# Patient Record
Sex: MALE | Race: WHITE | Employment: OTHER | URBAN - METROPOLITAN AREA
[De-identification: names, ages, dates, MRNs, and addresses within clinical notes are randomized per-mention and may not be internally consistent; named-entity substitution may affect disease eponyms.]

---

## 2017-01-03 ENCOUNTER — HOSPITAL ENCOUNTER (OUTPATIENT)
Dept: PHYSICAL THERAPY | Age: 82
Discharge: HOME | End: 2016-12-31
Admitting: ORTHOPAEDIC SURGERY

## 2017-01-03 ENCOUNTER — OFFICE VISIT (OUTPATIENT)
Dept: INTERNAL MEDICINE CLINIC | Age: 82
End: 2017-01-03

## 2017-01-03 VITALS
SYSTOLIC BLOOD PRESSURE: 118 MMHG | HEART RATE: 83 BPM | TEMPERATURE: 98 F | WEIGHT: 203 LBS | DIASTOLIC BLOOD PRESSURE: 72 MMHG | BODY MASS INDEX: 29.06 KG/M2 | OXYGEN SATURATION: 93 %

## 2017-01-03 DIAGNOSIS — J18.9 PNEUMONIA OF RIGHT LOWER LOBE DUE TO INFECTIOUS ORGANISM: ICD-10-CM

## 2017-01-03 PROCEDURE — 99214 OFFICE O/P EST MOD 30 MIN: CPT | Performed by: FAMILY MEDICINE

## 2017-01-03 RX ORDER — LEVOFLOXACIN 750 MG/1
750 TABLET ORAL DAILY
Qty: 7 TABLET | Refills: 0 | Status: SHIPPED | OUTPATIENT
Start: 2017-01-03 | End: 2017-01-10

## 2017-01-03 RX ORDER — GUAIFENESIN 600 MG/1
600 TABLET, EXTENDED RELEASE ORAL 2 TIMES DAILY
Qty: 14 TABLET | Refills: 0 | Status: SHIPPED | OUTPATIENT
Start: 2017-01-03 | End: 2017-01-10

## 2017-01-03 ASSESSMENT — ENCOUNTER SYMPTOMS
NAUSEA: 0
SINUS PRESSURE: 0
DIARRHEA: 0
COUGH: 1
RHINORRHEA: 0
CHEST TIGHTNESS: 0
SORE THROAT: 0
VOMITING: 0
WHEEZING: 0

## 2017-01-03 ASSESSMENT — PATIENT HEALTH QUESTIONNAIRE - PHQ9
SUM OF ALL RESPONSES TO PHQ QUESTIONS 1-9: 0
2. FEELING DOWN, DEPRESSED OR HOPELESS: 0
1. LITTLE INTEREST OR PLEASURE IN DOING THINGS: 0
SUM OF ALL RESPONSES TO PHQ9 QUESTIONS 1 & 2: 0

## 2017-01-04 ENCOUNTER — TELEPHONE (OUTPATIENT)
Dept: INTERNAL MEDICINE CLINIC | Age: 82
End: 2017-01-04

## 2017-01-10 ENCOUNTER — OFFICE VISIT (OUTPATIENT)
Dept: INTERNAL MEDICINE CLINIC | Age: 82
End: 2017-01-10

## 2017-01-10 VITALS
OXYGEN SATURATION: 96 % | WEIGHT: 203 LBS | HEART RATE: 100 BPM | SYSTOLIC BLOOD PRESSURE: 130 MMHG | DIASTOLIC BLOOD PRESSURE: 70 MMHG | HEIGHT: 70 IN | BODY MASS INDEX: 29.06 KG/M2

## 2017-01-10 DIAGNOSIS — I10 ESSENTIAL HYPERTENSION: Chronic | ICD-10-CM

## 2017-01-10 DIAGNOSIS — E11.65 UNCONTROLLED TYPE 2 DIABETES MELLITUS WITH HYPERGLYCEMIA, WITH LONG-TERM CURRENT USE OF INSULIN (HCC): Primary | ICD-10-CM

## 2017-01-10 DIAGNOSIS — Z79.4 UNCONTROLLED TYPE 2 DIABETES MELLITUS WITH HYPERGLYCEMIA, WITH LONG-TERM CURRENT USE OF INSULIN (HCC): Primary | ICD-10-CM

## 2017-01-10 LAB
CREATININE URINE POCT: NORMAL
HBA1C MFR BLD: 8.9 %
MICROALBUMIN/CREAT 24H UR: NORMAL MG/G{CREAT}

## 2017-01-10 PROCEDURE — 82044 UR ALBUMIN SEMIQUANTITATIVE: CPT | Performed by: NURSE PRACTITIONER

## 2017-01-10 PROCEDURE — 83036 HEMOGLOBIN GLYCOSYLATED A1C: CPT | Performed by: NURSE PRACTITIONER

## 2017-01-10 PROCEDURE — 99214 OFFICE O/P EST MOD 30 MIN: CPT | Performed by: NURSE PRACTITIONER

## 2017-01-10 ASSESSMENT — ENCOUNTER SYMPTOMS
CONSTIPATION: 0
EYES NEGATIVE: 1
BLOOD IN STOOL: 0
COLOR CHANGE: 0
DIARRHEA: 0
COUGH: 0
BACK PAIN: 0
SHORTNESS OF BREATH: 0

## 2017-01-13 ENCOUNTER — HOSPITAL ENCOUNTER (OUTPATIENT)
Dept: PHYSICAL THERAPY | Age: 82
Discharge: HOME | End: 2017-01-13
Admitting: ORTHOPAEDIC SURGERY

## 2017-01-19 ENCOUNTER — HOSPITAL ENCOUNTER (OUTPATIENT)
Dept: PHYSICAL THERAPY | Age: 82
Discharge: HOME | End: 2017-01-19
Admitting: ORTHOPAEDIC SURGERY

## 2017-01-24 ENCOUNTER — TELEPHONE (OUTPATIENT)
Dept: INTERNAL MEDICINE CLINIC | Age: 82
End: 2017-01-24

## 2017-01-26 ENCOUNTER — HOSPITAL ENCOUNTER (OUTPATIENT)
Dept: OTHER | Age: 82
Discharge: HOME | End: 2017-01-26
Attending: FAMILY MEDICINE | Admitting: FAMILY MEDICINE

## 2017-01-26 ENCOUNTER — HOSPITAL ENCOUNTER (OUTPATIENT)
Dept: PHYSICAL THERAPY | Age: 82
Discharge: HOME | End: 2017-01-26
Admitting: ORTHOPAEDIC SURGERY

## 2017-01-26 ENCOUNTER — OFFICE VISIT (OUTPATIENT)
Dept: INTERNAL MEDICINE CLINIC | Age: 82
End: 2017-01-26

## 2017-01-26 VITALS
WEIGHT: 208 LBS | DIASTOLIC BLOOD PRESSURE: 82 MMHG | BODY MASS INDEX: 29.78 KG/M2 | HEART RATE: 82 BPM | SYSTOLIC BLOOD PRESSURE: 130 MMHG | HEIGHT: 70 IN

## 2017-01-26 DIAGNOSIS — R07.81 RIB PAIN: Primary | ICD-10-CM

## 2017-01-26 DIAGNOSIS — R07.81 ANTERIOR PLEURITIC PAIN: ICD-10-CM

## 2017-01-26 DIAGNOSIS — R05.9 COUGH: ICD-10-CM

## 2017-01-26 PROCEDURE — 1123F ACP DISCUSS/DSCN MKR DOCD: CPT | Performed by: FAMILY MEDICINE

## 2017-01-26 PROCEDURE — 99214 OFFICE O/P EST MOD 30 MIN: CPT | Performed by: FAMILY MEDICINE

## 2017-01-26 PROCEDURE — G8598 ASA/ANTIPLAT THER USED: HCPCS | Performed by: FAMILY MEDICINE

## 2017-01-26 PROCEDURE — G8420 CALC BMI NORM PARAMETERS: HCPCS | Performed by: FAMILY MEDICINE

## 2017-01-26 PROCEDURE — G8484 FLU IMMUNIZE NO ADMIN: HCPCS | Performed by: FAMILY MEDICINE

## 2017-01-26 PROCEDURE — 1036F TOBACCO NON-USER: CPT | Performed by: FAMILY MEDICINE

## 2017-01-26 PROCEDURE — 4040F PNEUMOC VAC/ADMIN/RCVD: CPT | Performed by: FAMILY MEDICINE

## 2017-01-26 PROCEDURE — G8428 CUR MEDS NOT DOCUMENT: HCPCS | Performed by: FAMILY MEDICINE

## 2017-01-26 ASSESSMENT — PATIENT HEALTH QUESTIONNAIRE - PHQ9
2. FEELING DOWN, DEPRESSED OR HOPELESS: 0
SUM OF ALL RESPONSES TO PHQ9 QUESTIONS 1 & 2: 0
SUM OF ALL RESPONSES TO PHQ QUESTIONS 1-9: 0
1. LITTLE INTEREST OR PLEASURE IN DOING THINGS: 0

## 2017-01-29 ASSESSMENT — ENCOUNTER SYMPTOMS
SHORTNESS OF BREATH: 0
CHEST TIGHTNESS: 0
BLOOD IN STOOL: 0
COLOR CHANGE: 0
BACK PAIN: 0
EYES NEGATIVE: 1
CONSTIPATION: 0
COUGH: 1
DIARRHEA: 0

## 2017-02-02 ENCOUNTER — HOSPITAL ENCOUNTER (OUTPATIENT)
Dept: PHYSICAL THERAPY | Age: 82
Discharge: HOME | End: 2017-02-02
Admitting: ORTHOPAEDIC SURGERY

## 2017-02-07 ENCOUNTER — OFFICE VISIT (OUTPATIENT)
Dept: INTERNAL MEDICINE CLINIC | Age: 82
End: 2017-02-07

## 2017-02-07 VITALS
HEIGHT: 70 IN | BODY MASS INDEX: 29.2 KG/M2 | WEIGHT: 204 LBS | DIASTOLIC BLOOD PRESSURE: 76 MMHG | SYSTOLIC BLOOD PRESSURE: 124 MMHG

## 2017-02-07 DIAGNOSIS — E11.65 UNCONTROLLED TYPE 2 DIABETES MELLITUS WITH HYPERGLYCEMIA, WITH LONG-TERM CURRENT USE OF INSULIN (HCC): Primary | ICD-10-CM

## 2017-02-07 DIAGNOSIS — Z79.4 UNCONTROLLED TYPE 2 DIABETES MELLITUS WITH HYPERGLYCEMIA, WITH LONG-TERM CURRENT USE OF INSULIN (HCC): Primary | ICD-10-CM

## 2017-02-07 DIAGNOSIS — I10 ESSENTIAL HYPERTENSION: ICD-10-CM

## 2017-02-07 PROCEDURE — 99213 OFFICE O/P EST LOW 20 MIN: CPT | Performed by: NURSE PRACTITIONER

## 2017-02-07 PROCEDURE — 1123F ACP DISCUSS/DSCN MKR DOCD: CPT | Performed by: NURSE PRACTITIONER

## 2017-02-07 PROCEDURE — G8484 FLU IMMUNIZE NO ADMIN: HCPCS | Performed by: NURSE PRACTITIONER

## 2017-02-07 PROCEDURE — G8598 ASA/ANTIPLAT THER USED: HCPCS | Performed by: NURSE PRACTITIONER

## 2017-02-07 PROCEDURE — 4040F PNEUMOC VAC/ADMIN/RCVD: CPT | Performed by: NURSE PRACTITIONER

## 2017-02-07 PROCEDURE — 1036F TOBACCO NON-USER: CPT | Performed by: NURSE PRACTITIONER

## 2017-02-07 PROCEDURE — G8428 CUR MEDS NOT DOCUMENT: HCPCS | Performed by: NURSE PRACTITIONER

## 2017-02-07 PROCEDURE — G8420 CALC BMI NORM PARAMETERS: HCPCS | Performed by: NURSE PRACTITIONER

## 2017-02-07 ASSESSMENT — ENCOUNTER SYMPTOMS
COUGH: 0
CONSTIPATION: 0
SHORTNESS OF BREATH: 0
EYES NEGATIVE: 1
COLOR CHANGE: 0
DIARRHEA: 0
BACK PAIN: 0
BLOOD IN STOOL: 0

## 2017-02-08 ENCOUNTER — HOSPITAL ENCOUNTER (OUTPATIENT)
Dept: PHYSICAL THERAPY | Age: 82
Discharge: HOME | End: 2017-02-08
Admitting: ORTHOPAEDIC SURGERY

## 2017-02-13 RX ORDER — PEN NEEDLE, DIABETIC 31 GX5/16"
NEEDLE, DISPOSABLE MISCELLANEOUS
Qty: 100 EACH | Refills: 2 | Status: SHIPPED | OUTPATIENT
Start: 2017-02-13 | End: 2017-12-09 | Stop reason: SDUPTHER

## 2017-02-23 ENCOUNTER — HOSPITAL ENCOUNTER (OUTPATIENT)
Dept: GENERAL RADIOLOGY | Age: 82
Discharge: HOME | End: 2017-02-23
Attending: INTERNAL MEDICINE | Admitting: INTERNAL MEDICINE

## 2017-02-23 DIAGNOSIS — R13.13 DYSPHAGIA, CRICOPHARYNGEAL: ICD-10-CM

## 2017-03-07 ENCOUNTER — OFFICE VISIT (OUTPATIENT)
Dept: INTERNAL MEDICINE CLINIC | Age: 82
End: 2017-03-07

## 2017-03-07 VITALS
WEIGHT: 200 LBS | SYSTOLIC BLOOD PRESSURE: 120 MMHG | HEIGHT: 70 IN | DIASTOLIC BLOOD PRESSURE: 68 MMHG | BODY MASS INDEX: 28.63 KG/M2

## 2017-03-07 DIAGNOSIS — E11.65 TYPE 2 DIABETES MELLITUS WITH HYPERGLYCEMIA, WITH LONG-TERM CURRENT USE OF INSULIN (HCC): Primary | ICD-10-CM

## 2017-03-07 DIAGNOSIS — Z79.4 TYPE 2 DIABETES MELLITUS WITH HYPERGLYCEMIA, WITH LONG-TERM CURRENT USE OF INSULIN (HCC): Primary | ICD-10-CM

## 2017-03-07 DIAGNOSIS — I10 ESSENTIAL HYPERTENSION: Chronic | ICD-10-CM

## 2017-03-07 LAB — HBA1C MFR BLD: 8.9 %

## 2017-03-07 PROCEDURE — 1036F TOBACCO NON-USER: CPT | Performed by: NURSE PRACTITIONER

## 2017-03-07 PROCEDURE — G8427 DOCREV CUR MEDS BY ELIG CLIN: HCPCS | Performed by: NURSE PRACTITIONER

## 2017-03-07 PROCEDURE — 4040F PNEUMOC VAC/ADMIN/RCVD: CPT | Performed by: NURSE PRACTITIONER

## 2017-03-07 PROCEDURE — 99214 OFFICE O/P EST MOD 30 MIN: CPT | Performed by: NURSE PRACTITIONER

## 2017-03-07 PROCEDURE — 1123F ACP DISCUSS/DSCN MKR DOCD: CPT | Performed by: NURSE PRACTITIONER

## 2017-03-07 PROCEDURE — G8598 ASA/ANTIPLAT THER USED: HCPCS | Performed by: NURSE PRACTITIONER

## 2017-03-07 PROCEDURE — G8484 FLU IMMUNIZE NO ADMIN: HCPCS | Performed by: NURSE PRACTITIONER

## 2017-03-07 PROCEDURE — G8420 CALC BMI NORM PARAMETERS: HCPCS | Performed by: NURSE PRACTITIONER

## 2017-03-07 PROCEDURE — 83036 HEMOGLOBIN GLYCOSYLATED A1C: CPT | Performed by: NURSE PRACTITIONER

## 2017-03-07 ASSESSMENT — ENCOUNTER SYMPTOMS
COLOR CHANGE: 0
SHORTNESS OF BREATH: 0
DIARRHEA: 0
EYES NEGATIVE: 1
BLOOD IN STOOL: 0
COUGH: 0
BACK PAIN: 0
CONSTIPATION: 0

## 2017-03-16 ENCOUNTER — OFFICE VISIT (OUTPATIENT)
Dept: ORTHOPEDIC SURGERY | Age: 82
End: 2017-03-16

## 2017-03-16 VITALS
SYSTOLIC BLOOD PRESSURE: 130 MMHG | HEIGHT: 70 IN | BODY MASS INDEX: 28.63 KG/M2 | HEART RATE: 78 BPM | WEIGHT: 199.96 LBS | DIASTOLIC BLOOD PRESSURE: 70 MMHG

## 2017-03-16 DIAGNOSIS — S72.001D CLOSED FRACTURE OF NECK OF RIGHT FEMUR WITH ROUTINE HEALING, SUBSEQUENT ENCOUNTER: Primary | ICD-10-CM

## 2017-03-16 PROCEDURE — G8484 FLU IMMUNIZE NO ADMIN: HCPCS | Performed by: ORTHOPAEDIC SURGERY

## 2017-03-16 PROCEDURE — G8427 DOCREV CUR MEDS BY ELIG CLIN: HCPCS | Performed by: ORTHOPAEDIC SURGERY

## 2017-03-16 PROCEDURE — 1123F ACP DISCUSS/DSCN MKR DOCD: CPT | Performed by: ORTHOPAEDIC SURGERY

## 2017-03-16 PROCEDURE — G8598 ASA/ANTIPLAT THER USED: HCPCS | Performed by: ORTHOPAEDIC SURGERY

## 2017-03-16 PROCEDURE — 99213 OFFICE O/P EST LOW 20 MIN: CPT | Performed by: ORTHOPAEDIC SURGERY

## 2017-03-16 PROCEDURE — 4040F PNEUMOC VAC/ADMIN/RCVD: CPT | Performed by: ORTHOPAEDIC SURGERY

## 2017-03-16 PROCEDURE — G8420 CALC BMI NORM PARAMETERS: HCPCS | Performed by: ORTHOPAEDIC SURGERY

## 2017-03-16 PROCEDURE — 1036F TOBACCO NON-USER: CPT | Performed by: ORTHOPAEDIC SURGERY

## 2017-03-23 ENCOUNTER — TELEPHONE (OUTPATIENT)
Dept: ORTHOPEDIC SURGERY | Age: 82
End: 2017-03-23

## 2017-03-29 ENCOUNTER — TELEPHONE (OUTPATIENT)
Dept: INTERNAL MEDICINE CLINIC | Age: 82
End: 2017-03-29

## 2017-04-14 ENCOUNTER — TELEPHONE (OUTPATIENT)
Dept: INTERNAL MEDICINE CLINIC | Age: 82
End: 2017-04-14

## 2017-04-19 ENCOUNTER — OFFICE VISIT (OUTPATIENT)
Dept: INTERNAL MEDICINE CLINIC | Age: 82
End: 2017-04-19

## 2017-04-19 VITALS
WEIGHT: 207 LBS | BODY MASS INDEX: 29.63 KG/M2 | OXYGEN SATURATION: 95 % | DIASTOLIC BLOOD PRESSURE: 58 MMHG | HEART RATE: 78 BPM | TEMPERATURE: 98.9 F | SYSTOLIC BLOOD PRESSURE: 110 MMHG | HEIGHT: 70 IN

## 2017-04-19 DIAGNOSIS — E11.65 UNCONTROLLED TYPE 2 DIABETES MELLITUS WITH HYPERGLYCEMIA, WITH LONG-TERM CURRENT USE OF INSULIN (HCC): ICD-10-CM

## 2017-04-19 DIAGNOSIS — J30.2 SEASONAL ALLERGIC RHINITIS, UNSPECIFIED ALLERGIC RHINITIS TRIGGER: Primary | ICD-10-CM

## 2017-04-19 DIAGNOSIS — R06.02 SOB (SHORTNESS OF BREATH): ICD-10-CM

## 2017-04-19 DIAGNOSIS — I10 ESSENTIAL HYPERTENSION: Chronic | ICD-10-CM

## 2017-04-19 DIAGNOSIS — Z79.4 UNCONTROLLED TYPE 2 DIABETES MELLITUS WITH HYPERGLYCEMIA, WITH LONG-TERM CURRENT USE OF INSULIN (HCC): ICD-10-CM

## 2017-04-19 PROCEDURE — 4040F PNEUMOC VAC/ADMIN/RCVD: CPT | Performed by: NURSE PRACTITIONER

## 2017-04-19 PROCEDURE — G8420 CALC BMI NORM PARAMETERS: HCPCS | Performed by: NURSE PRACTITIONER

## 2017-04-19 PROCEDURE — 1123F ACP DISCUSS/DSCN MKR DOCD: CPT | Performed by: NURSE PRACTITIONER

## 2017-04-19 PROCEDURE — 1036F TOBACCO NON-USER: CPT | Performed by: NURSE PRACTITIONER

## 2017-04-19 PROCEDURE — G8598 ASA/ANTIPLAT THER USED: HCPCS | Performed by: NURSE PRACTITIONER

## 2017-04-19 PROCEDURE — G8427 DOCREV CUR MEDS BY ELIG CLIN: HCPCS | Performed by: NURSE PRACTITIONER

## 2017-04-19 PROCEDURE — 99214 OFFICE O/P EST MOD 30 MIN: CPT | Performed by: NURSE PRACTITIONER

## 2017-04-19 ASSESSMENT — ENCOUNTER SYMPTOMS
COLOR CHANGE: 0
BLOOD IN STOOL: 0
COUGH: 0
DIARRHEA: 0
SHORTNESS OF BREATH: 1
EYES NEGATIVE: 1
BACK PAIN: 0
CONSTIPATION: 0

## 2017-04-28 ENCOUNTER — OFFICE VISIT (OUTPATIENT)
Dept: INTERNAL MEDICINE CLINIC | Age: 82
End: 2017-04-28

## 2017-04-28 VITALS
HEIGHT: 70 IN | BODY MASS INDEX: 29.49 KG/M2 | WEIGHT: 206 LBS | SYSTOLIC BLOOD PRESSURE: 120 MMHG | OXYGEN SATURATION: 92 % | TEMPERATURE: 98.3 F | DIASTOLIC BLOOD PRESSURE: 60 MMHG | HEART RATE: 75 BPM

## 2017-04-28 DIAGNOSIS — I10 ESSENTIAL HYPERTENSION: Chronic | ICD-10-CM

## 2017-04-28 DIAGNOSIS — Z79.4 UNCONTROLLED TYPE 2 DIABETES MELLITUS WITH HYPERGLYCEMIA, WITH LONG-TERM CURRENT USE OF INSULIN (HCC): Primary | ICD-10-CM

## 2017-04-28 DIAGNOSIS — E11.65 UNCONTROLLED TYPE 2 DIABETES MELLITUS WITH HYPERGLYCEMIA, WITH LONG-TERM CURRENT USE OF INSULIN (HCC): Primary | ICD-10-CM

## 2017-04-28 PROCEDURE — 1036F TOBACCO NON-USER: CPT | Performed by: NURSE PRACTITIONER

## 2017-04-28 PROCEDURE — 4040F PNEUMOC VAC/ADMIN/RCVD: CPT | Performed by: NURSE PRACTITIONER

## 2017-04-28 PROCEDURE — G8598 ASA/ANTIPLAT THER USED: HCPCS | Performed by: NURSE PRACTITIONER

## 2017-04-28 PROCEDURE — G8420 CALC BMI NORM PARAMETERS: HCPCS | Performed by: NURSE PRACTITIONER

## 2017-04-28 PROCEDURE — 1123F ACP DISCUSS/DSCN MKR DOCD: CPT | Performed by: NURSE PRACTITIONER

## 2017-04-28 PROCEDURE — G8427 DOCREV CUR MEDS BY ELIG CLIN: HCPCS | Performed by: NURSE PRACTITIONER

## 2017-04-28 PROCEDURE — 99213 OFFICE O/P EST LOW 20 MIN: CPT | Performed by: NURSE PRACTITIONER

## 2017-04-28 ASSESSMENT — ENCOUNTER SYMPTOMS
COLOR CHANGE: 0
EYES NEGATIVE: 1
BLOOD IN STOOL: 0
CONSTIPATION: 0
SHORTNESS OF BREATH: 0
BACK PAIN: 0
DIARRHEA: 0
COUGH: 0

## 2017-05-02 ENCOUNTER — OFFICE VISIT (OUTPATIENT)
Dept: INTERNAL MEDICINE CLINIC | Age: 82
End: 2017-05-02

## 2017-05-02 VITALS
DIASTOLIC BLOOD PRESSURE: 60 MMHG | HEIGHT: 70 IN | WEIGHT: 205 LBS | SYSTOLIC BLOOD PRESSURE: 120 MMHG | HEART RATE: 84 BPM | OXYGEN SATURATION: 93 % | TEMPERATURE: 98.7 F | BODY MASS INDEX: 29.35 KG/M2

## 2017-05-02 DIAGNOSIS — R53.1 WEAKNESS: ICD-10-CM

## 2017-05-02 DIAGNOSIS — J06.9 UPPER RESPIRATORY TRACT INFECTION, UNSPECIFIED TYPE: Primary | ICD-10-CM

## 2017-05-02 DIAGNOSIS — R09.82 PND (POST-NASAL DRIP): ICD-10-CM

## 2017-05-02 DIAGNOSIS — I95.2 HYPOTENSION DUE TO DRUGS: ICD-10-CM

## 2017-05-02 PROCEDURE — 1036F TOBACCO NON-USER: CPT | Performed by: NURSE PRACTITIONER

## 2017-05-02 PROCEDURE — G8598 ASA/ANTIPLAT THER USED: HCPCS | Performed by: NURSE PRACTITIONER

## 2017-05-02 PROCEDURE — 1123F ACP DISCUSS/DSCN MKR DOCD: CPT | Performed by: NURSE PRACTITIONER

## 2017-05-02 PROCEDURE — 99214 OFFICE O/P EST MOD 30 MIN: CPT | Performed by: NURSE PRACTITIONER

## 2017-05-02 PROCEDURE — 4040F PNEUMOC VAC/ADMIN/RCVD: CPT | Performed by: NURSE PRACTITIONER

## 2017-05-02 PROCEDURE — G8428 CUR MEDS NOT DOCUMENT: HCPCS | Performed by: NURSE PRACTITIONER

## 2017-05-02 PROCEDURE — G8420 CALC BMI NORM PARAMETERS: HCPCS | Performed by: NURSE PRACTITIONER

## 2017-05-02 RX ORDER — AMOXICILLIN 500 MG/1
500 CAPSULE ORAL 3 TIMES DAILY
Qty: 30 CAPSULE | Refills: 0 | Status: SHIPPED | OUTPATIENT
Start: 2017-05-02 | End: 2017-05-12

## 2017-05-02 ASSESSMENT — ENCOUNTER SYMPTOMS
VOICE CHANGE: 1
COUGH: 0
BACK PAIN: 1
RHINORRHEA: 1
EYES NEGATIVE: 1
BLOOD IN STOOL: 0
CONSTIPATION: 0
COLOR CHANGE: 0
DIARRHEA: 0
SHORTNESS OF BREATH: 0

## 2017-05-03 RX ORDER — CALCIUM CITRATE/VITAMIN D3 200MG-6.25
TABLET ORAL
Qty: 100 STRIP | Refills: 0 | Status: SHIPPED | OUTPATIENT
Start: 2017-05-03 | End: 2017-06-06 | Stop reason: SDUPTHER

## 2017-06-09 ENCOUNTER — OFFICE VISIT (OUTPATIENT)
Dept: INTERNAL MEDICINE CLINIC | Age: 82
End: 2017-06-09

## 2017-06-09 VITALS
SYSTOLIC BLOOD PRESSURE: 134 MMHG | HEIGHT: 70 IN | DIASTOLIC BLOOD PRESSURE: 66 MMHG | WEIGHT: 209 LBS | BODY MASS INDEX: 29.92 KG/M2

## 2017-06-09 DIAGNOSIS — Z79.4 UNCONTROLLED TYPE 2 DIABETES MELLITUS WITH HYPERGLYCEMIA, WITH LONG-TERM CURRENT USE OF INSULIN (HCC): Primary | ICD-10-CM

## 2017-06-09 DIAGNOSIS — R41.3 MEMORY CHANGE: ICD-10-CM

## 2017-06-09 DIAGNOSIS — I10 ESSENTIAL HYPERTENSION: Chronic | ICD-10-CM

## 2017-06-09 DIAGNOSIS — E11.65 UNCONTROLLED TYPE 2 DIABETES MELLITUS WITH HYPERGLYCEMIA, WITH LONG-TERM CURRENT USE OF INSULIN (HCC): Primary | ICD-10-CM

## 2017-06-09 LAB — HBA1C MFR BLD: 8.3 %

## 2017-06-09 PROCEDURE — 99214 OFFICE O/P EST MOD 30 MIN: CPT | Performed by: NURSE PRACTITIONER

## 2017-06-09 PROCEDURE — 83036 HEMOGLOBIN GLYCOSYLATED A1C: CPT | Performed by: NURSE PRACTITIONER

## 2017-06-09 PROCEDURE — G8598 ASA/ANTIPLAT THER USED: HCPCS | Performed by: NURSE PRACTITIONER

## 2017-06-09 PROCEDURE — 1123F ACP DISCUSS/DSCN MKR DOCD: CPT | Performed by: NURSE PRACTITIONER

## 2017-06-09 PROCEDURE — G8427 DOCREV CUR MEDS BY ELIG CLIN: HCPCS | Performed by: NURSE PRACTITIONER

## 2017-06-09 PROCEDURE — 4040F PNEUMOC VAC/ADMIN/RCVD: CPT | Performed by: NURSE PRACTITIONER

## 2017-06-09 PROCEDURE — G8420 CALC BMI NORM PARAMETERS: HCPCS | Performed by: NURSE PRACTITIONER

## 2017-06-09 PROCEDURE — 1036F TOBACCO NON-USER: CPT | Performed by: NURSE PRACTITIONER

## 2017-06-09 ASSESSMENT — ENCOUNTER SYMPTOMS
COUGH: 0
BLOOD IN STOOL: 0
SHORTNESS OF BREATH: 0
COLOR CHANGE: 0
EYES NEGATIVE: 1
DIARRHEA: 0
BACK PAIN: 0
CONSTIPATION: 0

## 2017-06-12 ENCOUNTER — TELEPHONE (OUTPATIENT)
Dept: PULMONOLOGY | Age: 82
End: 2017-06-12

## 2017-06-19 ENCOUNTER — TELEPHONE (OUTPATIENT)
Dept: INTERNAL MEDICINE CLINIC | Age: 82
End: 2017-06-19

## 2017-07-19 ENCOUNTER — OFFICE VISIT (OUTPATIENT)
Dept: INTERNAL MEDICINE CLINIC | Age: 82
End: 2017-07-19

## 2017-07-19 VITALS
HEIGHT: 70 IN | BODY MASS INDEX: 29.63 KG/M2 | SYSTOLIC BLOOD PRESSURE: 124 MMHG | WEIGHT: 207 LBS | DIASTOLIC BLOOD PRESSURE: 60 MMHG

## 2017-07-19 DIAGNOSIS — I10 ESSENTIAL HYPERTENSION: Chronic | ICD-10-CM

## 2017-07-19 DIAGNOSIS — E11.65 UNCONTROLLED TYPE 2 DIABETES MELLITUS WITH HYPERGLYCEMIA, WITH LONG-TERM CURRENT USE OF INSULIN (HCC): Primary | ICD-10-CM

## 2017-07-19 DIAGNOSIS — Z79.4 UNCONTROLLED TYPE 2 DIABETES MELLITUS WITH HYPERGLYCEMIA, WITH LONG-TERM CURRENT USE OF INSULIN (HCC): Primary | ICD-10-CM

## 2017-07-19 PROCEDURE — 1036F TOBACCO NON-USER: CPT | Performed by: NURSE PRACTITIONER

## 2017-07-19 PROCEDURE — 4040F PNEUMOC VAC/ADMIN/RCVD: CPT | Performed by: NURSE PRACTITIONER

## 2017-07-19 PROCEDURE — G8598 ASA/ANTIPLAT THER USED: HCPCS | Performed by: NURSE PRACTITIONER

## 2017-07-19 PROCEDURE — 1123F ACP DISCUSS/DSCN MKR DOCD: CPT | Performed by: NURSE PRACTITIONER

## 2017-07-19 PROCEDURE — 99213 OFFICE O/P EST LOW 20 MIN: CPT | Performed by: NURSE PRACTITIONER

## 2017-07-19 PROCEDURE — G8419 CALC BMI OUT NRM PARAM NOF/U: HCPCS | Performed by: NURSE PRACTITIONER

## 2017-07-19 PROCEDURE — G8427 DOCREV CUR MEDS BY ELIG CLIN: HCPCS | Performed by: NURSE PRACTITIONER

## 2017-07-19 RX ORDER — FLUTICASONE FUROATE AND VILANTEROL TRIFENATATE 100; 25 UG/1; UG/1
POWDER RESPIRATORY (INHALATION)
COMMUNITY
Start: 2017-06-27 | End: 2018-10-26

## 2017-07-19 ASSESSMENT — ENCOUNTER SYMPTOMS
COUGH: 0
BACK PAIN: 0
CONSTIPATION: 0
SHORTNESS OF BREATH: 0
COLOR CHANGE: 0
DIARRHEA: 0
BLOOD IN STOOL: 0
EYES NEGATIVE: 1

## 2017-07-25 ENCOUNTER — TELEPHONE (OUTPATIENT)
Dept: INTERNAL MEDICINE CLINIC | Age: 82
End: 2017-07-25

## 2017-08-25 RX ORDER — INSULIN DETEMIR 100 [IU]/ML
INJECTION, SOLUTION SUBCUTANEOUS
Qty: 15 ML | Refills: 0 | Status: SHIPPED | OUTPATIENT
Start: 2017-08-25 | End: 2017-10-01 | Stop reason: SDUPTHER

## 2017-09-05 RX ORDER — GLYBURIDE 5 MG/1
TABLET ORAL
Qty: 180 TABLET | Refills: 1 | Status: SHIPPED | OUTPATIENT
Start: 2017-09-05 | End: 2018-03-06 | Stop reason: SDUPTHER

## 2017-10-02 RX ORDER — BLOOD SUGAR DIAGNOSTIC
STRIP MISCELLANEOUS
Qty: 300 STRIP | Refills: 0 | Status: SHIPPED | OUTPATIENT
Start: 2017-10-02 | End: 2018-10-25

## 2017-10-02 RX ORDER — LANCETS
EACH MISCELLANEOUS
Qty: 100 EACH | Refills: 0 | Status: SHIPPED | OUTPATIENT
Start: 2017-10-02 | End: 2018-10-26

## 2017-10-02 NOTE — TELEPHONE ENCOUNTER
Refill request for lancets / test strips medication.      Name of Pharmacy- [facility]    Last visit - 7-19-17     Pending visit - none pending    Last refill -8-24-16 / 7-28-17    Medication Contract signed -   Last Robin lenz-     Additional Comments

## 2017-10-03 RX ORDER — INSULIN DETEMIR 100 [IU]/ML
INJECTION, SOLUTION SUBCUTANEOUS
Qty: 15 PEN | Refills: 3 | Status: SHIPPED | OUTPATIENT
Start: 2017-10-03 | End: 2019-02-01 | Stop reason: SDUPTHER

## 2017-10-11 ENCOUNTER — HOSPITAL ENCOUNTER (OUTPATIENT)
Dept: SURGERY | Age: 82
Discharge: HOME | End: 2017-10-11
Attending: OPHTHALMOLOGY | Admitting: OPHTHALMOLOGY

## 2017-10-11 VITALS
SYSTOLIC BLOOD PRESSURE: 160 MMHG | RESPIRATION RATE: 14 BRPM | OXYGEN SATURATION: 95 % | DIASTOLIC BLOOD PRESSURE: 71 MMHG | HEART RATE: 66 BPM

## 2017-10-11 RX ORDER — PROPARACAINE HYDROCHLORIDE 5 MG/ML
1 SOLUTION/ DROPS OPHTHALMIC
Status: COMPLETED | OUTPATIENT
Start: 2017-10-11 | End: 2017-10-11

## 2017-10-11 RX ORDER — PREDNISOLONE ACETATE 10 MG/ML
1 SUSPENSION/ DROPS OPHTHALMIC
Status: COMPLETED | OUTPATIENT
Start: 2017-10-11 | End: 2017-10-11

## 2017-10-11 RX ORDER — APRACLONIDINE HYDROCHLORIDE 5 MG/ML
1 SOLUTION/ DROPS OPHTHALMIC 2 TIMES DAILY PRN
Status: DISCONTINUED | OUTPATIENT
Start: 2017-10-11 | End: 2017-10-12 | Stop reason: HOSPADM

## 2017-10-11 RX ORDER — SOFT LENS RINSE,STORE SOLUTION
1 SOLUTION, NON-ORAL MISCELLANEOUS
Status: COMPLETED | OUTPATIENT
Start: 2017-10-11 | End: 2017-10-11

## 2017-10-11 RX ORDER — PILOCARPINE HYDROCHLORIDE 20 MG/ML
1 SOLUTION/ DROPS OPHTHALMIC
Status: COMPLETED | OUTPATIENT
Start: 2017-10-11 | End: 2017-10-11

## 2017-10-11 RX ADMIN — APRACLONIDINE HYDROCHLORIDE 1 DROP: 5 SOLUTION/ DROPS OPHTHALMIC at 12:29

## 2017-10-11 RX ADMIN — PREDNISOLONE ACETATE 1 DROP: 10 SUSPENSION/ DROPS OPHTHALMIC at 12:29

## 2017-10-11 RX ADMIN — PROPARACAINE HYDROCHLORIDE 1 DROP: 5 SOLUTION/ DROPS OPHTHALMIC at 12:25

## 2017-10-11 RX ADMIN — PILOCARPINE HYDROCHLORIDE 1 DROP: 20 SOLUTION/ DROPS OPHTHALMIC at 11:58

## 2017-10-11 RX ADMIN — APRACLONIDINE HYDROCHLORIDE 1 DROP: 5 SOLUTION/ DROPS OPHTHALMIC at 11:54

## 2017-10-11 RX ADMIN — Medication 1 DROP: at 12:29

## 2017-10-11 NOTE — PROGRESS NOTES
Patient received discharge instruction and tolerated procedure well. All questions answered. ** Prescription drop instruction given. Patient left via self.

## 2017-10-16 ENCOUNTER — CARE COORDINATION (OUTPATIENT)
Dept: CARE COORDINATION | Age: 82
End: 2017-10-16

## 2017-10-18 ENCOUNTER — CARE COORDINATION (OUTPATIENT)
Dept: CARE COORDINATION | Age: 82
End: 2017-10-18

## 2017-10-18 NOTE — CARE COORDINATION
No longer with [facility] PCP.  He is being followed by Dr. Naima Rousseau with [facility].

## 2017-11-08 ENCOUNTER — HOSPITAL ENCOUNTER (OUTPATIENT)
Dept: SURGERY | Age: 82
Discharge: HOME | End: 2017-11-08
Attending: OPHTHALMOLOGY | Admitting: OPHTHALMOLOGY

## 2017-11-08 VITALS — RESPIRATION RATE: 16 BRPM | HEART RATE: 64 BPM | SYSTOLIC BLOOD PRESSURE: 148 MMHG | DIASTOLIC BLOOD PRESSURE: 66 MMHG

## 2017-11-08 RX ORDER — APRACLONIDINE HYDROCHLORIDE 5 MG/ML
1 SOLUTION/ DROPS OPHTHALMIC 2 TIMES DAILY PRN
Status: COMPLETED | OUTPATIENT
Start: 2017-11-08 | End: 2017-11-08

## 2017-11-08 RX ORDER — SOFT LENS RINSE,STORE SOLUTION
1 SOLUTION, NON-ORAL MISCELLANEOUS
Status: COMPLETED | OUTPATIENT
Start: 2017-11-08 | End: 2017-11-08

## 2017-11-08 RX ORDER — PROPARACAINE HYDROCHLORIDE 5 MG/ML
1 SOLUTION/ DROPS OPHTHALMIC
Status: COMPLETED | OUTPATIENT
Start: 2017-11-08 | End: 2017-11-08

## 2017-11-08 RX ORDER — PILOCARPINE HYDROCHLORIDE 20 MG/ML
1 SOLUTION/ DROPS OPHTHALMIC
Status: COMPLETED | OUTPATIENT
Start: 2017-11-08 | End: 2017-11-08

## 2017-11-08 RX ORDER — PREDNISOLONE ACETATE 10 MG/ML
1 SUSPENSION/ DROPS OPHTHALMIC
Status: COMPLETED | OUTPATIENT
Start: 2017-11-08 | End: 2017-11-08

## 2017-11-08 RX ADMIN — APRACLONIDINE HYDROCHLORIDE 1 DROP: 5 SOLUTION/ DROPS OPHTHALMIC at 13:00

## 2017-11-08 RX ADMIN — PILOCARPINE HYDROCHLORIDE 1 DROP: 20 SOLUTION/ DROPS OPHTHALMIC at 12:19

## 2017-11-08 RX ADMIN — PROPARACAINE HYDROCHLORIDE 1 DROP: 5 SOLUTION/ DROPS OPHTHALMIC at 12:55

## 2017-11-08 RX ADMIN — Medication 1 DROP: at 13:00

## 2017-11-08 RX ADMIN — APRACLONIDINE HYDROCHLORIDE 1 DROP: 5 SOLUTION/ DROPS OPHTHALMIC at 12:22

## 2017-11-08 RX ADMIN — PREDNISOLONE ACETATE 1 DROP: 10 SUSPENSION/ DROPS OPHTHALMIC at 13:00

## 2017-11-08 NOTE — OP NOTE
Mahesh Ibarra    Pre-operative diagnosis:  Anatomically Narrow Angle the left eye    Post-operative diagnosis:  Same    Procedure Performed:  YAG laser iridotomy the left eye                                      Anesthesia:  Topical anesthesia     Complications:  None    Procedure: The patient was taken to the laser room and following adequate miosis, was positioned in front of the YAG laser. The patient then received 34 applications of 3.0 millijoules to the temporal one-third of the iris with a nice iridotomy site performed and fluid seen to percolate from the posterior to the anterior chamber and an immediate deepening of the angle. The eye was then rinsed with sterile saline. One drop of Pred Forte 1% was then administered. One drop of Iopidine (o.5%) was also administered. The patient tolerated the procedure extremely well and will be followed closely and conservatively as an outpatient in my office for pressure measurement and was discharged on Pred Forte qid and Iopidine bid.

## 2017-12-11 RX ORDER — PEN NEEDLE, DIABETIC 31 GX5/16"
NEEDLE, DISPOSABLE MISCELLANEOUS
Qty: 100 EACH | Refills: 1 | Status: SHIPPED | OUTPATIENT
Start: 2017-12-11 | End: 2018-10-25

## 2017-12-14 ENCOUNTER — OFFICE VISIT (OUTPATIENT)
Dept: ORTHOPEDIC SURGERY | Age: 82
End: 2017-12-14

## 2017-12-14 VITALS
DIASTOLIC BLOOD PRESSURE: 70 MMHG | BODY MASS INDEX: 32.49 KG/M2 | HEART RATE: 67 BPM | WEIGHT: 207.01 LBS | SYSTOLIC BLOOD PRESSURE: 149 MMHG | HEIGHT: 67 IN

## 2017-12-14 VITALS — BODY MASS INDEX: 32.49 KG/M2 | WEIGHT: 207.01 LBS | HEIGHT: 67 IN

## 2017-12-14 DIAGNOSIS — M25.551 HIP PAIN, RIGHT: Primary | ICD-10-CM

## 2017-12-14 DIAGNOSIS — M54.50 ACUTE RIGHT-SIDED LOW BACK PAIN WITHOUT SCIATICA: ICD-10-CM

## 2017-12-14 DIAGNOSIS — M43.06 LUMBAR SPONDYLOLYSIS: Primary | ICD-10-CM

## 2017-12-14 DIAGNOSIS — S72.001D CLOSED FRACTURE OF NECK OF RIGHT FEMUR WITH ROUTINE HEALING: ICD-10-CM

## 2017-12-14 PROCEDURE — G8484 FLU IMMUNIZE NO ADMIN: HCPCS | Performed by: PHYSICAL MEDICINE & REHABILITATION

## 2017-12-14 PROCEDURE — 1123F ACP DISCUSS/DSCN MKR DOCD: CPT | Performed by: ORTHOPAEDIC SURGERY

## 2017-12-14 PROCEDURE — G8427 DOCREV CUR MEDS BY ELIG CLIN: HCPCS | Performed by: PHYSICAL MEDICINE & REHABILITATION

## 2017-12-14 PROCEDURE — G8417 CALC BMI ABV UP PARAM F/U: HCPCS | Performed by: ORTHOPAEDIC SURGERY

## 2017-12-14 PROCEDURE — G8598 ASA/ANTIPLAT THER USED: HCPCS | Performed by: ORTHOPAEDIC SURGERY

## 2017-12-14 PROCEDURE — 1123F ACP DISCUSS/DSCN MKR DOCD: CPT | Performed by: PHYSICAL MEDICINE & REHABILITATION

## 2017-12-14 PROCEDURE — 4040F PNEUMOC VAC/ADMIN/RCVD: CPT | Performed by: PHYSICAL MEDICINE & REHABILITATION

## 2017-12-14 PROCEDURE — G8484 FLU IMMUNIZE NO ADMIN: HCPCS | Performed by: ORTHOPAEDIC SURGERY

## 2017-12-14 PROCEDURE — 99203 OFFICE O/P NEW LOW 30 MIN: CPT | Performed by: PHYSICAL MEDICINE & REHABILITATION

## 2017-12-14 PROCEDURE — 1036F TOBACCO NON-USER: CPT | Performed by: PHYSICAL MEDICINE & REHABILITATION

## 2017-12-14 PROCEDURE — G8427 DOCREV CUR MEDS BY ELIG CLIN: HCPCS | Performed by: ORTHOPAEDIC SURGERY

## 2017-12-14 PROCEDURE — G8417 CALC BMI ABV UP PARAM F/U: HCPCS | Performed by: PHYSICAL MEDICINE & REHABILITATION

## 2017-12-14 PROCEDURE — 1036F TOBACCO NON-USER: CPT | Performed by: ORTHOPAEDIC SURGERY

## 2017-12-14 PROCEDURE — G8598 ASA/ANTIPLAT THER USED: HCPCS | Performed by: PHYSICAL MEDICINE & REHABILITATION

## 2017-12-14 PROCEDURE — 4040F PNEUMOC VAC/ADMIN/RCVD: CPT | Performed by: ORTHOPAEDIC SURGERY

## 2017-12-14 PROCEDURE — 73502 X-RAY EXAM HIP UNI 2-3 VIEWS: CPT | Performed by: ORTHOPAEDIC SURGERY

## 2017-12-14 PROCEDURE — 99213 OFFICE O/P EST LOW 20 MIN: CPT | Performed by: ORTHOPAEDIC SURGERY

## 2017-12-14 RX ORDER — HYDROCODONE BITARTRATE AND ACETAMINOPHEN 5; 325 MG/1; MG/1
TABLET ORAL
Qty: 28 TABLET | Refills: 0 | Status: SHIPPED | OUTPATIENT
Start: 2017-12-14 | End: 2018-01-26 | Stop reason: ALTCHOICE

## 2017-12-14 NOTE — PROGRESS NOTES
Chief Complaint:  Follow-up (Right femoral neck fracture, status post hip hemiarthroplasty sx 9/23/2016)      SUBJECTIVE:  Mahesh Ibarra is a 80 y.o. male who returns today for reevaluation of right hip, I did a hemiarthroplasty for femoral neck fracture 1 year ago, he has done quite well until 2-3 days ago he noticed posterior pain worse with bending forward, denies pain in the thigh, denies pain in the groin on the lateral aspect of his hip. He denies radiating pain down the leg or numbness or tingling throughout the leg or foot. Currently ambulating with a cane, denies falls for reinjury. He is here with his wife. Pain Assessment:  Pain Assessment  Location of Pain: Pelvis  Location Modifiers: Right  Severity of Pain: 10  Duration of Pain: A few hours  Frequency of Pain: Intermittent  Limiting Behavior: No  Relieving Factors: Rest  Result of Injury: Yes  Work-Related Injury: No  Are there other pain locations you wish to document?: No      OBJECTIVE:  Vital Signs:  Vitals:    12/14/17 1420   Weight: 207 lb 0.2 oz (93.9 kg)   Height: 5' 6.93\" (1.7 m)       Appearance: alert, well appearing, and in no distress, oriented to person, place, and time and normal appearing weight. Physical exam:   Incision is well-healed, there is no erythema, no tenderness to palpation groin or lateral aspect of the hip, no pain with gentle range of motion. Pain is all posterior located at the SI joint and low back. Pain with forward flexion. X-ray: 3 views of the right hip and AP pelvis were obtained and reviewed today show evidence of a hemiarthroplasty in appropriate position, no evidence of loosening, he does have subchondral cysts noted in the acetabulum, these are unchanged from his initial x-rays. No evidence of periprosthetic fracture. Assessment :  Low back pain    Impression:  Encounter Diagnoses   Name Primary?     Hip pain, right Yes    Closed fracture of neck of right femur with routine healing Office Procedures:  Orders Placed This Encounter   Procedures    Hip 2-3 Vw W Pelvis Right     No orders of the defined types were placed in this encounter. Treatment Plan:  His hip is appropriately positioned, he does not have any complaints at this time that are consistent with loosening or acetabular wear. His pain appears to be coming from his back, I will have him follow up with Dr. Diego Cota for further evaluation and treatment. Follow up with me as needed for right hip pain. Patient agrees with this plan, all of their questions were answered best of our ability and to their satisfaction.         Helen Brown

## 2017-12-14 NOTE — PROGRESS NOTES
[facility] [address]     Hyperlipemia     Hypertension     Ischemic colitis ([facility] [address] 10/07/14    Renal insufficiency     Shortness of breath on exertion     Sleep apnea     cpap    Syncope     Venous (peripheral) insufficiency       Past Surgical History:     Past Surgical History:   Procedure Laterality Date    APPENDECTOMY  1939    CARDIAC SURGERY  1990    cabg 4 vessel    CHOLECYSTECTOMY      COLONOSCOPY  12/12/03    ADENOMA    COLONOSCOPY  04/12/05    DIVERTICULOSIS    COLONOSCOPY  03/02/10    DIVERTICULOSIS    COLONOSCOPY  10/07/2014    Ischemic colitis   [facility] [place]    JOINT REPLACEMENT Right 1991    OTHER SURGICAL HISTORY Right 09/24/2016     right hip hemiarthroplasty      PACEMAKER PLACEMENT      PROSTATECTOMY  2007    REVISION TOTAL KNEE ARTHROPLASTY Left 6-18-14    REVISION LEFT TOTAL KNEE REPLACEMENT WITH FROZEN SECTION AND    SIGMOIDOSCOPY      UPPER GASTROINTESTINAL ENDOSCOPY  03/02/10    WNL                  Current Medications:     Current Outpatient Prescriptions:     B-D ULTRAFINE III SHORT PEN 31G X 8 MM MISC, USE DAILY WITH LEVEMIR INSULIN, Disp: 100 each, Rfl: 1    Probiotic Product (ALIGN PO), Take by mouth daily, Disp: , Rfl:     LEVEMIR FLEXTOUCH 100 UNIT/ML injection pen, INJECT 44 UNITS INTO THE SKIN DAILY, Disp: 15 Pen, Rfl: 3    [facility] Ultra Thin Lancets MISC, TEST TWICE DAILY AS DIRECTED, Disp: 100 each, Rfl: 0    ACCU-CHEK SMARTVIEW strip, TEST THREE TIMES DAILY, Disp: 300 strip, Rfl: 0    glyBURIDE (DIABETA) 5 MG tablet, TAKE ONE TABLET BY MOUTH TWICE A DAY, Disp: 180 tablet, Rfl: 1    BREO ELLIPTA 100-25 MCG/INH AEPB inhaler, , Disp: , Rfl:     metFORMIN (GLUCOPHAGE) 500 MG tablet, Take 1 tablet by mouth 2 times daily (with meals) 1/2 tablet twice a day, Disp: 30 tablet, Rfl: 3    Blood Glucose Monitoring Suppl (TRUE METRIX METER) W/DEVICE KIT, 1 KIT DOES NOT APPLY TWICE DAILY, Disp: 1 kit, Rfl: 0    spironolactone (ALDACTONE) 25 adequately groomed with no evidence of malnutrition. · Orientation: The patient is oriented to time, place and person. · Mood & Affect:The patient's mood and affect are appropriate   · Vascular: Examination reveals no swelling tenderness in upper or lower extremities. Good capillary refill  · Lymphatic: The lymphatic examination bilaterally reveals all areas to be without enlargement or induration  · Sensation: Sensation is intact without deficit  · Coordination/Balance: Good coordination       LUMBAR/SACRAL EXAMINATION:  · Inspection: Local inspection shows no step-off or bruising. Lumbar alignment is normal.  Sagittal and Coronal balance is neutral.      · Palpation:   No evidence of tenderness at the midline. No tenderness bilaterally at the paraspinal or trochanters. There is no step-off or paraspinal spasm. No tenderness in the right lower lumbar area reason,  · Range of Motion:  Any flexion or extension aggravate his right back pain  · Strength:   Strength testing is 5/5 in all muscle groups tested. · Special Tests:   Straight leg raise and crossed SLR negative. Leg length and pelvis level.  0 out of 5 Dalila's signs. · Skin: There are no rashes, ulcerations or lesions. · Reflexes: Reflexes are symmetrically absent at the patellar and ankle tendons. Clonus absent bilaterally at the feet. · Gait & station: Walks with a single point cane  · Additional Examinations:   · RIGHT LOWER EXTREMITY: Inspection/examination of the right lower extremity does not show any tenderness, deformity or injury. Range of motion is full. There is no gross instability. There are no rashes, ulcerations or lesions. Strength and tone are normal.  ·   · LEFT LOWER EXTREMITY:  Inspection/examination of the left lower extremity does not show any tenderness, deformity or injury. Range of motion is full. There is no gross instability. There are no rashes, ulcerations or lesions.   Strength and tone are normal.    Diagnostic Testing:      AP pelvis reviewed today shows right hip ORIF spondylosis and endplate spurring of the lumbar spine    Hemoglobin A1c over the summer as a 0.3    Is beginning creatinine are mildly elevated from [facility] labs which were reviewed    Impression:    Acute right back pain  Lumbar spondylosis  Coumadin therapy, renal insufficiency, incidental dependent diabetes        Plan:     Avoid prednisone and NSAIDs with his underlying medical problems  Hydrocodone 5/325 one half to one q.i.d. p.r.n.  #28she will check with his Coumadin DrLisa prior to taking this medicine  Physical therapy  2 week follow-up for pain control, if no better 2 views lumbar spine and MRI lumbar spine    JOEY Coppola

## 2017-12-28 ENCOUNTER — OFFICE VISIT (OUTPATIENT)
Dept: ORTHOPEDIC SURGERY | Age: 82
End: 2017-12-28

## 2017-12-28 VITALS
DIASTOLIC BLOOD PRESSURE: 83 MMHG | SYSTOLIC BLOOD PRESSURE: 159 MMHG | HEIGHT: 67 IN | HEART RATE: 86 BPM | WEIGHT: 207.01 LBS | BODY MASS INDEX: 32.49 KG/M2

## 2017-12-28 DIAGNOSIS — M43.06 LUMBAR SPONDYLOLYSIS: Primary | ICD-10-CM

## 2017-12-28 DIAGNOSIS — M54.50 ACUTE RIGHT-SIDED LOW BACK PAIN WITHOUT SCIATICA: ICD-10-CM

## 2017-12-28 PROCEDURE — 4040F PNEUMOC VAC/ADMIN/RCVD: CPT | Performed by: PHYSICIAN ASSISTANT

## 2017-12-28 PROCEDURE — 1036F TOBACCO NON-USER: CPT | Performed by: PHYSICIAN ASSISTANT

## 2017-12-28 PROCEDURE — G8598 ASA/ANTIPLAT THER USED: HCPCS | Performed by: PHYSICIAN ASSISTANT

## 2017-12-28 PROCEDURE — 1123F ACP DISCUSS/DSCN MKR DOCD: CPT | Performed by: PHYSICIAN ASSISTANT

## 2017-12-28 PROCEDURE — G8417 CALC BMI ABV UP PARAM F/U: HCPCS | Performed by: PHYSICIAN ASSISTANT

## 2017-12-28 PROCEDURE — G8427 DOCREV CUR MEDS BY ELIG CLIN: HCPCS | Performed by: PHYSICIAN ASSISTANT

## 2017-12-28 PROCEDURE — 99213 OFFICE O/P EST LOW 20 MIN: CPT | Performed by: PHYSICIAN ASSISTANT

## 2017-12-28 PROCEDURE — G8484 FLU IMMUNIZE NO ADMIN: HCPCS | Performed by: PHYSICIAN ASSISTANT

## 2017-12-28 RX ORDER — HYDROCODONE BITARTRATE AND ACETAMINOPHEN 5; 325 MG/1; MG/1
TABLET ORAL
Qty: 7 TABLET | Refills: 0 | Status: SHIPPED | OUTPATIENT
Start: 2017-12-28 | End: 2018-01-26 | Stop reason: ALTCHOICE

## 2017-12-28 NOTE — PROGRESS NOTES
6.93\" (1.7 m), weight 207 lb 0.2 oz (93.9 kg). GENERAL EXAM:  · General Apparence: Patient is adequately groomed with no evidence of malnutrition. · Orientation: The patient is oriented to time, place and person. · Mood & Affect:The patient's mood and affect are appropriate   · Sensation: Sensation is intact without deficit  ·   LUMBAR/SACRAL EXAMINATION:  · Inspection: Local inspection shows no step-off or bruising. Mild kyphosis   · Palpation:   No evidence of tenderness at the midline. No tenderness bilaterally at the paraspinal or trochanters. There is no step-off or paraspinal spasm. · Range of Motion:  Able to sit forward flexed w/out pain   · Strength:   Strength testing is 5/5 in all muscle groups tested. · Special Tests:   Straight leg raise and crossed SLR negative. Leg length and pelvis level.  0 out of 5 Dalila's signs. · Skin: There are no rashes, ulcerations or lesions. · Reflexes: Reflexes are symmetrically absent  at the patellar and ankle tendons. Clonus absent bilaterally at the feet. · Gait & station: Forward flexed mildly antalgic with cane  · Additional Examinations:   · RIGHT LOWER EXTREMITY: Inspection/examination of the right lower extremity does not show any tenderness, deformity or injury. Range of motion is full. There is no gross instability. There are no rashes, ulcerations or lesions. Strength and tone are normal.  · LEFT LOWER EXTREMITY:  Inspection/examination of the left lower extremity does not show any tenderness, deformity or injury. Range of motion is full. There is no gross instability. There are no rashes, ulcerations or lesions.   Strength and tone are normal.      Diagnostic Testing:   AP pelvis reviewed today shows right hip ORIF spondylosis and endplate spurring of the lumbar spine     Hemoglobin A1c over the summer as a 0.3     Is beginning creatinine are mildly elevated from [facility] labs which were reviewed      Impression:  1) Acute right mechanical back pain, improved   2) Lumbar spondylosis  3) H/o prostate cancer, renal insufficiency, DM  4) S/p right hip hemiarthroplasty, Dr. Shant Espinoza recent eval        Plan:  1) Norco 5/325 1/2 po BID PRN #7 then Tylenol PRN   2) F/u if pain returns or worsens for dedicated L xrays & L MRI           Dictated by Steph Rico PA-C, also seen & evaluated by Dr. Niya Floyd

## 2018-01-04 ENCOUNTER — OFFICE VISIT (OUTPATIENT)
Dept: ORTHOPEDIC SURGERY | Age: 83
End: 2018-01-04

## 2018-01-04 VITALS
DIASTOLIC BLOOD PRESSURE: 85 MMHG | SYSTOLIC BLOOD PRESSURE: 166 MMHG | HEART RATE: 87 BPM | BODY MASS INDEX: 32.49 KG/M2 | WEIGHT: 207.01 LBS | HEIGHT: 67 IN

## 2018-01-04 DIAGNOSIS — M51.36 DDD (DEGENERATIVE DISC DISEASE), LUMBAR: ICD-10-CM

## 2018-01-04 DIAGNOSIS — M54.5 LOW BACK PAIN, UNSPECIFIED BACK PAIN LATERALITY, UNSPECIFIED CHRONICITY, WITH SCIATICA PRESENCE UNSPECIFIED: Primary | ICD-10-CM

## 2018-01-04 DIAGNOSIS — S32.050D CLOSED COMPRESSION FRACTURE OF L5 LUMBAR VERTEBRA WITH ROUTINE HEALING, SUBSEQUENT ENCOUNTER: ICD-10-CM

## 2018-01-04 PROCEDURE — 99214 OFFICE O/P EST MOD 30 MIN: CPT | Performed by: PHYSICIAN ASSISTANT

## 2018-01-04 PROCEDURE — G8417 CALC BMI ABV UP PARAM F/U: HCPCS | Performed by: PHYSICIAN ASSISTANT

## 2018-01-04 PROCEDURE — 1123F ACP DISCUSS/DSCN MKR DOCD: CPT | Performed by: PHYSICIAN ASSISTANT

## 2018-01-04 PROCEDURE — G8484 FLU IMMUNIZE NO ADMIN: HCPCS | Performed by: PHYSICIAN ASSISTANT

## 2018-01-04 PROCEDURE — 72100 X-RAY EXAM L-S SPINE 2/3 VWS: CPT | Performed by: PHYSICIAN ASSISTANT

## 2018-01-04 PROCEDURE — 1036F TOBACCO NON-USER: CPT | Performed by: PHYSICIAN ASSISTANT

## 2018-01-04 PROCEDURE — 4040F PNEUMOC VAC/ADMIN/RCVD: CPT | Performed by: PHYSICIAN ASSISTANT

## 2018-01-04 PROCEDURE — L0642 LO SAG RI AN/POS PNL PRE OTS: HCPCS | Performed by: PHYSICIAN ASSISTANT

## 2018-01-04 PROCEDURE — G8598 ASA/ANTIPLAT THER USED: HCPCS | Performed by: PHYSICIAN ASSISTANT

## 2018-01-04 PROCEDURE — G8427 DOCREV CUR MEDS BY ELIG CLIN: HCPCS | Performed by: PHYSICIAN ASSISTANT

## 2018-01-09 DIAGNOSIS — M51.36 DDD (DEGENERATIVE DISC DISEASE), LUMBAR: Primary | ICD-10-CM

## 2018-01-17 ENCOUNTER — HOSPITAL ENCOUNTER (OUTPATIENT)
Dept: CT IMAGING | Age: 83
Discharge: HOME | End: 2018-01-17
Attending: PHYSICIAN ASSISTANT | Admitting: PHYSICIAN ASSISTANT

## 2018-01-17 DIAGNOSIS — M51.36 DDD (DEGENERATIVE DISC DISEASE), LUMBAR: ICD-10-CM

## 2018-01-17 DIAGNOSIS — M51.36 OTHER INTERVERTEBRAL DISC DEGENERATION, LUMBAR REGION: ICD-10-CM

## 2018-01-26 ENCOUNTER — HOSPITAL ENCOUNTER (OUTPATIENT)
Dept: OTHER | Age: 83
Discharge: HOME | End: 2018-01-26
Attending: PHYSICIAN ASSISTANT | Admitting: PHYSICIAN ASSISTANT

## 2018-01-26 ENCOUNTER — OFFICE VISIT (OUTPATIENT)
Dept: ORTHOPEDIC SURGERY | Age: 83
End: 2018-01-26

## 2018-01-26 VITALS
HEART RATE: 64 BPM | WEIGHT: 199.96 LBS | DIASTOLIC BLOOD PRESSURE: 82 MMHG | BODY MASS INDEX: 28.63 KG/M2 | HEIGHT: 70 IN | SYSTOLIC BLOOD PRESSURE: 159 MMHG

## 2018-01-26 DIAGNOSIS — S32.020D CLOSED COMPRESSION FRACTURE OF L2 LUMBAR VERTEBRA WITH ROUTINE HEALING, SUBSEQUENT ENCOUNTER: ICD-10-CM

## 2018-01-26 DIAGNOSIS — M51.36 DDD (DEGENERATIVE DISC DISEASE), LUMBAR: Primary | ICD-10-CM

## 2018-01-26 DIAGNOSIS — M43.06 LUMBAR SPONDYLOLYSIS: ICD-10-CM

## 2018-01-26 LAB
BASOPHILS ABSOLUTE: 0 K/UL (ref 0–0.2)
BASOPHILS RELATIVE PERCENT: 0.3 %
EOSINOPHILS ABSOLUTE: 0.1 K/UL (ref 0–0.6)
EOSINOPHILS RELATIVE PERCENT: 1.3 %
HCT VFR BLD CALC: 37.2 % (ref 40.5–52.5)
HEMOGLOBIN: 12.5 G/DL (ref 13.5–17.5)
LYMPHOCYTES ABSOLUTE: 1.6 K/UL (ref 1–5.1)
LYMPHOCYTES RELATIVE PERCENT: 21.1 %
MCH RBC QN AUTO: 30.1 PG (ref 26–34)
MCHC RBC AUTO-ENTMCNC: 33.5 G/DL (ref 31–36)
MCV RBC AUTO: 89.9 FL (ref 80–100)
MONOCYTES ABSOLUTE: 0.6 K/UL (ref 0–1.3)
MONOCYTES RELATIVE PERCENT: 8.1 %
NEUTROPHILS ABSOLUTE: 5.4 K/UL (ref 1.7–7.7)
NEUTROPHILS RELATIVE PERCENT: 69.2 %
PDW BLD-RTO: 14.1 % (ref 12.4–15.4)
PLATELET # BLD: 191 K/UL (ref 135–450)
PMV BLD AUTO: 8.4 FL (ref 5–10.5)
PROTEIN PROTEIN: 0.01 G/DL
PROTEIN PROTEIN: 6 MG/DL
RBC # BLD: 4.14 M/UL (ref 4.2–5.9)
SEDIMENTATION RATE, ERYTHROCYTE: 41 MM/HR (ref 0–20)
WBC # BLD: 7.8 K/UL (ref 4–11)

## 2018-01-26 PROCEDURE — G8598 ASA/ANTIPLAT THER USED: HCPCS | Performed by: PHYSICAL MEDICINE & REHABILITATION

## 2018-01-26 PROCEDURE — 4040F PNEUMOC VAC/ADMIN/RCVD: CPT | Performed by: PHYSICAL MEDICINE & REHABILITATION

## 2018-01-26 PROCEDURE — 99214 OFFICE O/P EST MOD 30 MIN: CPT | Performed by: PHYSICAL MEDICINE & REHABILITATION

## 2018-01-26 PROCEDURE — G8427 DOCREV CUR MEDS BY ELIG CLIN: HCPCS | Performed by: PHYSICAL MEDICINE & REHABILITATION

## 2018-01-26 PROCEDURE — G8484 FLU IMMUNIZE NO ADMIN: HCPCS | Performed by: PHYSICAL MEDICINE & REHABILITATION

## 2018-01-26 PROCEDURE — G8417 CALC BMI ABV UP PARAM F/U: HCPCS | Performed by: PHYSICAL MEDICINE & REHABILITATION

## 2018-01-26 PROCEDURE — 1036F TOBACCO NON-USER: CPT | Performed by: PHYSICAL MEDICINE & REHABILITATION

## 2018-01-26 PROCEDURE — 1123F ACP DISCUSS/DSCN MKR DOCD: CPT | Performed by: PHYSICAL MEDICINE & REHABILITATION

## 2018-01-29 LAB
ALBUMIN SERPL-MCNC: 3.3 G/DL (ref 3.1–4.9)
ALPHA-1-GLOBULIN: 0.3 G/DL (ref 0.2–0.4)
ALPHA-2-GLOBULIN: 1 G/DL (ref 0.4–1.1)
BETA GLOBULIN: 1.2 G/DL (ref 0.9–1.6)
GAMMA GLOBULIN: 0.9 G/DL (ref 0.6–1.8)
SPE/IFE INTERPRETATION: NORMAL
TOTAL PROTEIN: 6.7 G/DL (ref 6.4–8.2)
URINE ELECTROPHORESIS INTERP: NORMAL

## 2018-02-09 ENCOUNTER — TELEPHONE (OUTPATIENT)
Dept: ORTHOPEDIC SURGERY | Age: 83
End: 2018-02-09

## 2018-03-25 PROBLEM — R07.9 CHEST PAIN: Status: ACTIVE | Noted: 2018-03-25

## 2018-10-26 ENCOUNTER — HOSPITAL ENCOUNTER (OUTPATIENT)
Dept: WOUND CARE | Age: 83
Discharge: HOME | End: 2018-10-26
Payer: MEDICARE

## 2018-10-26 VITALS
TEMPERATURE: 97.7 F | BODY MASS INDEX: 29.78 KG/M2 | WEIGHT: 208 LBS | HEIGHT: 70 IN | RESPIRATION RATE: 18 BRPM | HEART RATE: 69 BPM | DIASTOLIC BLOOD PRESSURE: 67 MMHG | SYSTOLIC BLOOD PRESSURE: 150 MMHG

## 2018-10-26 DIAGNOSIS — E55.9 VITAMIN D DEFICIENCY: ICD-10-CM

## 2018-10-26 DIAGNOSIS — J47.9 BRONCHIECTASIS WITHOUT COMPLICATION (HCC): ICD-10-CM

## 2018-10-26 DIAGNOSIS — L89.152 PRESSURE ULCER OF COCCYGEAL REGION, STAGE 2 (HCC): ICD-10-CM

## 2018-10-26 DIAGNOSIS — Z96.653 PRESENCE OF TOTAL KNEE JOINT PROSTHESIS, BILATERAL: ICD-10-CM

## 2018-10-26 DIAGNOSIS — J45.30 RAD (REACTIVE AIRWAY DISEASE), MILD PERSISTENT, UNCOMPLICATED: ICD-10-CM

## 2018-10-26 DIAGNOSIS — R26.89 DECREASED MOBILITY: ICD-10-CM

## 2018-10-26 DIAGNOSIS — Z96.641 PRESENCE OF RIGHT HIP IMPLANT: ICD-10-CM

## 2018-10-26 PROCEDURE — 99203 OFFICE O/P NEW LOW 30 MIN: CPT | Performed by: INTERNAL MEDICINE

## 2018-10-26 PROCEDURE — 99204 OFFICE O/P NEW MOD 45 MIN: CPT

## 2018-10-26 RX ORDER — LIDOCAINE 40 MG/G
CREAM TOPICAL PRN
Status: DISCONTINUED | OUTPATIENT
Start: 2018-10-26 | End: 2018-10-27 | Stop reason: HOSPADM

## 2018-10-26 RX ORDER — IBUPROFEN 200 MG
1 CAPSULE ORAL DAILY
COMMUNITY

## 2018-10-26 RX ORDER — FLUTICASONE PROPIONATE 50 MCG
1 SPRAY, SUSPENSION (ML) NASAL PRN
Status: ON HOLD | COMMUNITY
End: 2019-03-08 | Stop reason: HOSPADM

## 2018-10-29 NOTE — PLAN OF CARE
Problem: Pressure Ulcer:  Goal: Signs of wound healing will improve  Signs of wound healing will improve  Outcome: Ongoing  New patient with stage 2 pressure ulcer. Patient will use mepilex border this week and when dressing comes off can use ZnO. Patient will be referred for [facility] for optimal offloading and follow up in 2 weeks in wound clinic. Discharge instructions reviewed with patient, all questions answered, copy given to patient. Dressings were applied to all wounds per M.D. Instructions at this visit.

## 2018-11-03 PROBLEM — E55.9 VITAMIN D DEFICIENCY: Status: ACTIVE | Noted: 2018-11-03

## 2018-11-03 PROBLEM — J98.4 RESTRICTIVE LUNG DISEASE: Status: ACTIVE | Noted: 2018-01-28

## 2018-11-03 PROBLEM — G47.31 CENTRAL SLEEP APNEA: Status: ACTIVE | Noted: 2017-09-07

## 2018-11-03 PROBLEM — R26.89 DECREASED MOBILITY: Status: ACTIVE | Noted: 2018-11-03

## 2018-11-03 PROBLEM — Z96.653: Status: ACTIVE | Noted: 2018-11-03

## 2018-11-03 PROBLEM — G47.30 SLEEP APNEA: Status: ACTIVE | Noted: 2017-09-07

## 2018-11-03 PROBLEM — J30.9 ALLERGIC RHINITIS: Status: ACTIVE | Noted: 2018-11-03

## 2018-11-03 PROBLEM — L89.152 PRESSURE ULCER OF COCCYGEAL REGION, STAGE 2 (HCC): Status: ACTIVE | Noted: 2018-11-03

## 2018-11-03 PROBLEM — K57.90 DIVERTICULOSIS: Status: ACTIVE | Noted: 2018-11-03

## 2018-11-03 PROBLEM — J84.9 ILD (INTERSTITIAL LUNG DISEASE) (HCC): Status: ACTIVE | Noted: 2017-06-16

## 2018-11-03 PROBLEM — R07.9 CHEST PAIN: Status: RESOLVED | Noted: 2018-03-25 | Resolved: 2018-11-03

## 2018-11-03 PROBLEM — J45.30 RAD (REACTIVE AIRWAY DISEASE), MILD PERSISTENT, UNCOMPLICATED: Status: ACTIVE | Noted: 2017-06-27

## 2018-11-03 PROBLEM — K21.9 LPRD (LARYNGOPHARYNGEAL REFLUX DISEASE): Status: ACTIVE | Noted: 2017-09-01

## 2018-11-03 PROBLEM — Z96.641 PRESENCE OF RIGHT HIP IMPLANT: Status: ACTIVE | Noted: 2018-11-03

## 2018-11-03 PROBLEM — J47.9 BRONCHIECTASIS WITHOUT COMPLICATION (HCC): Status: ACTIVE | Noted: 2017-06-27

## 2018-11-03 NOTE — H&P
cm    Wound 10/26/18 #1, coccyx, pressure stage 2 (onset 9/1/18) gradually appeared-Wound Depth (cm) : 0.2  ______________________________    Lab Results   Component Value Date    LABALBU 3.7 03/25/2018     Lab Results   Component Value Date    CREATININE 1.0 03/26/2018     Lab Results   Component Value Date    HGB 12.3 (L) 03/26/2018     Lab Results   Component Value Date    LABA1C 8.3 06/09/2017     A more recent A1c at an outside hospital was 7.5%, however. Assessment:     Patient Active Problem List   Diagnosis Code    Persistent atrial fibrillation (HCC) I48.1    Diabetes mellitus type 2, uncontrolled ([facility] [address] E11.65    Hyperlipidemia E78.5    Venous (peripheral) insufficiency I87.2    CAD (coronary artery disease) I25.10    Normocytic anemia I32.4    Diastolic dysfunction O93.4    Essential hypertension I10    Lumbar spondylolysis M43.06    Pressure ulcer of coccygeal region, stage 2 L89.152    Bronchiectasis without complication ([facility] [address] E46.6    Cardiac pacemaker in situ Z95.0    Carotid stenosis I65.29    CKD (chronic kidney disease) stage 3, GFR 30-59 ml/min ([facility] N18.3    LPRD (laryngopharyngeal reflux disease) K21.9    RAD (reactive airway disease), mild persistent, uncomplicated P66.01    RBBB (right bundle branch block) I45.10    Restrictive lung disease J98.4    Sensorineural hearing loss H90.5    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    Decreased mobility R26.89    Presence of total knee joint prosthesis, 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  Factors contributing to occurrence and/or persistence of the chronic ulcer include diabetes, chronic pressure, decreased mobility

## 2018-11-09 ENCOUNTER — HOSPITAL ENCOUNTER (OUTPATIENT)
Dept: WOUND CARE | Age: 83
Discharge: HOME | End: 2018-11-09
Payer: MEDICARE

## 2018-11-09 VITALS
RESPIRATION RATE: 16 BRPM | WEIGHT: 208 LBS | DIASTOLIC BLOOD PRESSURE: 63 MMHG | HEART RATE: 65 BPM | BODY MASS INDEX: 29.78 KG/M2 | SYSTOLIC BLOOD PRESSURE: 165 MMHG | TEMPERATURE: 97.2 F | HEIGHT: 70 IN

## 2018-11-09 PROCEDURE — 99213 OFFICE O/P EST LOW 20 MIN: CPT

## 2018-11-09 PROCEDURE — 99212 OFFICE O/P EST SF 10 MIN: CPT | Performed by: INTERNAL MEDICINE

## 2018-11-09 RX ORDER — LIDOCAINE 40 MG/G
CREAM TOPICAL ONCE
Status: DISCONTINUED | OUTPATIENT
Start: 2018-11-09 | End: 2018-11-10 | Stop reason: HOSPADM

## 2018-11-19 PROBLEM — R29.898 BILATERAL ARM WEAKNESS: Status: ACTIVE | Noted: 2018-11-19

## 2018-11-24 NOTE — PROGRESS NOTES
[facility] Progress Note    Mahesh Ibarra     : 3/13/1933    DATE OF VISIT:  2018    Subjective:     Mahesh Ibarra is a 80 y.o. male who has a pressure ulcer located on the buttocks. Current complaint of pain in this ulcer? yes. Quality of pain: aching  Timing: constant  Severity: mild  Associated Signs/Symptoms:  mild redness and drainage  Other significant symptoms or pertinent ulcer history: no fever or chills, no diarrhea, doing ok with current dressings. There was a bit of a delay getting his wheelchair and cushion approved, but things should be happening now. He and his wife are concerned with how well he might be able to self-propel in a standard wheelchair, however, an issue that we didn't discuss last time. Additional ulcer(s) noted? no.      Mr. Jose Boothe has a past medical history of Acute encephalopathy; Acute lower GI bleeding; Acute renal failure (ARF) ([address] Acute respiratory failure ([address] Atrial flutter ([address] Closed right hip fracture ([address] Diastolic heart failure ([address] Diverticulitis; HCAP (healthcare-associated pneumonia); Iron deficiency anemia; Ischemic colitis ([address] Obesity; 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 ([address] Prostate cancer ([address] Vasovagal syncope; and VT (ventricular tachycardia) ([address] He has a past surgical history that includes Cholecystectomy; Appendectomy (); Prostatectomy (); Revision total knee arthroplasty (Left, 2014); Upper gastrointestinal endoscopy (03/02/10); pacemaker placement; Colonoscopy (10/07/2014); Uvulopalatopharygoplasty; Coronary artery bypass graft (); Total knee arthroplasty (Bilateral, ); Partial hip arthroplasty (Right, 2016); and Prostate biopsy (08/10/2007). His family history includes Alcohol Abuse in his father; Cancer in his brother, sister, and sister; Mental Retardation in his brother; Other in his brother and father; Stroke in his mother.      Mr. Jose Boothe reports Component Value Date    LABA1C 8.3 06/09/2017     Assessment:     Patient Active Problem List   Diagnosis Code    Persistent atrial fibrillation ([facility]) I48.1    Diabetes mellitus type 2, uncontrolled ([facility] [address] E11.65    Hyperlipidemia E78.5    Venous (peripheral) insufficiency I87.2    CAD (coronary artery disease) I25.10    Normocytic anemia Z54.4    Diastolic dysfunction F38.2    Essential hypertension I10    Lumbar spondylolysis M43.06    Pressure ulcer of coccygeal region, stage 2 L89.152    Bronchiectasis without complication ([facility] [address] H88.0    Cardiac pacemaker in situ Z95.0    Carotid stenosis I65.29    CKD (chronic kidney disease) stage 3, GFR 30-59 ml/min ([facility]) N18.3    LPRD (laryngopharyngeal reflux disease) K21.9    RAD (reactive airway disease), mild persistent, uncomplicated A86.14    RBBB (right bundle branch block) I45.10    Restrictive lung disease J98.4    Sensorineural hearing loss H90.5    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    Decreased mobility R26.89    Presence of total knee joint prosthesis, 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 Factors contributing to occurrence and/or persistence of the chronic ulcer include chronic pressure, decreased mobility and shear force. Medical necessity of today's visit is shown by the above documentation. Sharp debridement is not indicated today, based upon the exam findings in the wound(s) above. Discharge plan:     Treatment in the wound care center today: Wound 10/26/18 #1, coccyx, pressure stage 2 (onset 9/1/18) gradually appeared-Dressing/Treatment:  (polysporin, Mepilex border).     Await delivery of manual wheelchair; I'll see if they can't

## 2018-11-30 ENCOUNTER — HOSPITAL ENCOUNTER (OUTPATIENT)
Dept: WOUND CARE | Age: 83
Discharge: HOME | End: 2018-11-30
Payer: MEDICARE

## 2018-11-30 VITALS
HEART RATE: 94 BPM | WEIGHT: 208.6 LBS | BODY MASS INDEX: 29.86 KG/M2 | HEIGHT: 70 IN | DIASTOLIC BLOOD PRESSURE: 73 MMHG | RESPIRATION RATE: 16 BRPM | TEMPERATURE: 98 F | SYSTOLIC BLOOD PRESSURE: 148 MMHG

## 2018-11-30 DIAGNOSIS — R26.81 UNSTEADY GAIT: Primary | ICD-10-CM

## 2018-11-30 DIAGNOSIS — Z91.81 HX OF FALL: ICD-10-CM

## 2018-11-30 PROCEDURE — 99213 OFFICE O/P EST LOW 20 MIN: CPT | Performed by: INTERNAL MEDICINE

## 2018-11-30 PROCEDURE — 99213 OFFICE O/P EST LOW 20 MIN: CPT

## 2018-11-30 RX ORDER — LIDOCAINE 40 MG/G
CREAM TOPICAL ONCE
Status: DISCONTINUED | OUTPATIENT
Start: 2018-11-30 | End: 2018-12-01 | Stop reason: HOSPADM

## 2018-12-10 NOTE — PROGRESS NOTES
decreased mobility and shear force. Medical necessity of today's visit is shown by the above documentation. Sharp debridement is not indicated today, based upon the exam findings in the wound(s) above. Discharge plan:     Treatment in the wound care center today: Wound 10/26/18 #1, coccyx, pressure stage 2 (onset 9/1/18) gradually appeared-Dressing/Treatment: Other (Comment) (mepilex border ). Will order a lightweight wheelchair (of the same seat size, so that his ROHO will still be useful). Will also set him up for outpatient PT. Continue to work on frequent repositioning, more time lying in bed if needed, maintain protein intake. Home treatment: good handwashing before and after any dressing changes. Cleanse ulcer with saline or soap & water before dressing change. May use Vaseline (petrolatum), Aquaphor, Aveeno, CeraVe, Cetaphil, Eucerin, Lubriderm, etc for dry skin. Dressing type for home: Other: [employer], every 1-2 days as needed; zinc oxide to protect periwound is ok also. Written discharge instructions given to patient. Follow up in 2 weeks, but call sooner with any concerns or questions. Electronically signed by Yamileth Ramirez MD on 12/10/2018 at 9:18 AM.  ___________________________    ADDENDUM --    Just by way of clarification, the bilateral arm weakness mentioned in the above problem list and documented in the exam is the reason that he has a medical necessity for a lightweight wheelchair, as I (a bit vaguely) mentioned toward the top of my note. Prescription recently sent to [facility], but I'll forward this additional documentation to them now.      Electronically signed by Yamileth Ramirez MD on 1/17/2019 at 8:16 AM

## 2018-12-14 ENCOUNTER — HOSPITAL ENCOUNTER (OUTPATIENT)
Dept: WOUND CARE | Age: 83
Discharge: HOME | End: 2018-12-14
Payer: MEDICARE

## 2018-12-14 VITALS
HEIGHT: 70 IN | DIASTOLIC BLOOD PRESSURE: 68 MMHG | HEART RATE: 69 BPM | BODY MASS INDEX: 30.06 KG/M2 | TEMPERATURE: 98.3 F | SYSTOLIC BLOOD PRESSURE: 152 MMHG | WEIGHT: 210 LBS | RESPIRATION RATE: 16 BRPM

## 2018-12-14 PROCEDURE — 99213 OFFICE O/P EST LOW 20 MIN: CPT

## 2018-12-14 PROCEDURE — 99212 OFFICE O/P EST SF 10 MIN: CPT | Performed by: INTERNAL MEDICINE

## 2018-12-14 RX ORDER — LIDOCAINE 40 MG/G
CREAM TOPICAL ONCE
Status: DISCONTINUED | OUTPATIENT
Start: 2018-12-14 | End: 2018-12-15 | Stop reason: HOSPADM

## 2018-12-26 PROBLEM — L89.152 PRESSURE ULCER OF COCCYGEAL REGION, STAGE 2 (HCC): Status: RESOLVED | Noted: 2018-11-03 | Resolved: 2018-12-26

## 2018-12-26 NOTE — PROGRESS NOTES
skin care. Await lightweight wheelchair from [facility]. Continue to use ROHO cushion while seated, as much as possible (including in the car, other chairs at home, etc). Await start of PT to work on balance, strength, more assured ambulation, with Hx of recurrent falls. Home treatment: good handwashing before and after any dressing changes. Cleanse ulcer with saline or soap & water before dressing change. May use Vaseline (petrolatum), Aquaphor, Aveeno, CeraVe, Cetaphil, Eucerin, Lubriderm, etc for dry skin. Dressing type for home: Other: She can keep a [employer] dressing in place for at least a week or so to allow skin to get more durable, and then would apply zinc oxide regularly, if there are any concerns with moisture or mild friction changes. Written discharge instructions given to patient. Follow up here PRN.     Electronically signed by Rosalina Florez MD on 12/26/2018 at 7:42 AM.

## 2018-12-27 ENCOUNTER — OFFICE VISIT (OUTPATIENT)
Dept: ENDOCRINOLOGY | Age: 83
End: 2018-12-27
Payer: MEDICARE

## 2018-12-27 VITALS
HEART RATE: 82 BPM | HEIGHT: 70 IN | WEIGHT: 207.6 LBS | SYSTOLIC BLOOD PRESSURE: 157 MMHG | OXYGEN SATURATION: 97 % | BODY MASS INDEX: 29.72 KG/M2 | DIASTOLIC BLOOD PRESSURE: 77 MMHG

## 2018-12-27 DIAGNOSIS — I10 ESSENTIAL HYPERTENSION: Chronic | ICD-10-CM

## 2018-12-27 DIAGNOSIS — E78.2 MIXED HYPERLIPIDEMIA: ICD-10-CM

## 2018-12-27 DIAGNOSIS — E55.9 VITAMIN D DEFICIENCY: ICD-10-CM

## 2018-12-27 DIAGNOSIS — E11.65 UNCONTROLLED TYPE 2 DIABETES MELLITUS WITH HYPERGLYCEMIA (HCC): Primary | ICD-10-CM

## 2018-12-27 LAB — HBA1C MFR BLD: 7.4 %

## 2018-12-27 PROCEDURE — G8598 ASA/ANTIPLAT THER USED: HCPCS | Performed by: NURSE PRACTITIONER

## 2018-12-27 PROCEDURE — 83036 HEMOGLOBIN GLYCOSYLATED A1C: CPT | Performed by: NURSE PRACTITIONER

## 2018-12-27 PROCEDURE — G8427 DOCREV CUR MEDS BY ELIG CLIN: HCPCS | Performed by: NURSE PRACTITIONER

## 2018-12-27 PROCEDURE — 95250 CONT GLUC MNTR PHYS/QHP EQP: CPT | Performed by: NURSE PRACTITIONER

## 2018-12-27 PROCEDURE — 1123F ACP DISCUSS/DSCN MKR DOCD: CPT | Performed by: NURSE PRACTITIONER

## 2018-12-27 PROCEDURE — G8417 CALC BMI ABV UP PARAM F/U: HCPCS | Performed by: NURSE PRACTITIONER

## 2018-12-27 PROCEDURE — G8484 FLU IMMUNIZE NO ADMIN: HCPCS | Performed by: NURSE PRACTITIONER

## 2018-12-27 PROCEDURE — 1036F TOBACCO NON-USER: CPT | Performed by: NURSE PRACTITIONER

## 2018-12-27 PROCEDURE — 99214 OFFICE O/P EST MOD 30 MIN: CPT | Performed by: NURSE PRACTITIONER

## 2018-12-27 PROCEDURE — 1101F PT FALLS ASSESS-DOCD LE1/YR: CPT | Performed by: NURSE PRACTITIONER

## 2018-12-27 PROCEDURE — 4040F PNEUMOC VAC/ADMIN/RCVD: CPT | Performed by: NURSE PRACTITIONER

## 2018-12-27 ASSESSMENT — ENCOUNTER SYMPTOMS
DIARRHEA: 0
NAUSEA: 1
CONSTIPATION: 0
SHORTNESS OF BREATH: 0
COLOR CHANGE: 0
EYE PAIN: 0

## 2018-12-31 LAB
ALBUMIN SERPL-MCNC: ABNORMAL G/DL
ALP BLD-CCNC: ABNORMAL U/L
ALT SERPL-CCNC: ABNORMAL U/L
AST SERPL-CCNC: ABNORMAL U/L
BILIRUB SERPL-MCNC: ABNORMAL MG/DL (ref 0.1–1.4)
BUN BLDV-MCNC: 18 MG/DL
CALCIUM SERPL-MCNC: 9.7 MG/DL
CHLORIDE BLD-SCNC: 94 MMOL/L
CO2: 29 MMOL/L
CREAT SERPL-MCNC: 1.3 MG/DL
GFR CALCULATED: 52
GLUCOSE BLD-MCNC: 362 MG/DL
INR BLD: 4
POTASSIUM SERPL-SCNC: 4.8 MMOL/L
PROTIME: 47.2 SECONDS
SODIUM BLD-SCNC: 135 MMOL/L
TOTAL PROTEIN: ABNORMAL

## 2019-01-15 ENCOUNTER — HOSPITAL ENCOUNTER (OUTPATIENT)
Dept: PHYSICAL THERAPY | Age: 84
Setting detail: THERAPIES SERIES
Discharge: HOME | End: 2019-01-15

## 2019-01-23 ENCOUNTER — HOSPITAL ENCOUNTER (OUTPATIENT)
Dept: PHYSICAL THERAPY | Age: 84
Setting detail: THERAPIES SERIES
Discharge: HOME | End: 2019-01-23
Payer: MEDICARE

## 2019-01-23 PROCEDURE — 97116 GAIT TRAINING THERAPY: CPT

## 2019-01-23 PROCEDURE — 97162 PT EVAL MOD COMPLEX 30 MIN: CPT

## 2019-01-23 PROCEDURE — 97110 THERAPEUTIC EXERCISES: CPT

## 2019-01-23 PROCEDURE — 97530 THERAPEUTIC ACTIVITIES: CPT

## 2019-01-29 ENCOUNTER — HOSPITAL ENCOUNTER (OUTPATIENT)
Dept: PHYSICAL THERAPY | Age: 84
Setting detail: THERAPIES SERIES
Discharge: HOME | End: 2019-01-29
Payer: MEDICARE

## 2019-01-29 PROCEDURE — 97530 THERAPEUTIC ACTIVITIES: CPT

## 2019-01-29 PROCEDURE — 97110 THERAPEUTIC EXERCISES: CPT

## 2019-01-31 ENCOUNTER — OFFICE VISIT (OUTPATIENT)
Dept: ENDOCRINOLOGY | Age: 84
End: 2019-01-31
Payer: MEDICARE

## 2019-01-31 VITALS
BODY MASS INDEX: 29.46 KG/M2 | WEIGHT: 205.8 LBS | HEART RATE: 69 BPM | SYSTOLIC BLOOD PRESSURE: 173 MMHG | OXYGEN SATURATION: 94 % | DIASTOLIC BLOOD PRESSURE: 70 MMHG | HEIGHT: 70 IN

## 2019-01-31 DIAGNOSIS — I10 ESSENTIAL HYPERTENSION: Chronic | ICD-10-CM

## 2019-01-31 DIAGNOSIS — E78.2 MIXED HYPERLIPIDEMIA: ICD-10-CM

## 2019-01-31 DIAGNOSIS — N18.30 CKD (CHRONIC KIDNEY DISEASE) STAGE 3, GFR 30-59 ML/MIN (HCC): ICD-10-CM

## 2019-01-31 DIAGNOSIS — R26.89 DECREASED MOBILITY: ICD-10-CM

## 2019-01-31 DIAGNOSIS — Z95.0 CARDIAC PACEMAKER IN SITU: ICD-10-CM

## 2019-01-31 DIAGNOSIS — E11.65 UNCONTROLLED TYPE 2 DIABETES MELLITUS WITH HYPERGLYCEMIA (HCC): Primary | ICD-10-CM

## 2019-01-31 PROCEDURE — 4040F PNEUMOC VAC/ADMIN/RCVD: CPT | Performed by: NURSE PRACTITIONER

## 2019-01-31 PROCEDURE — G8598 ASA/ANTIPLAT THER USED: HCPCS | Performed by: NURSE PRACTITIONER

## 2019-01-31 PROCEDURE — 95251 CONT GLUC MNTR ANALYSIS I&R: CPT | Performed by: NURSE PRACTITIONER

## 2019-01-31 PROCEDURE — 99214 OFFICE O/P EST MOD 30 MIN: CPT | Performed by: NURSE PRACTITIONER

## 2019-01-31 PROCEDURE — 1036F TOBACCO NON-USER: CPT | Performed by: NURSE PRACTITIONER

## 2019-01-31 PROCEDURE — G8417 CALC BMI ABV UP PARAM F/U: HCPCS | Performed by: NURSE PRACTITIONER

## 2019-01-31 PROCEDURE — G8484 FLU IMMUNIZE NO ADMIN: HCPCS | Performed by: NURSE PRACTITIONER

## 2019-01-31 PROCEDURE — 1101F PT FALLS ASSESS-DOCD LE1/YR: CPT | Performed by: NURSE PRACTITIONER

## 2019-01-31 PROCEDURE — 1123F ACP DISCUSS/DSCN MKR DOCD: CPT | Performed by: NURSE PRACTITIONER

## 2019-01-31 PROCEDURE — G8427 DOCREV CUR MEDS BY ELIG CLIN: HCPCS | Performed by: NURSE PRACTITIONER

## 2019-01-31 RX ORDER — FLASH GLUCOSE SCANNING READER
1 EACH MISCELLANEOUS PRN
Qty: 1 DEVICE | Refills: 0 | Status: ON HOLD | OUTPATIENT
Start: 2019-01-31 | End: 2019-05-28 | Stop reason: HOSPADM

## 2019-01-31 RX ORDER — FLASH GLUCOSE SENSOR
2 KIT MISCELLANEOUS PRN
Qty: 2 EACH | Refills: 3 | Status: ON HOLD | OUTPATIENT
Start: 2019-01-31 | End: 2019-10-21 | Stop reason: HOSPADM

## 2019-01-31 ASSESSMENT — ENCOUNTER SYMPTOMS
CONSTIPATION: 0
EYE PAIN: 0
DIARRHEA: 0
SHORTNESS OF BREATH: 0
NAUSEA: 1
COLOR CHANGE: 0

## 2019-02-05 ENCOUNTER — HOSPITAL ENCOUNTER (OUTPATIENT)
Dept: PHYSICAL THERAPY | Age: 84
Setting detail: THERAPIES SERIES
Discharge: HOME | End: 2019-02-05
Payer: MEDICARE

## 2019-02-06 ENCOUNTER — HOSPITAL ENCOUNTER (OUTPATIENT)
Dept: WOUND CARE | Age: 84
Discharge: HOME | End: 2019-02-06
Payer: MEDICARE

## 2019-02-06 VITALS
TEMPERATURE: 97.8 F | DIASTOLIC BLOOD PRESSURE: 66 MMHG | HEART RATE: 67 BPM | SYSTOLIC BLOOD PRESSURE: 146 MMHG | HEIGHT: 70 IN | WEIGHT: 204 LBS | BODY MASS INDEX: 29.2 KG/M2 | RESPIRATION RATE: 16 BRPM

## 2019-02-06 DIAGNOSIS — T14.8XXA NONHEALING NONSURGICAL WOUND LIMITED TO BREAKDOWN OF SKIN: ICD-10-CM

## 2019-02-06 DIAGNOSIS — L92.9 HYPERGRANULATION: ICD-10-CM

## 2019-02-06 PROCEDURE — 17250 CHEM CAUT OF GRANLTJ TISSUE: CPT | Performed by: INTERNAL MEDICINE

## 2019-02-06 PROCEDURE — 99214 OFFICE O/P EST MOD 30 MIN: CPT

## 2019-02-06 PROCEDURE — 17250 CHEM CAUT OF GRANLTJ TISSUE: CPT

## 2019-02-06 RX ORDER — LIDOCAINE HYDROCHLORIDE 40 MG/ML
SOLUTION TOPICAL ONCE
Status: DISCONTINUED | OUTPATIENT
Start: 2019-02-06 | End: 2019-02-07 | Stop reason: HOSPADM

## 2019-02-07 ENCOUNTER — HOSPITAL ENCOUNTER (OUTPATIENT)
Dept: PHYSICAL THERAPY | Age: 84
Setting detail: THERAPIES SERIES
Discharge: HOME | End: 2019-02-07
Payer: MEDICARE

## 2019-02-07 PROCEDURE — 97116 GAIT TRAINING THERAPY: CPT

## 2019-02-07 PROCEDURE — 97530 THERAPEUTIC ACTIVITIES: CPT

## 2019-02-07 PROCEDURE — 97110 THERAPEUTIC EXERCISES: CPT

## 2019-02-12 ENCOUNTER — HOSPITAL ENCOUNTER (OUTPATIENT)
Dept: PHYSICAL THERAPY | Age: 84
Setting detail: THERAPIES SERIES
Discharge: HOME | End: 2019-02-12
Payer: MEDICARE

## 2019-02-12 PROCEDURE — 97112 NEUROMUSCULAR REEDUCATION: CPT

## 2019-02-12 PROCEDURE — 97110 THERAPEUTIC EXERCISES: CPT

## 2019-02-12 PROCEDURE — 97116 GAIT TRAINING THERAPY: CPT

## 2019-02-14 ENCOUNTER — HOSPITAL ENCOUNTER (OUTPATIENT)
Dept: PHYSICAL THERAPY | Age: 84
Setting detail: THERAPIES SERIES
Discharge: HOME | End: 2019-02-14
Payer: MEDICARE

## 2019-02-14 PROBLEM — T14.8XXA NONHEALING NONSURGICAL WOUND LIMITED TO BREAKDOWN OF SKIN: Status: ACTIVE | Noted: 2019-02-14

## 2019-02-14 PROBLEM — L92.9 HYPERGRANULATION: Status: ACTIVE | Noted: 2019-02-14

## 2019-02-18 ENCOUNTER — APPOINTMENT (OUTPATIENT)
Dept: PHYSICAL THERAPY | Age: 84
End: 2019-02-18
Payer: MEDICARE

## 2019-02-20 ENCOUNTER — HOSPITAL ENCOUNTER (OUTPATIENT)
Dept: WOUND CARE | Age: 84
Discharge: HOME | End: 2019-02-20
Payer: MEDICARE

## 2019-02-20 VITALS
HEART RATE: 70 BPM | HEIGHT: 70 IN | BODY MASS INDEX: 29.58 KG/M2 | TEMPERATURE: 97 F | WEIGHT: 206.6 LBS | SYSTOLIC BLOOD PRESSURE: 151 MMHG | RESPIRATION RATE: 16 BRPM | DIASTOLIC BLOOD PRESSURE: 72 MMHG

## 2019-02-20 PROCEDURE — 17250 CHEM CAUT OF GRANLTJ TISSUE: CPT

## 2019-02-20 PROCEDURE — 17250 CHEM CAUT OF GRANLTJ TISSUE: CPT | Performed by: INTERNAL MEDICINE

## 2019-02-20 RX ORDER — LIDOCAINE 40 MG/G
CREAM TOPICAL ONCE
Status: DISCONTINUED | OUTPATIENT
Start: 2019-02-20 | End: 2019-02-21 | Stop reason: HOSPADM

## 2019-02-20 ASSESSMENT — PAIN SCALES - GENERAL: PAINLEVEL_OUTOF10: 3

## 2019-02-20 ASSESSMENT — PAIN DESCRIPTION - PAIN TYPE: TYPE: ACUTE PAIN

## 2019-02-20 ASSESSMENT — PAIN DESCRIPTION - LOCATION: LOCATION: COCCYX

## 2019-02-21 ENCOUNTER — HOSPITAL ENCOUNTER (OUTPATIENT)
Dept: PHYSICAL THERAPY | Age: 84
Setting detail: THERAPIES SERIES
Discharge: HOME | End: 2019-02-21
Payer: MEDICARE

## 2019-02-21 PROCEDURE — 97110 THERAPEUTIC EXERCISES: CPT

## 2019-02-21 PROCEDURE — 97112 NEUROMUSCULAR REEDUCATION: CPT

## 2019-02-25 ENCOUNTER — APPOINTMENT (OUTPATIENT)
Dept: GENERAL RADIOLOGY | Age: 84
End: 2019-02-25
Payer: MEDICARE

## 2019-02-25 ENCOUNTER — HOSPITAL ENCOUNTER (EMERGENCY)
Age: 84
Discharge: ANOTHER HOSPITAL | End: 2019-02-25
Attending: EMERGENCY MEDICINE
Payer: MEDICARE

## 2019-02-25 ENCOUNTER — HOSPITAL ENCOUNTER (OUTPATIENT)
Age: 84
Setting detail: OBSERVATION
Discharge: NURSING, REHAB OR LONG-TERM CARE | End: 2019-02-28
Attending: INTERNAL MEDICINE | Admitting: INTERNAL MEDICINE
Payer: MEDICARE

## 2019-02-25 ENCOUNTER — APPOINTMENT (OUTPATIENT)
Dept: CT IMAGING | Age: 84
End: 2019-02-25
Payer: MEDICARE

## 2019-02-25 ENCOUNTER — HOSPITAL ENCOUNTER (OUTPATIENT)
Dept: PHYSICAL THERAPY | Age: 84
Setting detail: THERAPIES SERIES
Discharge: HOME | End: 2019-02-25
Payer: MEDICARE

## 2019-02-25 VITALS
OXYGEN SATURATION: 97 % | DIASTOLIC BLOOD PRESSURE: 53 MMHG | WEIGHT: 193 LBS | HEART RATE: 64 BPM | BODY MASS INDEX: 26.14 KG/M2 | HEIGHT: 72 IN | RESPIRATION RATE: 13 BRPM | TEMPERATURE: 99.2 F | SYSTOLIC BLOOD PRESSURE: 144 MMHG

## 2019-02-25 DIAGNOSIS — I48.91 ATRIAL FIBRILLATION, UNSPECIFIED TYPE (HCC): ICD-10-CM

## 2019-02-25 DIAGNOSIS — R79.1: ICD-10-CM

## 2019-02-25 DIAGNOSIS — G93.40 ACUTE ENCEPHALOPATHY: Primary | ICD-10-CM

## 2019-02-25 PROBLEM — R41.82 MENTAL STATUS ALTERATION: Status: ACTIVE | Noted: 2019-02-25

## 2019-02-25 LAB
A/G RATIO: 1.2 (ref 1.1–2.2)
ALBUMIN SERPL-MCNC: 3.7 G/DL (ref 3.4–5)
ALP BLD-CCNC: 99 U/L (ref 40–129)
ALT SERPL-CCNC: 27 U/L (ref 10–40)
ANION GAP SERPL CALCULATED.3IONS-SCNC: 11 MMOL/L (ref 3–16)
AST SERPL-CCNC: 26 U/L (ref 15–37)
BASOPHILS ABSOLUTE: 0.1 K/UL (ref 0–0.2)
BASOPHILS RELATIVE PERCENT: 0.4 %
BILIRUB SERPL-MCNC: 0.9 MG/DL (ref 0–1)
BILIRUBIN URINE: NEGATIVE
BLOOD, URINE: NEGATIVE
BUN BLDV-MCNC: 15 MG/DL (ref 7–20)
CALCIUM SERPL-MCNC: 9.2 MG/DL (ref 8.3–10.6)
CHLORIDE BLD-SCNC: 92 MMOL/L (ref 99–110)
CHP ED QC CHECK: NORMAL
CLARITY: CLEAR
CO2: 28 MMOL/L (ref 21–32)
COLOR: YELLOW
CREAT SERPL-MCNC: 1.1 MG/DL (ref 0.8–1.3)
D DIMER: 393 NG/ML DDU (ref 0–229)
EKG ATRIAL RATE: 375 BPM
EKG DIAGNOSIS: NORMAL
EKG Q-T INTERVAL: 376 MS
EKG QRS DURATION: 120 MS
EKG QTC CALCULATION (BAZETT): 459 MS
EKG R AXIS: 54 DEGREES
EKG T AXIS: -40 DEGREES
EKG VENTRICULAR RATE: 90 BPM
EOSINOPHILS ABSOLUTE: 0 K/UL (ref 0–0.6)
EOSINOPHILS RELATIVE PERCENT: 0.1 %
GFR AFRICAN AMERICAN: >60
GFR NON-AFRICAN AMERICAN: >60
GLOBULIN: 3.2 G/DL
GLUCOSE BLD-MCNC: 115 MG/DL (ref 70–99)
GLUCOSE BLD-MCNC: 130 MG/DL
GLUCOSE BLD-MCNC: 130 MG/DL (ref 70–99)
GLUCOSE URINE: NEGATIVE MG/DL
HCT VFR BLD CALC: 35.1 % (ref 40.5–52.5)
HEMOGLOBIN: 11.7 G/DL (ref 13.5–17.5)
INR BLD: 5.98 (ref 0.86–1.14)
KETONES, URINE: NEGATIVE MG/DL
LEUKOCYTE ESTERASE, URINE: NEGATIVE
LIPASE: 40 U/L (ref 13–60)
LYMPHOCYTES ABSOLUTE: 0.4 K/UL (ref 1–5.1)
LYMPHOCYTES RELATIVE PERCENT: 3.2 %
MCH RBC QN AUTO: 30.3 PG (ref 26–34)
MCHC RBC AUTO-ENTMCNC: 33.4 G/DL (ref 31–36)
MCV RBC AUTO: 90.6 FL (ref 80–100)
MICROSCOPIC EXAMINATION: NORMAL
MONOCYTES ABSOLUTE: 0.7 K/UL (ref 0–1.3)
MONOCYTES RELATIVE PERCENT: 4.7 %
NEUTROPHILS ABSOLUTE: 12.7 K/UL (ref 1.7–7.7)
NEUTROPHILS RELATIVE PERCENT: 91.6 %
NITRITE, URINE: NEGATIVE
PDW BLD-RTO: 13.5 % (ref 12.4–15.4)
PERFORMED ON: ABNORMAL
PH UA: 6
PLATELET # BLD: 171 K/UL (ref 135–450)
PMV BLD AUTO: 7.8 FL (ref 5–10.5)
POTASSIUM SERPL-SCNC: 4.3 MMOL/L (ref 3.5–5.1)
PROTEIN UA: NEGATIVE MG/DL
PROTHROMBIN TIME: 68.2 SEC (ref 9.8–13)
RAPID INFLUENZA  B AGN: NEGATIVE
RAPID INFLUENZA A AGN: NEGATIVE
RBC # BLD: 3.88 M/UL (ref 4.2–5.9)
SODIUM BLD-SCNC: 131 MMOL/L (ref 136–145)
SPECIFIC GRAVITY UA: 1.01
TOTAL PROTEIN: 6.9 G/DL (ref 6.4–8.2)
TROPONIN: 0.02 NG/ML
URINE REFLEX TO CULTURE: NORMAL
URINE TYPE: NORMAL
UROBILINOGEN, URINE: 0.2 E.U./DL
WBC # BLD: 13.9 K/UL (ref 4–11)

## 2019-02-25 PROCEDURE — 6360000002 HC RX W HCPCS: Performed by: PHYSICIAN ASSISTANT

## 2019-02-25 PROCEDURE — G0378 HOSPITAL OBSERVATION PER HR: HCPCS

## 2019-02-25 PROCEDURE — 51701 INSERT BLADDER CATHETER: CPT

## 2019-02-25 PROCEDURE — 71275 CT ANGIOGRAPHY CHEST: CPT

## 2019-02-25 PROCEDURE — 6360000004 HC RX CONTRAST MEDICATION: Performed by: PHYSICIAN ASSISTANT

## 2019-02-25 PROCEDURE — 87804 INFLUENZA ASSAY W/OPTIC: CPT

## 2019-02-25 PROCEDURE — 71045 X-RAY EXAM CHEST 1 VIEW: CPT

## 2019-02-25 PROCEDURE — 84484 ASSAY OF TROPONIN QUANT: CPT

## 2019-02-25 PROCEDURE — 6360000002 HC RX W HCPCS: Performed by: EMERGENCY MEDICINE

## 2019-02-25 PROCEDURE — 93005 ELECTROCARDIOGRAM TRACING: CPT | Performed by: EMERGENCY MEDICINE

## 2019-02-25 PROCEDURE — 2580000003 HC RX 258: Performed by: PHYSICIAN ASSISTANT

## 2019-02-25 PROCEDURE — 70450 CT HEAD/BRAIN W/O DYE: CPT

## 2019-02-25 PROCEDURE — 85610 PROTHROMBIN TIME: CPT

## 2019-02-25 PROCEDURE — 80053 COMPREHEN METABOLIC PANEL: CPT

## 2019-02-25 PROCEDURE — 96365 THER/PROPH/DIAG IV INF INIT: CPT

## 2019-02-25 PROCEDURE — 85379 FIBRIN DEGRADATION QUANT: CPT

## 2019-02-25 PROCEDURE — 99285 EMERGENCY DEPT VISIT HI MDM: CPT

## 2019-02-25 PROCEDURE — 81003 URINALYSIS AUTO W/O SCOPE: CPT

## 2019-02-25 PROCEDURE — 96375 TX/PRO/DX INJ NEW DRUG ADDON: CPT

## 2019-02-25 PROCEDURE — 85025 COMPLETE CBC W/AUTO DIFF WBC: CPT

## 2019-02-25 PROCEDURE — 74174 CTA ABD&PLVS W/CONTRAST: CPT

## 2019-02-25 PROCEDURE — 83690 ASSAY OF LIPASE: CPT

## 2019-02-25 PROCEDURE — G0379 DIRECT REFER HOSPITAL OBSERV: HCPCS

## 2019-02-25 PROCEDURE — 93010 ELECTROCARDIOGRAM REPORT: CPT | Performed by: INTERNAL MEDICINE

## 2019-02-25 RX ORDER — FINASTERIDE 5 MG/1
5 TABLET, FILM COATED ORAL DAILY
Status: DISCONTINUED | OUTPATIENT
Start: 2019-02-26 | End: 2019-02-28 | Stop reason: HOSPADM

## 2019-02-25 RX ORDER — LISINOPRIL 10 MG/1
10 TABLET ORAL DAILY
Status: DISCONTINUED | OUTPATIENT
Start: 2019-02-26 | End: 2019-02-28 | Stop reason: HOSPADM

## 2019-02-25 RX ORDER — AMLODIPINE BESYLATE 2.5 MG/1
2.5 TABLET ORAL DAILY
Status: DISCONTINUED | OUTPATIENT
Start: 2019-02-26 | End: 2019-02-28 | Stop reason: HOSPADM

## 2019-02-25 RX ORDER — SODIUM CHLORIDE 0.9 % (FLUSH) 0.9 %
10 SYRINGE (ML) INJECTION EVERY 12 HOURS SCHEDULED
Status: DISCONTINUED | OUTPATIENT
Start: 2019-02-25 | End: 2019-02-28 | Stop reason: HOSPADM

## 2019-02-25 RX ORDER — WARFARIN SODIUM 5 MG/1
1 TABLET ORAL
Status: ON HOLD | COMMUNITY
End: 2019-02-25 | Stop reason: SDUPTHER

## 2019-02-25 RX ORDER — ONDANSETRON 2 MG/ML
4 INJECTION INTRAMUSCULAR; INTRAVENOUS EVERY 6 HOURS PRN
Status: DISCONTINUED | OUTPATIENT
Start: 2019-02-25 | End: 2019-02-28 | Stop reason: HOSPADM

## 2019-02-25 RX ORDER — SODIUM CHLORIDE 0.9 % (FLUSH) 0.9 %
10 SYRINGE (ML) INJECTION PRN
Status: DISCONTINUED | OUTPATIENT
Start: 2019-02-25 | End: 2019-02-28 | Stop reason: HOSPADM

## 2019-02-25 RX ORDER — RANOLAZINE 500 MG/1
500 TABLET, EXTENDED RELEASE ORAL DAILY
Status: DISCONTINUED | OUTPATIENT
Start: 2019-02-26 | End: 2019-02-28 | Stop reason: HOSPADM

## 2019-02-25 RX ORDER — ONDANSETRON 2 MG/ML
4 INJECTION INTRAMUSCULAR; INTRAVENOUS EVERY 30 MIN PRN
Status: DISCONTINUED | OUTPATIENT
Start: 2019-02-25 | End: 2019-02-25 | Stop reason: HOSPADM

## 2019-02-25 RX ORDER — AMLODIPINE BESYLATE AND BENAZEPRIL HYDROCHLORIDE 2.5; 1 MG/1; MG/1
1 CAPSULE ORAL DAILY
Status: DISCONTINUED | OUTPATIENT
Start: 2019-02-26 | End: 2019-02-25

## 2019-02-25 RX ORDER — ASPIRIN 81 MG/1
81 TABLET ORAL DAILY
Status: DISCONTINUED | OUTPATIENT
Start: 2019-02-26 | End: 2019-02-28 | Stop reason: HOSPADM

## 2019-02-25 RX ORDER — ACETAMINOPHEN 325 MG/1
650 TABLET ORAL EVERY 4 HOURS PRN
Status: DISCONTINUED | OUTPATIENT
Start: 2019-02-25 | End: 2019-02-28 | Stop reason: HOSPADM

## 2019-02-25 RX ADMIN — ONDANSETRON 4 MG: 2 INJECTION INTRAMUSCULAR; INTRAVENOUS at 14:53

## 2019-02-25 RX ADMIN — PHYTONADIONE 10 MG: 10 INJECTION, EMULSION INTRAMUSCULAR; INTRAVENOUS; SUBCUTANEOUS at 17:06

## 2019-02-25 RX ADMIN — IOPAMIDOL 85 ML: 755 INJECTION, SOLUTION INTRAVENOUS at 15:46

## 2019-02-25 ASSESSMENT — ENCOUNTER SYMPTOMS
BACK PAIN: 0
RHINORRHEA: 0
DIARRHEA: 0
EYE PAIN: 0
ABDOMINAL PAIN: 0
NAUSEA: 1
SHORTNESS OF BREATH: 0
COUGH: 0
FACIAL SWELLING: 0
CONSTIPATION: 0
SORE THROAT: 0
CHEST TIGHTNESS: 0
EYE REDNESS: 0

## 2019-02-25 ASSESSMENT — PAIN SCALES - GENERAL: PAINLEVEL_OUTOF10: 0

## 2019-02-26 LAB
ANION GAP SERPL CALCULATED.3IONS-SCNC: 7 MMOL/L (ref 3–16)
BASOPHILS ABSOLUTE: 0 K/UL (ref 0–0.2)
BASOPHILS RELATIVE PERCENT: 0.2 %
BUN BLDV-MCNC: 16 MG/DL (ref 7–20)
CALCIUM SERPL-MCNC: 8.6 MG/DL (ref 8.3–10.6)
CHLORIDE BLD-SCNC: 97 MMOL/L (ref 99–110)
CO2: 32 MMOL/L (ref 21–32)
CREAT SERPL-MCNC: 1 MG/DL (ref 0.8–1.3)
EOSINOPHILS ABSOLUTE: 0 K/UL (ref 0–0.6)
EOSINOPHILS RELATIVE PERCENT: 0.4 %
GFR AFRICAN AMERICAN: >60
GFR NON-AFRICAN AMERICAN: >60
GLUCOSE BLD-MCNC: 101 MG/DL (ref 70–99)
GLUCOSE BLD-MCNC: 130 MG/DL (ref 70–99)
GLUCOSE BLD-MCNC: 174 MG/DL (ref 70–99)
GLUCOSE BLD-MCNC: 42 MG/DL (ref 70–99)
GLUCOSE BLD-MCNC: 45 MG/DL (ref 70–99)
GLUCOSE BLD-MCNC: 70 MG/DL (ref 70–99)
GLUCOSE BLD-MCNC: 93 MG/DL (ref 70–99)
GLUCOSE BLD-MCNC: 94 MG/DL (ref 70–99)
HCT VFR BLD CALC: 32.2 % (ref 40.5–52.5)
HEMOGLOBIN: 10.9 G/DL (ref 13.5–17.5)
INR BLD: 1.75 (ref 0.86–1.14)
LYMPHOCYTES ABSOLUTE: 1.4 K/UL (ref 1–5.1)
LYMPHOCYTES RELATIVE PERCENT: 13.5 %
MCH RBC QN AUTO: 30.4 PG (ref 26–34)
MCHC RBC AUTO-ENTMCNC: 33.8 G/DL (ref 31–36)
MCV RBC AUTO: 90.1 FL (ref 80–100)
MONOCYTES ABSOLUTE: 1.4 K/UL (ref 0–1.3)
MONOCYTES RELATIVE PERCENT: 13.5 %
NEUTROPHILS ABSOLUTE: 7.3 K/UL (ref 1.7–7.7)
NEUTROPHILS RELATIVE PERCENT: 72.4 %
PDW BLD-RTO: 13.7 % (ref 12.4–15.4)
PERFORMED ON: ABNORMAL
PERFORMED ON: NORMAL
PERFORMED ON: NORMAL
PLATELET # BLD: 157 K/UL (ref 135–450)
PMV BLD AUTO: 7.9 FL (ref 5–10.5)
POTASSIUM REFLEX MAGNESIUM: 4.8 MMOL/L (ref 3.5–5.1)
PROTHROMBIN TIME: 19.9 SEC (ref 9.8–13)
RBC # BLD: 3.57 M/UL (ref 4.2–5.9)
SODIUM BLD-SCNC: 136 MMOL/L (ref 136–145)
WBC # BLD: 10.1 K/UL (ref 4–11)

## 2019-02-26 PROCEDURE — 97530 THERAPEUTIC ACTIVITIES: CPT

## 2019-02-26 PROCEDURE — 2700000000 HC OXYGEN THERAPY PER DAY

## 2019-02-26 PROCEDURE — 94761 N-INVAS EAR/PLS OXIMETRY MLT: CPT

## 2019-02-26 PROCEDURE — 97116 GAIT TRAINING THERAPY: CPT

## 2019-02-26 PROCEDURE — 85025 COMPLETE CBC W/AUTO DIFF WBC: CPT

## 2019-02-26 PROCEDURE — G0378 HOSPITAL OBSERVATION PER HR: HCPCS

## 2019-02-26 PROCEDURE — 85610 PROTHROMBIN TIME: CPT

## 2019-02-26 PROCEDURE — 80048 BASIC METABOLIC PNL TOTAL CA: CPT

## 2019-02-26 PROCEDURE — 36415 COLL VENOUS BLD VENIPUNCTURE: CPT

## 2019-02-26 PROCEDURE — 97166 OT EVAL MOD COMPLEX 45 MIN: CPT

## 2019-02-26 PROCEDURE — 97162 PT EVAL MOD COMPLEX 30 MIN: CPT

## 2019-02-26 PROCEDURE — 2580000003 HC RX 258: Performed by: INTERNAL MEDICINE

## 2019-02-26 PROCEDURE — 97535 SELF CARE MNGMENT TRAINING: CPT

## 2019-02-26 PROCEDURE — 6370000000 HC RX 637 (ALT 250 FOR IP): Performed by: INTERNAL MEDICINE

## 2019-02-26 RX ADMIN — ACETAMINOPHEN 650 MG: 325 TABLET ORAL at 19:39

## 2019-02-26 RX ADMIN — FINASTERIDE 5 MG: 5 TABLET, FILM COATED ORAL at 08:54

## 2019-02-26 RX ADMIN — SODIUM CHLORIDE, PRESERVATIVE FREE 10 ML: 5 INJECTION INTRAVENOUS at 08:54

## 2019-02-26 RX ADMIN — ASPIRIN 81 MG: 81 TABLET, COATED ORAL at 08:54

## 2019-02-26 RX ADMIN — LISINOPRIL 10 MG: 10 TABLET ORAL at 08:54

## 2019-02-26 RX ADMIN — AMLODIPINE BESYLATE 2.5 MG: 2.5 TABLET ORAL at 08:54

## 2019-02-26 RX ADMIN — RANOLAZINE 500 MG: 500 TABLET, FILM COATED, EXTENDED RELEASE ORAL at 08:54

## 2019-02-26 RX ADMIN — SODIUM CHLORIDE, PRESERVATIVE FREE 10 ML: 5 INJECTION INTRAVENOUS at 05:14

## 2019-02-26 RX ADMIN — SODIUM CHLORIDE, PRESERVATIVE FREE 10 ML: 5 INJECTION INTRAVENOUS at 22:38

## 2019-02-26 ASSESSMENT — PAIN DESCRIPTION - LOCATION: LOCATION: BACK

## 2019-02-26 ASSESSMENT — PAIN SCALES - GENERAL: PAINLEVEL_OUTOF10: 3

## 2019-02-26 ASSESSMENT — PAIN DESCRIPTION - PAIN TYPE: TYPE: CHRONIC PAIN

## 2019-02-27 LAB
GLUCOSE BLD-MCNC: 148 MG/DL (ref 70–99)
GLUCOSE BLD-MCNC: 154 MG/DL (ref 70–99)
GLUCOSE BLD-MCNC: 272 MG/DL (ref 70–99)
GLUCOSE BLD-MCNC: 335 MG/DL (ref 70–99)
GLUCOSE BLD-MCNC: 377 MG/DL (ref 70–99)
INR BLD: 1.29 (ref 0.86–1.14)
PERFORMED ON: ABNORMAL
PROTHROMBIN TIME: 14.7 SEC (ref 9.8–13)

## 2019-02-27 PROCEDURE — G0378 HOSPITAL OBSERVATION PER HR: HCPCS

## 2019-02-27 PROCEDURE — 2580000003 HC RX 258: Performed by: INTERNAL MEDICINE

## 2019-02-27 PROCEDURE — 6370000000 HC RX 637 (ALT 250 FOR IP): Performed by: INTERNAL MEDICINE

## 2019-02-27 PROCEDURE — 85610 PROTHROMBIN TIME: CPT

## 2019-02-27 PROCEDURE — 36415 COLL VENOUS BLD VENIPUNCTURE: CPT

## 2019-02-27 PROCEDURE — 97110 THERAPEUTIC EXERCISES: CPT

## 2019-02-27 PROCEDURE — 97530 THERAPEUTIC ACTIVITIES: CPT

## 2019-02-27 RX ADMIN — FINASTERIDE 5 MG: 5 TABLET, FILM COATED ORAL at 09:23

## 2019-02-27 RX ADMIN — SODIUM CHLORIDE, PRESERVATIVE FREE 10 ML: 5 INJECTION INTRAVENOUS at 09:24

## 2019-02-27 RX ADMIN — ASPIRIN 81 MG: 81 TABLET, COATED ORAL at 09:23

## 2019-02-27 RX ADMIN — LISINOPRIL 10 MG: 10 TABLET ORAL at 09:23

## 2019-02-27 RX ADMIN — RANOLAZINE 500 MG: 500 TABLET, FILM COATED, EXTENDED RELEASE ORAL at 09:23

## 2019-02-27 RX ADMIN — AMLODIPINE BESYLATE 2.5 MG: 2.5 TABLET ORAL at 09:23

## 2019-02-27 ASSESSMENT — PAIN SCALES - GENERAL
PAINLEVEL_OUTOF10: 0

## 2019-02-28 ENCOUNTER — APPOINTMENT (OUTPATIENT)
Dept: PHYSICAL THERAPY | Age: 84
End: 2019-02-28
Payer: MEDICARE

## 2019-02-28 ENCOUNTER — HOSPITAL ENCOUNTER (INPATIENT)
Age: 84
LOS: 9 days | Discharge: HOME HEALTH | DRG: 071 | End: 2019-03-09
Attending: PHYSICAL MEDICINE & REHABILITATION | Admitting: PHYSICAL MEDICINE & REHABILITATION
Payer: MEDICARE

## 2019-02-28 VITALS
BODY MASS INDEX: 28.35 KG/M2 | RESPIRATION RATE: 18 BRPM | DIASTOLIC BLOOD PRESSURE: 90 MMHG | HEART RATE: 68 BPM | HEIGHT: 70 IN | TEMPERATURE: 97.9 F | SYSTOLIC BLOOD PRESSURE: 135 MMHG | OXYGEN SATURATION: 97 % | WEIGHT: 198 LBS

## 2019-02-28 PROBLEM — G93.41 METABOLIC ENCEPHALOPATHY: Status: ACTIVE | Noted: 2019-02-28

## 2019-02-28 LAB
GLUCOSE BLD-MCNC: 202 MG/DL (ref 70–99)
GLUCOSE BLD-MCNC: 276 MG/DL (ref 70–99)
GLUCOSE BLD-MCNC: 280 MG/DL (ref 70–99)
GLUCOSE BLD-MCNC: 294 MG/DL (ref 70–99)
INR BLD: 1.24 (ref 0.86–1.14)
PERFORMED ON: ABNORMAL
PROTHROMBIN TIME: 14.1 SEC (ref 9.8–13)

## 2019-02-28 PROCEDURE — 85610 PROTHROMBIN TIME: CPT

## 2019-02-28 PROCEDURE — 51701 INSERT BLADDER CATHETER: CPT

## 2019-02-28 PROCEDURE — 1280000000 HC REHAB R&B

## 2019-02-28 PROCEDURE — 6370000000 HC RX 637 (ALT 250 FOR IP): Performed by: PHYSICAL MEDICINE & REHABILITATION

## 2019-02-28 PROCEDURE — 6370000000 HC RX 637 (ALT 250 FOR IP): Performed by: INTERNAL MEDICINE

## 2019-02-28 PROCEDURE — 36415 COLL VENOUS BLD VENIPUNCTURE: CPT

## 2019-02-28 PROCEDURE — 51798 US URINE CAPACITY MEASURE: CPT

## 2019-02-28 PROCEDURE — 2580000003 HC RX 258: Performed by: INTERNAL MEDICINE

## 2019-02-28 PROCEDURE — 97110 THERAPEUTIC EXERCISES: CPT

## 2019-02-28 PROCEDURE — 97530 THERAPEUTIC ACTIVITIES: CPT

## 2019-02-28 PROCEDURE — 97535 SELF CARE MNGMENT TRAINING: CPT

## 2019-02-28 PROCEDURE — G0378 HOSPITAL OBSERVATION PER HR: HCPCS

## 2019-02-28 RX ORDER — POLYETHYLENE GLYCOL 3350 17 G/17G
17 POWDER, FOR SOLUTION ORAL DAILY PRN
Status: DISCONTINUED | OUTPATIENT
Start: 2019-02-28 | End: 2019-03-09 | Stop reason: HOSPADM

## 2019-02-28 RX ORDER — DEXTROSE MONOHYDRATE 25 G/50ML
12.5 INJECTION, SOLUTION INTRAVENOUS PRN
Status: DISCONTINUED | OUTPATIENT
Start: 2019-02-28 | End: 2019-03-09 | Stop reason: HOSPADM

## 2019-02-28 RX ORDER — DOCUSATE SODIUM 100 MG/1
100 CAPSULE, LIQUID FILLED ORAL 2 TIMES DAILY
Status: CANCELLED | OUTPATIENT
Start: 2019-02-28

## 2019-02-28 RX ORDER — WARFARIN SODIUM 2.5 MG/1
2.5 TABLET ORAL DAILY
Status: CANCELLED | OUTPATIENT
Start: 2019-02-28

## 2019-02-28 RX ORDER — DEXTROSE MONOHYDRATE 25 G/50ML
12.5 INJECTION, SOLUTION INTRAVENOUS PRN
Status: CANCELLED | OUTPATIENT
Start: 2019-02-28

## 2019-02-28 RX ORDER — RANOLAZINE 500 MG/1
500 TABLET, EXTENDED RELEASE ORAL DAILY
Status: CANCELLED | OUTPATIENT
Start: 2019-03-01

## 2019-02-28 RX ORDER — ASPIRIN 81 MG/1
81 TABLET ORAL DAILY
Status: CANCELLED | OUTPATIENT
Start: 2019-03-01

## 2019-02-28 RX ORDER — AMLODIPINE BESYLATE 2.5 MG/1
2.5 TABLET ORAL DAILY
Status: DISCONTINUED | OUTPATIENT
Start: 2019-03-01 | End: 2019-03-05

## 2019-02-28 RX ORDER — BISACODYL 10 MG
10 SUPPOSITORY, RECTAL RECTAL DAILY PRN
Status: CANCELLED | OUTPATIENT
Start: 2019-02-28

## 2019-02-28 RX ORDER — DOCUSATE SODIUM 100 MG/1
100 CAPSULE, LIQUID FILLED ORAL 2 TIMES DAILY
Status: DISCONTINUED | OUTPATIENT
Start: 2019-02-28 | End: 2019-03-09 | Stop reason: HOSPADM

## 2019-02-28 RX ORDER — FINASTERIDE 5 MG/1
5 TABLET, FILM COATED ORAL DAILY
Status: DISCONTINUED | OUTPATIENT
Start: 2019-03-01 | End: 2019-03-09 | Stop reason: HOSPADM

## 2019-02-28 RX ORDER — NICOTINE POLACRILEX 4 MG
15 LOZENGE BUCCAL PRN
Status: CANCELLED | OUTPATIENT
Start: 2019-02-28

## 2019-02-28 RX ORDER — ONDANSETRON 4 MG/1
4 TABLET, FILM COATED ORAL EVERY 8 HOURS PRN
Status: DISCONTINUED | OUTPATIENT
Start: 2019-02-28 | End: 2019-02-28 | Stop reason: CLARIF

## 2019-02-28 RX ORDER — BISACODYL 10 MG
10 SUPPOSITORY, RECTAL RECTAL DAILY PRN
Status: DISCONTINUED | OUTPATIENT
Start: 2019-02-28 | End: 2019-03-09 | Stop reason: HOSPADM

## 2019-02-28 RX ORDER — ASPIRIN 81 MG/1
81 TABLET ORAL DAILY
Status: DISCONTINUED | OUTPATIENT
Start: 2019-03-01 | End: 2019-03-09 | Stop reason: HOSPADM

## 2019-02-28 RX ORDER — RANOLAZINE 500 MG/1
500 TABLET, EXTENDED RELEASE ORAL DAILY
Status: DISCONTINUED | OUTPATIENT
Start: 2019-03-01 | End: 2019-03-09 | Stop reason: HOSPADM

## 2019-02-28 RX ORDER — NICOTINE POLACRILEX 4 MG
15 LOZENGE BUCCAL PRN
Status: DISCONTINUED | OUTPATIENT
Start: 2019-02-28 | End: 2019-03-09 | Stop reason: HOSPADM

## 2019-02-28 RX ORDER — ONDANSETRON HYDROCHLORIDE 8 MG/1
4 TABLET, FILM COATED ORAL EVERY 8 HOURS PRN
Status: CANCELLED | OUTPATIENT
Start: 2019-02-28

## 2019-02-28 RX ORDER — ONDANSETRON 4 MG/1
4 TABLET, ORALLY DISINTEGRATING ORAL EVERY 8 HOURS PRN
Status: DISCONTINUED | OUTPATIENT
Start: 2019-02-28 | End: 2019-03-09 | Stop reason: HOSPADM

## 2019-02-28 RX ORDER — ACETAMINOPHEN 325 MG/1
650 TABLET ORAL EVERY 6 HOURS PRN
Status: CANCELLED | OUTPATIENT
Start: 2019-02-28

## 2019-02-28 RX ORDER — CHOLECALCIFEROL (VITAMIN D3) 125 MCG
5 CAPSULE ORAL NIGHTLY PRN
Status: CANCELLED | OUTPATIENT
Start: 2019-02-28

## 2019-02-28 RX ORDER — DEXTROSE MONOHYDRATE 50 MG/ML
100 INJECTION, SOLUTION INTRAVENOUS PRN
Status: DISCONTINUED | OUTPATIENT
Start: 2019-02-28 | End: 2019-03-09 | Stop reason: HOSPADM

## 2019-02-28 RX ORDER — POLYETHYLENE GLYCOL 3350 17 G/17G
17 POWDER, FOR SOLUTION ORAL DAILY PRN
Status: CANCELLED | OUTPATIENT
Start: 2019-02-28

## 2019-02-28 RX ORDER — FINASTERIDE 5 MG/1
5 TABLET, FILM COATED ORAL DAILY
Status: CANCELLED | OUTPATIENT
Start: 2019-03-01

## 2019-02-28 RX ORDER — SPIRONOLACTONE 25 MG/1
25 TABLET ORAL DAILY
Status: CANCELLED | OUTPATIENT
Start: 2019-03-01

## 2019-02-28 RX ORDER — SPIRONOLACTONE 25 MG/1
25 TABLET ORAL DAILY
Status: DISCONTINUED | OUTPATIENT
Start: 2019-03-01 | End: 2019-03-09 | Stop reason: HOSPADM

## 2019-02-28 RX ORDER — DEXTROSE MONOHYDRATE 50 MG/ML
100 INJECTION, SOLUTION INTRAVENOUS PRN
Status: CANCELLED | OUTPATIENT
Start: 2019-02-28

## 2019-02-28 RX ORDER — LISINOPRIL 10 MG/1
10 TABLET ORAL DAILY
Status: CANCELLED | OUTPATIENT
Start: 2019-03-01

## 2019-02-28 RX ORDER — AMLODIPINE BESYLATE 2.5 MG/1
2.5 TABLET ORAL DAILY
Status: CANCELLED | OUTPATIENT
Start: 2019-03-01

## 2019-02-28 RX ORDER — LISINOPRIL 10 MG/1
10 TABLET ORAL DAILY
Status: DISCONTINUED | OUTPATIENT
Start: 2019-03-01 | End: 2019-03-05

## 2019-02-28 RX ORDER — WARFARIN SODIUM 2.5 MG/1
5 TABLET ORAL ONCE
Status: COMPLETED | OUTPATIENT
Start: 2019-02-28 | End: 2019-02-28

## 2019-02-28 RX ORDER — CHOLECALCIFEROL (VITAMIN D3) 125 MCG
5 CAPSULE ORAL NIGHTLY PRN
Status: DISCONTINUED | OUTPATIENT
Start: 2019-02-28 | End: 2019-03-09 | Stop reason: HOSPADM

## 2019-02-28 RX ORDER — ACETAMINOPHEN 325 MG/1
650 TABLET ORAL EVERY 6 HOURS PRN
Status: DISCONTINUED | OUTPATIENT
Start: 2019-02-28 | End: 2019-03-09 | Stop reason: HOSPADM

## 2019-02-28 RX ADMIN — LISINOPRIL 10 MG: 10 TABLET ORAL at 08:07

## 2019-02-28 RX ADMIN — FINASTERIDE 5 MG: 5 TABLET, FILM COATED ORAL at 08:07

## 2019-02-28 RX ADMIN — RANOLAZINE 500 MG: 500 TABLET, FILM COATED, EXTENDED RELEASE ORAL at 08:07

## 2019-02-28 RX ADMIN — WARFARIN SODIUM 5 MG: 2.5 TABLET ORAL at 21:38

## 2019-02-28 RX ADMIN — AMLODIPINE BESYLATE 2.5 MG: 2.5 TABLET ORAL at 08:07

## 2019-02-28 RX ADMIN — SODIUM CHLORIDE, PRESERVATIVE FREE 10 ML: 5 INJECTION INTRAVENOUS at 08:06

## 2019-02-28 RX ADMIN — METFORMIN HYDROCHLORIDE 500 MG: 500 TABLET ORAL at 11:49

## 2019-02-28 RX ADMIN — DOCUSATE SODIUM 100 MG: 100 CAPSULE, LIQUID FILLED ORAL at 21:38

## 2019-02-28 RX ADMIN — ACETAMINOPHEN 650 MG: 325 TABLET ORAL at 08:12

## 2019-02-28 RX ADMIN — ASPIRIN 81 MG: 81 TABLET, COATED ORAL at 08:07

## 2019-02-28 ASSESSMENT — PAIN DESCRIPTION - LOCATION: LOCATION: BUTTOCKS

## 2019-02-28 ASSESSMENT — PAIN SCALES - GENERAL
PAINLEVEL_OUTOF10: 5
PAINLEVEL_OUTOF10: 5
PAINLEVEL_OUTOF10: 0

## 2019-03-01 LAB
ANION GAP SERPL CALCULATED.3IONS-SCNC: 9 MMOL/L (ref 3–16)
BASOPHILS ABSOLUTE: 0 K/UL (ref 0–0.2)
BASOPHILS RELATIVE PERCENT: 0.1 %
BUN BLDV-MCNC: 21 MG/DL (ref 7–20)
CALCIUM SERPL-MCNC: 8.5 MG/DL (ref 8.3–10.6)
CHLORIDE BLD-SCNC: 96 MMOL/L (ref 99–110)
CO2: 29 MMOL/L (ref 21–32)
CREAT SERPL-MCNC: 1 MG/DL (ref 0.8–1.3)
EOSINOPHILS ABSOLUTE: 0.1 K/UL (ref 0–0.6)
EOSINOPHILS RELATIVE PERCENT: 2 %
GFR AFRICAN AMERICAN: >60
GFR NON-AFRICAN AMERICAN: >60
GLUCOSE BLD-MCNC: 162 MG/DL (ref 70–99)
GLUCOSE BLD-MCNC: 164 MG/DL (ref 70–99)
GLUCOSE BLD-MCNC: 177 MG/DL (ref 70–99)
GLUCOSE BLD-MCNC: 215 MG/DL (ref 70–99)
GLUCOSE BLD-MCNC: 238 MG/DL (ref 70–99)
GLUCOSE BLD-MCNC: 250 MG/DL (ref 70–99)
HCT VFR BLD CALC: 32.4 % (ref 40.5–52.5)
HEMOGLOBIN: 10.8 G/DL (ref 13.5–17.5)
INR BLD: 1.24 (ref 0.86–1.14)
LYMPHOCYTES ABSOLUTE: 1.6 K/UL (ref 1–5.1)
LYMPHOCYTES RELATIVE PERCENT: 21.3 %
MCH RBC QN AUTO: 30 PG (ref 26–34)
MCHC RBC AUTO-ENTMCNC: 33.3 G/DL (ref 31–36)
MCV RBC AUTO: 90 FL (ref 80–100)
MONOCYTES ABSOLUTE: 0.9 K/UL (ref 0–1.3)
MONOCYTES RELATIVE PERCENT: 12.4 %
NEUTROPHILS ABSOLUTE: 4.7 K/UL (ref 1.7–7.7)
NEUTROPHILS RELATIVE PERCENT: 64.2 %
PDW BLD-RTO: 13.8 % (ref 12.4–15.4)
PERFORMED ON: ABNORMAL
PLATELET # BLD: 174 K/UL (ref 135–450)
PMV BLD AUTO: 7.9 FL (ref 5–10.5)
POTASSIUM REFLEX MAGNESIUM: 4.3 MMOL/L (ref 3.5–5.1)
PREALBUMIN: 11.6 MG/DL (ref 20–40)
PROTHROMBIN TIME: 14.1 SEC (ref 9.8–13)
RBC # BLD: 3.6 M/UL (ref 4.2–5.9)
SODIUM BLD-SCNC: 134 MMOL/L (ref 136–145)
WBC # BLD: 7.4 K/UL (ref 4–11)

## 2019-03-01 PROCEDURE — 51798 US URINE CAPACITY MEASURE: CPT

## 2019-03-01 PROCEDURE — 85610 PROTHROMBIN TIME: CPT

## 2019-03-01 PROCEDURE — 36415 COLL VENOUS BLD VENIPUNCTURE: CPT

## 2019-03-01 PROCEDURE — 80048 BASIC METABOLIC PNL TOTAL CA: CPT

## 2019-03-01 PROCEDURE — 97530 THERAPEUTIC ACTIVITIES: CPT

## 2019-03-01 PROCEDURE — 2700000000 HC OXYGEN THERAPY PER DAY

## 2019-03-01 PROCEDURE — 97535 SELF CARE MNGMENT TRAINING: CPT

## 2019-03-01 PROCEDURE — 1280000000 HC REHAB R&B

## 2019-03-01 PROCEDURE — 92523 SPEECH SOUND LANG COMPREHEN: CPT

## 2019-03-01 PROCEDURE — 97166 OT EVAL MOD COMPLEX 45 MIN: CPT

## 2019-03-01 PROCEDURE — 97116 GAIT TRAINING THERAPY: CPT

## 2019-03-01 PROCEDURE — 97162 PT EVAL MOD COMPLEX 30 MIN: CPT

## 2019-03-01 PROCEDURE — 51701 INSERT BLADDER CATHETER: CPT

## 2019-03-01 PROCEDURE — 94761 N-INVAS EAR/PLS OXIMETRY MLT: CPT

## 2019-03-01 PROCEDURE — 6370000000 HC RX 637 (ALT 250 FOR IP): Performed by: PHYSICAL MEDICINE & REHABILITATION

## 2019-03-01 PROCEDURE — 97542 WHEELCHAIR MNGMENT TRAINING: CPT

## 2019-03-01 PROCEDURE — 85025 COMPLETE CBC W/AUTO DIFF WBC: CPT

## 2019-03-01 PROCEDURE — 84134 ASSAY OF PREALBUMIN: CPT

## 2019-03-01 PROCEDURE — 92610 EVALUATE SWALLOWING FUNCTION: CPT

## 2019-03-01 RX ORDER — WARFARIN SODIUM 5 MG/1
5 TABLET ORAL ONCE
Status: COMPLETED | OUTPATIENT
Start: 2019-03-01 | End: 2019-03-01

## 2019-03-01 RX ADMIN — RANOLAZINE 500 MG: 500 TABLET, FILM COATED, EXTENDED RELEASE ORAL at 08:02

## 2019-03-01 RX ADMIN — DOCUSATE SODIUM 100 MG: 100 CAPSULE, LIQUID FILLED ORAL at 20:37

## 2019-03-01 RX ADMIN — SPIRONOLACTONE 25 MG: 25 TABLET ORAL at 08:02

## 2019-03-01 RX ADMIN — WARFARIN SODIUM 5 MG: 5 TABLET ORAL at 17:43

## 2019-03-01 RX ADMIN — AMLODIPINE BESYLATE 2.5 MG: 2.5 TABLET ORAL at 08:03

## 2019-03-01 RX ADMIN — METFORMIN HYDROCHLORIDE 500 MG: 500 TABLET ORAL at 08:03

## 2019-03-01 RX ADMIN — METFORMIN HYDROCHLORIDE 500 MG: 500 TABLET ORAL at 17:48

## 2019-03-01 RX ADMIN — DOCUSATE SODIUM 100 MG: 100 CAPSULE, LIQUID FILLED ORAL at 08:03

## 2019-03-01 RX ADMIN — LISINOPRIL 10 MG: 10 TABLET ORAL at 08:03

## 2019-03-01 RX ADMIN — ASPIRIN 81 MG: 81 TABLET, COATED ORAL at 08:03

## 2019-03-01 RX ADMIN — FINASTERIDE 5 MG: 5 TABLET, FILM COATED ORAL at 08:03

## 2019-03-01 ASSESSMENT — PAIN SCALES - GENERAL
PAINLEVEL_OUTOF10: 0

## 2019-03-02 LAB
GLUCOSE BLD-MCNC: 169 MG/DL (ref 70–99)
GLUCOSE BLD-MCNC: 196 MG/DL (ref 70–99)
GLUCOSE BLD-MCNC: 199 MG/DL (ref 70–99)
INR BLD: 1.38 (ref 0.86–1.14)
PERFORMED ON: ABNORMAL
PROTHROMBIN TIME: 15.7 SEC (ref 9.8–13)

## 2019-03-02 PROCEDURE — 92507 TX SP LANG VOICE COMM INDIV: CPT

## 2019-03-02 PROCEDURE — 97116 GAIT TRAINING THERAPY: CPT

## 2019-03-02 PROCEDURE — 92526 ORAL FUNCTION THERAPY: CPT

## 2019-03-02 PROCEDURE — 97127 HC SP THER IVNTJ W/FOCUS COG FUNCJ: CPT

## 2019-03-02 PROCEDURE — 36415 COLL VENOUS BLD VENIPUNCTURE: CPT

## 2019-03-02 PROCEDURE — 97110 THERAPEUTIC EXERCISES: CPT

## 2019-03-02 PROCEDURE — 51798 US URINE CAPACITY MEASURE: CPT

## 2019-03-02 PROCEDURE — 97530 THERAPEUTIC ACTIVITIES: CPT

## 2019-03-02 PROCEDURE — 51701 INSERT BLADDER CATHETER: CPT

## 2019-03-02 PROCEDURE — 6370000000 HC RX 637 (ALT 250 FOR IP): Performed by: PHYSICAL MEDICINE & REHABILITATION

## 2019-03-02 PROCEDURE — 1280000000 HC REHAB R&B

## 2019-03-02 PROCEDURE — 85610 PROTHROMBIN TIME: CPT

## 2019-03-02 PROCEDURE — 94761 N-INVAS EAR/PLS OXIMETRY MLT: CPT

## 2019-03-02 PROCEDURE — 2700000000 HC OXYGEN THERAPY PER DAY

## 2019-03-02 RX ORDER — WARFARIN SODIUM 5 MG/1
5 TABLET ORAL
Status: COMPLETED | OUTPATIENT
Start: 2019-03-02 | End: 2019-03-02

## 2019-03-02 RX ADMIN — WARFARIN SODIUM 5 MG: 5 TABLET ORAL at 17:33

## 2019-03-02 RX ADMIN — METFORMIN HYDROCHLORIDE 500 MG: 500 TABLET ORAL at 17:33

## 2019-03-02 RX ADMIN — DOCUSATE SODIUM 100 MG: 100 CAPSULE, LIQUID FILLED ORAL at 19:55

## 2019-03-02 RX ADMIN — ASPIRIN 81 MG: 81 TABLET, COATED ORAL at 08:50

## 2019-03-02 RX ADMIN — RANOLAZINE 500 MG: 500 TABLET, FILM COATED, EXTENDED RELEASE ORAL at 08:51

## 2019-03-02 RX ADMIN — METFORMIN HYDROCHLORIDE 500 MG: 500 TABLET ORAL at 08:49

## 2019-03-02 RX ADMIN — AMLODIPINE BESYLATE 2.5 MG: 2.5 TABLET ORAL at 08:50

## 2019-03-02 RX ADMIN — LISINOPRIL 10 MG: 10 TABLET ORAL at 08:49

## 2019-03-02 RX ADMIN — SPIRONOLACTONE 25 MG: 25 TABLET ORAL at 08:50

## 2019-03-02 RX ADMIN — DOCUSATE SODIUM 100 MG: 100 CAPSULE, LIQUID FILLED ORAL at 08:49

## 2019-03-02 RX ADMIN — FINASTERIDE 5 MG: 5 TABLET, FILM COATED ORAL at 08:50

## 2019-03-02 ASSESSMENT — PAIN SCALES - GENERAL
PAINLEVEL_OUTOF10: 0

## 2019-03-03 LAB
GLUCOSE BLD-MCNC: 153 MG/DL (ref 70–99)
GLUCOSE BLD-MCNC: 194 MG/DL (ref 70–99)
GLUCOSE BLD-MCNC: 228 MG/DL (ref 70–99)
GLUCOSE BLD-MCNC: 234 MG/DL (ref 70–99)
INR BLD: 1.69 (ref 0.86–1.14)
PERFORMED ON: ABNORMAL
PROTHROMBIN TIME: 19.3 SEC (ref 9.8–13)

## 2019-03-03 PROCEDURE — 6370000000 HC RX 637 (ALT 250 FOR IP): Performed by: PHYSICAL MEDICINE & REHABILITATION

## 2019-03-03 PROCEDURE — 2700000000 HC OXYGEN THERAPY PER DAY

## 2019-03-03 PROCEDURE — 36415 COLL VENOUS BLD VENIPUNCTURE: CPT

## 2019-03-03 PROCEDURE — 85610 PROTHROMBIN TIME: CPT

## 2019-03-03 PROCEDURE — 51798 US URINE CAPACITY MEASURE: CPT

## 2019-03-03 PROCEDURE — 1280000000 HC REHAB R&B

## 2019-03-03 PROCEDURE — 94761 N-INVAS EAR/PLS OXIMETRY MLT: CPT

## 2019-03-03 RX ORDER — WARFARIN SODIUM 5 MG/1
5 TABLET ORAL
Status: COMPLETED | OUTPATIENT
Start: 2019-03-03 | End: 2019-03-03

## 2019-03-03 RX ADMIN — DOCUSATE SODIUM 100 MG: 100 CAPSULE, LIQUID FILLED ORAL at 20:08

## 2019-03-03 RX ADMIN — AMLODIPINE BESYLATE 2.5 MG: 2.5 TABLET ORAL at 08:51

## 2019-03-03 RX ADMIN — DOCUSATE SODIUM 100 MG: 100 CAPSULE, LIQUID FILLED ORAL at 08:50

## 2019-03-03 RX ADMIN — ASPIRIN 81 MG: 81 TABLET, COATED ORAL at 08:51

## 2019-03-03 RX ADMIN — METFORMIN HYDROCHLORIDE 500 MG: 500 TABLET ORAL at 08:51

## 2019-03-03 RX ADMIN — FINASTERIDE 5 MG: 5 TABLET, FILM COATED ORAL at 08:50

## 2019-03-03 RX ADMIN — METFORMIN HYDROCHLORIDE 500 MG: 500 TABLET ORAL at 17:18

## 2019-03-03 RX ADMIN — RANOLAZINE 500 MG: 500 TABLET, FILM COATED, EXTENDED RELEASE ORAL at 08:50

## 2019-03-03 RX ADMIN — WARFARIN SODIUM 5 MG: 5 TABLET ORAL at 17:18

## 2019-03-03 RX ADMIN — Medication 5 MG: at 20:08

## 2019-03-03 RX ADMIN — LISINOPRIL 10 MG: 10 TABLET ORAL at 08:50

## 2019-03-03 RX ADMIN — SPIRONOLACTONE 25 MG: 25 TABLET ORAL at 08:50

## 2019-03-03 ASSESSMENT — PAIN SCALES - GENERAL
PAINLEVEL_OUTOF10: 0

## 2019-03-04 LAB
ANION GAP SERPL CALCULATED.3IONS-SCNC: 7 MMOL/L (ref 3–16)
BASOPHILS ABSOLUTE: 0 K/UL (ref 0–0.2)
BASOPHILS RELATIVE PERCENT: 0.4 %
BILIRUBIN URINE: NEGATIVE
BLOOD, URINE: ABNORMAL
BUN BLDV-MCNC: 17 MG/DL (ref 7–20)
CALCIUM SERPL-MCNC: 8.7 MG/DL (ref 8.3–10.6)
CHLORIDE BLD-SCNC: 97 MMOL/L (ref 99–110)
CLARITY: CLEAR
CO2: 31 MMOL/L (ref 21–32)
COLOR: YELLOW
CREAT SERPL-MCNC: 1 MG/DL (ref 0.8–1.3)
EOSINOPHILS ABSOLUTE: 0.2 K/UL (ref 0–0.6)
EOSINOPHILS RELATIVE PERCENT: 2.8 %
GFR AFRICAN AMERICAN: >60
GFR NON-AFRICAN AMERICAN: >60
GLUCOSE BLD-MCNC: 168 MG/DL (ref 70–99)
GLUCOSE BLD-MCNC: 177 MG/DL (ref 70–99)
GLUCOSE BLD-MCNC: 180 MG/DL (ref 70–99)
GLUCOSE BLD-MCNC: 196 MG/DL (ref 70–99)
GLUCOSE BLD-MCNC: 225 MG/DL (ref 70–99)
GLUCOSE URINE: 100 MG/DL
HCT VFR BLD CALC: 31.7 % (ref 40.5–52.5)
HEMOGLOBIN: 10.7 G/DL (ref 13.5–17.5)
INR BLD: 1.96 (ref 0.86–1.14)
KETONES, URINE: NEGATIVE MG/DL
LEUKOCYTE ESTERASE, URINE: NEGATIVE
LYMPHOCYTES ABSOLUTE: 1.5 K/UL (ref 1–5.1)
LYMPHOCYTES RELATIVE PERCENT: 22.9 %
MCH RBC QN AUTO: 30.4 PG (ref 26–34)
MCHC RBC AUTO-ENTMCNC: 33.9 G/DL (ref 31–36)
MCV RBC AUTO: 89.7 FL (ref 80–100)
MICROSCOPIC EXAMINATION: YES
MONOCYTES ABSOLUTE: 0.7 K/UL (ref 0–1.3)
MONOCYTES RELATIVE PERCENT: 10.5 %
NEUTROPHILS ABSOLUTE: 4.2 K/UL (ref 1.7–7.7)
NEUTROPHILS RELATIVE PERCENT: 63.4 %
NITRITE, URINE: NEGATIVE
PDW BLD-RTO: 13.8 % (ref 12.4–15.4)
PERFORMED ON: ABNORMAL
PH UA: 5.5 (ref 5–8)
PLATELET # BLD: 206 K/UL (ref 135–450)
PMV BLD AUTO: 7.6 FL (ref 5–10.5)
POTASSIUM REFLEX MAGNESIUM: 4.3 MMOL/L (ref 3.5–5.1)
PROTEIN UA: NEGATIVE MG/DL
PROTHROMBIN TIME: 22.3 SEC (ref 9.8–13)
RBC # BLD: 3.54 M/UL (ref 4.2–5.9)
RBC UA: NORMAL /HPF (ref 0–2)
SODIUM BLD-SCNC: 135 MMOL/L (ref 136–145)
SPECIFIC GRAVITY UA: <=1.005 (ref 1–1.03)
URINE REFLEX TO CULTURE: ABNORMAL
URINE TYPE: ABNORMAL
UROBILINOGEN, URINE: 0.2 E.U./DL
WBC # BLD: 6.6 K/UL (ref 4–11)
WBC UA: NORMAL /HPF (ref 0–5)

## 2019-03-04 PROCEDURE — 1280000000 HC REHAB R&B

## 2019-03-04 PROCEDURE — 97110 THERAPEUTIC EXERCISES: CPT

## 2019-03-04 PROCEDURE — 97530 THERAPEUTIC ACTIVITIES: CPT

## 2019-03-04 PROCEDURE — 2700000000 HC OXYGEN THERAPY PER DAY

## 2019-03-04 PROCEDURE — 85025 COMPLETE CBC W/AUTO DIFF WBC: CPT

## 2019-03-04 PROCEDURE — 97127 HC OT THER IVNTJ W/FOCUS COG FUNCJ: CPT

## 2019-03-04 PROCEDURE — 92507 TX SP LANG VOICE COMM INDIV: CPT

## 2019-03-04 PROCEDURE — 80048 BASIC METABOLIC PNL TOTAL CA: CPT

## 2019-03-04 PROCEDURE — 51701 INSERT BLADDER CATHETER: CPT

## 2019-03-04 PROCEDURE — 6370000000 HC RX 637 (ALT 250 FOR IP): Performed by: PHYSICAL MEDICINE & REHABILITATION

## 2019-03-04 PROCEDURE — 51798 US URINE CAPACITY MEASURE: CPT

## 2019-03-04 PROCEDURE — 36415 COLL VENOUS BLD VENIPUNCTURE: CPT

## 2019-03-04 PROCEDURE — 92526 ORAL FUNCTION THERAPY: CPT

## 2019-03-04 PROCEDURE — 94761 N-INVAS EAR/PLS OXIMETRY MLT: CPT

## 2019-03-04 PROCEDURE — 97127 HC SP THER IVNTJ W/FOCUS COG FUNCJ: CPT

## 2019-03-04 PROCEDURE — 97116 GAIT TRAINING THERAPY: CPT

## 2019-03-04 PROCEDURE — 81001 URINALYSIS AUTO W/SCOPE: CPT

## 2019-03-04 PROCEDURE — 97112 NEUROMUSCULAR REEDUCATION: CPT

## 2019-03-04 PROCEDURE — 85610 PROTHROMBIN TIME: CPT

## 2019-03-04 PROCEDURE — 97535 SELF CARE MNGMENT TRAINING: CPT

## 2019-03-04 RX ORDER — BETHANECHOL CHLORIDE 25 MG/1
25 TABLET ORAL 3 TIMES DAILY
Status: DISCONTINUED | OUTPATIENT
Start: 2019-03-04 | End: 2019-03-09 | Stop reason: HOSPADM

## 2019-03-04 RX ORDER — TAMSULOSIN HYDROCHLORIDE 0.4 MG/1
0.4 CAPSULE ORAL DAILY
Status: DISCONTINUED | OUTPATIENT
Start: 2019-03-04 | End: 2019-03-09 | Stop reason: HOSPADM

## 2019-03-04 RX ADMIN — WARFARIN SODIUM 3 MG: 2 TABLET ORAL at 18:04

## 2019-03-04 RX ADMIN — BETHANECHOL CHLORIDE 25 MG: 25 TABLET ORAL at 08:19

## 2019-03-04 RX ADMIN — LISINOPRIL 10 MG: 10 TABLET ORAL at 08:18

## 2019-03-04 RX ADMIN — AMLODIPINE BESYLATE 2.5 MG: 2.5 TABLET ORAL at 08:21

## 2019-03-04 RX ADMIN — Medication 5 MG: at 20:38

## 2019-03-04 RX ADMIN — ASPIRIN 81 MG: 81 TABLET, COATED ORAL at 08:19

## 2019-03-04 RX ADMIN — METFORMIN HYDROCHLORIDE 500 MG: 500 TABLET ORAL at 18:04

## 2019-03-04 RX ADMIN — SPIRONOLACTONE 25 MG: 25 TABLET ORAL at 08:21

## 2019-03-04 RX ADMIN — BETHANECHOL CHLORIDE 25 MG: 25 TABLET ORAL at 20:38

## 2019-03-04 RX ADMIN — DOCUSATE SODIUM 100 MG: 100 CAPSULE, LIQUID FILLED ORAL at 08:20

## 2019-03-04 RX ADMIN — RANOLAZINE 500 MG: 500 TABLET, FILM COATED, EXTENDED RELEASE ORAL at 08:21

## 2019-03-04 RX ADMIN — TAMSULOSIN HYDROCHLORIDE 0.4 MG: 0.4 CAPSULE ORAL at 08:20

## 2019-03-04 RX ADMIN — DOCUSATE SODIUM 100 MG: 100 CAPSULE, LIQUID FILLED ORAL at 20:32

## 2019-03-04 RX ADMIN — FINASTERIDE 5 MG: 5 TABLET, FILM COATED ORAL at 08:18

## 2019-03-04 RX ADMIN — METFORMIN HYDROCHLORIDE 500 MG: 500 TABLET ORAL at 08:20

## 2019-03-04 RX ADMIN — BETHANECHOL CHLORIDE 25 MG: 25 TABLET ORAL at 18:07

## 2019-03-04 ASSESSMENT — PAIN DESCRIPTION - ONSET: ONSET: ON-GOING

## 2019-03-04 ASSESSMENT — PAIN SCALES - GENERAL
PAINLEVEL_OUTOF10: 0
PAINLEVEL_OUTOF10: 3
PAINLEVEL_OUTOF10: 0
PAINLEVEL_OUTOF10: 0

## 2019-03-04 ASSESSMENT — PAIN - FUNCTIONAL ASSESSMENT: PAIN_FUNCTIONAL_ASSESSMENT: ACTIVITIES ARE NOT PREVENTED

## 2019-03-04 ASSESSMENT — PAIN DESCRIPTION - DESCRIPTORS: DESCRIPTORS: ACHING

## 2019-03-04 ASSESSMENT — PAIN DESCRIPTION - ORIENTATION: ORIENTATION: MID

## 2019-03-04 ASSESSMENT — PAIN DESCRIPTION - FREQUENCY: FREQUENCY: CONTINUOUS

## 2019-03-04 ASSESSMENT — PAIN DESCRIPTION - PAIN TYPE: TYPE: ACUTE PAIN

## 2019-03-04 ASSESSMENT — PAIN DESCRIPTION - PROGRESSION: CLINICAL_PROGRESSION: GRADUALLY WORSENING

## 2019-03-04 ASSESSMENT — PAIN DESCRIPTION - LOCATION: LOCATION: COCCYX

## 2019-03-05 LAB
GLUCOSE BLD-MCNC: 153 MG/DL (ref 70–99)
GLUCOSE BLD-MCNC: 156 MG/DL (ref 70–99)
GLUCOSE BLD-MCNC: 164 MG/DL (ref 70–99)
GLUCOSE BLD-MCNC: 213 MG/DL (ref 70–99)
INR BLD: 1.86 (ref 0.86–1.14)
PERFORMED ON: ABNORMAL
PROTHROMBIN TIME: 21.2 SEC (ref 9.8–13)

## 2019-03-05 PROCEDURE — 6370000000 HC RX 637 (ALT 250 FOR IP): Performed by: PHYSICAL MEDICINE & REHABILITATION

## 2019-03-05 PROCEDURE — 51798 US URINE CAPACITY MEASURE: CPT

## 2019-03-05 PROCEDURE — 97116 GAIT TRAINING THERAPY: CPT

## 2019-03-05 PROCEDURE — 97112 NEUROMUSCULAR REEDUCATION: CPT

## 2019-03-05 PROCEDURE — 85610 PROTHROMBIN TIME: CPT

## 2019-03-05 PROCEDURE — 97127 HC SP THER IVNTJ W/FOCUS COG FUNCJ: CPT

## 2019-03-05 PROCEDURE — 97110 THERAPEUTIC EXERCISES: CPT

## 2019-03-05 PROCEDURE — 92507 TX SP LANG VOICE COMM INDIV: CPT

## 2019-03-05 PROCEDURE — 97530 THERAPEUTIC ACTIVITIES: CPT

## 2019-03-05 PROCEDURE — 92526 ORAL FUNCTION THERAPY: CPT

## 2019-03-05 PROCEDURE — 36415 COLL VENOUS BLD VENIPUNCTURE: CPT

## 2019-03-05 PROCEDURE — 97535 SELF CARE MNGMENT TRAINING: CPT

## 2019-03-05 PROCEDURE — 1280000000 HC REHAB R&B

## 2019-03-05 RX ORDER — LISINOPRIL 10 MG/1
10 TABLET ORAL DAILY
Status: DISCONTINUED | OUTPATIENT
Start: 2019-03-06 | End: 2019-03-06

## 2019-03-05 RX ORDER — WARFARIN SODIUM 5 MG/1
5 TABLET ORAL
Status: COMPLETED | OUTPATIENT
Start: 2019-03-05 | End: 2019-03-05

## 2019-03-05 RX ORDER — ATORVASTATIN CALCIUM 80 MG/1
80 TABLET, FILM COATED ORAL NIGHTLY
Status: DISCONTINUED | OUTPATIENT
Start: 2019-03-05 | End: 2019-03-09 | Stop reason: HOSPADM

## 2019-03-05 RX ORDER — AMLODIPINE BESYLATE 5 MG/1
5 TABLET ORAL ONCE
Status: COMPLETED | OUTPATIENT
Start: 2019-03-05 | End: 2019-03-05

## 2019-03-05 RX ADMIN — BETHANECHOL CHLORIDE 25 MG: 25 TABLET ORAL at 07:53

## 2019-03-05 RX ADMIN — BETHANECHOL CHLORIDE 25 MG: 25 TABLET ORAL at 20:28

## 2019-03-05 RX ADMIN — METFORMIN HYDROCHLORIDE 850 MG: 850 TABLET ORAL at 17:21

## 2019-03-05 RX ADMIN — SPIRONOLACTONE 25 MG: 25 TABLET ORAL at 07:53

## 2019-03-05 RX ADMIN — WARFARIN SODIUM 5 MG: 5 TABLET ORAL at 17:21

## 2019-03-05 RX ADMIN — AMLODIPINE BESYLATE 2.5 MG: 2.5 TABLET ORAL at 07:53

## 2019-03-05 RX ADMIN — TAMSULOSIN HYDROCHLORIDE 0.4 MG: 0.4 CAPSULE ORAL at 07:54

## 2019-03-05 RX ADMIN — AMLODIPINE BESYLATE 5 MG: 5 TABLET ORAL at 12:32

## 2019-03-05 RX ADMIN — FINASTERIDE 5 MG: 5 TABLET, FILM COATED ORAL at 07:53

## 2019-03-05 RX ADMIN — DOCUSATE SODIUM 100 MG: 100 CAPSULE, LIQUID FILLED ORAL at 07:54

## 2019-03-05 RX ADMIN — Medication 5 MG: at 20:28

## 2019-03-05 RX ADMIN — LISINOPRIL 10 MG: 10 TABLET ORAL at 07:54

## 2019-03-05 RX ADMIN — BETHANECHOL CHLORIDE 25 MG: 25 TABLET ORAL at 12:32

## 2019-03-05 RX ADMIN — DOCUSATE SODIUM 100 MG: 100 CAPSULE, LIQUID FILLED ORAL at 20:28

## 2019-03-05 RX ADMIN — ASPIRIN 81 MG: 81 TABLET, COATED ORAL at 07:54

## 2019-03-05 RX ADMIN — METFORMIN HYDROCHLORIDE 500 MG: 500 TABLET ORAL at 07:54

## 2019-03-05 RX ADMIN — ATORVASTATIN CALCIUM 80 MG: 80 TABLET, FILM COATED ORAL at 20:28

## 2019-03-05 RX ADMIN — RANOLAZINE 500 MG: 500 TABLET, FILM COATED, EXTENDED RELEASE ORAL at 07:54

## 2019-03-05 ASSESSMENT — PAIN SCALES - GENERAL
PAINLEVEL_OUTOF10: 0

## 2019-03-06 LAB
GLUCOSE BLD-MCNC: 149 MG/DL (ref 70–99)
GLUCOSE BLD-MCNC: 186 MG/DL (ref 70–99)
GLUCOSE BLD-MCNC: 207 MG/DL (ref 70–99)
GLUCOSE BLD-MCNC: 226 MG/DL (ref 70–99)
INR BLD: 2.04 (ref 0.86–1.14)
PERFORMED ON: ABNORMAL
PROTHROMBIN TIME: 23.3 SEC (ref 9.8–13)

## 2019-03-06 PROCEDURE — 1280000000 HC REHAB R&B

## 2019-03-06 PROCEDURE — 97530 THERAPEUTIC ACTIVITIES: CPT

## 2019-03-06 PROCEDURE — 85610 PROTHROMBIN TIME: CPT

## 2019-03-06 PROCEDURE — 51798 US URINE CAPACITY MEASURE: CPT

## 2019-03-06 PROCEDURE — 97127 HC SP THER IVNTJ W/FOCUS COG FUNCJ: CPT

## 2019-03-06 PROCEDURE — 97110 THERAPEUTIC EXERCISES: CPT

## 2019-03-06 PROCEDURE — 6370000000 HC RX 637 (ALT 250 FOR IP): Performed by: PHYSICAL MEDICINE & REHABILITATION

## 2019-03-06 PROCEDURE — 36415 COLL VENOUS BLD VENIPUNCTURE: CPT

## 2019-03-06 PROCEDURE — 92507 TX SP LANG VOICE COMM INDIV: CPT

## 2019-03-06 PROCEDURE — 97535 SELF CARE MNGMENT TRAINING: CPT

## 2019-03-06 PROCEDURE — 97116 GAIT TRAINING THERAPY: CPT

## 2019-03-06 RX ORDER — AMLODIPINE BESYLATE 5 MG/1
10 TABLET ORAL DAILY
Status: DISCONTINUED | OUTPATIENT
Start: 2019-03-07 | End: 2019-03-09 | Stop reason: HOSPADM

## 2019-03-06 RX ORDER — LISINOPRIL 10 MG/1
10 TABLET ORAL DAILY
Status: DISCONTINUED | OUTPATIENT
Start: 2019-03-07 | End: 2019-03-09 | Stop reason: HOSPADM

## 2019-03-06 RX ORDER — WARFARIN SODIUM 5 MG/1
5 TABLET ORAL
Status: COMPLETED | OUTPATIENT
Start: 2019-03-06 | End: 2019-03-06

## 2019-03-06 RX ADMIN — LISINOPRIL 10 MG: 10 TABLET ORAL at 07:45

## 2019-03-06 RX ADMIN — FINASTERIDE 5 MG: 5 TABLET, FILM COATED ORAL at 07:44

## 2019-03-06 RX ADMIN — METFORMIN HYDROCHLORIDE 850 MG: 850 TABLET ORAL at 07:45

## 2019-03-06 RX ADMIN — BETHANECHOL CHLORIDE 25 MG: 25 TABLET ORAL at 13:18

## 2019-03-06 RX ADMIN — BETHANECHOL CHLORIDE 25 MG: 25 TABLET ORAL at 20:57

## 2019-03-06 RX ADMIN — DOCUSATE SODIUM 100 MG: 100 CAPSULE, LIQUID FILLED ORAL at 20:57

## 2019-03-06 RX ADMIN — ATORVASTATIN CALCIUM 80 MG: 80 TABLET, FILM COATED ORAL at 20:57

## 2019-03-06 RX ADMIN — ASPIRIN 81 MG: 81 TABLET, COATED ORAL at 07:44

## 2019-03-06 RX ADMIN — METFORMIN HYDROCHLORIDE 850 MG: 850 TABLET ORAL at 17:36

## 2019-03-06 RX ADMIN — BETHANECHOL CHLORIDE 25 MG: 25 TABLET ORAL at 07:44

## 2019-03-06 RX ADMIN — AMLODIPINE BESYLATE 7.5 MG: 5 TABLET ORAL at 07:45

## 2019-03-06 RX ADMIN — SPIRONOLACTONE 25 MG: 25 TABLET ORAL at 07:45

## 2019-03-06 RX ADMIN — WARFARIN SODIUM 5 MG: 5 TABLET ORAL at 17:37

## 2019-03-06 RX ADMIN — DOCUSATE SODIUM 100 MG: 100 CAPSULE, LIQUID FILLED ORAL at 07:44

## 2019-03-06 RX ADMIN — RANOLAZINE 500 MG: 500 TABLET, FILM COATED, EXTENDED RELEASE ORAL at 07:44

## 2019-03-06 RX ADMIN — TAMSULOSIN HYDROCHLORIDE 0.4 MG: 0.4 CAPSULE ORAL at 07:45

## 2019-03-06 ASSESSMENT — PAIN SCALES - GENERAL
PAINLEVEL_OUTOF10: 0

## 2019-03-07 LAB
ANION GAP SERPL CALCULATED.3IONS-SCNC: 10 MMOL/L (ref 3–16)
BASOPHILS ABSOLUTE: 0.1 K/UL (ref 0–0.2)
BASOPHILS RELATIVE PERCENT: 0.7 %
BUN BLDV-MCNC: 20 MG/DL (ref 7–20)
CALCIUM SERPL-MCNC: 9.5 MG/DL (ref 8.3–10.6)
CHLORIDE BLD-SCNC: 94 MMOL/L (ref 99–110)
CO2: 29 MMOL/L (ref 21–32)
CREAT SERPL-MCNC: 1 MG/DL (ref 0.8–1.3)
EOSINOPHILS ABSOLUTE: 0.1 K/UL (ref 0–0.6)
EOSINOPHILS RELATIVE PERCENT: 1.7 %
GFR AFRICAN AMERICAN: >60
GFR NON-AFRICAN AMERICAN: >60
GLUCOSE BLD-MCNC: 164 MG/DL (ref 70–99)
GLUCOSE BLD-MCNC: 165 MG/DL (ref 70–99)
GLUCOSE BLD-MCNC: 170 MG/DL (ref 70–99)
GLUCOSE BLD-MCNC: 180 MG/DL (ref 70–99)
GLUCOSE BLD-MCNC: 191 MG/DL (ref 70–99)
HCT VFR BLD CALC: 35.4 % (ref 40.5–52.5)
HEMOGLOBIN: 11.9 G/DL (ref 13.5–17.5)
INR BLD: 2.43 (ref 0.86–1.14)
LYMPHOCYTES ABSOLUTE: 1.9 K/UL (ref 1–5.1)
LYMPHOCYTES RELATIVE PERCENT: 25.4 %
MCH RBC QN AUTO: 30.5 PG (ref 26–34)
MCHC RBC AUTO-ENTMCNC: 33.6 G/DL (ref 31–36)
MCV RBC AUTO: 90.8 FL (ref 80–100)
MONOCYTES ABSOLUTE: 0.6 K/UL (ref 0–1.3)
MONOCYTES RELATIVE PERCENT: 7.3 %
NEUTROPHILS ABSOLUTE: 4.9 K/UL (ref 1.7–7.7)
NEUTROPHILS RELATIVE PERCENT: 64.9 %
PDW BLD-RTO: 14 % (ref 12.4–15.4)
PERFORMED ON: ABNORMAL
PLATELET # BLD: 264 K/UL (ref 135–450)
PMV BLD AUTO: 7.3 FL (ref 5–10.5)
POTASSIUM REFLEX MAGNESIUM: 5.6 MMOL/L (ref 3.5–5.1)
PROTHROMBIN TIME: 27.7 SEC (ref 9.8–13)
RBC # BLD: 3.9 M/UL (ref 4.2–5.9)
SODIUM BLD-SCNC: 133 MMOL/L (ref 136–145)
WBC # BLD: 7.6 K/UL (ref 4–11)

## 2019-03-07 PROCEDURE — 97110 THERAPEUTIC EXERCISES: CPT

## 2019-03-07 PROCEDURE — 1280000000 HC REHAB R&B

## 2019-03-07 PROCEDURE — 6370000000 HC RX 637 (ALT 250 FOR IP): Performed by: PHYSICAL MEDICINE & REHABILITATION

## 2019-03-07 PROCEDURE — 92507 TX SP LANG VOICE COMM INDIV: CPT

## 2019-03-07 PROCEDURE — 97116 GAIT TRAINING THERAPY: CPT

## 2019-03-07 PROCEDURE — 97127 HC SP THER IVNTJ W/FOCUS COG FUNCJ: CPT

## 2019-03-07 PROCEDURE — 36415 COLL VENOUS BLD VENIPUNCTURE: CPT

## 2019-03-07 PROCEDURE — 85610 PROTHROMBIN TIME: CPT

## 2019-03-07 PROCEDURE — 80048 BASIC METABOLIC PNL TOTAL CA: CPT

## 2019-03-07 PROCEDURE — 97535 SELF CARE MNGMENT TRAINING: CPT

## 2019-03-07 PROCEDURE — 92526 ORAL FUNCTION THERAPY: CPT

## 2019-03-07 PROCEDURE — 51798 US URINE CAPACITY MEASURE: CPT

## 2019-03-07 PROCEDURE — 85025 COMPLETE CBC W/AUTO DIFF WBC: CPT

## 2019-03-07 PROCEDURE — 97530 THERAPEUTIC ACTIVITIES: CPT

## 2019-03-07 RX ORDER — HYDROCHLOROTHIAZIDE 25 MG/1
25 TABLET ORAL DAILY
Status: DISCONTINUED | OUTPATIENT
Start: 2019-03-07 | End: 2019-03-09 | Stop reason: HOSPADM

## 2019-03-07 RX ORDER — SODIUM POLYSTYRENE SULFONATE 15 G/60ML
15 SUSPENSION ORAL; RECTAL ONCE
Status: DISCONTINUED | OUTPATIENT
Start: 2019-03-07 | End: 2019-03-07 | Stop reason: ALTCHOICE

## 2019-03-07 RX ADMIN — FINASTERIDE 5 MG: 5 TABLET, FILM COATED ORAL at 10:08

## 2019-03-07 RX ADMIN — LISINOPRIL 10 MG: 10 TABLET ORAL at 10:08

## 2019-03-07 RX ADMIN — DOCUSATE SODIUM 100 MG: 100 CAPSULE, LIQUID FILLED ORAL at 19:54

## 2019-03-07 RX ADMIN — WARFARIN SODIUM 3 MG: 2 TABLET ORAL at 17:08

## 2019-03-07 RX ADMIN — BETHANECHOL CHLORIDE 25 MG: 25 TABLET ORAL at 14:26

## 2019-03-07 RX ADMIN — TAMSULOSIN HYDROCHLORIDE 0.4 MG: 0.4 CAPSULE ORAL at 10:07

## 2019-03-07 RX ADMIN — HYDROCHLOROTHIAZIDE 25 MG: 25 TABLET ORAL at 10:07

## 2019-03-07 RX ADMIN — BETHANECHOL CHLORIDE 25 MG: 25 TABLET ORAL at 10:07

## 2019-03-07 RX ADMIN — METFORMIN HYDROCHLORIDE 850 MG: 850 TABLET ORAL at 16:45

## 2019-03-07 RX ADMIN — ASPIRIN 81 MG: 81 TABLET, COATED ORAL at 10:07

## 2019-03-07 RX ADMIN — DOCUSATE SODIUM 100 MG: 100 CAPSULE, LIQUID FILLED ORAL at 10:08

## 2019-03-07 RX ADMIN — BETHANECHOL CHLORIDE 25 MG: 25 TABLET ORAL at 19:53

## 2019-03-07 RX ADMIN — METFORMIN HYDROCHLORIDE 850 MG: 850 TABLET ORAL at 10:07

## 2019-03-07 RX ADMIN — AMLODIPINE BESYLATE 10 MG: 5 TABLET ORAL at 10:07

## 2019-03-07 RX ADMIN — ATORVASTATIN CALCIUM 80 MG: 80 TABLET, FILM COATED ORAL at 19:54

## 2019-03-07 RX ADMIN — RANOLAZINE 500 MG: 500 TABLET, FILM COATED, EXTENDED RELEASE ORAL at 10:07

## 2019-03-07 RX ADMIN — ACETAMINOPHEN 650 MG: 325 TABLET ORAL at 18:03

## 2019-03-07 RX ADMIN — SPIRONOLACTONE 25 MG: 25 TABLET ORAL at 10:07

## 2019-03-07 RX ADMIN — PATIROMER 8.4 G: 8.4 POWDER, FOR SUSPENSION ORAL at 11:50

## 2019-03-07 ASSESSMENT — PAIN SCALES - GENERAL
PAINLEVEL_OUTOF10: 0
PAINLEVEL_OUTOF10: 3

## 2019-03-08 LAB
ANION GAP SERPL CALCULATED.3IONS-SCNC: 9 MMOL/L (ref 3–16)
BUN BLDV-MCNC: 21 MG/DL (ref 7–20)
CALCIUM SERPL-MCNC: 8.9 MG/DL (ref 8.3–10.6)
CHLORIDE BLD-SCNC: 92 MMOL/L (ref 99–110)
CO2: 29 MMOL/L (ref 21–32)
CREAT SERPL-MCNC: 1 MG/DL (ref 0.8–1.3)
GFR AFRICAN AMERICAN: >60
GFR NON-AFRICAN AMERICAN: >60
GLUCOSE BLD-MCNC: 137 MG/DL (ref 70–99)
GLUCOSE BLD-MCNC: 159 MG/DL (ref 70–99)
GLUCOSE BLD-MCNC: 169 MG/DL (ref 70–99)
GLUCOSE BLD-MCNC: 205 MG/DL (ref 70–99)
GLUCOSE BLD-MCNC: 223 MG/DL (ref 70–99)
INR BLD: 2.42 (ref 0.86–1.14)
PERFORMED ON: ABNORMAL
POTASSIUM REFLEX MAGNESIUM: 5 MMOL/L (ref 3.5–5.1)
PROTHROMBIN TIME: 27.6 SEC (ref 9.8–13)
SODIUM BLD-SCNC: 130 MMOL/L (ref 136–145)

## 2019-03-08 PROCEDURE — 6370000000 HC RX 637 (ALT 250 FOR IP): Performed by: PHYSICAL MEDICINE & REHABILITATION

## 2019-03-08 PROCEDURE — 97530 THERAPEUTIC ACTIVITIES: CPT

## 2019-03-08 PROCEDURE — 36415 COLL VENOUS BLD VENIPUNCTURE: CPT

## 2019-03-08 PROCEDURE — 97116 GAIT TRAINING THERAPY: CPT

## 2019-03-08 PROCEDURE — 97535 SELF CARE MNGMENT TRAINING: CPT

## 2019-03-08 PROCEDURE — 97127 HC SP THER IVNTJ W/FOCUS COG FUNCJ: CPT

## 2019-03-08 PROCEDURE — 1280000000 HC REHAB R&B

## 2019-03-08 PROCEDURE — 80048 BASIC METABOLIC PNL TOTAL CA: CPT

## 2019-03-08 PROCEDURE — 92507 TX SP LANG VOICE COMM INDIV: CPT

## 2019-03-08 PROCEDURE — 85610 PROTHROMBIN TIME: CPT

## 2019-03-08 PROCEDURE — 97110 THERAPEUTIC EXERCISES: CPT

## 2019-03-08 PROCEDURE — 92526 ORAL FUNCTION THERAPY: CPT

## 2019-03-08 RX ORDER — TAMSULOSIN HYDROCHLORIDE 0.4 MG/1
0.4 CAPSULE ORAL DAILY
Qty: 30 CAPSULE | Refills: 3 | Status: SHIPPED | OUTPATIENT
Start: 2019-03-09 | End: 2019-04-24

## 2019-03-08 RX ORDER — WARFARIN SODIUM 5 MG/1
5 TABLET ORAL
Status: COMPLETED | OUTPATIENT
Start: 2019-03-08 | End: 2019-03-08

## 2019-03-08 RX ORDER — BETHANECHOL CHLORIDE 25 MG/1
25 TABLET ORAL 3 TIMES DAILY
Qty: 90 TABLET | Refills: 3 | Status: ON HOLD | OUTPATIENT
Start: 2019-03-08 | End: 2019-11-08 | Stop reason: HOSPADM

## 2019-03-08 RX ADMIN — FINASTERIDE 5 MG: 5 TABLET, FILM COATED ORAL at 07:46

## 2019-03-08 RX ADMIN — BETHANECHOL CHLORIDE 25 MG: 25 TABLET ORAL at 13:33

## 2019-03-08 RX ADMIN — TAMSULOSIN HYDROCHLORIDE 0.4 MG: 0.4 CAPSULE ORAL at 07:47

## 2019-03-08 RX ADMIN — BETHANECHOL CHLORIDE 25 MG: 25 TABLET ORAL at 07:46

## 2019-03-08 RX ADMIN — ATORVASTATIN CALCIUM 80 MG: 80 TABLET, FILM COATED ORAL at 21:42

## 2019-03-08 RX ADMIN — ASPIRIN 81 MG: 81 TABLET, COATED ORAL at 07:47

## 2019-03-08 RX ADMIN — METFORMIN HYDROCHLORIDE 850 MG: 850 TABLET ORAL at 16:49

## 2019-03-08 RX ADMIN — BETHANECHOL CHLORIDE 25 MG: 25 TABLET ORAL at 21:42

## 2019-03-08 RX ADMIN — DOCUSATE SODIUM 100 MG: 100 CAPSULE, LIQUID FILLED ORAL at 21:42

## 2019-03-08 RX ADMIN — WARFARIN SODIUM 5 MG: 5 TABLET ORAL at 18:15

## 2019-03-08 RX ADMIN — RANOLAZINE 500 MG: 500 TABLET, FILM COATED, EXTENDED RELEASE ORAL at 07:47

## 2019-03-08 RX ADMIN — METFORMIN HYDROCHLORIDE 850 MG: 850 TABLET ORAL at 07:46

## 2019-03-08 RX ADMIN — DOCUSATE SODIUM 100 MG: 100 CAPSULE, LIQUID FILLED ORAL at 07:46

## 2019-03-08 RX ADMIN — AMLODIPINE BESYLATE 10 MG: 5 TABLET ORAL at 07:47

## 2019-03-08 RX ADMIN — HYDROCHLOROTHIAZIDE 25 MG: 25 TABLET ORAL at 07:46

## 2019-03-08 RX ADMIN — LISINOPRIL 10 MG: 10 TABLET ORAL at 07:47

## 2019-03-08 RX ADMIN — SPIRONOLACTONE 25 MG: 25 TABLET ORAL at 07:47

## 2019-03-08 ASSESSMENT — PAIN DESCRIPTION - PAIN TYPE
TYPE: ACUTE PAIN
TYPE: ACUTE PAIN

## 2019-03-08 ASSESSMENT — PAIN SCALES - GENERAL
PAINLEVEL_OUTOF10: 2
PAINLEVEL_OUTOF10: 3

## 2019-03-08 ASSESSMENT — PAIN DESCRIPTION - LOCATION
LOCATION: BUTTOCKS
LOCATION: BUTTOCKS

## 2019-03-08 ASSESSMENT — PAIN DESCRIPTION - ORIENTATION
ORIENTATION: LEFT
ORIENTATION: LEFT

## 2019-03-09 VITALS
WEIGHT: 197.7 LBS | BODY MASS INDEX: 28.3 KG/M2 | TEMPERATURE: 98.6 F | DIASTOLIC BLOOD PRESSURE: 72 MMHG | SYSTOLIC BLOOD PRESSURE: 154 MMHG | HEIGHT: 70 IN | OXYGEN SATURATION: 94 % | HEART RATE: 64 BPM | RESPIRATION RATE: 16 BRPM

## 2019-03-09 LAB
ANION GAP SERPL CALCULATED.3IONS-SCNC: 11 MMOL/L (ref 3–16)
BUN BLDV-MCNC: 20 MG/DL (ref 7–20)
CALCIUM SERPL-MCNC: 9.1 MG/DL (ref 8.3–10.6)
CHLORIDE BLD-SCNC: 89 MMOL/L (ref 99–110)
CO2: 28 MMOL/L (ref 21–32)
CREAT SERPL-MCNC: 1.1 MG/DL (ref 0.8–1.3)
GFR AFRICAN AMERICAN: >60
GFR NON-AFRICAN AMERICAN: >60
GLUCOSE BLD-MCNC: 168 MG/DL (ref 70–99)
GLUCOSE BLD-MCNC: 169 MG/DL (ref 70–99)
GLUCOSE BLD-MCNC: 179 MG/DL (ref 70–99)
INR BLD: 2.4 (ref 0.86–1.14)
PERFORMED ON: ABNORMAL
PERFORMED ON: ABNORMAL
POTASSIUM REFLEX MAGNESIUM: 4.8 MMOL/L (ref 3.5–5.1)
PROTHROMBIN TIME: 27.4 SEC (ref 9.8–13)
SODIUM BLD-SCNC: 128 MMOL/L (ref 136–145)

## 2019-03-09 PROCEDURE — 85610 PROTHROMBIN TIME: CPT

## 2019-03-09 PROCEDURE — 80048 BASIC METABOLIC PNL TOTAL CA: CPT

## 2019-03-09 PROCEDURE — 6370000000 HC RX 637 (ALT 250 FOR IP): Performed by: PHYSICAL MEDICINE & REHABILITATION

## 2019-03-09 PROCEDURE — 36415 COLL VENOUS BLD VENIPUNCTURE: CPT

## 2019-03-09 RX ADMIN — BETHANECHOL CHLORIDE 25 MG: 25 TABLET ORAL at 07:53

## 2019-03-09 RX ADMIN — TAMSULOSIN HYDROCHLORIDE 0.4 MG: 0.4 CAPSULE ORAL at 07:53

## 2019-03-09 RX ADMIN — AMLODIPINE BESYLATE 10 MG: 5 TABLET ORAL at 07:53

## 2019-03-09 RX ADMIN — HYDROCHLOROTHIAZIDE 25 MG: 25 TABLET ORAL at 07:53

## 2019-03-09 RX ADMIN — LISINOPRIL 10 MG: 10 TABLET ORAL at 07:53

## 2019-03-09 RX ADMIN — ASPIRIN 81 MG: 81 TABLET, COATED ORAL at 07:53

## 2019-03-09 RX ADMIN — ONDANSETRON 4 MG: 4 TABLET, ORALLY DISINTEGRATING ORAL at 12:00

## 2019-03-09 RX ADMIN — METFORMIN HYDROCHLORIDE 850 MG: 850 TABLET ORAL at 07:53

## 2019-03-09 RX ADMIN — RANOLAZINE 500 MG: 500 TABLET, FILM COATED, EXTENDED RELEASE ORAL at 07:53

## 2019-03-09 RX ADMIN — DOCUSATE SODIUM 100 MG: 100 CAPSULE, LIQUID FILLED ORAL at 07:53

## 2019-03-09 RX ADMIN — FINASTERIDE 5 MG: 5 TABLET, FILM COATED ORAL at 07:53

## 2019-03-09 RX ADMIN — SPIRONOLACTONE 25 MG: 25 TABLET ORAL at 07:53

## 2019-03-09 ASSESSMENT — PAIN DESCRIPTION - ORIENTATION: ORIENTATION: LEFT

## 2019-03-09 ASSESSMENT — PAIN SCALES - GENERAL
PAINLEVEL_OUTOF10: 0
PAINLEVEL_OUTOF10: 2
PAINLEVEL_OUTOF10: 0
PAINLEVEL_OUTOF10: 0

## 2019-03-09 ASSESSMENT — PAIN DESCRIPTION - LOCATION: LOCATION: BUTTOCKS

## 2019-03-09 ASSESSMENT — PAIN DESCRIPTION - PAIN TYPE: TYPE: ACUTE PAIN

## 2019-03-11 ENCOUNTER — TELEPHONE (OUTPATIENT)
Dept: ENDOCRINOLOGY | Age: 84
End: 2019-03-11

## 2019-03-11 DIAGNOSIS — E11.65 UNCONTROLLED TYPE 2 DIABETES MELLITUS WITH HYPERGLYCEMIA (HCC): Primary | ICD-10-CM

## 2019-03-13 ENCOUNTER — HOSPITAL ENCOUNTER (OUTPATIENT)
Dept: WOUND CARE | Age: 84
Discharge: HOME | End: 2019-03-13
Payer: MEDICARE

## 2019-03-13 VITALS
SYSTOLIC BLOOD PRESSURE: 155 MMHG | WEIGHT: 199.2 LBS | HEART RATE: 65 BPM | BODY MASS INDEX: 28.52 KG/M2 | HEIGHT: 70 IN | RESPIRATION RATE: 16 BRPM | TEMPERATURE: 97.8 F | DIASTOLIC BLOOD PRESSURE: 66 MMHG

## 2019-03-13 DIAGNOSIS — L89.322 PRESSURE ULCER OF LEFT BUTTOCK, STAGE 2 (HCC): ICD-10-CM

## 2019-03-13 PROCEDURE — 99212 OFFICE O/P EST SF 10 MIN: CPT | Performed by: INTERNAL MEDICINE

## 2019-03-13 PROCEDURE — 99213 OFFICE O/P EST LOW 20 MIN: CPT

## 2019-03-13 RX ORDER — LIDOCAINE 40 MG/G
CREAM TOPICAL ONCE
Status: DISCONTINUED | OUTPATIENT
Start: 2019-03-13 | End: 2019-03-14 | Stop reason: HOSPADM

## 2019-03-13 ASSESSMENT — PAIN SCALES - GENERAL: PAINLEVEL_OUTOF10: 7

## 2019-03-13 ASSESSMENT — PAIN DESCRIPTION - FREQUENCY: FREQUENCY: CONTINUOUS

## 2019-03-13 ASSESSMENT — PAIN DESCRIPTION - LOCATION: LOCATION: BUTTOCKS

## 2019-03-13 ASSESSMENT — PAIN DESCRIPTION - PROGRESSION: CLINICAL_PROGRESSION: GRADUALLY WORSENING

## 2019-03-13 ASSESSMENT — PAIN DESCRIPTION - DESCRIPTORS: DESCRIPTORS: BURNING

## 2019-03-13 ASSESSMENT — PAIN DESCRIPTION - ORIENTATION: ORIENTATION: LEFT

## 2019-03-13 ASSESSMENT — PAIN DESCRIPTION - ONSET: ONSET: ON-GOING

## 2019-03-13 ASSESSMENT — PAIN - FUNCTIONAL ASSESSMENT: PAIN_FUNCTIONAL_ASSESSMENT: ACTIVITIES ARE NOT PREVENTED

## 2019-03-13 ASSESSMENT — PAIN DESCRIPTION - PAIN TYPE: TYPE: ACUTE PAIN

## 2019-03-15 ENCOUNTER — TELEPHONE (OUTPATIENT)
Dept: ENDOCRINOLOGY | Age: 84
End: 2019-03-15

## 2019-03-20 ENCOUNTER — HOSPITAL ENCOUNTER (OUTPATIENT)
Dept: WOUND CARE | Age: 84
Discharge: HOME | End: 2019-03-20
Payer: MEDICARE

## 2019-03-20 VITALS
HEART RATE: 64 BPM | SYSTOLIC BLOOD PRESSURE: 172 MMHG | DIASTOLIC BLOOD PRESSURE: 67 MMHG | RESPIRATION RATE: 18 BRPM | TEMPERATURE: 97.7 F

## 2019-03-20 PROCEDURE — 97597 DBRDMT OPN WND 1ST 20 CM/<: CPT

## 2019-03-20 PROCEDURE — 97597 DBRDMT OPN WND 1ST 20 CM/<: CPT | Performed by: INTERNAL MEDICINE

## 2019-03-20 RX ORDER — LIDOCAINE 40 MG/G
CREAM TOPICAL ONCE
Status: DISCONTINUED | OUTPATIENT
Start: 2019-03-20 | End: 2019-03-21 | Stop reason: HOSPADM

## 2019-03-23 ENCOUNTER — TELEPHONE (OUTPATIENT)
Dept: ENDOCRINOLOGY | Age: 84
End: 2019-03-23

## 2019-03-24 PROBLEM — L89.322 PRESSURE ULCER OF LEFT BUTTOCK, STAGE 2 (HCC): Status: ACTIVE | Noted: 2019-03-24

## 2019-03-24 PROBLEM — T14.8XXA NONHEALING NONSURGICAL WOUND LIMITED TO BREAKDOWN OF SKIN: Status: RESOLVED | Noted: 2019-02-14 | Resolved: 2019-03-24

## 2019-03-24 PROBLEM — L92.9 HYPERGRANULATION: Status: RESOLVED | Noted: 2019-02-14 | Resolved: 2019-03-24

## 2019-03-24 PROBLEM — R41.82 MENTAL STATUS ALTERATION: Status: RESOLVED | Noted: 2019-02-25 | Resolved: 2019-03-24

## 2019-03-24 PROBLEM — G93.41 METABOLIC ENCEPHALOPATHY: Status: RESOLVED | Noted: 2019-02-28 | Resolved: 2019-03-24

## 2019-03-27 ENCOUNTER — TELEPHONE (OUTPATIENT)
Dept: ENDOCRINOLOGY | Age: 84
End: 2019-03-27

## 2019-04-03 ENCOUNTER — OFFICE VISIT (OUTPATIENT)
Dept: ENDOCRINOLOGY | Age: 84
End: 2019-04-03
Payer: MEDICARE

## 2019-04-03 VITALS
SYSTOLIC BLOOD PRESSURE: 154 MMHG | BODY MASS INDEX: 28.58 KG/M2 | OXYGEN SATURATION: 99 % | DIASTOLIC BLOOD PRESSURE: 63 MMHG | HEART RATE: 68 BPM | HEIGHT: 70 IN | RESPIRATION RATE: 16 BRPM

## 2019-04-03 DIAGNOSIS — E11.65 UNCONTROLLED TYPE 2 DIABETES MELLITUS WITH HYPERGLYCEMIA (HCC): Primary | ICD-10-CM

## 2019-04-03 PROCEDURE — 1111F DSCHRG MED/CURRENT MED MERGE: CPT | Performed by: NURSE PRACTITIONER

## 2019-04-03 PROCEDURE — G8427 DOCREV CUR MEDS BY ELIG CLIN: HCPCS | Performed by: NURSE PRACTITIONER

## 2019-04-03 PROCEDURE — 4040F PNEUMOC VAC/ADMIN/RCVD: CPT | Performed by: NURSE PRACTITIONER

## 2019-04-03 PROCEDURE — G8417 CALC BMI ABV UP PARAM F/U: HCPCS | Performed by: NURSE PRACTITIONER

## 2019-04-03 PROCEDURE — 1036F TOBACCO NON-USER: CPT | Performed by: NURSE PRACTITIONER

## 2019-04-03 PROCEDURE — 99213 OFFICE O/P EST LOW 20 MIN: CPT | Performed by: NURSE PRACTITIONER

## 2019-04-03 PROCEDURE — G8598 ASA/ANTIPLAT THER USED: HCPCS | Performed by: NURSE PRACTITIONER

## 2019-04-03 PROCEDURE — 1123F ACP DISCUSS/DSCN MKR DOCD: CPT | Performed by: NURSE PRACTITIONER

## 2019-04-03 ASSESSMENT — ENCOUNTER SYMPTOMS
CONSTIPATION: 0
SHORTNESS OF BREATH: 0
DIARRHEA: 0
COLOR CHANGE: 0
NAUSEA: 1
EYE PAIN: 0

## 2019-04-03 NOTE — PROGRESS NOTES
Endocrinology  [place], [place]  Phone: [phone]   FAX: [phone]    Mahesh Ibarra is a 80 y.o. male who is following up for management of Diabetes Mellitus Type 2. Last A1C:   Lab Results   Component Value Date    LABA1C 7.4 12/27/2018    LABA1C 8.3 06/09/2017    LABA1C 8.9 03/07/2017     Last BP Readings:  BP Readings from Last 3 Encounters:   04/03/19 (!) 154/63   03/20/19 (!) 172/67   03/13/19 (!) 155/66     Last LDL:   Lab Results   Component Value Date    LDLCALC 30 03/26/2018     Aspirin Use: 81 mg    Tobacco/Alcohol History:    Smoking status: Never Smoker    Smokeless tobacco: Never Used    Alcohol use No     Diabetes:  Mahesh Ibarra  has been diabetic for 12  years and on insulin for  8 years. Patient reports that diabetes is generally not well controlled. Disease course has been varaible   Current microvascular complications include peripheral neuropathy  Current Macrovascular complications include  CAD, PVD  Patient reports compliance for about 80% of the time and adheres to medication, but usually not to diet and exercise instructions. Most recent hospital admission on 2/25/19 for encephalopathy and hypoglycemia  PMH includes CAD, PVD, Afib (s/p pace maker), RBBB, ckd, TERRY, h/o prostate cancer.     Blood glucose trends:  Patient brought log glucometer for download  Readings per day  4 times per day  Average reading low 100s  Lowest in past 2 weeks 88  Highest in past 2 weeks 228  Hypoglycemia awareness and symptoms: mental fogginess  Has not done CGM  Usual carbs per meal: does not count    Lab Results   Component Value Date    .3 09/30/2016    .6 03/24/2015    .8 10/01/2014     Current Medication regimen:   Metformin 500 mg BID  Glyburide 5mg  Levemir 44 mg    Other meds tried in past: no recent changes  GFR 52    Microvascular Complications:  · Neuropathy: denies concerns, occasional numbness  · Retinopathy: no concerns with vision, field of vision  · Nephropathy: no recent MACR    Diabetic Health Maintenance   · Last Eye Exam: > 1 year  · Last Foot exam: sees podiatrist  · Has patient seen a dietitian? Yes  · Current Exercise: No structured exercise  · On ACEI or ARB: Lotrel 2.5-10 mg  · On statin: Lipitor 80 mg    Hyperlipidemia: Currently is on Lipitor 80 mg. Current complaints include occasional myalgias but otherwise tolerates well. Lab Results   Component Value Date    CHOL 86 03/26/2018    CHOL 101 03/24/2015    CHOL 133 04/23/2014     Lab Results   Component Value Date    TRIG 87 03/26/2018    TRIG 98 03/24/2015    TRIG 155 04/23/2014     Lab Results   Component Value Date    HDL 39 03/26/2018    HDL 45 03/24/2015    HDL 40 04/23/2014    HDL 43 03/27/2012    HDL 39 08/13/2011    HDL 42 03/28/2011     Lab Results   Component Value Date    LDLCALC 30 03/26/2018    LDLCALC 36 03/24/2015    LDLCALC 62 04/23/2014     No results found for: LDLDIRECT  No results found for: CHOLHDLRATIO    Vitamin D deficiency: Currently is on no supplementation. Current complaints include fatigue on daily basis. Last vitamin D level is:  Lab Results   Component Value Date    VITD25 14.0 09/29/2016     Hypertension  Currently on Lotrel 2.5-10 mg. Controlled , denies symptoms of dizziness, light headedness. Occasional dependent edema. Tries to follow a salt restricted diet. Lab Results   Component Value Date     (L) 03/09/2019    K 4.8 03/09/2019    CL 89 (L) 03/09/2019    CO2 28 03/09/2019    BUN 20 03/09/2019    CREATININE 1.1 03/09/2019    GLUCOSE 169 (H) 03/09/2019    CALCIUM 9.1 03/09/2019    PROT 6.9 02/25/2019    LABALBU 3.7 02/25/2019    BILITOT 0.9 02/25/2019    ALKPHOS 99 02/25/2019    AST 26 02/25/2019    ALT 27 02/25/2019    LABGLOM >60 03/09/2019    GFRAA >60 03/09/2019    AGRATIO 1.2 02/25/2019    GLOB 3.2 02/25/2019     The ASCVD Risk score (Pierre Bryant et al., 2013) failed to calculate for the following reasons:     The 2013 ASCVD risk score is only valid for ages 36 to 78  Past Medical History:   Diagnosis Date    Acute encephalopathy 10/7/2014    Acute lower GI bleeding 10/6/2014    Acute renal failure (ARF) ([address] 10/7/2014    Acute respiratory failure ([address] 11/2/2014    Atrial flutter ([address] 07/11/2015 06/27/2016, AFL DCCV 200 J, successful    Closed right hip fracture ([address] 2/77/4705    Diastolic heart failure ([address] 11/2/2014    Diverticulitis     HCAP (healthcare-associated pneumonia) 11/2/2014    Iron deficiency anemia     Ischemic colitis ([address] 96/70/40    Metabolic encephalopathy 5/84/7283    Nonhealing nonsurgical wound of scalp, limited to breakdown of skin 2/14/2019    Obesity 8/29/2012    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 Negative for appetite change, diaphoresis, fever and unexpected weight change. HENT: Negative for dental problem. Eyes: Negative for pain and visual disturbance. Respiratory: Negative for shortness of breath. Cardiovascular: Negative for chest pain, palpitations and leg swelling. Gastrointestinal: Positive for nausea. Negative for constipation and diarrhea. Endocrine: Negative for cold intolerance, heat intolerance, polydipsia, polyphagia and polyuria. Genitourinary: Negative for frequency and urgency. Musculoskeletal: Negative for arthralgias, joint swelling and myalgias. Skin: Negative for color change and pallor.    Neurological: Negative for weakness, numbness Recommendations  Important to modify breakfast composition specifically to higher protein and good fats. Reduce/eliminate the oatmeal/doughnut/fruit and substitute with eggs/rogers/peanut butter with whole grain bread. Lunch / dinner to decrease refined carbs/processed carbs as much as possible  Repeat CGMS in 6 months if does not qualify for personal freestyle faye. Plan  Problem List Items Addressed This Visit     Diabetes mellitus type 2, uncontrolled ([facility] - Primary     Insulin Levemir 30 units: take it in the morning    Stop the glyburide at this time    Blood sugar numbers to range in the 100 -200 range at morning and evening         Relevant Medications    insulin detemir (LEVEMIR FLEXTOUCH) 100 UNIT/ML injection pen      Counseled patient on theareas above for > 25 minutes  Return in about 3 months (around 7/3/2019).

## 2019-04-03 NOTE — PATIENT INSTRUCTIONS
Insulin Levemir 30 units: take it in the morning    Stop the glyburide    Blood sugar numbers to range in the 100 -200 range at morning and evening

## 2019-04-04 ENCOUNTER — TELEPHONE (OUTPATIENT)
Dept: ENDOCRINOLOGY | Age: 84
End: 2019-04-04

## 2019-04-04 NOTE — TELEPHONE ENCOUNTER
Patient's wife said that he wasn't able to take his shot until 6pm last night and his sugars were 125 this morning. She wanted to know what time should he take his shot today.

## 2019-04-04 NOTE — TELEPHONE ENCOUNTER
Called the patient and spoke with wife per HIPAA,  it is ok for him to take the regular dose of levemir now and to resume morning dosing from tomorrow

## 2019-04-04 NOTE — TELEPHONE ENCOUNTER
Pt's wife wants to know if it's ok for him to take the Levemir tomorrow morning or should he take a dose sooner since his last dose was yesterday evening at 6:00? She's concerned that if he takes it too soon that his BS will drop too low. The reading he had this morning (125) was the best he's had in a long time.

## 2019-04-07 NOTE — ASSESSMENT & PLAN NOTE
Insulin Levemir 30 units: take it in the morning    Stop the glyburide at this time    Blood sugar numbers to range in the 100 -200 range at morning and evening

## 2019-04-08 ENCOUNTER — TELEPHONE (OUTPATIENT)
Dept: ENDOCRINOLOGY | Age: 84
End: 2019-04-08

## 2019-04-09 NOTE — TELEPHONE ENCOUNTER
Spoke with pt's wife per HIPAA and asked that she bring in BS log per Dr. Gwen Mishra to see how to titrate pt's insulin. Wife will drop off at the office.

## 2019-04-10 ENCOUNTER — HOSPITAL ENCOUNTER (OUTPATIENT)
Dept: WOUND CARE | Age: 84
Discharge: HOME | End: 2019-04-10
Payer: MEDICARE

## 2019-04-10 VITALS
RESPIRATION RATE: 18 BRPM | SYSTOLIC BLOOD PRESSURE: 162 MMHG | HEART RATE: 68 BPM | TEMPERATURE: 97.7 F | HEIGHT: 70 IN | BODY MASS INDEX: 28.37 KG/M2 | DIASTOLIC BLOOD PRESSURE: 85 MMHG | WEIGHT: 198.2 LBS

## 2019-04-10 PROCEDURE — 99212 OFFICE O/P EST SF 10 MIN: CPT

## 2019-04-10 PROCEDURE — 99212 OFFICE O/P EST SF 10 MIN: CPT | Performed by: INTERNAL MEDICINE

## 2019-04-10 RX ORDER — LIDOCAINE 40 MG/G
CREAM TOPICAL ONCE
Status: DISCONTINUED | OUTPATIENT
Start: 2019-04-10 | End: 2019-04-11 | Stop reason: HOSPADM

## 2019-04-15 ENCOUNTER — TELEPHONE (OUTPATIENT)
Dept: ENDOCRINOLOGY | Age: 84
End: 2019-04-15

## 2019-04-15 NOTE — TELEPHONE ENCOUNTER
Patient's wife called and said that this patient has been taking 22 units of insulin and his readings have been above 200. They don't think it is what he is eating because he is losing weight. Patient thinks he should be taking 35 units but wife did not think that was right. They would like a call back to clarify what he should be taking.

## 2019-04-15 NOTE — TELEPHONE ENCOUNTER
How many units of Levemir is the pt supposed to be taking? Notes from 4/03/19 state he's supposed to be taking 30 IU in the morning.

## 2019-04-16 NOTE — TELEPHONE ENCOUNTER
Please call patient to start taking 30 units as discussed at visit. His prior dose was 22 units and he needs to increase to 30 units.

## 2019-04-18 NOTE — TELEPHONE ENCOUNTER
Patient's wife was calling back and wants to talk to René. She asked if she could increase his medication.

## 2019-04-18 NOTE — PROGRESS NOTES
[facility] Progress Note    Mahesh Ibarra     : 3/13/1933    DATE OF VISIT:  4/10/2019    Subjective:     Mahesh Ibarra is a 80 y.o. male who has a pressure ulcer located on the buttock (left). Current complaint of pain in this ulcer? yes. Quality of pain: aching and sharp  Timing: intermittent and decreasing in frequency  Severity: mild  Associated Signs/Symptoms: drainage (light, clear)  Other significant symptoms or pertinent ulcer history: feeling ok, eating well, using his ROHO cushion, no fever. Additional ulcer(s) noted? no.      Mr. 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 He has a past surgical history that includes Cholecystectomy; Appendectomy (); Prostatectomy (); Revision total knee arthroplasty (Left, 2014); Upper gastrointestinal endoscopy (03/02/10); pacemaker placement; Colonoscopy (10/07/2014); Uvulopalatopharygoplasty; Coronary artery bypass graft (); Total knee arthroplasty (Bilateral, ); Partial hip arthroplasty (Right, 2016); and Prostate biopsy (08/10/2007). His family history includes Alcohol Abuse in his father; Cancer in his brother, sister, and sister; Mental Retardation in his brother; Other in his brother and father; Stroke in his mother. Mr. Georgiana Frederick reports that he has never smoked.  He has never used smokeless tobacco. He reports that he does not drink alcohol or use drugs. His current medication list consists of FREESTYLE KRISTY 14 DAY READER, FREESTYLE KRISTY 14 DAY SENSOR, amLODIPine-benazepril, aspirin, atorvastatin, bethanechol, calcium carbonate, dutasteride, ezetimibe, insulin detemir, metFORMIN HCl, spironolactone, tamsulosin, and warfarin. Allergies: Metoprolol    Pertinent items from the review of systems are discussed in the HPI; the remainder of the ROS was reviewed and is negative. Objective:     Vitals:    04/10/19 1319   BP: (!) 162/85   Pulse: 68   Resp: 18   Temp: 97.7 °F (36.5 °C)   TempSrc: Oral   Weight: 198 lb 3.2 oz (89.9 kg)   Height: 5' 10\" (1.778 m)       Constitutional:  well-developed, well-nourished, overweight, a bit fatigued  Psychiatric:  Stable features of cognitive decline; mood and affect appropriate for the situation   Eyes:  pupils equal, round and reactive to light; sclerae anicteric, conjunctivae not pale  Musculoskeletal:  no clubbing, cyanosis or petechiae; hips and pelvic area with no gross effusions, joint misalignment or acute arthritis  Cheryl-ulcer skin: indurated, pink, vilma and warm. Ulcer(s): appropriately moist, granular, red and (light, clear) drainage. Photos also saved in electronic chart.     Today's Wound Measurements:  Wound 03/13/19 #3, Left buttock, Pressure Ulcer, Stage 2, onset 3/6/19, Pressure-Wound Length (cm): 0.3 cm    Wound 03/13/19 #3, Left buttock, Pressure Ulcer, Stage 2, onset 3/6/19, Pressure-Wound Width (cm): 0.3 cm    Wound 03/13/19 #3, Left buttock, Pressure Ulcer, Stage 2, onset 3/6/19, Pressure-Wound Depth (cm): 0.1 cm  ______________________________    Lab Results   Component Value Date    LABALBU 3.7 02/25/2019     Lab Results   Component Value Date    CREATININE 1.1 03/09/2019     Lab Results   Component Value Date    HGB 11.9 (L) 03/07/2019     Lab Results   Component Value Date    LABA1C 7.4 12/27/2018     Assessment:     Patient Active Problem List   Diagnosis Code    Persistent atrial fibrillation ([facility]) I48.1    Diabetes mellitus type 2, uncontrolled ([facility] E11.65    Hyperlipidemia E78.5    Venous (peripheral) insufficiency I87.2    CAD (coronary artery disease) I25.10    Normocytic anemia N81.1    Diastolic dysfunction V64.4    Essential hypertension I10    Lumbar spondylolysis M43.06    Bronchiectasis without complication ([facility]) T91.6    Cardiac pacemaker in situ Z95.0    Carotid stenosis I65.29    CKD (chronic kidney disease) stage 3, GFR 30-59 ml/min ([facility]) N18.3    LPRD (laryngopharyngeal reflux disease) K21.9    RAD (reactive airway disease), mild persistent, uncomplicated T48.74    RBBB (right bundle branch block) I45.10    Restrictive lung disease J98.4    Sensorineural hearing loss H90.5    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 Factors contributing to occurrence and/or persistence of the chronic ulcer include diabetes, poor glucose control, chronic pressure, decreased mobility, shear force and obesity. Medical necessity of today's visit is shown by the above documentation. Sharp debridement is not indicated today, based upon the exam findings in the wound(s) above. Discharge plan:     Treatment in the wound care center today: Wound 03/13/19 #3, Left buttock, Pressure Ulcer, Stage 2, onset 3/6/19, Pressure-Dressing/Treatment: Barrier Film, Silicone border.     Keep working on glucose control, protein intake, getting more mobile, changing positions frequently, with good transfer technique. Use ROHO at all times while seated, check for proper inflation frequently. Home treatment: good handwashing before and after any dressing changes. Cleanse ulcer with saline or soap & water before dressing change. May use Vaseline (petrolatum), Aquaphor, Aveeno, CeraVe, Cetaphil, Eucerin, Lubriderm, etc for dry skin. Dressing type for home: [facility], three times weekly. Written discharge instructions given to patient. Follow up in 2 weeks, but call sooner with any concerns or questions.     Electronically signed by Oskar May MD on 4/18/2019 at 2:34 PM.

## 2019-04-23 ENCOUNTER — TELEPHONE (OUTPATIENT)
Dept: ENDOCRINOLOGY | Age: 84
End: 2019-04-23

## 2019-04-23 NOTE — TELEPHONE ENCOUNTER
Patient is  on an appropriate dose for him and would recommend he go to the ED. Listlessness and apparent increase in confusion requires that other causes of confusion or altered mentation be ruled out also prior to further dosing increase. I have spoken to the wife for two prior dose adjustments. I called and could not reach her just now.

## 2019-04-23 NOTE — TELEPHONE ENCOUNTER
Wife called and said for the last couple days he has been splitting the dose to a goal of 44 units. His sugars are always over 200 at night and has affected his dementia. She also wants him changed back to 44 units daily in one shot. She wants him to take 5mg glyburide as before. he is on levemir flextouch and needs a refill of that.  Please call wife back

## 2019-04-24 ENCOUNTER — HOSPITAL ENCOUNTER (OUTPATIENT)
Dept: WOUND CARE | Age: 84
Discharge: HOME | End: 2019-04-24
Payer: MEDICARE

## 2019-04-24 VITALS
TEMPERATURE: 97.8 F | HEART RATE: 69 BPM | SYSTOLIC BLOOD PRESSURE: 166 MMHG | RESPIRATION RATE: 16 BRPM | DIASTOLIC BLOOD PRESSURE: 67 MMHG

## 2019-04-24 PROCEDURE — 99212 OFFICE O/P EST SF 10 MIN: CPT | Performed by: INTERNAL MEDICINE

## 2019-04-24 PROCEDURE — 99213 OFFICE O/P EST LOW 20 MIN: CPT

## 2019-04-24 ASSESSMENT — PAIN SCALES - GENERAL: PAINLEVEL_OUTOF10: 0

## 2019-04-24 NOTE — TELEPHONE ENCOUNTER
Spoke with patients wife and she stated that the patient is feeling better and that there is no need to go to the ER.

## 2019-04-24 NOTE — PLAN OF CARE
Wound fragily healed. Dr Moira Tinsley request to cont. With dressings for a few more weeks to protect area. Wife instructed on dressing changes & verbalizes understanding. Pt. Is cont. To use ROHO cushion when sitting for off-loading. Reinforced importance of frequent repositioning from side to side to avoid reopening wound. No further f/u in [address] [place] [address] at this time, call if a new wound opens or with any questions/concerns. Discharge instructions reviewed with patient & wife, all questions answered, copy given to patient. Dressings were applied to all wounds per M.D. Instructions at this visit.

## 2019-05-01 ENCOUNTER — TELEPHONE (OUTPATIENT)
Dept: ENDOCRINOLOGY | Age: 84
End: 2019-05-01

## 2019-05-01 RX ORDER — GLYBURIDE 5 MG/1
5 TABLET ORAL 2 TIMES DAILY
Status: ON HOLD | COMMUNITY
End: 2019-05-28 | Stop reason: HOSPADM

## 2019-05-01 NOTE — TELEPHONE ENCOUNTER
I called PCP office to request care co-ordination as it appears patient/wife are struggling with diabetic management and closer oversight would be helpful. Per recent PCP note she also has PCP manage insulin dose/glyburide etc.    I also called patient and spoke with his wife. Very concerned about how former treatment no longer works. She is not certain who she wants to manage the diabetes, will think it over and let us know.     Currently was on 18 units Levemir BID and Glyburide 5 mg BID  Changed to 22 units in the AM QD  Continue Glyburide 5 mg BID     I let patient know she was welcome to call us back with questions

## 2019-05-01 NOTE — TELEPHONE ENCOUNTER
Patient is having sugar trouble and in this morning sugars were 75 and in the evenings it has been in the 200s. Wife wanted to see if they could gradually get him to be on one shot a day in the morning.

## 2019-05-03 NOTE — TELEPHONE ENCOUNTER
[address] manager @ Dr Thomas's office w/ [facility] PCP called and would like to talk about pt and his diabetes care  [phone]

## 2019-05-07 NOTE — PROGRESS NOTES
[facility] Progress Note    Mahesh Ibarra     : 3/13/1933    DATE OF VISIT:  2019    Subjective:     Mahesh Ibarra is a 80 y.o. male who has a pressure ulcer located on the buttock (left). Current complaint of pain in this ulcer? yes. Quality of pain: aching  Timing: intermittent  Severity: mild  Associated Signs/Symptoms: none  Other significant symptoms or pertinent ulcer history: no F/C/D, eating well, using his ROHO cushion. Additional ulcer(s) noted? no.      Mr. 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 He has a past surgical history that includes Cholecystectomy; Appendectomy (); Prostatectomy (); Revision total knee arthroplasty (Left, 2014); Upper gastrointestinal endoscopy (03/02/10); pacemaker placement; Colonoscopy (10/07/2014); Uvulopalatopharygoplasty; Coronary artery bypass graft (); Total knee arthroplasty (Bilateral, ); Partial hip arthroplasty (Right, 2016); and Prostate biopsy (08/10/2007). His family history includes Alcohol Abuse in his father; Cancer in his brother, sister, and sister; Mental Retardation in his brother; Other in his brother and father; Stroke in his mother. Mr. Bayron Candelaria reports that he has never smoked. He has never used smokeless tobacco. He reports that he does not drink alcohol or use drugs.     His current medication list CAD (coronary artery disease) I25.10    Normocytic anemia C37.7    Diastolic dysfunction D18.5    Essential hypertension I10    Lumbar spondylolysis M43.06    Bronchiectasis without complication ([facility]) J31.7    Cardiac pacemaker in situ Z95.0    Carotid stenosis I65.29    CKD (chronic kidney disease) stage 3, GFR 30-59 ml/min ([facility]) N18.3    LPRD (laryngopharyngeal reflux disease) K21.9    RAD (reactive airway disease), mild persistent, uncomplicated A16.70    RBBB (right bundle branch block) I45.10    Restrictive lung disease J98.4    Sensorineural hearing loss H90.5    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    Decreased mobility R26.89    Presence of total knee joint prosthesis, 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 Factors contributing to occurrence and/or persistence of the chronic ulcer include diabetes, poor glucose control, chronic pressure, decreased mobility, shear force and obesity. Medical necessity of today's visit is shown by the above documentation. Sharp debridement is not indicated today, based upon the exam findings in the wound(s) above. Discharge plan:     Treatment in the wound care center today: Wound 03/13/19 #3, Left buttock, Pressure Ulcer, Stage 2, onset 3/6/19, Pressure-Dressing/Treatment: Barrier Film, Silicone Dressing, Wound gel. Important to keep up with protein intake, maintaining hydration, using ROHO, changing positions frequently. Home treatment: good handwashing before and after any dressing changes. Cleanse ulcer with saline or soap & water before dressing change.  May use Vaseline (petrolatum), Aquaphor, Aveeno, CeraVe, Cetaphil, Eucerin, Lubriderm, etc for dry skin. Dressing type for home: OTC hydrogel or hydrocolloid pad as needed, once daily. If the skin tends to get a bit moist, and especially if any discrete ulceration isn't seen, just topical application of ZnO paste would be fine too. Written discharge instructions given to patient. Follow up here as needed.     Electronically signed by Rafal Rosenthal MD on 5/7/2019 at 3:16 PM.

## 2019-05-08 ENCOUNTER — HOSPITAL ENCOUNTER (OUTPATIENT)
Dept: WOUND CARE | Age: 84
Discharge: HOME | End: 2019-05-08
Payer: MEDICARE

## 2019-05-14 ENCOUNTER — APPOINTMENT (OUTPATIENT)
Dept: CT IMAGING | Age: 84
DRG: 542 | End: 2019-05-14
Payer: MEDICARE

## 2019-05-14 ENCOUNTER — HOSPITAL ENCOUNTER (INPATIENT)
Age: 84
LOS: 3 days | Discharge: NURSING, REHAB OR LONG-TERM CARE | DRG: 542 | End: 2019-05-17
Attending: EMERGENCY MEDICINE | Admitting: INTERNAL MEDICINE
Payer: MEDICARE

## 2019-05-14 ENCOUNTER — APPOINTMENT (OUTPATIENT)
Dept: GENERAL RADIOLOGY | Age: 84
DRG: 542 | End: 2019-05-14
Payer: MEDICARE

## 2019-05-14 DIAGNOSIS — K59.00 CONSTIPATION, UNSPECIFIED CONSTIPATION TYPE: Primary | ICD-10-CM

## 2019-05-14 DIAGNOSIS — M43.06 LUMBAR SPONDYLOLYSIS: ICD-10-CM

## 2019-05-14 DIAGNOSIS — R73.9 HYPERGLYCEMIA: ICD-10-CM

## 2019-05-14 DIAGNOSIS — R09.02 HYPOXIA: ICD-10-CM

## 2019-05-14 DIAGNOSIS — S32.020A CLOSED COMPRESSION FRACTURE OF SECOND LUMBAR VERTEBRA, INITIAL ENCOUNTER: ICD-10-CM

## 2019-05-14 DIAGNOSIS — S32.010A CLOSED COMPRESSION FRACTURE OF FIRST LUMBAR VERTEBRA, INITIAL ENCOUNTER: ICD-10-CM

## 2019-05-14 LAB
A/G RATIO: 1.2 (ref 1.1–2.2)
ALBUMIN SERPL-MCNC: 3.6 G/DL (ref 3.4–5)
ALP BLD-CCNC: 89 U/L (ref 40–129)
ALT SERPL-CCNC: 10 U/L (ref 10–40)
ANION GAP SERPL CALCULATED.3IONS-SCNC: 11 MMOL/L (ref 3–16)
AST SERPL-CCNC: 11 U/L (ref 15–37)
BASOPHILS ABSOLUTE: 0 K/UL (ref 0–0.2)
BASOPHILS RELATIVE PERCENT: 0.3 %
BILIRUB SERPL-MCNC: 0.4 MG/DL (ref 0–1)
BILIRUBIN URINE: NEGATIVE
BLOOD, URINE: NEGATIVE
BUN BLDV-MCNC: 18 MG/DL (ref 7–20)
C DIFFICILE TOXIN, EIA: NORMAL
CALCIUM SERPL-MCNC: 8.9 MG/DL (ref 8.3–10.6)
CHLORIDE BLD-SCNC: 91 MMOL/L (ref 99–110)
CHP ED QC CHECK: NORMAL
CLARITY: CLEAR
CO2: 31 MMOL/L (ref 21–32)
COLOR: YELLOW
CREAT SERPL-MCNC: 0.9 MG/DL (ref 0.8–1.3)
EOSINOPHILS ABSOLUTE: 0.1 K/UL (ref 0–0.6)
EOSINOPHILS RELATIVE PERCENT: 0.9 %
GFR AFRICAN AMERICAN: >60
GFR NON-AFRICAN AMERICAN: >60
GLOBULIN: 3.1 G/DL
GLUCOSE BLD-MCNC: 119 MG/DL
GLUCOSE BLD-MCNC: 119 MG/DL (ref 70–99)
GLUCOSE BLD-MCNC: 196 MG/DL (ref 70–99)
GLUCOSE BLD-MCNC: 317 MG/DL (ref 70–99)
GLUCOSE BLD-MCNC: 342 MG/DL (ref 70–99)
GLUCOSE BLD-MCNC: 89 MG/DL (ref 70–99)
GLUCOSE URINE: 500 MG/DL
HCT VFR BLD CALC: 34.9 % (ref 40.5–52.5)
HEMOGLOBIN: 11.5 G/DL (ref 13.5–17.5)
INR BLD: 3.8 (ref 0.86–1.14)
KETONES, URINE: NEGATIVE MG/DL
LACTIC ACID: 1.8 MMOL/L (ref 0.4–2)
LEUKOCYTE ESTERASE, URINE: NEGATIVE
LIPASE: 42 U/L (ref 13–60)
LYMPHOCYTES ABSOLUTE: 0.8 K/UL (ref 1–5.1)
LYMPHOCYTES RELATIVE PERCENT: 9.7 %
MCH RBC QN AUTO: 30.3 PG (ref 26–34)
MCHC RBC AUTO-ENTMCNC: 33 G/DL (ref 31–36)
MCV RBC AUTO: 91.7 FL (ref 80–100)
MICROSCOPIC EXAMINATION: ABNORMAL
MONOCYTES ABSOLUTE: 0.6 K/UL (ref 0–1.3)
MONOCYTES RELATIVE PERCENT: 7.2 %
NEUTROPHILS ABSOLUTE: 6.4 K/UL (ref 1.7–7.7)
NEUTROPHILS RELATIVE PERCENT: 81.9 %
NITRITE, URINE: NEGATIVE
PDW BLD-RTO: 14 % (ref 12.4–15.4)
PERFORMED ON: ABNORMAL
PERFORMED ON: NORMAL
PH UA: 5.5 (ref 5–8)
PLATELET # BLD: 175 K/UL (ref 135–450)
PMV BLD AUTO: 7.9 FL (ref 5–10.5)
POTASSIUM SERPL-SCNC: 4.4 MMOL/L (ref 3.5–5.1)
PROTEIN UA: NEGATIVE MG/DL
PROTHROMBIN TIME: 43.3 SEC (ref 9.8–13)
RBC # BLD: 3.81 M/UL (ref 4.2–5.9)
SODIUM BLD-SCNC: 133 MMOL/L (ref 136–145)
SPECIFIC GRAVITY UA: 1.01 (ref 1–1.03)
TOTAL PROTEIN: 6.7 G/DL (ref 6.4–8.2)
URINE TYPE: ABNORMAL
UROBILINOGEN, URINE: 0.2 E.U./DL
WBC # BLD: 7.8 K/UL (ref 4–11)

## 2019-05-14 PROCEDURE — 51701 INSERT BLADDER CATHETER: CPT

## 2019-05-14 PROCEDURE — 96374 THER/PROPH/DIAG INJ IV PUSH: CPT

## 2019-05-14 PROCEDURE — 83690 ASSAY OF LIPASE: CPT

## 2019-05-14 PROCEDURE — 74177 CT ABD & PELVIS W/CONTRAST: CPT

## 2019-05-14 PROCEDURE — 2580000003 HC RX 258: Performed by: NURSE PRACTITIONER

## 2019-05-14 PROCEDURE — 83605 ASSAY OF LACTIC ACID: CPT

## 2019-05-14 PROCEDURE — 87449 NOS EACH ORGANISM AG IA: CPT

## 2019-05-14 PROCEDURE — 1200000000 HC SEMI PRIVATE

## 2019-05-14 PROCEDURE — 85025 COMPLETE CBC W/AUTO DIFF WBC: CPT

## 2019-05-14 PROCEDURE — 80053 COMPREHEN METABOLIC PANEL: CPT

## 2019-05-14 PROCEDURE — 6370000000 HC RX 637 (ALT 250 FOR IP): Performed by: NURSE PRACTITIONER

## 2019-05-14 PROCEDURE — 87324 CLOSTRIDIUM AG IA: CPT

## 2019-05-14 PROCEDURE — 85610 PROTHROMBIN TIME: CPT

## 2019-05-14 PROCEDURE — 99285 EMERGENCY DEPT VISIT HI MDM: CPT

## 2019-05-14 PROCEDURE — 2580000003 HC RX 258: Performed by: INTERNAL MEDICINE

## 2019-05-14 PROCEDURE — 2700000000 HC OXYGEN THERAPY PER DAY

## 2019-05-14 PROCEDURE — 6360000004 HC RX CONTRAST MEDICATION: Performed by: EMERGENCY MEDICINE

## 2019-05-14 PROCEDURE — 94761 N-INVAS EAR/PLS OXIMETRY MLT: CPT

## 2019-05-14 PROCEDURE — 96361 HYDRATE IV INFUSION ADD-ON: CPT

## 2019-05-14 PROCEDURE — 71046 X-RAY EXAM CHEST 2 VIEWS: CPT

## 2019-05-14 PROCEDURE — 6370000000 HC RX 637 (ALT 250 FOR IP): Performed by: INTERNAL MEDICINE

## 2019-05-14 PROCEDURE — 81003 URINALYSIS AUTO W/O SCOPE: CPT

## 2019-05-14 RX ORDER — FUROSEMIDE 20 MG/1
20 TABLET ORAL DAILY
Status: ON HOLD | COMMUNITY
End: 2019-05-28 | Stop reason: HOSPADM

## 2019-05-14 RX ORDER — OXYCODONE HYDROCHLORIDE AND ACETAMINOPHEN 5; 325 MG/1; MG/1
1 TABLET ORAL EVERY 6 HOURS PRN
Status: DISCONTINUED | OUTPATIENT
Start: 2019-05-14 | End: 2019-05-17 | Stop reason: HOSPADM

## 2019-05-14 RX ORDER — INSULIN GLARGINE 100 [IU]/ML
30 INJECTION, SOLUTION SUBCUTANEOUS EVERY MORNING
Status: DISCONTINUED | OUTPATIENT
Start: 2019-05-15 | End: 2019-05-17 | Stop reason: HOSPADM

## 2019-05-14 RX ORDER — ONDANSETRON 2 MG/ML
4 INJECTION INTRAMUSCULAR; INTRAVENOUS EVERY 6 HOURS PRN
Status: DISCONTINUED | OUTPATIENT
Start: 2019-05-14 | End: 2019-05-17 | Stop reason: HOSPADM

## 2019-05-14 RX ORDER — LISINOPRIL 10 MG/1
10 TABLET ORAL DAILY
Status: DISCONTINUED | OUTPATIENT
Start: 2019-05-14 | End: 2019-05-17 | Stop reason: HOSPADM

## 2019-05-14 RX ORDER — ASPIRIN 81 MG/1
81 TABLET ORAL DAILY
Status: DISCONTINUED | OUTPATIENT
Start: 2019-05-14 | End: 2019-05-17 | Stop reason: HOSPADM

## 2019-05-14 RX ORDER — DEXTROSE MONOHYDRATE 50 MG/ML
100 INJECTION, SOLUTION INTRAVENOUS PRN
Status: DISCONTINUED | OUTPATIENT
Start: 2019-05-14 | End: 2019-05-17 | Stop reason: HOSPADM

## 2019-05-14 RX ORDER — LIDOCAINE 4 G/G
1 PATCH TOPICAL DAILY
Status: DISCONTINUED | OUTPATIENT
Start: 2019-05-14 | End: 2019-05-17 | Stop reason: HOSPADM

## 2019-05-14 RX ORDER — NICOTINE POLACRILEX 4 MG
15 LOZENGE BUCCAL PRN
Status: DISCONTINUED | OUTPATIENT
Start: 2019-05-14 | End: 2019-05-17 | Stop reason: HOSPADM

## 2019-05-14 RX ORDER — 0.9 % SODIUM CHLORIDE 0.9 %
500 INTRAVENOUS SOLUTION INTRAVENOUS ONCE
Status: COMPLETED | OUTPATIENT
Start: 2019-05-14 | End: 2019-05-14

## 2019-05-14 RX ORDER — SODIUM CHLORIDE 0.9 % (FLUSH) 0.9 %
10 SYRINGE (ML) INJECTION EVERY 12 HOURS SCHEDULED
Status: DISCONTINUED | OUTPATIENT
Start: 2019-05-14 | End: 2019-05-17 | Stop reason: HOSPADM

## 2019-05-14 RX ORDER — BETHANECHOL CHLORIDE 25 MG/1
25 TABLET ORAL 3 TIMES DAILY
Status: DISCONTINUED | OUTPATIENT
Start: 2019-05-14 | End: 2019-05-17 | Stop reason: HOSPADM

## 2019-05-14 RX ORDER — AMLODIPINE BESYLATE AND BENAZEPRIL HYDROCHLORIDE 2.5; 1 MG/1; MG/1
1 CAPSULE ORAL DAILY
Status: DISCONTINUED | OUTPATIENT
Start: 2019-05-14 | End: 2019-05-14 | Stop reason: CLARIF

## 2019-05-14 RX ORDER — ATORVASTATIN CALCIUM 80 MG/1
80 TABLET, FILM COATED ORAL EVERY EVENING
Status: DISCONTINUED | OUTPATIENT
Start: 2019-05-14 | End: 2019-05-17 | Stop reason: HOSPADM

## 2019-05-14 RX ORDER — WARFARIN SODIUM 5 MG/1
5 TABLET ORAL DAILY
Status: DISCONTINUED | OUTPATIENT
Start: 2019-05-15 | End: 2019-05-17 | Stop reason: HOSPADM

## 2019-05-14 RX ORDER — DEXTROSE MONOHYDRATE 25 G/50ML
12.5 INJECTION, SOLUTION INTRAVENOUS PRN
Status: DISCONTINUED | OUTPATIENT
Start: 2019-05-14 | End: 2019-05-17 | Stop reason: HOSPADM

## 2019-05-14 RX ORDER — SPIRONOLACTONE 25 MG/1
25 TABLET ORAL DAILY
Status: DISCONTINUED | OUTPATIENT
Start: 2019-05-14 | End: 2019-05-17 | Stop reason: HOSPADM

## 2019-05-14 RX ORDER — DOCUSATE SODIUM 100 MG/1
100 CAPSULE, LIQUID FILLED ORAL 2 TIMES DAILY
Qty: 14 CAPSULE | Refills: 0 | Status: SHIPPED | OUTPATIENT
Start: 2019-05-14 | End: 2019-05-21

## 2019-05-14 RX ORDER — ACETAMINOPHEN 325 MG/1
650 TABLET ORAL EVERY 4 HOURS PRN
Status: DISCONTINUED | OUTPATIENT
Start: 2019-05-14 | End: 2019-05-17 | Stop reason: HOSPADM

## 2019-05-14 RX ORDER — SODIUM CHLORIDE 0.9 % (FLUSH) 0.9 %
10 SYRINGE (ML) INJECTION PRN
Status: DISCONTINUED | OUTPATIENT
Start: 2019-05-14 | End: 2019-05-17 | Stop reason: HOSPADM

## 2019-05-14 RX ORDER — CYCLOBENZAPRINE HCL 10 MG
10 TABLET ORAL 3 TIMES DAILY PRN
Status: DISCONTINUED | OUTPATIENT
Start: 2019-05-14 | End: 2019-05-17 | Stop reason: HOSPADM

## 2019-05-14 RX ORDER — POLYETHYLENE GLYCOL 3350 17 G/17G
17 POWDER, FOR SOLUTION ORAL DAILY
Status: DISCONTINUED | OUTPATIENT
Start: 2019-05-14 | End: 2019-05-17 | Stop reason: HOSPADM

## 2019-05-14 RX ORDER — AMLODIPINE BESYLATE 2.5 MG/1
2.5 TABLET ORAL DAILY
Status: DISCONTINUED | OUTPATIENT
Start: 2019-05-14 | End: 2019-05-17 | Stop reason: HOSPADM

## 2019-05-14 RX ADMIN — SODIUM CHLORIDE 500 ML: 9 INJECTION, SOLUTION INTRAVENOUS at 16:01

## 2019-05-14 RX ADMIN — OXYCODONE HYDROCHLORIDE AND ACETAMINOPHEN 1 TABLET: 5; 325 TABLET ORAL at 20:17

## 2019-05-14 RX ADMIN — ASPIRIN 81 MG: 81 TABLET, COATED ORAL at 20:42

## 2019-05-14 RX ADMIN — Medication 10 ML: at 20:43

## 2019-05-14 RX ADMIN — AMLODIPINE BESYLATE 2.5 MG: 2.5 TABLET ORAL at 20:42

## 2019-05-14 RX ADMIN — ATORVASTATIN CALCIUM 80 MG: 80 TABLET, FILM COATED ORAL at 20:48

## 2019-05-14 RX ADMIN — INSULIN HUMAN 5 UNITS: 100 INJECTION, SOLUTION PARENTERAL at 16:01

## 2019-05-14 RX ADMIN — SPIRONOLACTONE 25 MG: 25 TABLET ORAL at 20:42

## 2019-05-14 RX ADMIN — POLYETHYLENE GLYCOL (3350) 17 G: 17 POWDER, FOR SOLUTION ORAL at 20:49

## 2019-05-14 RX ADMIN — BETHANECHOL CHLORIDE 25 MG: 25 TABLET ORAL at 20:42

## 2019-05-14 RX ADMIN — SORBITOL SOLUTION (BULK) 330 ML: 70 SOLUTION at 16:38

## 2019-05-14 RX ADMIN — IOPAMIDOL 75 ML: 755 INJECTION, SOLUTION INTRAVENOUS at 15:06

## 2019-05-14 RX ADMIN — LISINOPRIL 10 MG: 10 TABLET ORAL at 20:42

## 2019-05-14 ASSESSMENT — ENCOUNTER SYMPTOMS
WHEEZING: 0
ABDOMINAL DISTENTION: 0
SHORTNESS OF BREATH: 0
EYES NEGATIVE: 1
DIARRHEA: 0
ALLERGIC/IMMUNOLOGIC NEGATIVE: 1
ABDOMINAL PAIN: 1
COUGH: 0
CONSTIPATION: 1
VOMITING: 0
NAUSEA: 1
BACK PAIN: 0

## 2019-05-14 ASSESSMENT — PAIN SCALES - GENERAL
PAINLEVEL_OUTOF10: 9
PAINLEVEL_OUTOF10: 7

## 2019-05-14 NOTE — ED NOTES
Ordered diet tray, received call from dietary to confirm order.       Britt King, [facility]  05/14/19 [id number]

## 2019-05-14 NOTE — PROGRESS NOTES
4 Eyes Skin Assessment     The patient is being assess for  Admission    I agree that 2 RN's have performed a thorough Head to Toe Skin Assessment on the patient. ALL assessment sites listed below have been assessed. Areas assessed by both nurses:   [x]   Head, Face, and Ears   [x]   Shoulders, Back, and Chest  [x]   Arms, Elbows, and Hands   [x]   Coccyx, Sacrum, and IschIum  [x]   Legs, Feet, and Heels        Does the Patient have Skin Breakdown?   Yes LDA WOUND CARE was Initiated documentation include the Cheryl-wound, Wound Assessment, Measurements, Dressing Treatment, Drainage, and Color\",         Edmund Prevention initiated:  Yes   Wound Care Orders initiated:  Yes      [id number] nurse consulted for Pressure Injury (Stage 3,4, Unstageable, DTI, NWPT, and Complex wounds), New and Established Ostomies:  No      Nurse 1 eSignature: Electronically signed by Velia Ventura RN on 5/14/19 at 7:41 PM    **SHARE this note so that the co-signing nurse is able to place an eSignature**    Nurse 2 eSignature: Electronically signed by Marcelina Savage RN on 5/15/19 at 6:02 AM

## 2019-05-14 NOTE — ED PROVIDER NOTES
I independently performed a history and physical on Mahesh Ibarra. All diagnostic, treatment, and disposition decisions were made by myself in conjunction with the advanced practice provider. For further details of Mahesh Ibarra's emergency department encounter, please see Kaya Guzman NP's documentation. Patient is an 80-year-old male presenting today due to concern for constipation for the last week along with feeling nauseated this morning. He does feel like he is having stool incontinence while speaking with him. Denies any chest pain or shortness of breath. He has lower abdominal discomfort. No falls or trauma. He was recently started on medication for osteoarthritis but is unsure what medication that is. No other concerns at this time. No fever or chills. No trouble with urination. Physical:   Gen: No acute distress. Alert and oriented to person and situation. Pysch: Normal mood and affect  HEENT: NCAT, MMM  Neck: supple  Cardiac: RRR, pulses 2+ in upper extremities  Lungs: C2AB, no R/R/W  Abdomen: soft and nontender with no R/D/G  Neuro: moving all extremities     MDM:  She was evaluated due to concern for constipation for the last week with nausea. He has multiple bowel surgeries in the past and therefore will need CT to rule out obstruction. If negative for this, he will need rectal exam and enema. CT scan did show fecal impaction along with constipation. Enema was attempted but not successful. He did become mildly hypoxic at 89% in the emergency department and therefore was placed on nasal cannula. Chest x-ray showed atelectasis versus pneumonia but he has no elevated white count and no fever or cough and therefore I did not start him on antibiotics at this time. Lactic acid was also normal.  He will need further evaluation in the hospital this time but was in no acute distress in the emergency department and vitals were stable besides for the slight hypoxia.   I spoke with  Sanchez at [phone] for admission.      Tre Crockett MD  05/14/19 [id number]

## 2019-05-14 NOTE — ED NOTES
While starting IV patient states that he feels something coming out. Pt had small light brown, mucus-like stool. Pt cleaned up and attends changed.      Herminio Isbell RN  05/14/19 [id number]

## 2019-05-14 NOTE — ED NOTES
Pt given SMOG enema. Pt tolerated well but was only able to hold a small amount of enema inside. Pt assisted with 2 up to bedside commode. Pt sat on commode for 10-15 minutes. Pt unable to pass any stool. Pt assisted back into bed. Linens and gown changed. Pt sats 89-90%. Pt placed on 2LNC. Dr. Adela Liu notified.      Jame Cota RN  05/14/19 9079

## 2019-05-15 LAB
ALBUMIN SERPL-MCNC: 3.2 G/DL (ref 3.4–5)
ANION GAP SERPL CALCULATED.3IONS-SCNC: 9 MMOL/L (ref 3–16)
BUN BLDV-MCNC: 13 MG/DL (ref 7–20)
CALCIUM SERPL-MCNC: 8.9 MG/DL (ref 8.3–10.6)
CHLORIDE BLD-SCNC: 96 MMOL/L (ref 99–110)
CO2: 30 MMOL/L (ref 21–32)
CREAT SERPL-MCNC: 0.7 MG/DL (ref 0.8–1.3)
ESTIMATED AVERAGE GLUCOSE: 194.4 MG/DL
GFR AFRICAN AMERICAN: >60
GFR NON-AFRICAN AMERICAN: >60
GLUCOSE BLD-MCNC: 108 MG/DL (ref 70–99)
GLUCOSE BLD-MCNC: 123 MG/DL (ref 70–99)
GLUCOSE BLD-MCNC: 144 MG/DL (ref 70–99)
GLUCOSE BLD-MCNC: 151 MG/DL (ref 70–99)
GLUCOSE BLD-MCNC: 164 MG/DL (ref 70–99)
GLUCOSE BLD-MCNC: 91 MG/DL (ref 70–99)
HBA1C MFR BLD: 8.4 %
HCT VFR BLD CALC: 32 % (ref 40.5–52.5)
HEMOGLOBIN: 10.7 G/DL (ref 13.5–17.5)
INR BLD: 4.5 (ref 0.86–1.14)
MAGNESIUM: 1.9 MG/DL (ref 1.8–2.4)
MCH RBC QN AUTO: 30.4 PG (ref 26–34)
MCHC RBC AUTO-ENTMCNC: 33.4 G/DL (ref 31–36)
MCV RBC AUTO: 90.8 FL (ref 80–100)
PDW BLD-RTO: 13.8 % (ref 12.4–15.4)
PERFORMED ON: ABNORMAL
PERFORMED ON: NORMAL
PHOSPHORUS: 4.1 MG/DL (ref 2.5–4.9)
PLATELET # BLD: 172 K/UL (ref 135–450)
PMV BLD AUTO: 8.1 FL (ref 5–10.5)
POTASSIUM SERPL-SCNC: 4.3 MMOL/L (ref 3.5–5.1)
PROTHROMBIN TIME: 51.3 SEC (ref 9.8–13)
RBC # BLD: 3.53 M/UL (ref 4.2–5.9)
SODIUM BLD-SCNC: 135 MMOL/L (ref 136–145)
WBC # BLD: 9.6 K/UL (ref 4–11)

## 2019-05-15 PROCEDURE — 97166 OT EVAL MOD COMPLEX 45 MIN: CPT

## 2019-05-15 PROCEDURE — 83036 HEMOGLOBIN GLYCOSYLATED A1C: CPT

## 2019-05-15 PROCEDURE — 2580000003 HC RX 258: Performed by: INTERNAL MEDICINE

## 2019-05-15 PROCEDURE — 36415 COLL VENOUS BLD VENIPUNCTURE: CPT

## 2019-05-15 PROCEDURE — 97530 THERAPEUTIC ACTIVITIES: CPT

## 2019-05-15 PROCEDURE — 6370000000 HC RX 637 (ALT 250 FOR IP): Performed by: INTERNAL MEDICINE

## 2019-05-15 PROCEDURE — 97162 PT EVAL MOD COMPLEX 30 MIN: CPT

## 2019-05-15 PROCEDURE — 85027 COMPLETE CBC AUTOMATED: CPT

## 2019-05-15 PROCEDURE — 83735 ASSAY OF MAGNESIUM: CPT

## 2019-05-15 PROCEDURE — 1200000000 HC SEMI PRIVATE

## 2019-05-15 PROCEDURE — 85610 PROTHROMBIN TIME: CPT

## 2019-05-15 PROCEDURE — 6370000000 HC RX 637 (ALT 250 FOR IP): Performed by: NURSE PRACTITIONER

## 2019-05-15 PROCEDURE — 94761 N-INVAS EAR/PLS OXIMETRY MLT: CPT

## 2019-05-15 PROCEDURE — 80069 RENAL FUNCTION PANEL: CPT

## 2019-05-15 PROCEDURE — 99221 1ST HOSP IP/OBS SF/LOW 40: CPT | Performed by: PHYSICIAN ASSISTANT

## 2019-05-15 PROCEDURE — 97535 SELF CARE MNGMENT TRAINING: CPT

## 2019-05-15 PROCEDURE — 2700000000 HC OXYGEN THERAPY PER DAY

## 2019-05-15 RX ORDER — SODIUM CHLORIDE 9 MG/ML
INJECTION, SOLUTION INTRAVENOUS CONTINUOUS
Status: DISCONTINUED | OUTPATIENT
Start: 2019-05-15 | End: 2019-05-17 | Stop reason: HOSPADM

## 2019-05-15 RX ADMIN — BETHANECHOL CHLORIDE 25 MG: 25 TABLET ORAL at 21:19

## 2019-05-15 RX ADMIN — BETHANECHOL CHLORIDE 25 MG: 25 TABLET ORAL at 08:38

## 2019-05-15 RX ADMIN — AMLODIPINE BESYLATE 2.5 MG: 2.5 TABLET ORAL at 08:38

## 2019-05-15 RX ADMIN — INSULIN LISPRO 2 UNITS: 100 INJECTION, SOLUTION INTRAVENOUS; SUBCUTANEOUS at 12:25

## 2019-05-15 RX ADMIN — OXYCODONE HYDROCHLORIDE AND ACETAMINOPHEN 1 TABLET: 5; 325 TABLET ORAL at 08:36

## 2019-05-15 RX ADMIN — BETHANECHOL CHLORIDE 25 MG: 25 TABLET ORAL at 15:42

## 2019-05-15 RX ADMIN — INSULIN LISPRO 2 UNITS: 100 INJECTION, SOLUTION INTRAVENOUS; SUBCUTANEOUS at 09:58

## 2019-05-15 RX ADMIN — POLYETHYLENE GLYCOL (3350) 17 G: 17 POWDER, FOR SOLUTION ORAL at 09:46

## 2019-05-15 RX ADMIN — ATORVASTATIN CALCIUM 80 MG: 80 TABLET, FILM COATED ORAL at 21:19

## 2019-05-15 RX ADMIN — Medication 10 ML: at 08:42

## 2019-05-15 RX ADMIN — Medication 10 ML: at 21:26

## 2019-05-15 RX ADMIN — SPIRONOLACTONE 25 MG: 25 TABLET ORAL at 08:37

## 2019-05-15 RX ADMIN — LISINOPRIL 10 MG: 10 TABLET ORAL at 08:38

## 2019-05-15 RX ADMIN — INSULIN GLARGINE 30 UNITS: 100 INJECTION, SOLUTION SUBCUTANEOUS at 09:59

## 2019-05-15 RX ADMIN — SODIUM CHLORIDE: 9 INJECTION, SOLUTION INTRAVENOUS at 16:45

## 2019-05-15 RX ADMIN — ASPIRIN 81 MG: 81 TABLET, COATED ORAL at 08:38

## 2019-05-15 ASSESSMENT — PAIN SCALES - WONG BAKER: WONGBAKER_NUMERICALRESPONSE: 6

## 2019-05-15 ASSESSMENT — PAIN DESCRIPTION - LOCATION: LOCATION: BACK

## 2019-05-15 ASSESSMENT — PAIN DESCRIPTION - FREQUENCY: FREQUENCY: CONTINUOUS

## 2019-05-15 ASSESSMENT — PAIN SCALES - GENERAL
PAINLEVEL_OUTOF10: 10
PAINLEVEL_OUTOF10: 10

## 2019-05-15 ASSESSMENT — PAIN DESCRIPTION - DESCRIPTORS: DESCRIPTORS: ACHING

## 2019-05-15 ASSESSMENT — PAIN DESCRIPTION - PAIN TYPE: TYPE: ACUTE PAIN

## 2019-05-15 NOTE — PROGRESS NOTES
Occupational Therapy   Occupational Therapy Initial Assessment and Treatment  Date: 5/15/2019   Patient Name: Alcon Ferguson  MRN: [id number]     : 3/13/1933    Date of Service: 5/15/2019    Discharge Recommendations:  [address]  OT Equipment Recommendations  Equipment Needed: No  Other: Defer to rehab    Assessment   Performance deficits / Impairments: Decreased functional mobility ; Decreased strength;Decreased endurance;Decreased coordination;Decreased ADL status; Decreased safe awareness;Decreased balance;Decreased cognition  After evaluation and treatment, pt found to be presenting with the above mentioned deficits. Pt would benefit from continued skilled occupational therapy to address these deficits, increasing safety and independence with ADL and functional mobility. Max A x2 currently for functional t/fs with impaired cognition. Prognosis: Fair  Decision Making: Medium Complexity  REQUIRES OT FOLLOW UP: Yes  Activity Tolerance  Activity Tolerance: Patient Tolerated treatment well;Patient limited by fatigue;Patient limited by pain  Safety Devices  Safety Devices in place: Yes  Type of devices: Call light within reach; Left in chair;Chair alarm in place;Gait belt;Nurse notified           Patient Diagnosis(es): The primary encounter diagnosis was Constipation, unspecified constipation type. Diagnoses of Closed compression fracture of first lumbar vertebra, initial encounter [facility]), Closed compression fracture of second lumbar vertebra, initial encounter ([facility] [address] Hyperglycemia, and Hypoxia were also pertinent to this visit.      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 has a past surgical history that includes Cholecystectomy; Appendectomy (1939); Prostatectomy (2007); Revision total knee arthroplasty (Left, 06/18/2014); Upper gastrointestinal endoscopy (03/02/10); pacemaker placement; Colonoscopy (10/07/2014); Uvulopalatopharygoplasty; Coronary artery bypass graft (1990); Total knee arthroplasty (Bilateral, 1991); Partial hip arthroplasty (Right, 09/24/2016); and Prostate biopsy (08/10/2007). Restrictions  Restrictions/Precautions  Restrictions/Precautions: Fall Risk  Position Activity Restriction  Other position/activity restrictions: Per AYANA Whitlock note on 5/15/19: \"He may be up with PT/OT prior to brace arrival.\" Recommending [place] [facility] for conservative management    Subjective   General  Chart Reviewed: Yes  Patient assessed for rehabilitation services?: Yes  Additional Pertinent Hx: stage 2 sacral ulcer  Family / Caregiver Present: Yes(Wife)  Referring Practitioner: AYANA Whitlock  Diagnosis: Constipation, L1 and L2 compression fxs  Subjective  Subjective: RN cleared pt for tx. Pt supine at session start. Pain Assessment  Pain Assessment: Faces  Winslow-Baker Pain Rating: Hurts even more  Pain Type: Acute pain  Pain Location: Back  Pain Descriptors: Aching  Pain Frequency: Continuous  Clinical Progression: (worse with movement)  Non-Pharmaceutical Pain Intervention(s): Rest;Repositioned; Ambulation/Increased Activity  Response to Pain Intervention: Patient Satisfied    Social/Functional History  Social/Functional History  Lives With: Spouse  Type of Home: House  Home Layout: One level  Home Access: Level entry  Bathroom Shower/Tub: Walk-in shower( )  Bathroom Toilet: Standard(with RTS with rails)  Home Equipment: Rolling walker, [place], Standard walker, [address], 4 wheeled walker  Receives Help From: Home health(RN 1x/wk)  ADL Assistance: Needs assistance  Bath: Maximum assistance(wife assists with sponge bathing)  Dressing: Maximum assistance  Homemaking Assistance: Needs assistance  Homemaking Responsibilities: No  Ambulation Assistance: Independent(uses SPC in the home)  Transfer Assistance: Independent  Active : No  Patient's  Info: wife       Objective        Orientation  Overall Orientation Status: Within Functional Limits        Balance  Sitting Balance: Maximum assistance(initially progressing to CGA for static balance EOB)  Standing Balance: Dependent/Total(max A x2 with RW)  Functional Mobility  Functional Mobility Comments: Pt took ~5 steps from bed to adjacent chair with gait belt, RW, and max A x2 with increased time. ADL  Additional Comments: Pt declining ADL at this time. Tone RUE  RUE Tone: Normotonic  Tone LUE  LUE Tone: Normotonic  Coordination  Movements Are Fluid And Coordinated: Yes        Bed mobility  Supine to Sit: Dependent/Total(max A x2 with log roll and rail)  Scooting: Maximal assistance(to EOB)     Transfers  Sit to stand: Dependent/Total(Max A x2)  Stand to sit: Dependent/Total(mod A x2)        Cognition  Overall Cognitive Status: Exceptions  Following Commands: Follows one step commands with increased time; Follows one step commands with repetition  Attention Span: Difficulty attending to directions; Difficulty dividing attention; Attends with cues to redirect  Memory: Decreased recall of recent events  Safety Judgement: Decreased awareness of need for safety;Decreased awareness of need for assistance  Problem Solving: Assistance required to generate solutions;Assistance required to implement solutions;Assistance required to identify errors made;Assistance required to correct errors made;Decreased awareness of errors  Insights: Decreased awareness of deficits  Initiation: Requires cues for all  Sequencing: Requires cues for all  Cognition Comment: Drowsy, slow response                 LUE AROM (degrees)  LUE AROM : WFL(based on functional presentation)  RUE AROM (degrees)  RUE AROM : WFL(based on functional presentation)  LUE Strength  Gross LUE Strength: Exceptions to WFL(based on functional presentation)  RUE Strength  Gross RUE Strength: Exceptions to WFL(based on functional presentation)                Education: Role of OT, safe t/f training, safe use of DME, awareness of deficits, discharge planning, ADL as therapeutic exercise, importance of OOB, back precautions    Plan   Plan  Times per week: 3-5    AM-PAC Score        AM-[facility] Inpatient Daily Activity Raw Score: 13  AM-PAC Inpatient ADL T-Scale Score : 32.03  ADL Inpatient CMS 0-100% Score: 63.03  ADL Inpatient CMS G-Code Modifier : CL    Goals  Short term goals  Time Frame for Short term goals: 5/22/19  Short term goal 1: Functional t/f with mod A x2 by 5/20/19  Short term goal 2: LB dressing with min A x2 and LE AE prn  Short term goal 3: Toileting with mod A  Short term goal 4: Bed mobility with min A x2  Short term goal 5: Grooming EOB with SBA  Patient Goals   Patient goals : None stated by pt, but wife wants pt to \"Get better and be in less pain. \"       Therapy Time   Individual Concurrent Group Co-treatment   Time In 6760         Time Out 1414         Minutes 20         Timed Code Treatment Minutes: 10 Minutes       If patient is discharged prior to next treatment session, this note will serve as the discharge summary.   Krista Dudley, OTR/L #[id number]

## 2019-05-15 NOTE — PROGRESS NOTES
Lab called this morning with a critical lab. Patients INR is 4.5. Perfectserved Night doctor with results.

## 2019-05-15 NOTE — CONSULTS
[facility] Orthopedic Surgery  Consult Note        Reason for Consult:  Lumbar compression fractures    CHIEF COMPLAINT:  Constipation, back pain    History Obtained From:  patient, electronic medical record    HISTORY OF PRESENT ILLNESS:    The patient is an 80 y.o. male with a PMH significant for prostate cancer, ischemic colitis, and diastolic heart failure, who presented to the ED yesterday with constipation and abdominal discomfort for 1 week. He has history of multiple bowel surgeries. He underwent CT abdomen and pelvis while in the ED, which incidentally showed acute L1 compression fracture and progressed L2 compression fracture. Per ED report, patient has no history of trauma or fall. Patient is very drowsy on examination currently. He awakens briefly to name, but quickly falls back to sleep. At this time, he denies significant back or lower extremity pain. I spoke with RN, who states patient was complaining of severe back pain earlier today with any movement in bed. He was able to have two small bowel movements since admission. No history of bowel or bladder incontinence, or saddle numbness.      Past Medical History:        Diagnosis Date    Acute encephalopathy 10/7/2014    Acute lower GI bleeding 10/6/2014    Acute renal failure (ARF) ([address] 10/7/2014    Acute respiratory failure ([address] 11/2/2014    Atrial flutter ([address] 07/11/2015 06/27/2016, AFL DCCV 200 J, successful    Closed right hip fracture ([address] 4/14/3365    Diastolic heart failure ([address] 11/2/2014    Diverticulitis     HCAP (healthcare-associated pneumonia) 11/2/2014    Iron deficiency anemia     Ischemic colitis ([address] 90/11/45    Metabolic encephalopathy 6/27/8126    Nonhealing nonsurgical wound of scalp, limited to breakdown of skin 2/14/2019    Obesity 8/29/2012    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 ([address] 1/7/2014    Pressure ulcer of coccygeal region, stage 2 11/3/2018    Prostate cancer ([address]     Vasovagal syncope  VT (ventricular tachycardia) ([facility] [address] 8/13/2012       Past Surgical History:        Procedure Laterality Date    APPENDECTOMY  1939    CHOLECYSTECTOMY      COLONOSCOPY  10/07/2014    Ischemic colitis    CORONARY ARTERY BYPASS GRAFT  1990    PACEMAKER PLACEMENT      PARTIAL HIP ARTHROPLASTY Right 09/24/2016    PROSTATE BIOPSY  08/10/2007    PROSTATECTOMY  2007    radical, with lymph node dissection    REVISION TOTAL KNEE ARTHROPLASTY Left 06/18/2014    TOTAL KNEE ARTHROPLASTY Bilateral 1991    UPPER GASTROINTESTINAL ENDOSCOPY  03/02/10    WNL                 UVULOPALATOPHARYGOPLASTY         Family History and Social History reviewed.     Current Medications:   Current Facility-Administered Medications: aspirin EC tablet 81 mg, 81 mg, Oral, Daily  atorvastatin (LIPITOR) tablet 80 mg, 80 mg, Oral, QPM  bethanechol (URECHOLINE) tablet 25 mg, 25 mg, Oral, TID  insulin glargine (LANTUS) injection vial 30 Units, 30 Units, Subcutaneous, QAM  spironolactone (ALDACTONE) tablet 25 mg, 25 mg, Oral, Daily  warfarin (COUMADIN) tablet 5 mg, 5 mg, Oral, Daily  glucose (GLUTOSE) 40 % oral gel 15 g, 15 g, Oral, PRN  dextrose 50 % solution 12.5 g, 12.5 g, Intravenous, PRN  glucagon (rDNA) injection 1 mg, 1 mg, Intramuscular, PRN  dextrose 5 % solution, 100 mL/hr, Intravenous, PRN  insulin lispro (HUMALOG) injection vial 0-12 Units, 0-12 Units, Subcutaneous, TID WC  insulin lispro (HUMALOG) injection vial 0-6 Units, 0-6 Units, Subcutaneous, Nightly  sodium chloride flush 0.9 % injection 10 mL, 10 mL, Intravenous, 2 times per day  sodium chloride flush 0.9 % injection 10 mL, 10 mL, Intravenous, PRN  magnesium hydroxide (MILK OF MAGNESIA) 400 MG/5ML suspension 30 mL, 30 mL, Oral, Daily PRN  ondansetron (ZOFRAN) injection 4 mg, 4 mg, Intravenous, Q6H PRN  acetaminophen (TYLENOL) tablet 650 mg, 650 mg, Oral, Q4H PRN  polyethylene glycol (GLYCOLAX) packet 17 g, 17 g, Oral, Daily  bisacodyl (DULCOLAX) EC tablet 10 mg, 10 mg, Oral, Daily PRN  amLODIPine (NORVASC) tablet 2.5 mg, 2.5 mg, Oral, Daily **AND** lisinopril (PRINIVIL;ZESTRIL) tablet 10 mg, 10 mg, Oral, Daily  oxyCODONE-acetaminophen (PERCOCET) 5-325 MG per tablet 1 tablet, 1 tablet, Oral, Q6H PRN  cyclobenzaprine (FLEXERIL) tablet 10 mg, 10 mg, Oral, TID PRN  lidocaine 4 % external patch 1 patch, 1 patch, Transdermal, Daily    Allergies:  Metoprolol    REVIEW OF SYSTEMS:    Review of symptoms unable to be obtained at this time, due to lethargy of patient. All other systems reviewed and are negative. PHYSICAL EXAM:    VITALS:  BP (!) 156/69   Pulse 89   Temp 98.5 °F (36.9 °C) (Oral)   Resp 16   Ht 5' 10\" (1.778 m)   Wt 190 lb (86.2 kg)   SpO2 96%   BMI 27.26 kg/m²     PULMONARY: Respirations deep and easy  SKIN: warm and dry  MUSCULOSKELETAL: Physical exam was very limited due to patient's drowsiness. He appears to have 5/5 motor strength of bilateral lower extremity dorsiflexors and plantar flexors, as well as strong hip flexion and knee flexion bilaterally. NEUROLOGIC:   Sensory:    Touch: Patient complaining of no lower extremity sensation deficits at this time. DATA:    CBC:   Lab Results   Component Value Date    WBC 9.6 05/15/2019    RBC 3.53 05/15/2019    HGB 10.7 05/15/2019    HCT 32.0 05/15/2019    MCV 90.8 05/15/2019    MCH 30.4 05/15/2019    MCHC 33.4 05/15/2019    RDW 13.8 05/15/2019     05/15/2019    MPV 8.1 05/15/2019     WBC:    Lab Results   Component Value Date    WBC 9.6 05/15/2019     PT/INR:    Lab Results   Component Value Date    PROTIME 51.3 05/15/2019    PROTIME 47.2 12/31/2018    INR 4.50 05/15/2019     PTT:    Lab Results   Component Value Date    APTT 31.8 06/10/2014   [APTT    Radiology Review:  I reviewed CT abdomen and pelvis from 5/14/19:  No obstruction.       Compression deformities involving L1 and L2.  Compression fracture L1 appears   new and slightly progressed at L2.            IMPRESSION/RECOMMENDATIONS:  Acute L1 and L2 compression fractures    I was unable to discuss treatment options with . Audelia Lennox regarding his lumbar compression fractures. If pain is overall tolerable with medication management, would recommend conservative treatment with a radha orthosis through [address] Would recommend he continue with the brace when up with activity. He may be up with PT/OT prior to brace arrival. If pain remains excruciating and patient is unable to ambulate due to pain, would consider consulting Interventional Radiology for evaluation for lumbar kyphoplasty. He may need lumbar MRI prior to proceeding with procedure. I attempted to call both patient's wife, Gabby Urbina, and patient's daughter, Vanessa Isidro, to obtain better patient history and discuss treatment options, but received no answer. I notified RN of treatment options and she will relay to patient when he is more alert. If he elects to proceed with a brace and observation, he should follow up with Dr. Aracely Webb in 1-2 weeks for repeat xrays. Discussed with Dr. Aracely Webb.      Jaimie Box  5/15/2019  11:54 AM

## 2019-05-15 NOTE — PROGRESS NOTES
Physical Therapy    Facility/Department: [facility] C3 TELE/MED SURG/ONC  Initial Assessment    NAME: Mahesh Ibarra  : 3/13/1933  MRN: [id number]    Date of Service: 5/15/2019    Discharge Recommendations:  Subacute/Skilled Nursing Facility   PT Equipment Recommendations  Equipment Needed: No    Assessment   Body structures, Functions, Activity limitations: Decreased functional mobility ; Decreased strength;Decreased balance  Assessment: Pt is an 81 y/o male who presents with constipation and newly diagnosed lumbar compression fractures. Pt currently demonstrates deficits in gait, transfers, and strength below baseline. Pt requires assist x2 for all functional mobility. Pt would benefit from continued skilled PT to address these limitations and allow for safe discharge. Recommend SNF for continued therapy. Treatment Diagnosis: impaired transfers  Prognosis: Fair  Decision Making: Medium Complexity  Patient Education: Educated on role of PT, use of call light, safety with mobility  REQUIRES PT FOLLOW UP: Yes  Activity Tolerance  Activity Tolerance: Patient Tolerated treatment well       Patient Diagnosis(es): The primary encounter diagnosis was Constipation, unspecified constipation type. Diagnoses of Closed compression fracture of first lumbar vertebra, initial encounter [facility]), Closed compression fracture of second lumbar vertebra, initial encounter ([facility] [address] Hyperglycemia, and Hypoxia were also pertinent to this visit.      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 has a past surgical history that includes Cholecystectomy; Appendectomy (1939); Prostatectomy (2007); Revision total knee arthroplasty (Left, 06/18/2014); Upper gastrointestinal endoscopy (03/02/10); pacemaker placement; Colonoscopy (10/07/2014); Uvulopalatopharygoplasty; Coronary artery bypass graft (1990); Total knee arthroplasty (Bilateral, 1991); Partial hip arthroplasty (Right, 09/24/2016); and Prostate biopsy (08/10/2007). Restrictions  Restrictions/Precautions  Restrictions/Precautions: Fall Risk  Position Activity Restriction  Other position/activity restrictions: Per AYANA Woodard note on 5/15/19: \"He may be up with PT/OT prior to brace arrival.\" Recommending [facility] for conservative management     Subjective  General  Chart Reviewed: Yes  Patient assessed for rehabilitation services?: Yes  Family / Caregiver Present: Yes  Referring Practitioner: AYANA Woodard  Referral Date : 05/15/19  Diagnosis: constipation  Follows Commands: Within Functional Limits  General Comment  Comments: Pt supine in bed upon arrival. RN cleared pt for therapy. Subjective  Subjective: Pt agreeable to evaluation.  No c/o pain during session      Social/Functional History  Social/Functional History  Lives With: Spouse  Type of Home: House  Home Layout: One level  Home Access: Level entry  Bathroom Shower/Tub: Walk-in shower( )  Bathroom Toilet: Standard(with RTS with rails)  Home Equipment: Rolling walker, Cane, Standard walker, Wheelchair-manual, 4 wheeled walker  Receives Help From: Home health(RN 1x/wk)  ADL Assistance: Needs assistance  Bath: Maximum assistance(wife assists with sponge bathing)  Dressing: Maximum assistance  Homemaking Assistance: Needs assistance  Homemaking Responsibilities: No  Ambulation Assistance: Independent(uses SPC in the home)  Transfer Assistance: Independent  Active : No  Patient's  Info: wife  Objective  AROM RLE (degrees)  RLE AROM: WFL  AROM LLE (degrees)  LLE AROM : WFL  Strength RLE  Strength RLE: Exception  Comment: grossly 3+/5  Strength LLE  Strength LLE: Exception  Comment: grossly 3+/5  Bed mobility  Supine to Sit: Dependent/Total(max x2 with log roll technique)  Sit to Supine: Unable to assess(up in chair at end of session)  Transfers  Sit to Stand: Dependent/Total(max x2, cues for hand placement)  Stand to sit: Dependent/Total(max x2)  Ambulation  Ambulation?: Yes  Ambulation 1  Surface: level tile  Device: Rolling Walker  Assistance: Moderate assistance  Quality of Gait: short shuffling steps with limited foot clearance, therapist assisting with maneuvering walker  Distance: 3' to chair     Balance  Comments: Sitting balance min/SBA, standing balance max A      Plan   Plan  Times per week: 3-5x/wk  Current Treatment Recommendations: Strengthening, Balance Training, Functional Mobility Training, Transfer Training, Gait Training, Patient/Caregiver Education & Training  Safety Devices  Type of devices:  All fall risk precautions in place, Call light within reach, Chair alarm in place, Gait belt, Patient at risk for falls, Left in chair, Nurse notified  AM-PAC Score     AM-PAC Inpatient Mobility without Stair Climbing Raw Score : 8  AM-PAC Inpatient without Stair Climbing T-Scale Score : 30.65  Mobility Inpatient CMS 0-100% Score: 80.91  Mobility Inpatient without Stair CMS G-Code Modifier : CM     Goals  Short term goals  Time Frame for Short term goals: 5 days  Short term goal 1: pt will perform bed mobility with mod A   Short term goal 2: pt will perform transfers with mod A  Short term goal 3: Pt will ambulate 22' with RW and min A  Short term goal 4: By 5/17/19, pt will tolerate 15 reps of LE ther ex for improved strength and activity tolerance  Patient Goals   Patient goals : unable to state goal     Therapy Time   Individual Concurrent Group Co-treatment

## 2019-05-15 NOTE — CARE COORDINATION
[facility]    Referral received from VU Martinez RN CM with request to follow for home care as pt has had services with [facility] in the past. I am not seeing an H&P at this time but I have reviewed Ortho's note and the ED provider note. I will continue to follow patient for needs, and arrange [facility] services prior to DC. Should pt dc over the weekend please fax facesheet, order, MIKE, and H&P to [facility].      Lorenda Dandy RN CTN with [address]  GAJ:[phone]

## 2019-05-15 NOTE — H&P
Hospital Medicine History & Physical      PCP: Linda Barber    Date of Admission: 5/14/2019    Date of Service: Pt seen/examined on 15 May and Admitted to Inpatient with expected LOS greater than two midnights due to medical therapy. Chief Complaint:  Constipation and back pain      History Of Present Illness:       80 y.o. male w/ hx of chronic constipation, not uncommonly going a week between bowel movements x decades, who presented to [facility] with a week and a 1/2 of no BM. He also has non-traumatic moderately severe low back pain, worse w/ movement over the last week w/out known acute trauma. The patient denies any fever/chills or other signs/sxs of systemic illness or identifiable aggravating/alleviating factors.       Past Medical History:          Diagnosis Date    Acute encephalopathy 10/7/2014    Acute lower GI bleeding 10/6/2014    Acute renal failure (ARF) ([address] 10/7/2014    Acute respiratory failure ([address] 11/2/2014    Atrial flutter ([address] 07/11/2015 06/27/2016, AFL DCCV 200 J, successful    Closed right hip fracture ([address] 6/91/2015    Diastolic heart failure ([address] 11/2/2014    Diverticulitis     HCAP (healthcare-associated pneumonia) 11/2/2014    Iron deficiency anemia     Ischemic colitis ([address] 34/39/64    Metabolic encephalopathy 8/98/5818    Nonhealing nonsurgical wound of scalp, limited to breakdown of skin 2/14/2019    Obesity 8/29/2012    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 ([address] 1/7/2014    Pressure ulcer of coccygeal region, stage 2 11/3/2018    Prostate cancer ([address]     Vasovagal syncope     VT (ventricular tachycardia) ([address] 8/13/2012       Past Surgical History:          Procedure Laterality Date    APPENDECTOMY  1939    CHOLECYSTECTOMY      COLONOSCOPY  10/07/2014    Ischemic colitis    CORONARY ARTERY BYPASS GRAFT  1990    PACEMAKER PLACEMENT      PARTIAL HIP ARTHROPLASTY Right 09/24/2016    PROSTATE BIOPSY  08/10/2007    PROSTATECTOMY  2007    radical, with lymph node dissection    REVISION TOTAL KNEE ARTHROPLASTY Left 06/18/2014    TOTAL KNEE ARTHROPLASTY Bilateral 1991    UPPER GASTROINTESTINAL ENDOSCOPY  03/02/10    WNL                 UVULOPALATOPHARYGOPLASTY         Medications Prior to Admission:      Prior to Admission medications    Medication Sig Start Date End Date Taking? Authorizing Provider   docusate sodium (COLACE) 100 MG capsule Take 1 capsule by mouth 2 times daily for 7 days 5/14/19 5/21/19 Yes AGATHA Hoyt CNP   furosemide (LASIX) 20 MG tablet Take 20 mg by mouth daily 1 Tablet by mouth every Monday, Wednesday, and Friday   Yes Historical Provider, MD   glyBURIDE (DIABETA) 5 MG tablet Take 5 mg by mouth 2 times daily   Yes Historical Provider, MD   insulin detemir (LEVEMIR FLEXTOUCH) 100 UNIT/ML injection pen Inject 30 Units into the skin every morning 4/3/19  Yes AGATHA Gallegos CNP   METFORMIN HCL PO Take 500 mg by mouth 2 times daily   Yes Historical Provider, MD   bethanechol (URECHOLINE) 25 MG tablet Take 1 tablet by mouth 3 times daily 3/8/19  Yes Malathi Neal MD   Continuous Blood Gluc  (FREESTYLE KRISTY 14 DAY READER) TABITHA 1 Units by Does not apply route as needed (as  needed) 1/31/19  Yes AGATHA Gallegos CNP   Continuous Blood Gluc Sensor (FREESTYLE KRISTY 14 DAY SENSOR) MISC 2 Units by Does not apply route as needed (use one sensor for 14 days) 1/31/19  Yes AGATHA Gallegos CNP   calcium carbonate (OYSTER SHELL CALCIUM 500 MG) 1250 (500 Ca) MG tablet Take 1 tablet by mouth daily   Yes Historical Provider, MD   spironolactone (ALDACTONE) 25 MG tablet  11/15/16  Yes Historical Provider, MD   amLODIPine-benazepril (LOTREL) 2.5-10 MG per capsule Take 1 capsule by mouth daily  10/6/16  Yes Historical Provider, MD   warfarin (COUMADIN) 5 MG tablet 5 mg for the next 7 days 9/5/15  Yes Historical Provider, MD   aspirin 81 MG EC tablet Take 81 mg by mouth daily.      Yes Historical color, texture, turgor normal.  No rashes or lesions. Neurologic:  Neurovascularly intact without any focal sensory/motor deficits. Cranial nerves: II-XII intact, grossly non-focal.  Psychiatric:  Alert and oriented, thought content appropriate, normal insight  Capillary Refill: Brisk,< 3 seconds   Peripheral Pulses: +2 palpable, equal bilaterally       CXR:  I have reviewed the CXR with the following interpretation: basilar ASD   EKG:  I have reviewed the EKG with the following interpretation: Afib w/out acute ischemic changes. Labs:     Recent Labs     05/14/19 1426 05/15/19  0514   WBC 7.8 9.6   HGB 11.5* 10.7*   HCT 34.9* 32.0*    172     Recent Labs     05/14/19  1426 05/15/19  0514   * 135*   K 4.4 4.3   CL 91* 96*   CO2 31 30   BUN 18 13   CREATININE 0.9 0.7*   CALCIUM 8.9 8.9   PHOS  --  4.1     Recent Labs     05/14/19 1426   AST 11*   ALT 10   BILITOT 0.4   ALKPHOS 89     Recent Labs     05/14/19 1426 05/15/19  0514   INR 3.80* 4.50*     No results for input(s): CKTOTAL, TROPONINI in the last 72 hours. Urinalysis:      Lab Results   Component Value Date    NITRU Negative 05/14/2019    WBCUA None seen 03/04/2019    BACTERIA Rare 09/29/2016    RBCUA 0-2 03/04/2019    BLOODU Negative 05/14/2019    SPECGRAV 1.015 05/14/2019    GLUCOSEU 500 05/14/2019    GLUCOSEU NEGATIVE 02/06/2010         ASSESSMENT:    Active Hospital Problems    Diagnosis Date Noted    CAD (coronary artery disease) [I25.10] 10/05/2014     Priority: Medium    Hyperlipidemia [E78.5] 08/12/2012     Priority: Medium    Constipation [K59.00] 05/14/2019    CKD (chronic kidney disease) stage 3, GFR 30-59 ml/min ([facility] [address] [N18.3] 06/27/2016    Cardiac pacemaker in situ [Z95.0] 05/24/2016    Essential hypertension [I10] 09/14/2015       PLAN:      Constipation - Unlikely related specifically toLumbar compression fx at L1/L2 give hx of chronic constipation but may be related to pain meds for same.  Neurosurgery consulted and

## 2019-05-15 NOTE — PROGRESS NOTES
Nutrition Assessment    Type and Reason for Visit: Initial, Positive Nutrition Screen(Healing stg 2 ulcer )    Nutrition Recommendations:   Recommend advance diet as tolerated per MD.   Encourage mixed fiber foods, adequate fluids to promote BMs  Monitor nutrition adequacy, pertinent labs, bowel habits, wt changes, and clinical progress    Nutrition Assessment: Positive nutrition screen for healing stg 2 ulcer. Pt seen in room. Soundly sleeping at time of visit. Wife present at bedside. Reports pt admitted with constipation. Wife states pt typically has a BM about every 7 days. She reports pt does not eat a lot of vegetables and little fruits. Writer encouraged 25-35 gm fiber/day and adequate fluids to promote BMs. Pt is ordered on a bowel regimen currently and s/p BMs last night. Diet is full liquids currently pending neurosurg consult. Malnutrition Assessment:  · Malnutrition Status: No malnutrition    Nutrition Risk Level: Moderate    Nutrient Needs:  · Estimated Daily Total Kcal: 6397-0120  · Estimated Daily Protein (g): 75-90  · Estimated Daily Total Fluid (ml/day): 1 mL/kcal     Nutrition Diagnosis:   · Problem: Altered GI function  · Etiology: related to Insufficient energy/nutrient consumption(fiber)     Signs and symptoms:  as evidenced by (chronic constipation)    Objective Information:  · Nutrition-Focused Physical Findings: Constipation. S/p BMs 5/14  · Wound Type: Stage II, Pressure Ulcer  · Current Nutrition Therapies:  · Oral Diet Orders: Full Liquid   · Oral Diet intake: 51-75%  · Oral Nutrition Supplement (ONS) Orders: None  · ONS intake: 51-75%  · Anthropometric Measures:  · Ht: 5' 10\" (177.8 cm)   · Current Body Wt: 190 lb (86.2 kg)  · Ideal Body Wt: 166 lb (75.3 kg)   · BMI Classification: BMI 25.0 - 29.9 Overweight    Nutrition Interventions:   Modify current diet  Continued Inpatient Monitoring    Nutrition Evaluation:   · Evaluation: Goals set   · Goals:  Tolerate diet and consume greater than 50% of meals this admission    · Monitoring: Nutrition Progression, Meal Intake, Diet Tolerance      Electronically signed by Russell Abdul.  Liam Jeter RD, GREG on 5/15/19 at 2:45 PM    Contact Number: [phone]

## 2019-05-15 NOTE — PROGRESS NOTES
AM assessment. Patient in lots of pain and crying out. Rates back pain at 10. Patient oxygen saturations at 86% on 4 LNC. Patient head elevated and encouraged to cough copius amounts of thick yellow mucus. Administed pain medication and raised HOB up to 45degrees. O2 saturations up to 92%. Continue to monitor.

## 2019-05-16 ENCOUNTER — PRE-EVALUATION (OUTPATIENT)
Dept: ORTHOPEDIC SURGERY | Age: 84
End: 2019-05-16

## 2019-05-16 DIAGNOSIS — S32.010A CLOSED COMPRESSION FRACTURE OF FIRST LUMBAR VERTEBRA, INITIAL ENCOUNTER: ICD-10-CM

## 2019-05-16 LAB
GLUCOSE BLD-MCNC: 100 MG/DL (ref 70–99)
GLUCOSE BLD-MCNC: 132 MG/DL (ref 70–99)
GLUCOSE BLD-MCNC: 174 MG/DL (ref 70–99)
GLUCOSE BLD-MCNC: 231 MG/DL (ref 70–99)
GLUCOSE BLD-MCNC: 58 MG/DL (ref 70–99)
INR BLD: 4.15 (ref 0.86–1.14)
PERFORMED ON: ABNORMAL
PROTHROMBIN TIME: 47.3 SEC (ref 9.8–13)

## 2019-05-16 PROCEDURE — 97535 SELF CARE MNGMENT TRAINING: CPT

## 2019-05-16 PROCEDURE — 6370000000 HC RX 637 (ALT 250 FOR IP): Performed by: NURSE PRACTITIONER

## 2019-05-16 PROCEDURE — 2580000003 HC RX 258: Performed by: INTERNAL MEDICINE

## 2019-05-16 PROCEDURE — APPNB30 APP NON BILLABLE TIME 0-30 MINS: Performed by: PHYSICIAN ASSISTANT

## 2019-05-16 PROCEDURE — 6370000000 HC RX 637 (ALT 250 FOR IP): Performed by: INTERNAL MEDICINE

## 2019-05-16 PROCEDURE — 99231 SBSQ HOSP IP/OBS SF/LOW 25: CPT | Performed by: PHYSICIAN ASSISTANT

## 2019-05-16 PROCEDURE — 85610 PROTHROMBIN TIME: CPT

## 2019-05-16 PROCEDURE — 36415 COLL VENOUS BLD VENIPUNCTURE: CPT

## 2019-05-16 PROCEDURE — 94761 N-INVAS EAR/PLS OXIMETRY MLT: CPT

## 2019-05-16 PROCEDURE — 97110 THERAPEUTIC EXERCISES: CPT

## 2019-05-16 PROCEDURE — 2700000000 HC OXYGEN THERAPY PER DAY

## 2019-05-16 PROCEDURE — 1200000000 HC SEMI PRIVATE

## 2019-05-16 RX ADMIN — AMLODIPINE BESYLATE 2.5 MG: 2.5 TABLET ORAL at 09:40

## 2019-05-16 RX ADMIN — ATORVASTATIN CALCIUM 80 MG: 80 TABLET, FILM COATED ORAL at 22:03

## 2019-05-16 RX ADMIN — BETHANECHOL CHLORIDE 25 MG: 25 TABLET ORAL at 15:07

## 2019-05-16 RX ADMIN — BETHANECHOL CHLORIDE 25 MG: 25 TABLET ORAL at 09:40

## 2019-05-16 RX ADMIN — INSULIN GLARGINE 30 UNITS: 100 INJECTION, SOLUTION SUBCUTANEOUS at 10:05

## 2019-05-16 RX ADMIN — SPIRONOLACTONE 25 MG: 25 TABLET ORAL at 09:40

## 2019-05-16 RX ADMIN — POLYETHYLENE GLYCOL (3350) 17 G: 17 POWDER, FOR SOLUTION ORAL at 09:40

## 2019-05-16 RX ADMIN — SODIUM CHLORIDE: 9 INJECTION, SOLUTION INTRAVENOUS at 19:43

## 2019-05-16 RX ADMIN — SODIUM CHLORIDE: 9 INJECTION, SOLUTION INTRAVENOUS at 05:30

## 2019-05-16 RX ADMIN — BETHANECHOL CHLORIDE 25 MG: 25 TABLET ORAL at 22:03

## 2019-05-16 RX ADMIN — OXYCODONE HYDROCHLORIDE AND ACETAMINOPHEN 1 TABLET: 5; 325 TABLET ORAL at 05:19

## 2019-05-16 RX ADMIN — INSULIN LISPRO 2 UNITS: 100 INJECTION, SOLUTION INTRAVENOUS; SUBCUTANEOUS at 22:06

## 2019-05-16 RX ADMIN — Medication 10 ML: at 22:07

## 2019-05-16 RX ADMIN — INSULIN LISPRO 2 UNITS: 100 INJECTION, SOLUTION INTRAVENOUS; SUBCUTANEOUS at 11:59

## 2019-05-16 RX ADMIN — ASPIRIN 81 MG: 81 TABLET, COATED ORAL at 09:40

## 2019-05-16 RX ADMIN — LISINOPRIL 10 MG: 10 TABLET ORAL at 09:40

## 2019-05-16 ASSESSMENT — PAIN SCALES - GENERAL
PAINLEVEL_OUTOF10: 8
PAINLEVEL_OUTOF10: 2

## 2019-05-16 ASSESSMENT — PAIN DESCRIPTION - FREQUENCY: FREQUENCY: CONTINUOUS

## 2019-05-16 ASSESSMENT — PAIN DESCRIPTION - LOCATION: LOCATION: BACK

## 2019-05-16 ASSESSMENT — PAIN DESCRIPTION - PAIN TYPE: TYPE: ACUTE PAIN

## 2019-05-16 ASSESSMENT — PAIN DESCRIPTION - DESCRIPTORS: DESCRIPTORS: ACHING

## 2019-05-16 NOTE — PROGRESS NOTES
[address] at [phone] and ordered [facility] as ordered by Ortho. Faxed face sheet and order form. Brace to be delivered this afternoon. Spoke with Sherrie Lantigua.

## 2019-05-16 NOTE — PROGRESS NOTES
Orthopedic Brace brought in and patient fitted. Patient to wear the brace while up on chair. Instructed on use and how to put on the brace on patient.

## 2019-05-16 NOTE — PLAN OF CARE
Reposition and check and change patient every 2 hours with pillow/wedge support as patient allows. Use barrier cream as needed.  Will continue to monitor

## 2019-05-16 NOTE — PROGRESS NOTES
Occupational Therapy  Facility/Department: [facility] C3 TELE/MED SURG/ONC  Daily Treatment Note  NAME: Mahesh Ibarra  : 3/13/1933  MRN: [id number]    Date of Service: 2019    Discharge Recommendations:  Subacute/Skilled Nursing Facility  OT Equipment Recommendations  Other: Defer to rehab    Assessment    Pt presents with increased debility and generalized weakness impeding his indep with self-cares; dep for LBD and bed mobility supine <> EOB, mod AX2 with sit <> stands using FWW max cues for safety and sequencing while transitioning from bed <> chair using FWW and increased time; follows X1 step commands with repetition 50% of the time and increased time for thought-processing associated with delayed response. Cont with OT to maximize safety and mobility with functional transfers/ADLs. On 2L O2 with exertion; vitals >93. Performance deficits / Impairments: Decreased functional mobility ; Decreased strength;Decreased endurance;Decreased coordination;Decreased ADL status; Decreased safe awareness;Decreased balance;Decreased cognition  Decision Making: Medium Complexity  Patient Education: role of OT, safety  REQUIRES OT FOLLOW UP: Yes  Activity Tolerance  Activity Tolerance: Patient Tolerated treatment well;Patient limited by fatigue  Safety Devices  Safety Devices in place: Yes  Type of devices: Nurse notified;Gait belt;Call light within reach; Chair alarm in place; Left in chair         Patient Diagnosis(es): The primary encounter diagnosis was Constipation, unspecified constipation type. Diagnoses of Closed compression fracture of first lumbar vertebra, initial encounter [facility]), Closed compression fracture of second lumbar vertebra, initial encounter ([facility] [address] Hyperglycemia, and Hypoxia were also pertinent to this visit.       has a past medical history of Acute encephalopathy, Acute lower GI bleeding, Acute renal failure (ARF) ([facility] [address] Acute respiratory failure ([facility] [address] Atrial flutter ([facility] [address] Closed right hip fracture ([facility] [address] Diastolic heart failure ([address] Diverticulitis, HCAP (healthcare-associated pneumonia), Iron deficiency anemia, Ischemic colitis ([address] Metabolic encephalopathy, Nonhealing nonsurgical wound of scalp, limited to breakdown of skin, Obesity, 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 has a past surgical history that includes Cholecystectomy; Appendectomy (1939); Prostatectomy (2007); Revision total knee arthroplasty (Left, 06/18/2014); Upper gastrointestinal endoscopy (03/02/10); pacemaker placement; Colonoscopy (10/07/2014); Uvulopalatopharygoplasty; Coronary artery bypass graft (1990); Total knee arthroplasty (Bilateral, 1991); Partial hip arthroplasty (Right, 09/24/2016); and Prostate biopsy (08/10/2007). Restrictions  Restrictions/Precautions  Restrictions/Precautions: Fall Risk, General Precautions  Position Activity Restriction  Other position/activity restrictions: Per AYANA Lozano note on 5/15/19: \"He may be up with PT/OT prior to brace arrival.\" Recommending [facility] for conservative management  Subjective   General  Chart Reviewed: Yes  Patient assessed for rehabilitation services?: Yes  Additional Pertinent Hx: stage 2 sacral ulcer  Family / Caregiver Present: Yes  Referring Practitioner: AYANA Lozano  Diagnosis: Constipation, L1 and L2 compression fxs  Subjective  Subjective: pt alert and agreeable to OT tx this am  General Comment  Comments: RN cleared for therapy  Vital Signs  Patient Currently in Pain: Denies(at rest)   Orientation  Orientation  Overall Orientation Status: Within Functional Limits  Objective    ADL  Feeding: Dependent/Total  UE Dressing: Setup(hospital gown )  LE Dressing: Dependent/Total        Balance  Sitting Balance: Contact guard assistance  Standing Balance:  Moderate assistance(mod AX2)  Bed mobility  Supine to Sit: 2 Person assistance;Maximum assistance  Sit to Supine: Unable to assess(in chair end of tx )  Scooting: Dependent/Total  Transfers  Sit to stand: mod/max AX2  Stand to sit: mod/max Ax2  Transfer Comments: mod/max AX2         Cognition  Overall Cognitive Status: Exceptions  Following Commands: Follows one step commands with increased time; Follows one step commands with repetition  Attention Span: Difficulty attending to directions; Difficulty dividing attention; Attends with cues to redirect  Memory: Decreased recall of recent events  Safety Judgement: Decreased awareness of need for safety;Decreased awareness of need for assistance  Problem Solving: Assistance required to generate solutions;Assistance required to implement solutions;Assistance required to identify errors made;Assistance required to correct errors made;Decreased awareness of errors  Insights: Decreased awareness of deficits  Initiation: Requires cues for all  Sequencing: Requires cues for all  Cognition Comment: Drowsy, slow response     Type of ROM/Therapeutic Exercise  Type of ROM/Therapeutic Exercise: AROM  Comment: seated upright in chair   Exercises  Scapular Protraction: BUEs X20   Scapular Retraction: BUEs X20   Shoulder Depression: BUEs X20   Shoulder Elevation: BUEs X20   Shoulder Flexion: BUEs X20   Shoulder Extension: [address]  Times per week: 3-5X/wk  Times per day: Daily  Current Treatment Recommendations: Strengthening, Endurance Training, Patient/Caregiver Education & Training, Cognitive Reorientation, Balance Training, Gait Training, Pain Management, Self-Care / ADL, Functional Mobility Training, Safety Education & Training, Cognitive/Perceptual Training    AM-PAC Score        AM-[facility] Inpatient Daily Activity Raw Score: 8  AM-PAC Inpatient ADL T-Scale Score : 22.86  ADL Inpatient CMS 0-100% Score: 85.69  ADL Inpatient CMS G-Code Modifier : CM    Goals  Short term goals  Time Frame for Short term goals: 5/22/19  Short term goal 1: Functional t/f with mod A x2 by 5/20/19  Short term goal 2: LB dressing with min A x2 and LE AE prn  Short term goal 3: Toileting with mod A  Short term goal 4: Bed mobility with min A x2  Short term goal 5: Grooming EOB with SBA  Patient Goals   Patient goals : None stated by pt, but wife wants pt to \"Get better and be in less pain. \"       Therapy Time   Individual Concurrent Group Co-treatment   Time In 0800         Time Out 0843         Minutes 43         Timed Code Treatment Minutes: Jarrod 54, Chad

## 2019-05-16 NOTE — PROGRESS NOTES
AM assessement. BS 58,patient treated with orange juice and retaken in 30 minutes. BS is up to 100 and patient is asymptomatic at the time of the assement. Family is at beside and patient  Is A&O to self. Continue to monitor.

## 2019-05-16 NOTE — PROGRESS NOTES
range of motion without deformity. Skin: Skin color, texture, turgor normal.  No rashes or lesions. Neurologic:  Neurovascularly intact without any focal sensory/motor deficits. Cranial nerves: II-XII intact, grossly non-focal.  Psychiatric: Alert and oriented, thought content appropriate, normal insight  Capillary Refill: Brisk,< 3 seconds   Peripheral Pulses: +2 palpable, equal bilaterally       Labs:   Recent Labs     05/14/19  1426 05/15/19  0514   WBC 7.8 9.6   HGB 11.5* 10.7*   HCT 34.9* 32.0*    172     Recent Labs     05/14/19  1426 05/15/19  0514   * 135*   K 4.4 4.3   CL 91* 96*   CO2 31 30   BUN 18 13   CREATININE 0.9 0.7*   CALCIUM 8.9 8.9   PHOS  --  4.1     Recent Labs     05/14/19 1426   AST 11*   ALT 10   BILITOT 0.4   ALKPHOS 89     Recent Labs     05/14/19  1426 05/15/19  0514 05/16/19  0523   INR 3.80* 4.50* 4.15*     No results for input(s): [place] in the last 72 hours. Urinalysis:      Lab Results   Component Value Date    NITRU Negative 05/14/2019    WBCUA None seen 03/04/2019    BACTERIA Rare 09/29/2016    RBCUA 0-2 03/04/2019    BLOODU Negative 05/14/2019    SPECGRAV 1.015 05/14/2019    GLUCOSEU 500 05/14/2019    GLUCOSEU NEGATIVE 02/06/2010       Radiology:  XR CHEST STANDARD (2 VW)   Final Result   1. Worsening bibasilar airspace disease either due to atelectasis or   developing pneumonia. 2. Mild pulmonary vascular congestion. CT ABDOMEN PELVIS W IV CONTRAST Additional Contrast? None   Final Result   No obstruction. Compression deformities involving L1 and L2. Compression fracture L1 appears   new and slightly progressed at L2.                  Assessment/Plan:    Active Hospital Problems    Diagnosis Date Noted    CAD (coronary artery disease) [I25.10] 10/05/2014     Priority: Medium    Hyperlipidemia [E78.5] 08/12/2012     Priority: Medium    Constipation [K59.00] 05/14/2019    CKD (chronic kidney disease) stage 3, GFR 30-59 ml/min ([facility]) [N18.3] 06/27/2016    Cardiac pacemaker in situ [Z95.0] 05/24/2016    Essential hypertension [I10] 09/14/2015         Constipation - Unlikely related specifically to Lumbar compression fx at L1/L2 give hx of chronic constipation but may be related to pain meds for same. Neurosurgery consulted and appreciated in advance. Continue bowel regimen which seems to be having some effect.      HTN/CAD - w/ known CAD and no evidence of active signs/sxs of ischemia/failure. Currently controlled on home meds w/ vitals reviewed and documented as above. S/P Pacer.      HyperLipidemia - controlled on home Statin. Continue, w/ f/u and med adjustment w/ PCP     CKD - baseline stage 2. Will continue to follow serial labs - resolved. Reviewed and documented as above.     Acute Respiratory failure - of unclear etiology but w/ pulmonary edema on admission CXR - less likely PNA w/out fever or leukocytosis. Supplemental O2 and wean as tolerated.      DM2 - controlled on home Lantus - continued. Follow FSBS/SSI med regimen.      Afib - rate controlled and anticoagulated on Coumadin - held. Will continue to follow INR - supratherapeutic. Reviewed and documented as above.     Anemia - mild, of unclear etiology, w/out evidence of active bleeding/hemolysis. Asymptomatic w/out indication for transfusion. Will continue to follow serial labs. Reviewed and documented as above.       DVT Prophylaxis: None, supratherapeutic INR.    Diet: DIET FULL LIQUID;  Code Status: Full Code      PT/OT Eval Status: seen w/ recs for SNF.      Dispo - likely 1-2 days pending clinical improvement and neurosurgical Ghada Salgado MD

## 2019-05-16 NOTE — CARE COORDINATION
Attempted to see patient regarding discharge plans and therapy recommendations for SNF. Patient was sleeping sound upon entering room the first attempt. Followed up again and this time patient was awake however patient was not able to appropriately answer questions. Patient stated \"I don't know about rehab , I am going to [facility]. \" Call out to spouse and daughter to return DCP's call to discuss discharge disposition and wishes. Patient has been to ARU in past and did well there. He also has traditional Medicare and if patient and family are agreeable to ARU it would not require pre-cert. He has had services with [facility] in past and previous  referred patient back to [facility] to follow. Will continue to try to reach family to discuss rehab. Recommendations are for skilled rehab however per ARU he is a candidate for acute rehab.

## 2019-05-16 NOTE — PROGRESS NOTES
[facility] Orthopedic Surgery   Progress Note      S/P :  SUBJECTIVE  Patient resting in bed. He notes minimal back pain as this time. Per RN, he was up in chair and able to ambulate some with OT earlier today. He denies new bowel or bladder dysfunction or saddle numbness. I spoke with patient's wife via phone, who reports patient has dementia and this is his baseline. She notes he does still complain of severe pain when getting out of bed, but does better once he is up. OBJECTIVE              Physical                      VITALS:  BP (!) 174/74   Pulse 70   Temp 97.6 °F (36.4 °C)   Resp 16   Ht 5' 10\" (1.778 m)   Wt 190 lb (86.2 kg)   SpO2 97%   BMI 27.26 kg/m²                     MUSCULOSKELETAL:  Bilateral lower extremities NVI with 5/5 motor strength. Sensation intact L3-S1 bilaterally.                     NEUROLOGIC:                                  Sensory:  Touch:  Right Lower Extremity:  normal  Left Lower Extremity:  normal      Data       CBC:   Lab Results   Component Value Date    WBC 9.6 05/15/2019    RBC 3.53 05/15/2019    HGB 10.7 05/15/2019    HCT 32.0 05/15/2019    MCV 90.8 05/15/2019    MCH 30.4 05/15/2019    MCHC 33.4 05/15/2019    RDW 13.8 05/15/2019     05/15/2019    MPV 8.1 05/15/2019        WBC:    Lab Results   Component Value Date    WBC 9.6 05/15/2019        Hemoglobin/Hematocrit:    Lab Results   Component Value Date    HGB 10.7 05/15/2019    HCT 32.0 05/15/2019        PT/INR:    Lab Results   Component Value Date    PROTIME 47.3 05/16/2019    PROTIME 47.2 12/31/2018    INR 4.15 05/16/2019              Current Inpatient Medications             Current Facility-Administered Medications: 0.9 % sodium chloride infusion, , Intravenous, Continuous  aspirin EC tablet 81 mg, 81 mg, Oral, Daily  atorvastatin (LIPITOR) tablet 80 mg, 80 mg, Oral, QPM  bethanechol (URECHOLINE) tablet 25 mg, 25 mg, Oral, TID  insulin glargine (LANTUS) injection vial 30 Units, 30 Units, Subcutaneous, QAM  spironolactone (ALDACTONE) tablet 25 mg, 25 mg, Oral, Daily  warfarin (COUMADIN) tablet 5 mg, 5 mg, Oral, Daily  glucose (GLUTOSE) 40 % oral gel 15 g, 15 g, Oral, PRN  dextrose 50 % solution 12.5 g, 12.5 g, Intravenous, PRN  glucagon (rDNA) injection 1 mg, 1 mg, Intramuscular, PRN  dextrose 5 % solution, 100 mL/hr, Intravenous, PRN  insulin lispro (HUMALOG) injection vial 0-12 Units, 0-12 Units, Subcutaneous, TID WC  insulin lispro (HUMALOG) injection vial 0-6 Units, 0-6 Units, Subcutaneous, Nightly  sodium chloride flush 0.9 % injection 10 mL, 10 mL, Intravenous, 2 times per day  sodium chloride flush 0.9 % injection 10 mL, 10 mL, Intravenous, PRN  magnesium hydroxide (MILK OF MAGNESIA) 400 MG/5ML suspension 30 mL, 30 mL, Oral, Daily PRN  ondansetron (ZOFRAN) injection 4 mg, 4 mg, Intravenous, Q6H PRN  acetaminophen (TYLENOL) tablet 650 mg, 650 mg, Oral, Q4H PRN  polyethylene glycol (GLYCOLAX) packet 17 g, 17 g, Oral, Daily  bisacodyl (DULCOLAX) EC tablet 10 mg, 10 mg, Oral, Daily PRN  amLODIPine (NORVASC) tablet 2.5 mg, 2.5 mg, Oral, Daily **AND** lisinopril (PRINIVIL;ZESTRIL) tablet 10 mg, 10 mg, Oral, Daily  oxyCODONE-acetaminophen (PERCOCET) 5-325 MG per tablet 1 tablet, 1 tablet, Oral, Q6H PRN  cyclobenzaprine (FLEXERIL) tablet 10 mg, 10 mg, Oral, TID PRN  lidocaine 4 % external patch 1 patch, 1 patch, Transdermal, Daily    ASSESSMENT AND PLAN    I discussed treatment options with patient's wife Claire Jones via telephone, including observation, a radha orthosis, or lumbar kyphoplasty. She wishes for him to initially try conservative treatment with a radha brace. He should have the brace on when up with activity. Okay for PT/OT prior to brace arrival. He should Follow up with Dr. Cong Perez in 1-2 weeks for repeat xrays. If he is unable to tolerate the brace, or if pain remains severe, would recommend lumbar MRI and consulting Interventional Radiology for possible kyphoplasty.      Discussed with Dr. Cong Perez

## 2019-05-16 NOTE — PROGRESS NOTES
Patient assisted back from chair to bed. Patient's oxygen levels dropped down to 80% after he had taken his oxygen off for 5 minutes. Patient's nasal cavity is dry and he took o2 off for comfort. Added humidifier to the oxygen for comfort. Increased to 4L until 97% with HOB elevelated. Patient returned to 97% on 2.5LN. Continue to monitor.

## 2019-05-16 NOTE — PROGRESS NOTES
Assessment completed and documented. VSS. Alert to self and time, disoriented to place or situation. Family at bedside. Patient very drowsy, delayed responses. patient currently on 4L NC, will try to wean off tonight. Denies pain. Denies further needs at this time. Bed locked and in lowest position. Bedside table and call light within reach. Will continue to monitor.

## 2019-05-16 NOTE — CARE COORDINATION
Spoke to patient's wife and decision maker Kira Novoa. Patient has dementia and is unable to participate appropriately in discharge planning discussion. Patient's wife is agreeable to ARU at discharge. Per [employer] [facility] he currently meets criteria and they will accept patient for acute rehab at discharge.

## 2019-05-17 ENCOUNTER — HOSPITAL ENCOUNTER (INPATIENT)
Age: 84
LOS: 7 days | Discharge: ANOTHER HOSPITAL | DRG: 551 | End: 2019-05-24
Attending: PHYSICAL MEDICINE & REHABILITATION | Admitting: PHYSICAL MEDICINE & REHABILITATION
Payer: MEDICARE

## 2019-05-17 VITALS
RESPIRATION RATE: 16 BRPM | BODY MASS INDEX: 27.2 KG/M2 | TEMPERATURE: 98.3 F | HEART RATE: 66 BPM | OXYGEN SATURATION: 97 % | HEIGHT: 70 IN | WEIGHT: 190 LBS | DIASTOLIC BLOOD PRESSURE: 65 MMHG | SYSTOLIC BLOOD PRESSURE: 176 MMHG

## 2019-05-17 PROBLEM — S32.000S LUMBAR COMPRESSION FRACTURE, SEQUELA: Status: ACTIVE | Noted: 2019-05-17

## 2019-05-17 LAB
ALBUMIN SERPL-MCNC: 3.1 G/DL (ref 3.4–5)
ANION GAP SERPL CALCULATED.3IONS-SCNC: 7 MMOL/L (ref 3–16)
BUN BLDV-MCNC: 10 MG/DL (ref 7–20)
CALCIUM SERPL-MCNC: 8.6 MG/DL (ref 8.3–10.6)
CHLORIDE BLD-SCNC: 98 MMOL/L (ref 99–110)
CO2: 32 MMOL/L (ref 21–32)
CREAT SERPL-MCNC: 0.7 MG/DL (ref 0.8–1.3)
GFR AFRICAN AMERICAN: >60
GFR NON-AFRICAN AMERICAN: >60
GLUCOSE BLD-MCNC: 102 MG/DL (ref 70–99)
GLUCOSE BLD-MCNC: 104 MG/DL (ref 70–99)
GLUCOSE BLD-MCNC: 112 MG/DL (ref 70–99)
GLUCOSE BLD-MCNC: 116 MG/DL (ref 70–99)
GLUCOSE BLD-MCNC: 191 MG/DL (ref 70–99)
GLUCOSE BLD-MCNC: 40 MG/DL (ref 70–99)
GLUCOSE BLD-MCNC: 80 MG/DL (ref 70–99)
GLUCOSE BLD-MCNC: 94 MG/DL (ref 70–99)
HCT VFR BLD CALC: 32.9 % (ref 40.5–52.5)
HEMOGLOBIN: 10.9 G/DL (ref 13.5–17.5)
INR BLD: 3.29 (ref 0.86–1.14)
MCH RBC QN AUTO: 30.2 PG (ref 26–34)
MCHC RBC AUTO-ENTMCNC: 33.1 G/DL (ref 31–36)
MCV RBC AUTO: 91.3 FL (ref 80–100)
PDW BLD-RTO: 13.6 % (ref 12.4–15.4)
PERFORMED ON: ABNORMAL
PERFORMED ON: NORMAL
PERFORMED ON: NORMAL
PHOSPHORUS: 3.5 MG/DL (ref 2.5–4.9)
PLATELET # BLD: 179 K/UL (ref 135–450)
PMV BLD AUTO: 7.9 FL (ref 5–10.5)
POTASSIUM SERPL-SCNC: 4.8 MMOL/L (ref 3.5–5.1)
PROTHROMBIN TIME: 37.5 SEC (ref 9.8–13)
RBC # BLD: 3.6 M/UL (ref 4.2–5.9)
SODIUM BLD-SCNC: 137 MMOL/L (ref 136–145)
WBC # BLD: 7.4 K/UL (ref 4–11)

## 2019-05-17 PROCEDURE — 80069 RENAL FUNCTION PANEL: CPT

## 2019-05-17 PROCEDURE — 85610 PROTHROMBIN TIME: CPT

## 2019-05-17 PROCEDURE — 6370000000 HC RX 637 (ALT 250 FOR IP): Performed by: PHYSICAL MEDICINE & REHABILITATION

## 2019-05-17 PROCEDURE — 2700000000 HC OXYGEN THERAPY PER DAY

## 2019-05-17 PROCEDURE — 85027 COMPLETE CBC AUTOMATED: CPT

## 2019-05-17 PROCEDURE — 94761 N-INVAS EAR/PLS OXIMETRY MLT: CPT

## 2019-05-17 PROCEDURE — 36415 COLL VENOUS BLD VENIPUNCTURE: CPT

## 2019-05-17 PROCEDURE — 1280000000 HC REHAB R&B

## 2019-05-17 PROCEDURE — 6370000000 HC RX 637 (ALT 250 FOR IP): Performed by: NURSE PRACTITIONER

## 2019-05-17 PROCEDURE — 6370000000 HC RX 637 (ALT 250 FOR IP): Performed by: INTERNAL MEDICINE

## 2019-05-17 PROCEDURE — 2580000003 HC RX 258: Performed by: INTERNAL MEDICINE

## 2019-05-17 PROCEDURE — 97530 THERAPEUTIC ACTIVITIES: CPT

## 2019-05-17 RX ORDER — ASPIRIN 81 MG/1
81 TABLET ORAL DAILY
Status: CANCELLED | OUTPATIENT
Start: 2019-05-18

## 2019-05-17 RX ORDER — SPIRONOLACTONE 25 MG/1
25 TABLET ORAL DAILY
Status: DISCONTINUED | OUTPATIENT
Start: 2019-05-18 | End: 2019-05-24 | Stop reason: HOSPADM

## 2019-05-17 RX ORDER — ATORVASTATIN CALCIUM 80 MG/1
80 TABLET, FILM COATED ORAL EVERY EVENING
Status: CANCELLED | OUTPATIENT
Start: 2019-05-17

## 2019-05-17 RX ORDER — BETHANECHOL CHLORIDE 25 MG/1
25 TABLET ORAL 3 TIMES DAILY
Status: CANCELLED | OUTPATIENT
Start: 2019-05-17

## 2019-05-17 RX ORDER — ONDANSETRON 4 MG/1
4 TABLET, FILM COATED ORAL EVERY 8 HOURS PRN
Status: DISCONTINUED | OUTPATIENT
Start: 2019-05-17 | End: 2019-05-17 | Stop reason: CLARIF

## 2019-05-17 RX ORDER — LISINOPRIL 10 MG/1
10 TABLET ORAL DAILY
Status: DISCONTINUED | OUTPATIENT
Start: 2019-05-18 | End: 2019-05-24 | Stop reason: HOSPADM

## 2019-05-17 RX ORDER — ACETAMINOPHEN 325 MG/1
650 TABLET ORAL EVERY 4 HOURS PRN
Status: CANCELLED | OUTPATIENT
Start: 2019-05-17

## 2019-05-17 RX ORDER — SENNA AND DOCUSATE SODIUM 50; 8.6 MG/1; MG/1
2 TABLET, FILM COATED ORAL 2 TIMES DAILY
Status: CANCELLED | OUTPATIENT
Start: 2019-05-17

## 2019-05-17 RX ORDER — WARFARIN SODIUM 5 MG/1
5 TABLET ORAL DAILY
Status: DISCONTINUED | OUTPATIENT
Start: 2019-05-17 | End: 2019-05-20 | Stop reason: ALTCHOICE

## 2019-05-17 RX ORDER — OXYCODONE HYDROCHLORIDE AND ACETAMINOPHEN 5; 325 MG/1; MG/1
1 TABLET ORAL EVERY 6 HOURS PRN
Status: DISCONTINUED | OUTPATIENT
Start: 2019-05-17 | End: 2019-05-24 | Stop reason: HOSPADM

## 2019-05-17 RX ORDER — OXYCODONE HYDROCHLORIDE AND ACETAMINOPHEN 5; 325 MG/1; MG/1
1 TABLET ORAL EVERY 6 HOURS PRN
Status: CANCELLED | OUTPATIENT
Start: 2019-05-17

## 2019-05-17 RX ORDER — ASPIRIN 81 MG/1
81 TABLET ORAL DAILY
Status: DISCONTINUED | OUTPATIENT
Start: 2019-05-18 | End: 2019-05-24 | Stop reason: HOSPADM

## 2019-05-17 RX ORDER — POLYETHYLENE GLYCOL 3350 17 G/17G
17 POWDER, FOR SOLUTION ORAL DAILY
Status: CANCELLED | OUTPATIENT
Start: 2019-05-18

## 2019-05-17 RX ORDER — ONDANSETRON 4 MG/1
4 TABLET, ORALLY DISINTEGRATING ORAL PRN
Status: DISCONTINUED | OUTPATIENT
Start: 2019-05-17 | End: 2019-05-24 | Stop reason: HOSPADM

## 2019-05-17 RX ORDER — SPIRONOLACTONE 25 MG/1
25 TABLET ORAL DAILY
Status: CANCELLED | OUTPATIENT
Start: 2019-05-18

## 2019-05-17 RX ORDER — ONDANSETRON HYDROCHLORIDE 8 MG/1
4 TABLET, FILM COATED ORAL EVERY 8 HOURS PRN
Status: CANCELLED | OUTPATIENT
Start: 2019-05-17

## 2019-05-17 RX ORDER — BETHANECHOL CHLORIDE 25 MG/1
25 TABLET ORAL 3 TIMES DAILY
Status: DISCONTINUED | OUTPATIENT
Start: 2019-05-17 | End: 2019-05-24 | Stop reason: HOSPADM

## 2019-05-17 RX ORDER — AMLODIPINE BESYLATE 2.5 MG/1
2.5 TABLET ORAL DAILY
Status: CANCELLED | OUTPATIENT
Start: 2019-05-18

## 2019-05-17 RX ORDER — ACETAMINOPHEN 325 MG/1
650 TABLET ORAL EVERY 4 HOURS PRN
Status: DISCONTINUED | OUTPATIENT
Start: 2019-05-17 | End: 2019-05-24 | Stop reason: HOSPADM

## 2019-05-17 RX ORDER — LISINOPRIL 10 MG/1
10 TABLET ORAL DAILY
Status: CANCELLED | OUTPATIENT
Start: 2019-05-18

## 2019-05-17 RX ORDER — AMLODIPINE BESYLATE 2.5 MG/1
2.5 TABLET ORAL DAILY
Status: DISCONTINUED | OUTPATIENT
Start: 2019-05-18 | End: 2019-05-24 | Stop reason: HOSPADM

## 2019-05-17 RX ORDER — CYCLOBENZAPRINE HCL 10 MG
10 TABLET ORAL 3 TIMES DAILY PRN
Status: CANCELLED | OUTPATIENT
Start: 2019-05-17

## 2019-05-17 RX ORDER — BISACODYL 10 MG
10 SUPPOSITORY, RECTAL RECTAL DAILY PRN
Status: CANCELLED | OUTPATIENT
Start: 2019-05-17

## 2019-05-17 RX ORDER — NICOTINE POLACRILEX 4 MG
15 LOZENGE BUCCAL PRN
Status: DISCONTINUED | OUTPATIENT
Start: 2019-05-17 | End: 2019-05-24 | Stop reason: HOSPADM

## 2019-05-17 RX ORDER — INSULIN GLARGINE 100 [IU]/ML
30 INJECTION, SOLUTION SUBCUTANEOUS EVERY MORNING
Status: DISCONTINUED | OUTPATIENT
Start: 2019-05-18 | End: 2019-05-21

## 2019-05-17 RX ORDER — WARFARIN SODIUM 5 MG/1
5 TABLET ORAL DAILY
Status: CANCELLED | OUTPATIENT
Start: 2019-05-17

## 2019-05-17 RX ORDER — SENNA AND DOCUSATE SODIUM 50; 8.6 MG/1; MG/1
2 TABLET, FILM COATED ORAL 2 TIMES DAILY
Status: DISCONTINUED | OUTPATIENT
Start: 2019-05-17 | End: 2019-05-24 | Stop reason: HOSPADM

## 2019-05-17 RX ORDER — CYCLOBENZAPRINE HCL 10 MG
10 TABLET ORAL 3 TIMES DAILY PRN
Status: DISCONTINUED | OUTPATIENT
Start: 2019-05-17 | End: 2019-05-24 | Stop reason: HOSPADM

## 2019-05-17 RX ORDER — ATORVASTATIN CALCIUM 80 MG/1
80 TABLET, FILM COATED ORAL EVERY EVENING
Status: DISCONTINUED | OUTPATIENT
Start: 2019-05-17 | End: 2019-05-24 | Stop reason: HOSPADM

## 2019-05-17 RX ORDER — LIDOCAINE 4 G/G
1 PATCH TOPICAL DAILY
Status: DISCONTINUED | OUTPATIENT
Start: 2019-05-18 | End: 2019-05-24 | Stop reason: HOSPADM

## 2019-05-17 RX ORDER — WARFARIN SODIUM 5 MG/1
TABLET ORAL
Qty: 30 TABLET | Refills: 3 | Status: ON HOLD | OUTPATIENT
Start: 2019-05-17 | End: 2019-10-21

## 2019-05-17 RX ORDER — INSULIN GLARGINE 100 [IU]/ML
30 INJECTION, SOLUTION SUBCUTANEOUS EVERY MORNING
Status: CANCELLED | OUTPATIENT
Start: 2019-05-18

## 2019-05-17 RX ORDER — NICOTINE POLACRILEX 4 MG
15 LOZENGE BUCCAL PRN
Status: CANCELLED | OUTPATIENT
Start: 2019-05-17

## 2019-05-17 RX ORDER — LIDOCAINE 4 G/G
1 PATCH TOPICAL DAILY
Status: CANCELLED | OUTPATIENT
Start: 2019-05-18

## 2019-05-17 RX ORDER — POLYETHYLENE GLYCOL 3350 17 G/17G
17 POWDER, FOR SOLUTION ORAL DAILY
Qty: 527 G | Refills: 1 | Status: SHIPPED | OUTPATIENT
Start: 2019-05-18 | End: 2019-06-17

## 2019-05-17 RX ORDER — BISACODYL 10 MG
10 SUPPOSITORY, RECTAL RECTAL DAILY PRN
Status: DISCONTINUED | OUTPATIENT
Start: 2019-05-17 | End: 2019-05-24 | Stop reason: HOSPADM

## 2019-05-17 RX ORDER — POLYETHYLENE GLYCOL 3350 17 G/17G
17 POWDER, FOR SOLUTION ORAL DAILY
Status: DISCONTINUED | OUTPATIENT
Start: 2019-05-18 | End: 2019-05-24 | Stop reason: HOSPADM

## 2019-05-17 RX ORDER — OXYCODONE HYDROCHLORIDE AND ACETAMINOPHEN 5; 325 MG/1; MG/1
1 TABLET ORAL EVERY 6 HOURS PRN
Qty: 28 TABLET | Refills: 0 | Status: ON HOLD | OUTPATIENT
Start: 2019-05-17 | End: 2019-05-28 | Stop reason: HOSPADM

## 2019-05-17 RX ADMIN — POLYETHYLENE GLYCOL (3350) 17 G: 17 POWDER, FOR SOLUTION ORAL at 08:57

## 2019-05-17 RX ADMIN — ATORVASTATIN CALCIUM 80 MG: 80 TABLET, FILM COATED ORAL at 21:28

## 2019-05-17 RX ADMIN — AMLODIPINE BESYLATE 2.5 MG: 2.5 TABLET ORAL at 08:57

## 2019-05-17 RX ADMIN — ASPIRIN 81 MG: 81 TABLET, COATED ORAL at 08:57

## 2019-05-17 RX ADMIN — SPIRONOLACTONE 25 MG: 25 TABLET ORAL at 08:57

## 2019-05-17 RX ADMIN — BETHANECHOL CHLORIDE 25 MG: 25 TABLET ORAL at 08:56

## 2019-05-17 RX ADMIN — SENNOSIDES, DOCUSATE SODIUM 2 TABLET: 50; 8.6 TABLET, FILM COATED ORAL at 21:28

## 2019-05-17 RX ADMIN — INSULIN GLARGINE 30 UNITS: 100 INJECTION, SOLUTION SUBCUTANEOUS at 09:02

## 2019-05-17 RX ADMIN — Medication 10 ML: at 08:58

## 2019-05-17 RX ADMIN — BETHANECHOL CHLORIDE 25 MG: 25 TABLET ORAL at 21:28

## 2019-05-17 RX ADMIN — INSULIN LISPRO 2 UNITS: 100 INJECTION, SOLUTION INTRAVENOUS; SUBCUTANEOUS at 12:16

## 2019-05-17 RX ADMIN — BETHANECHOL CHLORIDE 25 MG: 25 TABLET ORAL at 15:41

## 2019-05-17 RX ADMIN — LISINOPRIL 10 MG: 10 TABLET ORAL at 08:57

## 2019-05-17 RX ADMIN — CYCLOBENZAPRINE HYDROCHLORIDE 10 MG: 10 TABLET, FILM COATED ORAL at 21:28

## 2019-05-17 RX ADMIN — DEXTROSE 15 G: 15 GEL ORAL at 01:29

## 2019-05-17 ASSESSMENT — PAIN SCALES - GENERAL
PAINLEVEL_OUTOF10: 4
PAINLEVEL_OUTOF10: 0
PAINLEVEL_OUTOF10: 5
PAINLEVEL_OUTOF10: 0
PAINLEVEL_OUTOF10: 0

## 2019-05-17 ASSESSMENT — PAIN DESCRIPTION - LOCATION
LOCATION: BACK
LOCATION: BACK

## 2019-05-17 ASSESSMENT — PAIN DESCRIPTION - PAIN TYPE
TYPE: ACUTE PAIN
TYPE: ACUTE PAIN

## 2019-05-17 NOTE — PLAN OF CARE
Patient has stage 2 on sacrum, treated with barrier cream. Check and change and turns as patient allows every 2 hours. Will continue to monitor.

## 2019-05-17 NOTE — PROGRESS NOTES
Result Value Ref Range    POC Glucose 104 (H) 70 - 99 mg/dl    Performed on ACCU-CHEK    Protime-INR    Collection Time: 05/17/19  5:23 AM   Result Value Ref Range    Protime 37.5 (H) 9.8 - 13.0 sec    INR 3.29 (H) 0.86 - 1.14   CBC    Collection Time: 05/17/19  5:23 AM   Result Value Ref Range    WBC 7.4 4.0 - 11.0 K/uL    RBC 3.60 (L) 4.20 - 5.90 M/uL    Hemoglobin 10.9 (L) 13.5 - 17.5 g/dL    Hematocrit 32.9 (L) 40.5 - 52.5 %    MCV 91.3 80.0 - 100.0 fL    MCH 30.2 26.0 - 34.0 pg    MCHC 33.1 31.0 - 36.0 g/dL    RDW 13.6 12.4 - 15.4 %    Platelets 116 199 - 191 K/uL    MPV 7.9 5.0 - 10.5 fL   Renal Function Panel    Collection Time: 05/17/19  5:23 AM   Result Value Ref Range    Sodium 137 136 - 145 mmol/L    Potassium 4.8 3.5 - 5.1 mmol/L    Chloride 98 (L) 99 - 110 mmol/L    CO2 32 21 - 32 mmol/L    Anion Gap 7 3 - 16    Glucose 112 (H) 70 - 99 mg/dL    BUN 10 7 - 20 mg/dL    CREATININE 0.7 (L) 0.8 - 1.3 mg/dL    GFR Non-African American >60 >60    GFR African American >60 >60    Calcium 8.6 8.3 - 10.6 mg/dL    Phosphorus 3.5 2.5 - 4.9 mg/dL    Alb 3.1 (L) 3.4 - 5.0 g/dL   POCT Glucose    Collection Time: 05/17/19  7:21 AM   Result Value Ref Range    POC Glucose 94 70 - 99 mg/dl    Performed on ACCU-CHEK    POCT Glucose    Collection Time: 05/17/19 11:32 AM   Result Value Ref Range    POC Glucose 191 (H) 70 - 99 mg/dl    Performed on ACCU-CHEK          Plan: We'll plan to admit to our rehabilitation unit today.       Dr. Jessica Bolanos

## 2019-05-17 NOTE — FLOWSHEET NOTE
Up to chair with brace on several hours today. Good po intake. Needs help with tray. No BM today. Wife bedside. Will proceed with d/c to ARU.

## 2019-05-17 NOTE — PROGRESS NOTES
Assessment completed and documented. VSS. Alert to self and time, disoriented to place and situation. q2turn, check and change as needed. Stage 2 on sacrum, treated with barrier cream. New brace at bedside. dayshift educated on how to use, will pass on information. IV fluids running. Denies pain. Denies further needs at this time. Bed locked and in lowest position. Bedside table and call light within reach. Will continue to monitor.

## 2019-05-17 NOTE — DISCHARGE INSTR - COC
Continuity of Care Form    Patient Name: Megan Cortez   :  3/13/1933  MRN:  [id number]    Admit date:  2019  Discharge date:  17 May 2019    Code Status Order: Full Code   Advance Directives:   [facility] Documentation     Date/Time Healthcare Directive Type of Healthcare Directive Copy in [address] Agent's Name Healthcare Agent's Phone Number    [phone]  No, patient does not have an advance directive for healthcare treatment -- -- -- -- --          Admitting Physician:  Mariaa Guevara MD  PCP: Val Melendez    Discharging Nurse: [facility] Unit/Room#: 9730/6744-62  Discharging Unit Phone Number: [phone]    Emergency Contact:   Extended Emergency Contact Information  Primary Emergency Contact: Rasheed [facility]  Address: [address] [place] [address] [address] Phone: [phone]  Mobile Phone: [phone]  Relation: Spouse  Secondary Emergency Contact: [facility] Phone: [phone]  Relation: Child    Past Surgical History:  Past Surgical History:   Procedure Laterality Date    APPENDECTOMY  193    CHOLECYSTECTOMY      COLONOSCOPY  10/07/2014    Ischemic colitis    CORONARY ARTERY BYPASS GRAFT      PACEMAKER PLACEMENT      PARTIAL HIP ARTHROPLASTY Right 2016    PROSTATE BIOPSY  08/10/2007    PROSTATECTOMY  2007    radical, with lymph node dissection    REVISION TOTAL KNEE ARTHROPLASTY Left 2014    TOTAL KNEE ARTHROPLASTY Bilateral     UPPER GASTROINTESTINAL ENDOSCOPY  03/02/10    WNL                 UVULOPALATOPHARYGOPLASTY         Immunization History:   Immunization History   Administered Date(s) Administered    DTaP 2009    Influenza Virus Vaccine 2008, 10/01/2009, 10/07/2010, 10/04/2011, 10/12/2012, 09/10/2013    Influenza, High Dose (Fluzone 65 yrs and older) 2014, 2015, 2016    Pneumococcal 13-valent Conjugate Светлана Vargas) 09/03/2015    Pneumococcal Conjugate 7-valent 04/08/2004, 05/22/2005, 04/03/2012    Pneumococcal Polysaccharide ([id number]) 04/03/2012    Tetanus 08/09/2009       Active Problems:  Patient Active Problem List   Diagnosis Code    Persistent atrial fibrillation (HCC) I48.1    Diabetes mellitus type 2, uncontrolled ([facility] E11.65    Hyperlipidemia E78.5    Venous (peripheral) insufficiency I87.2    CAD (coronary artery disease) I25.10    Normocytic anemia F17.5    Diastolic dysfunction L18.7    Essential hypertension I10    Lumbar spondylolysis M43.06    Bronchiectasis without complication ([facility] O61.3    Cardiac pacemaker in situ Z95.0    Carotid stenosis I65.29    CKD (chronic kidney disease) stage 3, GFR 30-59 ml/min ([facility]) N18.3    LPRD (laryngopharyngeal reflux disease) K21.9    RAD (reactive airway disease), mild persistent, uncomplicated S84.43    RBBB (right bundle branch block) I45.10    Restrictive lung disease J98.4    Sensorineural hearing loss H90.5    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    Decreased mobility R26.89    Presence of total knee joint prosthesis, 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    Presence of right hip implant Z96.641    Allergic rhinitis J30.9    Diverticulosis K57.90    Vitamin D deficiency E55.9    Pressure ulcer of left buttock, stage 2 L89.322    Constipation K59.00       Isolation/Infection:   Isolation          No Isolation            Nurse Assessment:  Last Vital Signs: BP (!) 177/77   Pulse 87   Temp 98.1 °F (36.7 °C) (Oral)   Resp 16   Ht 5' 10\" (1.778 m)   Wt 190 lb (86.2 kg)   SpO2 93%   BMI 27.26 kg/m²     Last documented pain score (0-10 scale): Pain Level: 4  Last Weight:   Wt Readings from Last 1 Encounters:   05/14/19 190 lb (86.2 kg)     Mental Status:  disoriented, alert, coherent, logical and able to concentrate and follow conversation    IV Access:  - None    Nursing Mobility/ADLs:  Walking   Assisted  Transfer  Assisted  Bathing Assisted  Dressing  Assisted  Toileting  Assisted  Feeding  Assisted  Med Admin  Assisted  Med Delivery   whole    Wound Care Documentation and Therapy:  Wound 03/13/19 #3, Left buttock, Pressure Ulcer, Stage 2, onset 3/6/19, Pressure (Active)   Wound Image   4/24/2019  1:25 PM   Wound Pressure Stage  2 5/14/2019  7:45 PM   Dressing Status Clean;Dry; Intact 5/17/2019 12:34 PM   Dressing Changed Changed/New 5/14/2019  7:42 PM   Dressing/Treatment Other (comment) 5/17/2019 12:34 PM   Wound Cleansed Rinsed/Irrigated with saline 4/24/2019  1:25 PM   Wound Length (cm) 0 cm 4/24/2019  1:25 PM   Wound Width (cm) 0.1 cm 5/14/2019  7:45 PM   Wound Depth (cm) 0.1 cm 5/14/2019  7:45 PM   Wound Surface Area (cm^2) 0 cm^2 4/24/2019  1:25 PM   Change in Wound Size % (l*w) 100 4/24/2019  1:25 PM   Wound Volume (cm^3) 0 cm^3 4/24/2019  1:25 PM   Wound Healing % 100 4/24/2019  1:25 PM   Distance Tunneling (cm) 0 cm 4/24/2019  1:25 PM   Undermining Maxium Distance (cm) 0 4/24/2019  1:25 PM   Wound Assessment Red 5/17/2019 12:34 PM   Drainage Amount None 5/17/2019 12:34 PM   Drainage Description Serosanguinous 4/24/2019  1:25 PM   Odor None 5/17/2019 12:34 PM   Cheryl-wound Assessment Red 5/17/2019 12:34 PM   Number of days: 64        Elimination:  Continence:   · Bowel: No  · Bladder: No  Urinary Catheter: None   Colostomy/Ileostomy/Ileal Conduit: No       Date of Last BM: 5/15/19    Intake/Output Summary (Last 24 hours) at 5/17/2019 1311  Last data filed at 5/17/2019 1246  Gross per 24 hour   Intake 3228 ml   Output 450 ml   Net 2778 ml     I/O last 3 completed shifts: In: 3088 [P.O.:1620; I.V.:1468]  Out: 1125 [Urine:1125]    Safety Concerns:     History of Falls (last 30 days)    Impairments/Disabilities:      ***    Nutrition Therapy:  Current Nutrition Therapy:   - Oral Diet:  Full Liquid    Routes of Feeding: Oral  Liquids:  Thin Liquids  Daily Fluid Restriction: no  Last Modified Barium Swallow with Video (Video Swallowing Test): not done    Treatments at the Time of Hospital Discharge:   Respiratory Treatments: ***  Oxygen Therapy:  is on oxygen at 3 L/min per nasal cannula. Ventilator:    - No ventilator support    Rehab Therapies: Physical Therapy and Occupational Therapy  Weight Bearing Status/Restrictions: No weight bearing restirctions  Other Medical Equipment (for information only, NOT a DME order):   Back brace  Other Treatments: ***    Patient's personal belongings (please select all that are sent with patient):  {[facility] DME Belongings:[id number]}    RN SIGNATURE:  Electronically signed by Josi Francisco RN on 5/17/19 at 5:28 PM    CASE MANAGEMENT/SOCIAL WORK SECTION    Inpatient Status Date: 5/17/19    Readmission Risk Assessment Score:  Readmission Risk              Risk of Unplanned Readmission:        21           Discharging to Facility/ Agency   · Name: [facility]  · Address:  · Phone:[phone]  · Fax:    / signature: Electronically signed by Marychuy Hawk RN on 5/17/19 at 1:46 PM    PHYSICIAN SECTION    Prognosis: Good    Condition at Discharge: Stable    Rehab Potential (if transferring to Rehab): Good    Recommended Labs or Other Treatments After Discharge: None    Physician Certification: I certify the above information and transfer of Mahesh Ibarra  is necessary for the continuing treatment of the diagnosis listed and that he requires Acute Rehab for less 30 days.      Update Admission H&P: No change in H&P    PHYSICIAN SIGNATURE:  Electronically signed by Jayla Hernandez MD on 5/17/19 at 1:11 PM

## 2019-05-17 NOTE — CARE COORDINATION
CASE MANAGEMENT DISCHARGE SUMMARY      Discharge to: ARU [facility] at [address] ordered/agency: Defer to ARU    Transportation: Internal transfer to rehab unit    Notified:    Family: Emi   Facility/Agency: [facility] faxed to [phone]   RN: Dori Feliciano   Phone number for report to facility: [phone]    Note: Discharging nurse to complete [facility], reconcile [facility]S, and place final copy with patient's discharge packet. RN to ensure that written prescriptions for  Level II medications are sent with patient to the facility as per protocol.

## 2019-05-17 NOTE — PROGRESS NOTES
Hospitalist Progress Note      PCP: Rosco Opitz    Date of Admission: 5/14/2019    Chief Complaint: Constipation and back pain     Subjective: no new c/o. Medications:  Reviewed    Infusion Medications    sodium chloride 75 mL/hr at 05/16/19 1943    dextrose       Scheduled Medications    aspirin  81 mg Oral Daily    atorvastatin  80 mg Oral QPM    bethanechol  25 mg Oral TID    insulin glargine  30 Units Subcutaneous QAM    spironolactone  25 mg Oral Daily    warfarin  5 mg Oral Daily    insulin lispro  0-12 Units Subcutaneous TID WC    insulin lispro  0-6 Units Subcutaneous Nightly    sodium chloride flush  10 mL Intravenous 2 times per day    polyethylene glycol  17 g Oral Daily    amLODIPine  2.5 mg Oral Daily    And    lisinopril  10 mg Oral Daily    lidocaine  1 patch Transdermal Daily     PRN Meds: glucose, dextrose, glucagon (rDNA), dextrose, sodium chloride flush, magnesium hydroxide, ondansetron, acetaminophen, bisacodyl, oxyCODONE-acetaminophen, cyclobenzaprine      Intake/Output Summary (Last 24 hours) at 5/17/2019 0928  Last data filed at 5/17/2019 0446  Gross per 24 hour   Intake 3088 ml   Output 600 ml   Net 2488 ml       Physical Exam Performed:    BP (!) 177/77   Pulse 87   Temp 98.1 °F (36.7 °C) (Oral)   Resp 16   Ht 5' 10\" (1.778 m)   Wt 190 lb (86.2 kg)   SpO2 93%   BMI 27.26 kg/m²     General appearance: No apparent distress, appears stated age and cooperative. HEENT: Pupils equal, round, and reactive to light. Conjunctivae/corneas clear. Neck: Supple, with full range of motion. No jugular venous distention. Trachea midline. Respiratory:  Normal respiratory effort. Clear to auscultation, bilaterally without Rales/Wheezes/Rhonchi. Cardiovascular: Regular rate and rhythm with normal S1/S2 without murmurs, rubs or gallops. Abdomen: Soft, non-tender, non-distended with normal bowel sounds. Musculoskeletal: No clubbing, cyanosis or edema bilaterally.   Full range of motion without deformity. Skin: Skin color, texture, turgor normal.  No rashes or lesions. Neurologic:  Neurovascularly intact without any focal sensory/motor deficits. Cranial nerves: II-XII intact, grossly non-focal.  Psychiatric: Alert and oriented, thought content appropriate, normal insight  Capillary Refill: Brisk,< 3 seconds   Peripheral Pulses: +2 palpable, equal bilaterally       Labs:   Recent Labs     05/14/19  1426 05/15/19  0514 05/17/19  0523   WBC 7.8 9.6 7.4   HGB 11.5* 10.7* 10.9*   HCT 34.9* 32.0* 32.9*    172 179     Recent Labs     05/14/19  1426 05/15/19  0514 05/17/19  0523   * 135* 137   K 4.4 4.3 4.8   CL 91* 96* 98*   CO2 31 30 32   BUN 18 13 10   CREATININE 0.9 0.7* 0.7*   CALCIUM 8.9 8.9 8.6   PHOS  --  4.1 3.5     Recent Labs     05/14/19  1426   AST 11*   ALT 10   BILITOT 0.4   ALKPHOS 89     Recent Labs     05/15/19  0514 05/16/19  0523 05/17/19  0523   INR 4.50* 4.15* 3.29*     No results for input(s): Ata Burroughs in the last 72 hours. Urinalysis:      Lab Results   Component Value Date    NITRU Negative 05/14/2019    WBCUA None seen 03/04/2019    BACTERIA Rare 09/29/2016    RBCUA 0-2 03/04/2019    BLOODU Negative 05/14/2019    SPECGRAV 1.015 05/14/2019    GLUCOSEU 500 05/14/2019    GLUCOSEU NEGATIVE 02/06/2010       Radiology:  XR CHEST STANDARD (2 VW)   Final Result   1. Worsening bibasilar airspace disease either due to atelectasis or   developing pneumonia. 2. Mild pulmonary vascular congestion. CT ABDOMEN PELVIS W IV CONTRAST Additional Contrast? None   Final Result   No obstruction. Compression deformities involving L1 and L2. Compression fracture L1 appears   new and slightly progressed at L2.                  Assessment/Plan:    Active Hospital Problems    Diagnosis Date Noted    CAD (coronary artery disease) [I25.10] 10/05/2014     Priority: Medium    Hyperlipidemia [E78.5] 08/12/2012     Priority: Medium    Constipation [K59.00] 05/14/2019    CKD (chronic kidney disease) stage 3, GFR 30-59 ml/min ([facility]) [N18.3] 06/27/2016    Cardiac pacemaker in situ [Z95.0] 05/24/2016    Essential hypertension [I10] 09/14/2015         Constipation - Unlikely related specifically to Lumbar compression fx at L1/L2 give hx of chronic constipation but may be related to pain meds for same. Spine surgery consulted and appreciated, w/ plans for conservative mgt w/ Osmani Brace. Continue bowel regimen which seems to be having some effect.      HTN/CAD - w/ known CAD and no evidence of active signs/sxs of ischemia/failure. Currently controlled on home meds w/ vitals reviewed and documented as above. S/P Pacer.      HyperLipidemia - controlled on home Statin. Continue, w/ f/u and med adjustment w/ PCP     CKD - baseline stage 2. Will continue to follow serial labs - resolved. Reviewed and documented as above.     Acute Respiratory failure - of unclear etiology but w/ pulmonary edema on admission CXR, likely acute - less likely PNA w/out fever or leukocytosis. Supplemental O2 and wean as tolerated.      DM2 - controlled on home Lantus - continued. Follow FSBS/SSI med regimen.      Afib - rate controlled and anticoagulated on Coumadin - held. Will continue to follow INR - supratherapeutic. Reviewed and documented as above.     Anemia - mild, of unclear etiology, w/out evidence of active bleeding/hemolysis. Asymptomatic w/out indication for transfusion. Will continue to follow serial labs. Reviewed and documented as above.       DVT Prophylaxis: None, supratherapeutic INR.    Diet: DIET FULL LIQUID;  Code Status: Full Code      PT/OT Eval Status: seen w/ recs for SNF.      Dispo - likely Friday 17 May pending clinical improvement, brace fitting        Armin Ramires MD

## 2019-05-17 NOTE — PROGRESS NOTES
Physical Therapy  Facility/Department: [facility] C3 TELE/MED SURG/ONC  Daily Treatment Note  NAME: Mahesh Ibarra  : 3/13/1933  MRN: [id number]    Date of Service: 2019    Discharge Recommendations:  Subacute/Skilled Nursing Facility   PT Equipment Recommendations  Equipment Needed: No    Patient Diagnosis(es): The primary encounter diagnosis was Constipation, unspecified constipation type. Diagnoses of Closed compression fracture of first lumbar vertebra, initial encounter [facility]), Closed compression fracture of second lumbar vertebra, initial encounter ([facility] [address] Hyperglycemia, and Hypoxia were also pertinent to this visit. 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 has a past surgical history that includes Cholecystectomy; Appendectomy (); Prostatectomy (); Revision total knee arthroplasty (Left, 2014); Upper gastrointestinal endoscopy (03/02/10); pacemaker placement; Colonoscopy (10/07/2014); Uvulopalatopharygoplasty; Coronary artery bypass graft (); Total knee arthroplasty (Bilateral, ); Partial hip arthroplasty (Right, 2016); and Prostate biopsy (08/10/2007).     Restrictions  Restrictions/Precautions  Restrictions/Precautions: Fall Risk, General Precautions  Position Activity Restriction  Other position/activity restrictions: Per AYANA Moreland note on 5/15/19: \"He may be up with PT/OT prior to brace arrival.\" perform transfers with mod A  Short term goal 3: Pt will ambulate 22' with RW and min A  Short term goal 4: By 5/17/19, pt will tolerate 15 reps of LE ther ex for improved strength and activity tolerance  Patient Goals   Patient goals : unable to state goal    Plan    Plan  Times per week: 3-5x/wk  Current Treatment Recommendations: Strengthening, Balance Training, Functional Mobility Training, Transfer Training, Gait Training, Patient/Caregiver Education & Training  Safety Devices  Type of devices:  All fall risk precautions in place, Call light within reach, Chair alarm in place, Gait belt, Patient at risk for falls, Left in chair, Nurse notified     Therapy Time   Individual Concurrent Group Co-treatment   Time In 0950         Time Out 1040         Minutes 50         Timed Code Treatment Minutes: [address], PTA

## 2019-05-18 LAB
ANION GAP SERPL CALCULATED.3IONS-SCNC: 9 MMOL/L (ref 3–16)
BUN BLDV-MCNC: 8 MG/DL (ref 7–20)
CALCIUM SERPL-MCNC: 9.1 MG/DL (ref 8.3–10.6)
CHLORIDE BLD-SCNC: 94 MMOL/L (ref 99–110)
CO2: 34 MMOL/L (ref 21–32)
CREAT SERPL-MCNC: 0.7 MG/DL (ref 0.8–1.3)
GFR AFRICAN AMERICAN: >60
GFR NON-AFRICAN AMERICAN: >60
GLUCOSE BLD-MCNC: 118 MG/DL (ref 70–99)
GLUCOSE BLD-MCNC: 154 MG/DL (ref 70–99)
GLUCOSE BLD-MCNC: 176 MG/DL (ref 70–99)
GLUCOSE BLD-MCNC: 47 MG/DL (ref 70–99)
GLUCOSE BLD-MCNC: 60 MG/DL (ref 70–99)
GLUCOSE BLD-MCNC: 81 MG/DL (ref 70–99)
HCT VFR BLD CALC: 34 % (ref 40.5–52.5)
HEMOGLOBIN: 11.3 G/DL (ref 13.5–17.5)
INR BLD: 2.33 (ref 0.86–1.14)
MAGNESIUM: 2.1 MG/DL (ref 1.8–2.4)
MCH RBC QN AUTO: 30.1 PG (ref 26–34)
MCHC RBC AUTO-ENTMCNC: 33.3 G/DL (ref 31–36)
MCV RBC AUTO: 90.5 FL (ref 80–100)
PDW BLD-RTO: 13.8 % (ref 12.4–15.4)
PERFORMED ON: ABNORMAL
PERFORMED ON: NORMAL
PLATELET # BLD: 203 K/UL (ref 135–450)
PMV BLD AUTO: 7.9 FL (ref 5–10.5)
POTASSIUM REFLEX MAGNESIUM: 3.8 MMOL/L (ref 3.5–5.1)
PROTHROMBIN TIME: 26.6 SEC (ref 9.8–13)
RBC # BLD: 3.76 M/UL (ref 4.2–5.9)
SODIUM BLD-SCNC: 137 MMOL/L (ref 136–145)
WBC # BLD: 8.2 K/UL (ref 4–11)

## 2019-05-18 PROCEDURE — 97116 GAIT TRAINING THERAPY: CPT

## 2019-05-18 PROCEDURE — 6370000000 HC RX 637 (ALT 250 FOR IP): Performed by: PHYSICAL MEDICINE & REHABILITATION

## 2019-05-18 PROCEDURE — 85610 PROTHROMBIN TIME: CPT

## 2019-05-18 PROCEDURE — 80048 BASIC METABOLIC PNL TOTAL CA: CPT

## 2019-05-18 PROCEDURE — 83735 ASSAY OF MAGNESIUM: CPT

## 2019-05-18 PROCEDURE — 1280000000 HC REHAB R&B

## 2019-05-18 PROCEDURE — 36415 COLL VENOUS BLD VENIPUNCTURE: CPT

## 2019-05-18 PROCEDURE — 97535 SELF CARE MNGMENT TRAINING: CPT

## 2019-05-18 PROCEDURE — 85027 COMPLETE CBC AUTOMATED: CPT

## 2019-05-18 PROCEDURE — 97162 PT EVAL MOD COMPLEX 30 MIN: CPT

## 2019-05-18 PROCEDURE — 97530 THERAPEUTIC ACTIVITIES: CPT

## 2019-05-18 PROCEDURE — 97167 OT EVAL HIGH COMPLEX 60 MIN: CPT

## 2019-05-18 RX ADMIN — SPIRONOLACTONE 25 MG: 25 TABLET ORAL at 08:43

## 2019-05-18 RX ADMIN — BETHANECHOL CHLORIDE 25 MG: 25 TABLET ORAL at 14:29

## 2019-05-18 RX ADMIN — BETHANECHOL CHLORIDE 25 MG: 25 TABLET ORAL at 08:43

## 2019-05-18 RX ADMIN — ATORVASTATIN CALCIUM 80 MG: 80 TABLET, FILM COATED ORAL at 18:16

## 2019-05-18 RX ADMIN — CYCLOBENZAPRINE HYDROCHLORIDE 10 MG: 10 TABLET, FILM COATED ORAL at 20:14

## 2019-05-18 RX ADMIN — POLYETHYLENE GLYCOL (3350) 17 G: 17 POWDER, FOR SOLUTION ORAL at 08:43

## 2019-05-18 RX ADMIN — ASPIRIN 81 MG: 81 TABLET, COATED ORAL at 08:43

## 2019-05-18 RX ADMIN — INSULIN GLARGINE 30 UNITS: 100 INJECTION, SOLUTION SUBCUTANEOUS at 09:11

## 2019-05-18 RX ADMIN — SENNOSIDES, DOCUSATE SODIUM 2 TABLET: 50; 8.6 TABLET, FILM COATED ORAL at 20:14

## 2019-05-18 RX ADMIN — WARFARIN SODIUM 5 MG: 5 TABLET ORAL at 18:16

## 2019-05-18 RX ADMIN — LISINOPRIL 10 MG: 10 TABLET ORAL at 08:43

## 2019-05-18 RX ADMIN — AMLODIPINE BESYLATE 2.5 MG: 2.5 TABLET ORAL at 08:43

## 2019-05-18 RX ADMIN — SENNOSIDES, DOCUSATE SODIUM 2 TABLET: 50; 8.6 TABLET, FILM COATED ORAL at 08:44

## 2019-05-18 RX ADMIN — BETHANECHOL CHLORIDE 25 MG: 25 TABLET ORAL at 20:14

## 2019-05-18 RX ADMIN — OXYCODONE HYDROCHLORIDE AND ACETAMINOPHEN 1 TABLET: 5; 325 TABLET ORAL at 08:44

## 2019-05-18 ASSESSMENT — PAIN SCALES - GENERAL
PAINLEVEL_OUTOF10: 0
PAINLEVEL_OUTOF10: 10
PAINLEVEL_OUTOF10: 5
PAINLEVEL_OUTOF10: 0
PAINLEVEL_OUTOF10: 5
PAINLEVEL_OUTOF10: 10
PAINLEVEL_OUTOF10: 10

## 2019-05-18 ASSESSMENT — PAIN SCALES - WONG BAKER: WONGBAKER_NUMERICALRESPONSE: 8

## 2019-05-18 ASSESSMENT — PAIN DESCRIPTION - PAIN TYPE
TYPE: ACUTE PAIN
TYPE: ACUTE PAIN

## 2019-05-18 ASSESSMENT — PAIN DESCRIPTION - LOCATION
LOCATION: BACK
LOCATION: BACK

## 2019-05-18 ASSESSMENT — PAIN DESCRIPTION - FREQUENCY: FREQUENCY: CONTINUOUS

## 2019-05-18 ASSESSMENT — PAIN DESCRIPTION - ORIENTATION: ORIENTATION: LOWER

## 2019-05-18 ASSESSMENT — PAIN - FUNCTIONAL ASSESSMENT: PAIN_FUNCTIONAL_ASSESSMENT: PREVENTS OR INTERFERES WITH ALL ACTIVE AND SOME PASSIVE ACTIVITIES

## 2019-05-18 NOTE — PLAN OF CARE
Nutrition Problem: Increased nutrient needs  Intervention: Food and/or Nutrient Delivery: Continue current diet, Start ONS  Nutritional Goals: Patient will eat 50% or greater of meals and supplements.

## 2019-05-18 NOTE — PROGRESS NOTES
Nutrition Assessment    Type and Reason for Visit: Initial, Positive Nutrition Screen, Consult(healing stage 2 decub, swallowing difficulty)    Nutrition Recommendations:   1. Continue carb control diet  2. Glucerna with meals  3. Will monitor nutritional adequacy, nutrition-related labs, weights, BMs, and clinical progress     Nutrition Assessment: Positive nutrition screen for stage 2 decub and swallowing difficulty. Patient sleeping soundly in room, wife not present. Patient did eat 100% of breakfast per observation. Diet upgraded to carb control from full liquids at lunch. Will add Glucerna with meals to enhance protein intake. Will monitor nutritional progression and bowel function. Malnutrition Assessment:  · Malnutrition Status: No malnutrition    Nutrition Risk Level: Moderate    Nutrient Needs:  · Estimated Daily Total Kcal: 4787-0131  · Estimated Daily Protein (g): 75-90  · Estimated Daily Total Fluid (ml/day): 1 ml/kcal    Nutrition Diagnosis:   · Problem: Increased nutrient needs  · Etiology: related to Increased demand for energy/nutrients     Signs and symptoms:  as evidenced by Presence of wounds    Objective Information:  · Nutrition-Focused Physical Findings: history of constipation-BM every 7 days   · Wound Type: Stage II, Pressure Ulcer  · Current Nutrition Therapies:  · Oral Diet Orders: Carb Control 3 Carbs/Meal   · Oral Diet intake: %  · Oral Nutrition Supplement (ONS) Orders: None  · ONS intake: Unable to assess  · Anthropometric Measures:  · Ht: 5' 10\" (177.8 cm)   · Current Body Wt: 201 lb 4 oz (91.3 kg)  · Ideal Body Wt: 166 lb (75.3 kg), % Ideal Body    · BMI Classification: BMI 25.0 - 29.9 Overweight    Nutrition Interventions:   Continue current diet, Start ONS  Continued Inpatient Monitoring    Nutrition Evaluation:   · Evaluation: Goals set   · Goals: Patient will eat 50% or greater of meals and supplements.       · Monitoring: Meal Intake, Supplement Intake, Monitor Bowel Function      Electronically signed by Leah Russell RD, GREG on 5/18/19 at 12:33 PM    Contact Number: Office: [phone]; [address] [place] [address] Road: [phone]

## 2019-05-18 NOTE — H&P
when up with activity. Okay for PT/OT prior to brace arrival. He should Follow up with Dr. Aundrea Burns in 1-2 weeks for repeat xrays. Patient has a past medical history positive for diabetes, hypertension, CAD, hyperlipidemia, chronic kidney disease, and A. fib on Coumadin. He lives at home with his wife       Prior Level of Function:  Independent in self care and ADLs prior to admission. Current Level of Function:  PT:  Max assist for transfers and standing    OT:   Mod to Max assist for self care and ADLs       has a past medical history of Acute encephalopathy, Acute lower GI bleeding, Acute renal failure (ARF) ([facility]), Acute respiratory failure ([address] Atrial flutter ([address] Closed right hip fracture ([address] Diastolic heart failure ([address] Diverticulitis, HCAP (healthcare-associated pneumonia), Iron deficiency anemia, Ischemic colitis ([address] Metabolic encephalopathy, Nonhealing nonsurgical wound of scalp, limited to breakdown of skin, Obesity, 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 reports that he has never smoked. He has never used smokeless tobacco. He reports that he does not drink alcohol or use drugs. family history includes Alcohol Abuse in his father; Cancer in his brother, sister, and sister; Mental Retardation in his brother; Other in his brother and father; Stroke in his mother. REVIEW OF SYSTEMS:   CONSTITUTIONAL: negative for fevers, chills, diaphoresis, activity change, appetite change, fatigue, night sweats and unexpected weight change. EYES: negative for blurred vision, eye discharge, visual disturbance and icterus. HEENT: negative for hearing loss, tinnitus, ear drainage, sinus pressure, nasal congestion, epistaxis and snoring. RESPIRATORY: Negative for hemoptysis, cough, sputum production.    CARDIOVASCULAR: negative for chest pain, palpitations, exertional chest pressure/discomfort, edema, syncope. + CAD, A. Fib. GASTROINTESTINAL: negative for nausea, vomiting, diarrhea, constipation, blood in stool and abdominal pain. + colitis  GENITOURINARY: negative for frequency, dysuria, urinary incontinence, decreased urine volume, and hematuria. + CKD, prostate Ca  HEMATOLOGIC/LYMPHATIC: negative for easy bruising, bleeding and lymphadenopathy. ALLERGIC/IMMUNOLOGIC: negative for recurrent infections, angioedema, anaphylaxis and drug reactions. ENDOCRINE: negative for weight changes and diabetic symptoms including polyuria, polydipsia and polyphagia. + DM  MUSCULOSKELETAL: negative for pain, joint swelling, decreased range of motion and muscle weakness. NEUROLOGICAL: negative for headaches, slurred speech, unilateral weakness. PSYCHIATRIC/BEHAVIORAL: negative for hallucinations, behavioral problems, confusion and agitation. All pertinent positives are noted in the HPI. Physical Examination:  Vitals:   Patient Vitals for the past 24 hrs:   BP Temp Temp src Pulse Resp SpO2 Height Weight   05/18/19 0830 (!) 196/71 97.9 °F (36.6 °C) Oral 76 20 95 % -- --   05/17/19 1930 (!) 175/75 97.8 °F (36.6 °C) Oral 68 18 97 % -- --   05/17/19 1800 (!) 178/73 98 °F (36.7 °C) Oral 68 16 97 % 5' 10\" (1.778 m) 201 lb 4.5 oz (91.3 kg)     Psych: Stable mood, normal judgement, normal affect   Const: No distress  Eyes: Conjunctiva noninjected, no icterus noted; pupils equal, round, and reactive to light. HENT: Atraumatic, normocephalic; Oral mucosa moist  Neck: Trachea midline, neck supple. No thyromegaly noted. CV: Regular rate and rhythm, no murmur rub or gallop noted  Resp: Lungs clear to auscultation bilaterally, no rales wheezes or ronchi, no retractions. Respirations unlabored. GI: Soft, nontender, nondistended. Normal bowel sounds. No palpable masses. Neuro: Alert, oriented, appropriate. No cranial nerve deficits appreciated.  Motor examination reveals 4+/5 strength in all four limbs diffusely. Skin: Normal temperature and turgor  MSK: No joint abnormalities noted. Ext: No significant edema appreciated. No varicosities. Lab Results   Component Value Date    WBC 8.2 05/18/2019    HGB 11.3 (L) 05/18/2019    HCT 34.0 (L) 05/18/2019    MCV 90.5 05/18/2019     05/18/2019     Lab Results   Component Value Date    INR 2.33 (H) 05/18/2019    PROTIME 26.6 (H) 05/18/2019     Lab Results   Component Value Date    CREATININE 0.7 (L) 05/18/2019    BUN 8 05/18/2019     05/18/2019    K 3.8 05/18/2019    CL 94 (L) 05/18/2019    CO2 34 (H) 05/18/2019     Lab Results   Component Value Date    ALT 10 05/14/2019    AST 11 (L) 05/14/2019    ALKPHOS 89 05/14/2019    BILITOT 0.4 05/14/2019       Xr Chest Standard (2 Vw)    Result Date: 5/14/2019  EXAMINATION: TWO XRAY VIEWS OF THE CHEST 5/14/2019 5:31 pm COMPARISON: 02/25/2019 HISTORY: ORDERING SYSTEM PROVIDED HISTORY: hypoxia TECHNOLOGIST PROVIDED HISTORY: Reason for exam:->hypoxia Ordering Physician Provided Reason for Exam: Hypoxia; SOB; Per patient's family, they had experienced some chest pains recently Acuity: Acute Type of Exam: Initial FINDINGS: There is worsening bibasilar airspace disease. There is also mild pulmonary vascular congestion. Cardiomegaly is stable status post median sternotomy, CABG and dual lead pacemaker. There is no pneumothorax or pleural effusion. No change in the elevated right hemidiaphragm. Degenerative changes involve the bilateral shoulders. 1. Worsening bibasilar airspace disease either due to atelectasis or developing pneumonia. 2. Mild pulmonary vascular congestion.      Ct Abdomen Pelvis W Iv Contrast Additional Contrast? None    Result Date: 5/14/2019  EXAMINATION: CT OF THE ABDOMEN AND PELVIS WITH CONTRAST 5/14/2019 3:07 pm TECHNIQUE: CT of the abdomen and pelvis was performed with the administration of intravenous contrast. Multiplanar reformatted images are provided for review. Dose modulation, iterative reconstruction, and/or weight based adjustment of the mA/kV was utilized to reduce the radiation dose to as low as reasonably achievable. COMPARISON: February 25, 2019 HISTORY: ORDERING SYSTEM PROVIDED HISTORY: constipation TECHNOLOGIST PROVIDED HISTORY: If patient is on cardiac monitor and/or pulse ox, they may be taken off cardiac monitor and pulse ox, left on O2 if currently on. All monitors reattached when patient returns to room. Additional Contrast?->None Ordering Physician Provided Reason for Exam: constipation x2 days Acuity: Acute Type of Exam: Initial FINDINGS: Lower Chest: Patchy atelectasis in the lung bases. Coronary artery calcifications. Organs: Cholecystectomy. Pneumobilia. Spleen is unremarkable. Multiple pancreatic cystic lesions which measure up to 1.2 cm. These appear similar to previous exam. No pancreatic duct dilatation. Vascular calcifications. GI/Bowel: No bowel obstruction. Sigmoid diverticulosis. No acute diverticulitis. Pelvis: No bladder stone. Diverticulum arising from the left lateral wall of the bladder. Small fat containing right inguinal hernia. Vascular calcifications. Peritoneum/Retroperitoneum: No abdominal aortic aneurysm. Adrenal glands unremarkable. Kidneys are unremarkable. Bones/Soft Tissues: No acute bony abnormality. Severe disc height loss L5-S1. Facet arthropathy and severe narrowing of the L5-S1 foramen. Compression fracture along the inferior aspect of L1 without significant vertebral body height loss. Compression deformity of L2, slightly progressed with loss of approximately 40% vertebral body height. No obstruction. Compression deformities involving L1 and L2. Compression fracture L1 appears new and slightly progressed at L2. The above labs and diagnostic studies have been reviewed by myself upon admission to inpatient rehabilitation.         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 He lives at home with his wife     POST ADMISSION PHYSICIAN EVALUATION  The patient has agreed to being admitted to our comprehensive inpatient  rehabilitation facility consisting of at least 180 minutes of therapy a day,  5 out of 7 days a week. The patient/family has a good understanding of our discharge process. The  patient has potential to make improvement and is in need of at least two of  the following multidisciplinary therapies including but not limited to  physical, occupational, respiratory, and speech, nutritional services, wound care, and prosthetics and orthotics. Given the patients complex condition  and risk of further medical complications, rehabilitation services cannot be  safely provided at a lower level of care such as a skilled nursing facility. I have compared the patients medical and functional status at the time of the  preadmission screening and the same on this date, and there are no significant changes. By signing this document, I acknowledge that I have personally performed a  full physical examination on this patient within 24 hours of admission to  this inpatient rehabilitation facility and have determined the patient to be  able to tolerate the above course of treatment at an intensive level for a  reasonable period of time.  I will be completing a detailed individualized  Plan of Care for this patient by day four of the patients stay based upon the  Preadmission Screen, this Post-Admission Evaluation, and the therapy  evaluations. Assessment and Plan:    acute L1 compression fracture and progressed L2 compression fracture. -lidoderm patch, percocet    Diabetes- lantus,SSI    Hypertension- norvasc,lisinopril    CAD, A. fib -on Coumadin, ASA    Hyperlipidemia- lipitor    chronic kidney disease     prostate cancer- urecholine     ischemic colitis    diastolic heart failure -aldactone    Bowels: Schedule Miralax + Senna S. Follow bowel movements. Enema or suppository if needed. Bladder: Check PVR x 3. [address] if PVR > 200ml or if any volume is > 500 ml. Pain: Percocet is ordered prn.        Wilbert Medrano MD, 5/18/2019, 9:23 AM

## 2019-05-18 NOTE — PROGRESS NOTES
Physical Therapy    Facility/Department: [facility]  Initial Assessment    NAME: Mahesh Ibarra  : 3/13/1933  MRN: [id number]    Date of Service: 2019    Discharge Recommendations:  24 hour supervision or assist, Home with Home health PT(pending progress with therapies )   PT Equipment Recommendations  Equipment Needed: No    Assessment   Body structures, Functions, Activity limitations: Decreased functional mobility ; Decreased strength;Decreased endurance;Decreased safe awareness;Decreased cognition;Decreased balance; Increased Pain  Assessment: Pt seen for PT evaluation following hospitalization for constipation and low back pain. Pt found to have L1/L2 compression fractures. Jewitt Brace ordered for pt for conservative management of compression fractures. Pt extremely drowsy/lethargic during PT evaluation. Pt requires constant cueing to remain awake/alert and engaged in task. Vitals stable throughout session. Discussed decreased altertness with RN at session's end. Pt presents to evaluation with impaired dynamic balance, decreased B LE functional strength, back pain with functional tasks, decreased endurance, lethargy and overall decreased (I) and safety with transfers, ambulation and functional mobility. Pt would benefit from skilled PT to address aforementioned deficits, maximize (I) and allow for safe DC home. Pt supine in bed, all needs in reach and bed alarm on at session's end. Treatment Diagnosis: impaired transfers  Prognosis: Fair  Decision Making: Medium Complexity  Patient Education: Role of PT, IP rehab schedule, transfer training   Barriers to Learning: Decreased alertness/lethargy   REQUIRES PT FOLLOW UP: Yes  Activity Tolerance  Activity Tolerance: Patient limited by fatigue;Patient limited by endurance; Patient limited by pain  Activity Tolerance: Pt extremely lethargic/drowsy throughout session. QB=341/52, HR=69 bpm and SpO2=97%;  Vitals stable throughout session and RN made aware of Maximum assistance  Toileting: Needs assistance  Homemaking Assistance: Needs assistance  Homemaking Responsibilities: No  Ambulation Assistance: Independent  Transfer Assistance: Independent  Active : No  Patient's  Info: wife  Mode of Transportation: Family  Occupation: Retired  Type of occupation: Pt retired   Additional Comments: Pt very lethargic during evaluation and difficult to obtain subjective information. Per pt/chart, was needing wife's assist with ADL's and household management but was mod (I) for mobility with use of SC     Strength RLE  Comment: grossly 3+/5  Strength LLE  Comment: grossly 3+/5  Strength Other  Other: Pt has difficulty remaining awake during MMT; B LE's grossly >/=3+/5 throughout      Sensation  Overall Sensation Status: (Unable to accurately assess sensation 2' drowsiness/lethargy)  Bed mobility  Rolling to Left: Maximum assistance  Rolling to Right: Maximum assistance  Supine to Sit: Maximum assistance  Sit to Supine: Maximum assistance  Scooting: Maximal assistance  Transfers  Sit to Stand: Dependent/Total(Requires Eyad Starling Stedy/parallel bars for sit<>stand )  Stand to sit: Dependent/Total(Requires Eyad Starling Stedy/parallel bars for sit<>stand )  Bed to Chair: Jean Carlos Hacking )  Comment: Max A sit<>stand pulling up to bars, mod A to stand to Jean Carlos Hacking. Pt needs assist for anterior progression of weight 2' weakness, Jewitt brace and back pain. Also requires cueing for technique including foot placement, and hand placement  Ambulation  Ambulation?: Yes  More Ambulation?: No  Ambulation 1  Surface: level tile  Device: Parallel Bars  Assistance: Moderate assistance  Quality of Gait: Short shuffling gait with improved foot clearance with increased distance.   Cueing for posturing and step length   Distance: 10 ft x 2 in parallel bars   Stairs/Curb  Stairs?: No  Wheelchair Activities  Wheelchair Type: Standard  Wheelchair Cushion: Standard  Propulsion: Yes  Propulsion 1  Propulsion: Manual  Level: Level Tile  Method: LUE;RUE  Level of Assistance: Maximum assistance  Description/ Details: Pt very drowsy and needs constant cueing, and hand over hand assist to remain engaged in task   Distance: 30 ft     Plan   Plan  Times per week: 5-7x/wk  Times per day: Daily  Plan weeks: 2 weeks   Current Treatment Recommendations: Strengthening, IADL Training, Functional Mobility Training, Neuromuscular Re-education, Home Exercise Program, Transfer Training, Gait Training, Safety Education & Training, Endurance Training, Positioning, Patient/Caregiver Education & Training, Stair training, Balance Training  Safety Devices  Type of devices:  All fall risk precautions in place, Call light within reach, Left in bed, Bed alarm in place, Patient at risk for falls, Gait belt  Restraints  Initially in place: No    Goals  Short term goals  Time Frame for Short term goals: 1 week (5/25/19)  Short term goal 1: Pt will perform supine<>sit with min A to increase (I) with getting out of bed in home   Short term goal 2: Pt will perform sit<>stand and bed<>chair transfer c LRAD and mod A to increase (I) with functional transfers in home   Short term goal 3: Pt will amb >30 ft c RW and min A to increase (I) with household ambulation   Short term goal 4: Pt will be able to accurately demonstrate log rolling technique with min vc's and </=6/10 pain to improve (I) and decrease stress on low back during bed mobility tasks   Long term goals  Time Frame for Long term goals : 2 weeks (6/1/19)  Long term goal 1: Pt will perform all bed mobility using log rolling technique with CGA for safety to increase (I) getting in to and out of bed in home   Long term goal 2: Pt will perform sit<>stand and bed<>chair transfer c LRAD and CGA for safety to increase (I) with functional transfers in home   Long term goal 3: Pt will amb >50 ft c LRAD and CGA for safety to increase (I) with household ambulation   Long term goal 4: Pt will be (I) with B LE strengthening program to promote B LE strength required for transfers and ambulation   Long term goal 5: Pt's family will be able to demonstrate independence donning and angying Bart Brace to allow for family to care for pt in home   Patient Goals   Patient goals : \"Try to sit when it won't hurt. \" \"To go home. \"       Therapy Time   Individual Concurrent Group Co-treatment   Time In 1000         Time Out 1130         Minutes 90         Timed Code Treatment Minutes: 76 Minutes       Quang Caba DPT

## 2019-05-18 NOTE — PROGRESS NOTES
Occupational Therapy   Occupational Therapy Initial Assessment  Date: 2019   Patient Name: Ana Gutierrez  MRN: [id number]     : 3/13/1933    Date of Service: 2019    Discharge Recommendations:  S Level 4, 24 hour supervision or assist, Home with Home health OT(pending family assist available)       Assessment   Performance deficits / Impairments: Decreased functional mobility ; Decreased strength;Decreased endurance;Decreased coordination;Decreased ADL status; Decreased safe awareness;Decreased balance;Decreased cognition  Assessment: Patient presents with above deficits, limited by lower back pain. Patient max A for UB sponge bathing and dressing, Dep for LB sponge bathing and dressing, Dep for toileting/use of urinal. Max A for bed mobility rolling R<->L, sup->EOB. Posterior LOB when taking meds, max A to correct. Mod A to stand within frame of Fairnitin Balboa for transfer to chair. Patient extremely lethargic throughout session. Prognosis: Fair  Decision Making: High Complexity  Patient Education: role of OT, safety, ADLs, d/c planning, transfers  REQUIRES OT FOLLOW UP: Yes  Activity Tolerance  Activity Tolerance: Patient limited by fatigue;Patient limited by pain;Treatment limited secondary to decreased cognition  Safety Devices  Safety Devices in place: Yes  Type of devices: Nurse notified;Gait belt;Call light within reach; Chair alarm in place; Left in chair;Patient at risk for falls  Restraints  Initially in place: No           Patient Diagnosis(es): There were no encounter diagnoses.      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 has a past surgical history that includes Cholecystectomy; Appendectomy (1939); Prostatectomy (2007); Revision total knee arthroplasty (Left, 06/18/2014); Upper gastrointestinal endoscopy (03/02/10); pacemaker placement; Colonoscopy (10/07/2014); Uvulopalatopharygoplasty; Coronary artery bypass graft (1990); Total knee arthroplasty (Bilateral, 1991); Partial hip arthroplasty (Right, 09/24/2016); and Prostate biopsy (08/10/2007). Restrictions  Restrictions/Precautions  Restrictions/Precautions: Fall Risk, General Precautions, Up as Tolerated  Required Braces or Orthoses?: Yes  Required Braces or Orthoses  Spinal Other: Darling-\"Patient should have brace on when up with activity. Okay to remove while in bed and while bathing. \"  Position Activity Restriction  Other position/activity restrictions: O2-3L NC, Osmani brace when OOB, ok off while in bed/during ADLs, Up as tolerated     Subjective   General  Chart Reviewed: Yes  Patient assessed for rehabilitation services?: Yes  Additional Pertinent Hx: stage 2 sacral ulcer  Family / Caregiver Present: No  Referring Practitioner: Serene King MD  Diagnosis: Constipation, L1 and L2 compression fxs  Subjective  Subjective: Patient agreeable to OT evaluation, requesting to use urinal  General Comment  Comments: RN cleared for treatment  Pain Assessment  Pain Assessment: 0-10  Pain Level: 10  Pain Type: Acute pain  Pain Location: Back  Non-Pharmaceutical Pain Intervention(s): Emotional support; Ambulation/Increased Activity;Repositioned  Response to Pain Intervention: Patient Satisfied  Oxygen Therapy  SpO2: 97 %  O2 Device: Nasal cannula  O2 Flow Rate (L/min): 3 L/min  Social/Functional History  Social/Functional History  Lives With: Spouse  Type of Home: [facility] Layout: One level  Home Access: Level entry  Bathroom Shower/Tub: Walk-in shower, Shower chair with back  Bathroom Toilet: Standard  Bathroom Equipment: Toilet raiser, Grab bars in shower, Hand-held shower  Home Equipment: Rolling walker, Cane, Standard walker, [employer], 4 wheeled walker, Oxygen  Receives Help From: Home health  ADL Assistance: Needs assistance  Bath: Maximum assistance  Dressing: Maximum assistance  Toileting: Needs assistance  Homemaking Assistance: Needs assistance  Homemaking Responsibilities: No  Ambulation Assistance: Independent  Transfer Assistance: Independent  Active : No  Patient's  Info: wife  Mode of Transportation: Family  Occupation: Retired  Type of occupation: Pt retired   Additional Comments: Pt very lethargic during evaluation and difficult to obtain subjective information. Per pt/chart, was needing wife's assist with ADL's and household management but was mod (I) for mobility with use of SC        Objective   Vision: Impaired  Vision Exceptions: Wears glasses for reading  Hearing: Exceptions to [facility]  Hearing Exceptions: Hard of hearing/hearing concerns;Bilateral hearing aid    Orientation  Overall Orientation Status: Impaired  Orientation Level: Disoriented to time;Oriented to person;Oriented to place; Disoriented to situation  Observation/Palpation  Posture: Fair(rounded shoulders, limited neck ROM)  Observation: patient leans entire body back to take meds, losing balance sitting EOB  Balance  Sitting Balance: Minimal assistance(initially min A EOB, improving to CGA until taking meds-posterior LOB with max A to correct)  Standing Balance: Dependent/Total(Angela Eden-mod Ax1 to  frame with bed elevated)  Standing Balance  Time: <1 min  Activity: transfer via Gloria Almodovar  Functional Mobility  Functional - Mobility Device: Other  Assist Level: Dependent/Total  Functional Mobility Comments: Gloria Almodovar  Toilet Transfers  Toilet Transfer: Unable to assess  Toilet Transfers Comments: unsafe to attempt this am due to pain and urgency of urination  Shower Transfers  Shower Transfers: Not tested  Shower Transfers Comments: unsafe to attempt  ADL  Feeding: Setup; Increased time to complete  UE Bathing: Maximum assistance  LE Bathing: Maximum assistance  UE Dressing: Maximum assistance(t-shirt)  LE Dressing: Dependent/Total(hospital pants & socks, no street clothing available)  Toileting: Dependent/Total(assist to steady urinal after initially missing and soiling sheets and brief; patient unable to manage urinal without assist)  Tone RUE  RUE Tone: Normotonic  Tone LUE  LUE Tone: Normotonic  Coordination  Movements Are Fluid And Coordinated: Yes  Coordination and Movement description: Decreased speed;Right UE;Left UE     Bed mobility  Rolling to Left: Maximum assistance  Rolling to Right: Maximum assistance  Supine to Sit: Maximum assistance  Scooting: Moderate assistance(to EOB)  Transfers  Stand Pivot Transfers: Dependent/Total(via Anabel Raymundo)  Transfer Comments: mod A to stand within frame of Anabel Raymundo with bed elevated  Vision - Basic Assessment  Prior Vision: Wears glasses only for reading  Cognition  Overall Cognitive Status: Exceptions  Following Commands: Follows one step commands with increased time; Follows one step commands with repetition  Attention Span: Difficulty attending to directions; Difficulty dividing attention; Attends with cues to redirect  Memory: Decreased recall of recent events  Safety Judgement: Decreased awareness of need for safety;Decreased awareness of need for assistance  Problem Solving: Assistance required to generate solutions;Assistance required to implement solutions;Assistance required to identify errors made;Assistance required to correct errors made;Decreased awareness of errors  Insights: Decreased awareness of deficits  Initiation: Requires cues for all  Sequencing: Requires cues for all  Cognition Comment: Drowsy, slow response  Perception  Overall Perceptual Status: Impaired  Initiation: Cues to initiate tasks     Sensation  Overall Sensation Status: (Unable to accurately assess sensation 2' drowsiness/lethargy)        LUE AROM (degrees)  LUE AROM : WFL  LUE General AROM: limited shoulder ROM around 90  RUE AROM (degrees)  RUE AROM : WFL  RUE General AROM: limited shoulder ROM around 90  LUE Strength  Gross LUE Strength: WFL  RUE Strength  Gross RUE Strength: WFL                   Plan   Plan  Times per week: 5/7x/week  Times per day: Daily  Current Treatment Recommendations: Strengthening, Endurance Training, Patient/Caregiver Education & Training, Cognitive Reorientation, Balance Training, Gait Training, Pain Management, Self-Care / ADL, Functional Mobility Training, Safety Education & Training, Cognitive/Perceptual Training      Goals  Short term goals  Time Frame for Short term goals: 1 week  Short term goal 1: Patient will complete toilet transfer/functional ADL transfer mod Ax1  Short term goal 2: Patient will complete UB dressing mod A  Short term goal 3: Patient will complete toileting/hygiene min A  Short term goal 4: Patient will complete grooming task EOB SBA  Short term goal 5: Patient will complete bed mobility mod Ax1  Long term goals  Time Frame for Long term goals : 2 weeks  Long term goal 1: Patient will complete functional ADL/toilet transfer CGA  Long term goal 2: Patient will complete UB/LB dressing min A  Long term goal 3: Patient will complete bed mobility CGA  Long term goal 4: Patient will complete 5 min dynamic standing activity CGA  Patient Goals   Patient goals : Patient reports goal is to get back home with wife       Therapy Time   Individual Concurrent Group Co-treatment   Time In 0730         Time Out 0900         Minutes 90         Timed Code Treatment Minutes: 75 Minutes(15 min eval)       Henrique Smith OT

## 2019-05-18 NOTE — DISCHARGE SUMMARY
Hospital Medicine Discharge Summary    Patient ID: Mahesh Ibarra      Patient's PCP: Chrissy Thomas    Admit Date: 5/14/2019     Discharge Date: 5/17/2019      Admitting Physician: Uma Casey MD     Discharge Physician: Uma Casey MD     Discharge Diagnoses: Active Hospital Problems    Diagnosis    CAD (coronary artery disease) [I25.10]     Priority: Medium    Hyperlipidemia [E78.5]     Priority: Medium    Constipation [K59.00]    CKD (chronic kidney disease) stage 3, GFR 30-59 ml/min (HCC) [N18.3]    Cardiac pacemaker in situ [Z95.0]    Essential hypertension [I10]       The patient was seen and examined on day of discharge and this discharge summary is in conjunction with any daily progress note from day of discharge. Hospital Course:         Constipation - Unlikely related specifically to Lumbar compression fx at L1/L2 give hx of chronic constipation but may be related to pain meds for same. Spine surgery consulted and appreciated, w/ plans for conservative mgt w/ Osmani Brace.  Continue bowel regimen which seems to be having some effect.      HTN/CAD - w/ known CAD and no evidence of active signs/sxs of ischemia/failure. Currently controlled on home meds w/ vitals reviewed and documented as above. S/P Pacer.      HyperLipidemia - controlled on home Statin. Continue, w/ f/u and med adjustment w/ PCP     CKD - baseline stage 2.  Will continue to follow serial labs - resolved.       Acute Respiratory failure - of unclear etiology but w/ pulmonary edema on admission CXR, likely acute - less likely PNA w/out fever or leukocytosis.  Supplemental O2 and wean as tolerated.      DM2 - controlled on home Lantus - continued.  Follow FSBS/SSI med regimen.      Afib - rate controlled and anticoagulated on Coumadin - held, OK to resume.  Continue to follow INR - supratherapeutic.       Anemia - mild, of unclear etiology, w/out evidence of active bleeding/hemolysis.  Asymptomatic w/out indication for transfusion.        Labs: For convenience and continuity at follow-up the following most recent labs are provided:      CBC:    Lab Results   Component Value Date    WBC 8.2 05/18/2019    HGB 11.3 05/18/2019    HCT 34.0 05/18/2019     05/18/2019       Renal:    Lab Results   Component Value Date     05/17/2019    K 4.8 05/17/2019    K 4.8 03/09/2019    CL 98 05/17/2019    CO2 32 05/17/2019    BUN 10 05/17/2019    CREATININE 0.7 05/17/2019    CALCIUM 8.6 05/17/2019    PHOS 3.5 05/17/2019         Significant Diagnostic Studies    Radiology:   XR CHEST STANDARD (2 VW)   Final Result   1. Worsening bibasilar airspace disease either due to atelectasis or   developing pneumonia. 2. Mild pulmonary vascular congestion. CT ABDOMEN PELVIS W IV CONTRAST Additional Contrast? None   Final Result   No obstruction. Compression deformities involving L1 and L2. Compression fracture L1 appears   new and slightly progressed at L2. Consults:     IP CONSULT TO HOSPITALIST  IP CONSULT TO NEUROSURGERY    Disposition: ARU    Condition at Discharge: Stable    Discharge Instructions/Follow-up:  W/ PCP prn. Code Status:  Full Code     Activity: activity as tolerated    Diet: regular diet      Discharge Medications:     Discharge Medication List as of 5/17/2019  5:28 PM           Details   oxyCODONE-acetaminophen (PERCOCET) 5-325 MG per tablet Take 1 tablet by mouth every 6 hours as needed for Pain for up to 7 days. , Disp-28 tablet, R-0Print      bisacodyl (DULCOLAX) 5 MG EC tablet Take 1 tablet by mouth daily as needed for Constipation, Disp-7 tablet, R-0Print      polyethylene glycol (GLYCOLAX) packet Take 17 g by mouth daily, Disp-527 g, R-1Print      docusate sodium (COLACE) 100 MG capsule Take 1 capsule by mouth 2 times daily for 7 days, Disp-14 capsule, R-0Print              Details   warfarin (COUMADIN) 5 MG tablet Follow INR, Disp-30 tablet, R-3Print              Details   furosemide (LASIX) 20 MG tablet Take 20 mg by mouth daily 1 Tablet by mouth every Monday, Wednesday, and FridayHistorical Med      glyBURIDE (DIABETA) 5 MG tablet Take 5 mg by mouth 2 times dailyHistorical Med      insulin detemir (LEVEMIR FLEXTOUCH) 100 UNIT/ML injection pen Inject 30 Units into the skin every morning, Disp-5 pen, R-3Normal      METFORMIN HCL PO Take 500 mg by mouth 2 times dailyHistorical Med      bethanechol (URECHOLINE) 25 MG tablet Take 1 tablet by mouth 3 times daily, Disp-90 tablet, R-3Print      Continuous Blood Gluc  (FREESTYLE KRISTY 14 DAY READER) TABITHA 1 Units by Does not apply route as needed (as  needed), Disp-1 Device, R-0Print      Continuous Blood Gluc Sensor (FREESTYLE KRISTY 14 DAY SENSOR) MISC 2 Units by Does not apply route as needed (use one sensor for 14 days), Disp-2 each, R-3Print      calcium carbonate (OYSTER SHELL CALCIUM 500 MG) 1250 (500 Ca) MG tablet Take 1 tablet by mouth dailyHistorical Med      spironolactone (ALDACTONE) 25 MG tablet Historical Med      amLODIPine-benazepril (LOTREL) 2.5-10 MG per capsule Take 1 capsule by mouth daily Historical Med      aspirin 81 MG EC tablet Take 81 mg by mouth daily. atorvastatin (LIPITOR) 80 MG tablet Take 80 mg by mouth every evening. dutasteride (AVODART) 0.5 MG capsule Take 0.5 mg by mouth twice a week. Take 0.5mg Monday and Friday       ezetimibe (ZETIA) 10 MG tablet Take 10 mg by mouth every evening Historical Med             Time Spent on discharge is more than 30 minutes in the examination, evaluation, counseling and review of medications and discharge plan. Signed:    Jacob James MD   5/18/2019      Thank you Tono Thompson for the opportunity to be involved in this patient's care. If you have any questions or concerns please feel free to contact me at [phone].

## 2019-05-19 LAB
GLUCOSE BLD-MCNC: 169 MG/DL (ref 70–99)
GLUCOSE BLD-MCNC: 170 MG/DL (ref 70–99)
GLUCOSE BLD-MCNC: 211 MG/DL (ref 70–99)
GLUCOSE BLD-MCNC: 54 MG/DL (ref 70–99)
GLUCOSE BLD-MCNC: 77 MG/DL (ref 70–99)
INR BLD: 1.99 (ref 0.86–1.14)
PERFORMED ON: ABNORMAL
PERFORMED ON: NORMAL
PROTHROMBIN TIME: 22.7 SEC (ref 9.8–13)

## 2019-05-19 PROCEDURE — 85610 PROTHROMBIN TIME: CPT

## 2019-05-19 PROCEDURE — 36415 COLL VENOUS BLD VENIPUNCTURE: CPT

## 2019-05-19 PROCEDURE — 94761 N-INVAS EAR/PLS OXIMETRY MLT: CPT

## 2019-05-19 PROCEDURE — 1280000000 HC REHAB R&B

## 2019-05-19 PROCEDURE — 6370000000 HC RX 637 (ALT 250 FOR IP): Performed by: PHYSICAL MEDICINE & REHABILITATION

## 2019-05-19 PROCEDURE — 2700000000 HC OXYGEN THERAPY PER DAY

## 2019-05-19 RX ADMIN — INSULIN LISPRO 2 UNITS: 100 INJECTION, SOLUTION INTRAVENOUS; SUBCUTANEOUS at 16:48

## 2019-05-19 RX ADMIN — WARFARIN SODIUM 5 MG: 5 TABLET ORAL at 16:48

## 2019-05-19 RX ADMIN — AMLODIPINE BESYLATE 2.5 MG: 2.5 TABLET ORAL at 09:13

## 2019-05-19 RX ADMIN — ATORVASTATIN CALCIUM 80 MG: 80 TABLET, FILM COATED ORAL at 16:48

## 2019-05-19 RX ADMIN — ASPIRIN 81 MG: 81 TABLET, COATED ORAL at 09:12

## 2019-05-19 RX ADMIN — SENNOSIDES, DOCUSATE SODIUM 2 TABLET: 50; 8.6 TABLET, FILM COATED ORAL at 09:12

## 2019-05-19 RX ADMIN — POLYETHYLENE GLYCOL (3350) 17 G: 17 POWDER, FOR SOLUTION ORAL at 09:12

## 2019-05-19 RX ADMIN — BETHANECHOL CHLORIDE 25 MG: 25 TABLET ORAL at 09:13

## 2019-05-19 RX ADMIN — LISINOPRIL 10 MG: 10 TABLET ORAL at 09:13

## 2019-05-19 RX ADMIN — CYCLOBENZAPRINE HYDROCHLORIDE 10 MG: 10 TABLET, FILM COATED ORAL at 20:26

## 2019-05-19 RX ADMIN — SPIRONOLACTONE 25 MG: 25 TABLET ORAL at 09:13

## 2019-05-19 RX ADMIN — BETHANECHOL CHLORIDE 25 MG: 25 TABLET ORAL at 14:35

## 2019-05-19 RX ADMIN — INSULIN LISPRO 2 UNITS: 100 INJECTION, SOLUTION INTRAVENOUS; SUBCUTANEOUS at 12:05

## 2019-05-19 RX ADMIN — BETHANECHOL CHLORIDE 25 MG: 25 TABLET ORAL at 20:26

## 2019-05-19 ASSESSMENT — PAIN SCALES - GENERAL
PAINLEVEL_OUTOF10: 0

## 2019-05-19 NOTE — PROGRESS NOTES
Mahesh Ibarra  5/19/2019  [id number]    Chief Complaint: Lumbar compression fracture, sequela    Subjective:   Resting this morning. Adequate pain control. No new issues. ROS: No cp, sob, n/v, f/c  Objective:  Patient Vitals for the past 24 hrs:   BP Temp Temp src Pulse Resp SpO2   05/18/19 1915 (!) 153/72 97.8 °F (36.6 °C) Oral 69 18 98 %   05/18/19 1147 (!) 157/52 -- -- 69 -- 97 %     Gen: No distress, pleasant. HEENT: Normocephalic, atraumatic. CV: Regular rate and rhythm. Resp: No respiratory distress. Abd: Soft, nontender   Ext: No edema. Neuro: Alert, oriented, appropriately interactive. Wt Readings from Last 3 Encounters:   05/17/19 201 lb 4.5 oz (91.3 kg)   05/14/19 190 lb (86.2 kg)   04/10/19 198 lb 3.2 oz (89.9 kg)       Laboratory data:   Lab Results   Component Value Date    WBC 8.2 05/18/2019    HGB 11.3 (L) 05/18/2019    HCT 34.0 (L) 05/18/2019    MCV 90.5 05/18/2019     05/18/2019       Lab Results   Component Value Date     05/18/2019    K 3.8 05/18/2019    CL 94 05/18/2019    CO2 34 05/18/2019    BUN 8 05/18/2019    CREATININE 0.7 05/18/2019    GLUCOSE 60 05/18/2019    CALCIUM 9.1 05/18/2019        Therapy progress:  PT  Required Braces or Orthoses  Spinal Other: [place]-\"Patient should have brace on when up with activity. Okay to remove while in bed and while bathing. \"  Position Activity Restriction  Other position/activity restrictions: O2-3L NC, Osmani brace when OOB, ok off while in bed/during ADLs, Up as tolerated   Objective     Sit to Stand: Dependent/Total(Requires Donnie Eglin Stedy/parallel bars for sit<>stand )  Stand to sit: Dependent/Total(Requires Donnie Eglin Stedy/parallel bars for sit<>stand )  Bed to Chair: Myla Clark  Device: Parallel Bars  Assistance:  Moderate assistance  Distance: 10 ft x 2 in parallel bars   OT  PT Equipment Recommendations  Equipment Needed: No  Toilet Transfers Comments: unsafe to attempt this am due to pain and urgency of urination  Assessment SLP                Body mass index is 28.88 kg/m². Assessment and Plan:     acute L1 compression fracture and progressed L2 compression fracture. -lidoderm patch, percocet     Diabetes- lantus,SSI     Hypertension- norvasc,lisinopril     CAD, A. fib -on Coumadin, ASA     Hyperlipidemia- lipitor     chronic kidney disease      prostate cancer- urecholine      ischemic colitis     diastolic heart failure -aldactone     Bowels: Schedule Miralax + Senna S. Follow bowel movements. Enema or suppository if needed.      Bladder: Check PVR x 3. [address] if PVR > 200ml or if any volume is > 500 ml.      Pain: Percocet is ordered prn.      Edenilson Echevarria MD 5/19/2019, 8:31 AM

## 2019-05-20 LAB
ANION GAP SERPL CALCULATED.3IONS-SCNC: 8 MMOL/L (ref 3–16)
BASOPHILS ABSOLUTE: 0 K/UL (ref 0–0.2)
BASOPHILS RELATIVE PERCENT: 0.4 %
BUN BLDV-MCNC: 13 MG/DL (ref 7–20)
CALCIUM SERPL-MCNC: 9.1 MG/DL (ref 8.3–10.6)
CHLORIDE BLD-SCNC: 94 MMOL/L (ref 99–110)
CO2: 37 MMOL/L (ref 21–32)
CREAT SERPL-MCNC: 0.7 MG/DL (ref 0.8–1.3)
EOSINOPHILS ABSOLUTE: 0.2 K/UL (ref 0–0.6)
EOSINOPHILS RELATIVE PERCENT: 2.7 %
GFR AFRICAN AMERICAN: >60
GFR NON-AFRICAN AMERICAN: >60
GLUCOSE BLD-MCNC: 112 MG/DL (ref 70–99)
GLUCOSE BLD-MCNC: 126 MG/DL (ref 70–99)
GLUCOSE BLD-MCNC: 172 MG/DL (ref 70–99)
GLUCOSE BLD-MCNC: 179 MG/DL (ref 70–99)
GLUCOSE BLD-MCNC: 312 MG/DL (ref 70–99)
HCT VFR BLD CALC: 34.2 % (ref 40.5–52.5)
HEMOGLOBIN: 11.5 G/DL (ref 13.5–17.5)
INR BLD: 1.61 (ref 0.86–1.14)
LYMPHOCYTES ABSOLUTE: 2.4 K/UL (ref 1–5.1)
LYMPHOCYTES RELATIVE PERCENT: 26.3 %
MCH RBC QN AUTO: 30.4 PG (ref 26–34)
MCHC RBC AUTO-ENTMCNC: 33.7 G/DL (ref 31–36)
MCV RBC AUTO: 90.2 FL (ref 80–100)
MONOCYTES ABSOLUTE: 1.1 K/UL (ref 0–1.3)
MONOCYTES RELATIVE PERCENT: 11.5 %
NEUTROPHILS ABSOLUTE: 5.4 K/UL (ref 1.7–7.7)
NEUTROPHILS RELATIVE PERCENT: 59.1 %
PDW BLD-RTO: 13.7 % (ref 12.4–15.4)
PERFORMED ON: ABNORMAL
PLATELET # BLD: 225 K/UL (ref 135–450)
PMV BLD AUTO: 7.5 FL (ref 5–10.5)
POTASSIUM REFLEX MAGNESIUM: 4.4 MMOL/L (ref 3.5–5.1)
PROTHROMBIN TIME: 18.3 SEC (ref 9.8–13)
RBC # BLD: 3.79 M/UL (ref 4.2–5.9)
SODIUM BLD-SCNC: 139 MMOL/L (ref 136–145)
WBC # BLD: 9.2 K/UL (ref 4–11)

## 2019-05-20 PROCEDURE — 80048 BASIC METABOLIC PNL TOTAL CA: CPT

## 2019-05-20 PROCEDURE — 97530 THERAPEUTIC ACTIVITIES: CPT

## 2019-05-20 PROCEDURE — 85025 COMPLETE CBC W/AUTO DIFF WBC: CPT

## 2019-05-20 PROCEDURE — 1280000000 HC REHAB R&B

## 2019-05-20 PROCEDURE — 36415 COLL VENOUS BLD VENIPUNCTURE: CPT

## 2019-05-20 PROCEDURE — 97110 THERAPEUTIC EXERCISES: CPT

## 2019-05-20 PROCEDURE — 6370000000 HC RX 637 (ALT 250 FOR IP): Performed by: PHYSICAL MEDICINE & REHABILITATION

## 2019-05-20 PROCEDURE — 85610 PROTHROMBIN TIME: CPT

## 2019-05-20 PROCEDURE — 2700000000 HC OXYGEN THERAPY PER DAY

## 2019-05-20 PROCEDURE — 94761 N-INVAS EAR/PLS OXIMETRY MLT: CPT

## 2019-05-20 PROCEDURE — 97535 SELF CARE MNGMENT TRAINING: CPT

## 2019-05-20 RX ORDER — WARFARIN SODIUM 5 MG/1
5 TABLET ORAL
Status: COMPLETED | OUTPATIENT
Start: 2019-05-20 | End: 2019-05-20

## 2019-05-20 RX ADMIN — BETHANECHOL CHLORIDE 25 MG: 25 TABLET ORAL at 21:03

## 2019-05-20 RX ADMIN — ASPIRIN 81 MG: 81 TABLET, COATED ORAL at 09:03

## 2019-05-20 RX ADMIN — SPIRONOLACTONE 25 MG: 25 TABLET ORAL at 09:03

## 2019-05-20 RX ADMIN — BETHANECHOL CHLORIDE 25 MG: 25 TABLET ORAL at 14:26

## 2019-05-20 RX ADMIN — INSULIN LISPRO 2 UNITS: 100 INJECTION, SOLUTION INTRAVENOUS; SUBCUTANEOUS at 17:11

## 2019-05-20 RX ADMIN — SENNOSIDES, DOCUSATE SODIUM 2 TABLET: 50; 8.6 TABLET, FILM COATED ORAL at 09:03

## 2019-05-20 RX ADMIN — INSULIN GLARGINE 30 UNITS: 100 INJECTION, SOLUTION SUBCUTANEOUS at 09:07

## 2019-05-20 RX ADMIN — WARFARIN SODIUM 5 MG: 5 TABLET ORAL at 17:10

## 2019-05-20 RX ADMIN — INSULIN LISPRO 1 UNITS: 100 INJECTION, SOLUTION INTRAVENOUS; SUBCUTANEOUS at 21:04

## 2019-05-20 RX ADMIN — AMLODIPINE BESYLATE 2.5 MG: 2.5 TABLET ORAL at 09:03

## 2019-05-20 RX ADMIN — POLYETHYLENE GLYCOL (3350) 17 G: 17 POWDER, FOR SOLUTION ORAL at 09:03

## 2019-05-20 RX ADMIN — ATORVASTATIN CALCIUM 80 MG: 80 TABLET, FILM COATED ORAL at 17:10

## 2019-05-20 RX ADMIN — LISINOPRIL 10 MG: 10 TABLET ORAL at 09:03

## 2019-05-20 RX ADMIN — BETHANECHOL CHLORIDE 25 MG: 25 TABLET ORAL at 09:03

## 2019-05-20 RX ADMIN — SENNOSIDES, DOCUSATE SODIUM 2 TABLET: 50; 8.6 TABLET, FILM COATED ORAL at 21:03

## 2019-05-20 RX ADMIN — INSULIN LISPRO 8 UNITS: 100 INJECTION, SOLUTION INTRAVENOUS; SUBCUTANEOUS at 11:29

## 2019-05-20 RX ADMIN — OXYCODONE HYDROCHLORIDE AND ACETAMINOPHEN 1 TABLET: 5; 325 TABLET ORAL at 09:03

## 2019-05-20 ASSESSMENT — PAIN DESCRIPTION - ORIENTATION
ORIENTATION: POSTERIOR

## 2019-05-20 ASSESSMENT — PAIN SCALES - GENERAL
PAINLEVEL_OUTOF10: 5
PAINLEVEL_OUTOF10: 0
PAINLEVEL_OUTOF10: 5
PAINLEVEL_OUTOF10: 0
PAINLEVEL_OUTOF10: 0
PAINLEVEL_OUTOF10: 5
PAINLEVEL_OUTOF10: 0
PAINLEVEL_OUTOF10: 5
PAINLEVEL_OUTOF10: 5

## 2019-05-20 ASSESSMENT — PAIN DESCRIPTION - PAIN TYPE
TYPE: ACUTE PAIN

## 2019-05-20 ASSESSMENT — PAIN DESCRIPTION - LOCATION
LOCATION: BACK

## 2019-05-20 ASSESSMENT — PAIN SCALES - WONG BAKER
WONGBAKER_NUMERICALRESPONSE: 0
WONGBAKER_NUMERICALRESPONSE: 0

## 2019-05-20 ASSESSMENT — PAIN DESCRIPTION - DESCRIPTORS
DESCRIPTORS: ACHING

## 2019-05-20 ASSESSMENT — PAIN - FUNCTIONAL ASSESSMENT: PAIN_FUNCTIONAL_ASSESSMENT: PREVENTS OR INTERFERES SOME ACTIVE ACTIVITIES AND ADLS

## 2019-05-20 ASSESSMENT — PAIN DESCRIPTION - FREQUENCY: FREQUENCY: CONTINUOUS

## 2019-05-20 NOTE — DISCHARGE INSTR - COC
Pneumococcal Conjugate 7-valent 04/08/2004, 05/22/2005, 04/03/2012    Pneumococcal Polysaccharide ([id number]) 04/03/2012    Tetanus 08/09/2009       Active Problems:  Patient Active Problem List   Diagnosis Code    Persistent atrial fibrillation (HCC) I48.1    Diabetes mellitus type 2, uncontrolled ([facility] E11.65    Hyperlipidemia E78.5    Venous (peripheral) insufficiency I87.2    CAD (coronary artery disease) I25.10    Normocytic anemia S63.6    Diastolic dysfunction I52.0    Essential hypertension I10    Lumbar spondylolysis M43.06    Bronchiectasis without complication ([facility]) U47.6    Cardiac pacemaker in situ Z95.0    Carotid stenosis I65.29    CKD (chronic kidney disease) stage 3, GFR 30-59 ml/min ([facility]) N18.3    LPRD (laryngopharyngeal reflux disease) K21.9    RAD (reactive airway disease), mild persistent, uncomplicated M35.36    RBBB (right bundle branch block) I45.10    Restrictive lung disease J98.4    Sensorineural hearing loss H90.5    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    Decreased mobility R26.89    Presence of total knee joint prosthesis, 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    Presence of right hip implant Z96.641    Allergic rhinitis J30.9    Diverticulosis K57.90    Vitamin D deficiency E55.9    Pressure ulcer of left buttock, stage 2 L89.322    Constipation K59.00    Lumbar compression fracture, sequela S32.000S       Isolation/Infection:   Isolation          No Isolation            Nurse Assessment:  Last Vital Signs: BP (!) 168/69   Pulse 75   Temp 98.1 °F (36.7 °C) (Oral)   Resp 18   Ht 5' 10\" (1.778 m)   Wt 198 lb 6.6 oz (90 kg)   SpO2 95%   BMI 28.47 kg/m²     Last documented pain score (0-10 scale): Pain Level: 0  Last Weight:   Wt Readings from Last 1 Encounters:   05/19/19 198 lb 6.6 oz (90 kg)     Mental Status:  {IP PT MENTAL STATUS:19038}    IV Access:  {[facility] ACCESS:818584964}    Nursing Mobility/ADLs:  Walking   {[facility] HQIN:693987486}  Transfer  {[facility] LYNI:492843029}  Bathing  {CH[facility] DME JOGJ:259899258}  Dressing  {CHP DME RUYX:062033649}  Toileting  {CHP DME WVAJ:174951337}  Feeding  {CHP DME DXWI:371428346}  Med Admin  {CHP DME OYXF:456813586}  Med Delivery   { [facility] MED Delivery:682095205}    Wound Care Documentation and Therapy:  Wound 03/13/19 #3, Left buttock, Pressure Ulcer, Stage 2, onset 3/6/19, Pressure (Active)   Wound Pressure Stage  2 5/17/2019  6:36 PM   Dressing Status Clean;Dry; Intact 5/19/2019 10:27 PM   Dressing Changed Changed/New 5/14/2019  7:42 PM   Dressing/Treatment Other (comment) 5/19/2019 10:27 PM   Wound Width (cm) 0.1 cm 5/14/2019  7:45 PM   Wound Depth (cm) 0.1 cm 5/14/2019  7:45 PM   Wound Assessment Red 5/19/2019 10:27 PM   Drainage Amount None 5/19/2019 10:27 PM   Odor None 5/19/2019 10:27 PM   Cheryl-wound Assessment Red 5/19/2019 10:27 PM   Number of days: 67        Elimination:  Continence:   · Bowel: {YES / FX:35106}  · Bladder: {YES / LK:40236}  Urinary Catheter: {Urinary Catheter:909782296}   Colostomy/Ileostomy/Ileal Conduit: {YES / GM:90507}       Date of Last BM: ***    Intake/Output Summary (Last 24 hours) at 5/20/2019 0804  Last data filed at 5/20/2019 0617  Gross per 24 hour   Intake 720 ml   Output 1500 ml   Net -780 ml     I/O last 3 completed shifts:   In: 5 [P.O.:720]  Out: 1500 [Urine:1500]    Safety Concerns:     [employer] Safety Concerns:164891557}    Impairments/Disabilities:      [employer] Impairments/Disabilities:009189346}    Nutrition Therapy:  Current Nutrition Therapy:   [employer] Diet List:409626445}    Routes of Feeding: {CHP DME Other Feedings:398192467}  Liquids: {Slp liquid thickness:72462}  Daily Fluid Restriction: {CHP DME Yes amt example:862083696}  Last Modified Barium Swallow with Video (Video Swallowing Test): {Done Not Done PLGX:727096611}    Treatments at the Time of Hospital Discharge:   Respiratory Treatments: ***  Oxygen Therapy:  {Therapy; copd oxygen:44522}  Ventilator:    { CC Vent ZBS:585226610}    Rehab Therapies: {THERAPEUTIC INTERVENTION:5650532355}  Weight Bearing Status/Restrictions: [address] [place] [address] CC Weight Bearin}  Other Medical Equipment (for information only, NOT a DME order):  {EQUIPMENT:341977602}  Other Treatments: ***    Patient's personal belongings (please select all that are sent with patient):  {CHP DME Belongings:726361213}    RN SIGNATURE:  {Esignature:993104632}    CASE MANAGEMENT/SOCIAL WORK SECTION    Inpatient Status Date: ***    Readmission Risk Assessment Score:  Readmission Risk              Risk of Unplanned Readmission:        21           Discharging to Facility/ Agency   Name:  [address] [place] [address]    Address: [address] [place] [address], [address] [place] [address], [place] [address]  Phone: [phone]  Fax: [phone]    ·     Dialysis Facility (if applicable)   · Name:  · Address:  · Dialysis Schedule:  · Phone:  · Fax:    / signature: {Esignature:224651739}    PHYSICIAN SECTION    Prognosis: {Prognosis:0548314202}    Condition at Discharge: 50Radha Bolanos Patient Condition:383173419}    Rehab Potential (if transferring to Rehab): {Prognosis:8987715325}    Recommended Labs or Other Treatments After Discharge: ***    Recommended Follow-up, Labs or Other Treatments After Discharge:    ***               Physician Certification: I certify the above information and transfer of Mahesh Ibarra  is necessary for the continuing treatment of the diagnosis listed and that he requires {Admit to Appropriate Level of Care:28759} for {GREATER/LESS:644594845} 30 days.      Update Admission H&P: {CHP DME Changes in CLUYK:614419129}    PHYSICIAN SIGNATURE:  {Esignature:550250045}

## 2019-05-20 NOTE — PATIENT CARE CONFERENCE
[facility]  Inpatient Rehabilitation  Weekly Team Conference Note    Date:   Patient Name: Jj Santo        MRN: [id number]    : 3/13/1933  (80 y.o.)  Gender: male   Referring Practitioner: Ziggy Orozco  Diagnosis: Constipation, back pain, Lumbar compression fractures       Interventions to be utilized toward barriers to discharge, per discipline:  [address] observed barriers to dc: Confusion, Limited safety awareness, Decreased endurance, Upper extremity weakness and Lower extremity weakness  Nursing interventions:medication, safety education    Family Education: medication, safety education   Fall Risk:  Yes      Physical therapy observed barriers to dc:    Baseline: needed assist with ADL's walked with cane without assist   Current level: needs assist of 1 with lift equipemtn or up to assist of 2 with lift equipment due to lethargy. Barriers to DC: lethargy, confusion, heavy assist of 1 to 2 for mobility. Pt with back pain due to compression fx and has Spine brace. Needs in order to achieve dc home/next level of care: min assist of 1 or less.        Physical therapy interventions:   Current Treatment Recommendations: Strengthening, IADL Training, Functional Mobility Training, Neuromuscular Re-education, Home Exercise Program, Transfer Training, Gait Training, Safety Education & Training, Endurance Training, Positioning, Patient/Caregiver Education & Training, Stair training, Balance Training      PHYSICAL THERAPY  FIMs last conference:DENICE      FIMS current conference:  [employer], Chair, Wheel Chair: 1 - Requires >75% assitance to transfer  Walk: 0 - Activity Not Assessed/Does Not Occur  Distance Walked: 10  Wheel Chair: 0 - Activity Not Assessed/Does Not Occur  Distance Traveled in Wheel Chair: 30  Stairs: 0 - Activity Does not Occur ( 0 only for the admission assessment)    PT Equipment Recommendations  Equipment Needed: No    Assessment: Pt seen for  therapy in the presence of L1/L2 compression fractures. Pt up to chair with OT in room at end of firs session and during second  session noted to be sitting EOB with adult incontinence garmetn only on and gripping bed rail due to posterior lob with Os via nc off pt and sitting next to patient at EOB. Pt total A to don  O2 and Brace[employer]itt Brace. Pt participoated in sit<>stand from bed/chair with use of steday and educated in bed mobility with cues for log rolling and to push up with LUE from left sidelying . Needed facilitation/placement  cues for hand placment  for bed mobility and transfers. extremely drowsy/lethargic during PT evaluation. Pt requires constant cueing to remain awake/alert and engaged in task. Vitals stable throughout session. Discussed decreased altertness with RN at session's end. Pt presents to ethargyfrom skilled PT to address aforementioned deficits, maximize (I) and allow for safe DC home. Pt supine in bed, all needs in reach and bed alarm on at session's end. Occupational therapy observed barriers to dc:    Baseline: needed assist with ADL's; walked with cane without assist   Current level: needs assist of 1 with lift equipment or up to assist of 2 with lift equipment due to lethargy. Barriers to DC: lethargy, confusion, difficulty following directions, heavy assist of 1 to 2 for mobility. Pt with back pain due to compression fx and has Spine brace. Needs in order to achieve dc home/next level of care: To be able to transfer with min assist of 1 or less. Min A for ADLs. Possible SNF placement if pt does not progress.     Occupational Therapy interventions:  Current Treatment Recommendations: Strengthening, Endurance Training, Patient/Caregiver Education & Training, Cognitive Reorientation, Balance Training, Gait Training, Pain Management, Self-Care / ADL, Functional Mobility Training, Safety Education & Training, Cognitive/Perceptual Training      OCCUPATIONAL THERAPY    FIMS: current conference  Eating: Physical Therapy, Occupational Therapy and Nursing Other pending eval   Equipment at Discharge: None    Team Members Present at Conference:  : Mariama Gallegos MSW, LSW    Occupational Therapist: Almas Orantes OT  Physical [address]  Speech Therapist: n/a  Nurse: Peggy Marques RN  Dietician: Janis Powell RDN, LD  : Julien Chan  Psychiatry: n/a    Family members present at conference: n/a      I led this team conference and I approve the established interdisciplinary plan of care as documented within the medical record of Mahesh Stacey. MD: Lucia Beaulieu.  Korey Briones MD 5/21/2019, 3:06 PM

## 2019-05-20 NOTE — PROGRESS NOTES
Occupational Therapy  Facility/Department: [facility]  Daily Treatment Note  NAME: Mahesh Ibarra  : 3/13/1933  MRN: [id number]    Date of Service: 2019    Discharge Recommendations:  S Level 4, 24 hour supervision or assist, Home with Home health OT(pending family ability to assist)     Assessment   Performance deficits / Impairments: Decreased functional mobility ; Decreased strength;Decreased endurance;Decreased coordination;Decreased ADL status; Decreased safe awareness;Decreased balance;Decreased cognition  Assessment: Pt agreeable to therapy. Pt required Max A for bed mobility and Min A-CGA to sit EOB ~5 minutes. Pt transferred from bed to chair with mraia antonia leach requiring Max A with little initation from the patient. Pt extremely lethargic throughout session requiring Max VCs and tactile cues for arousal throughout session. Pt will benefit from continued skilled OT per POC. Prognosis: Fair  Decision Making: High Complexity  Patient Education: role of OT, safety, ADLs, d/c planning, transfers  REQUIRES OT FOLLOW UP: Yes  Activity Tolerance  Activity Tolerance: Patient limited by fatigue;Patient limited by pain;Treatment limited secondary to decreased cognition  Activity Tolerance: Pt extremely lethargic throughout session requiring Max encouragement to attend to therapy session  Safety Devices  Safety Devices in place: Yes  Type of devices: Gait belt;Call light within reach; Chair alarm in place; Left in chair;Patient at risk for falls       Patient Diagnosis(es): There were no encounter diagnoses.       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 has a past surgical history that includes Cholecystectomy; Appendectomy (1939); Prostatectomy (2007); Revision total knee arthroplasty (Left, 06/18/2014); Upper gastrointestinal endoscopy (03/02/10); pacemaker placement; Colonoscopy (10/07/2014); Uvulopalatopharygoplasty; Coronary artery bypass graft (1990); Total knee arthroplasty (Bilateral, 1991); Partial hip arthroplasty (Right, 09/24/2016); and Prostate biopsy (08/10/2007). Restrictions  Restrictions/Precautions  Restrictions/Precautions: Fall Risk, General Precautions, Up as Tolerated  Required Braces or Orthoses?: Yes  Required Braces or Orthoses  Spinal Other: [place]-\"Patient should have brace on when up with activity. Okay to remove while in bed and while bathing. \"  Position Activity Restriction  Other position/activity restrictions: O2-3L NC, Osmani brace when OOB, ok off while in bed/during ADLs, Up as tolerated   Subjective   General  Chart Reviewed: Yes  Patient assessed for rehabilitation services?: Yes  Additional Pertinent Hx: stage 2 sacral ulcer  Family / Caregiver Present: No  Referring Practitioner: Mahesh Colunga MD  Diagnosis: Constipation, L1 and L2 compression fxs  Subjective  Subjective: Pt in chair, agreeable  General Comment  Comments: Per RN ok for therapy  Pain Assessment  Pain Assessment: Faces  [place] Pain Rating: No hurt      Objective    ADL  LE Dressing: Dependent/Total(socks supine in bed)  Additional Comments: pt declined further ADLs this session     Balance  Sitting Balance: Minimal assistance(VCs to maintain upright posture)  Standing Balance: Dependent/Total(maria antonia leach)  Standing Balance  Time: ~30 sec x3  Activity: transfer via [facility]  Functional Mobility  Functional - Mobility Device: Other(maria antonia complete toilet transfer/functional ADL transfer mod Ax1  Short term goal 2: Patient will complete UB dressing mod A  Short term goal 3: Patient will complete toileting/hygiene min A  Short term goal 4: Patient will complete grooming task EOB SBA  Short term goal 5: Patient will complete bed mobility mod Ax1  Long term goals  Time Frame for Long term goals : 2 weeks  Long term goal 1: Patient will complete functional ADL/toilet transfer CGA  Long term goal 2: Patient will complete UB/LB dressing min A  Long term goal 3: Patient will complete bed mobility CGA  Long term goal 4: Patient will complete 5 min dynamic standing activity CGA  Patient Goals   Patient goals : Patient reports goal is to get back home with wife     Therapy Time   Individual Concurrent Group Co-treatment   Time In 1300         Time Out 1330         Minutes 30         Timed Code Treatment Minutes: 30 Minutes       Media [place], OTR/L

## 2019-05-20 NOTE — PROGRESS NOTES
Physical Therapy  Facility/Department: [facility]  Daily Treatment Note  NAME: Mahesh Ibarra  : 3/13/1933  MRN: [id number]    Date of Service: 2019    Discharge Recommendations:  24 hour supervision or assist, Home with Home health PT   PT Equipment Recommendations  Equipment Needed: No    Patient Diagnosis(es): There were no encounter diagnoses. 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 has a past surgical history that includes Cholecystectomy; Appendectomy (); Prostatectomy (); Revision total knee arthroplasty (Left, 2014); Upper gastrointestinal endoscopy (03/02/10); pacemaker placement; Colonoscopy (10/07/2014); Uvulopalatopharygoplasty; Coronary artery bypass graft (); Total knee arthroplasty (Bilateral, ); Partial hip arthroplasty (Right, 2016); and Prostate biopsy (08/10/2007). Restrictions  Restrictions/Precautions  Restrictions/Precautions: Fall Risk, General Precautions, Up as Tolerated  Required Braces or Orthoses?: Yes  Required Braces or Orthoses  Spinal Other: [place]-\"Patient should have brace on when up with activity. Okay to remove while in bed and while bathing. \"  Position Activity Restriction  Other position/activity restrictions: O2-3L NC, [place] brace when OOB, ok off while in bed/during ADLs, Up as tolerated   Subjective   General  Chart Reviewed: Yes  Response To Previous Treatment: Not applicable  Family / Caregiver Present: No  Referring Practitioner: Geovani Orozco  Subjective  Subjective: \"My back pain is a 5/10\"  Pt found sitting at EOB no brace on  eating breakfast with pt gripping rail tightly due to poor sitting balance. Pain Screening  Patient Currently in Pain: Yes  Pain Assessment  Pain Assessment: 0-10  Pain Level: 5  Pain Type: Acute pain  Pain Location: Back  Pain Orientation: Posterior  Pain Descriptors: Aching  Functional Pain Assessment: Prevents or interferes some active activities and ADLs  Non-Pharmaceutical Pain Intervention(s): Repositioned;Splinting(back brace donned. )  Vital Signs  Pulse: 94  Patient Currently in Pain: Yes  Oxygen Therapy  SpO2: (!) 88 %(Upon entry to room pt had taken O2 off and was lying next to patient with wall oxygen set for 2L via nc, after donning 2L O2 via nc to pt SpO2 in 2minutes 93% HR 86 bpm. )  Pulse Oximeter Device Mode: Intermittent  Pulse Oximeter Device Location: Left;Finger  O2 Device: None (Room air)       Orientation  Orientation  Overall Orientation Status: Impaired  Orientation Level: Oriented to place;Oriented to person;Disoriented to place; Disoriented to time;Disoriented to situation  Cognition   Cognition  Overall Cognitive Status: Exceptions  Arousal/Alertness: Delayed responses to stimuli  Following Commands: Inconsistently follows commands  Attention Span: Difficulty attending to directions  Memory: Decreased recall of recent events  Safety Judgement: Decreased awareness of need for safety;Decreased awareness of need for assistance  Problem Solving: Assistance required to generate solutions;Assistance required to implement solutions;Assistance required to identify errors made;Assistance required to correct errors made;Decreased awareness of errors  Insights: Decreased awareness of deficits  Initiation: Requires cues for all  Sequencing: Requires cues for all  Cognition Comment: Drowsy, lethargic, slow response  Objective   Bed mobility  Rolling to Left: Maximum assistance  Rolling to Right: Maximum assistance  Supine to Sit: Maximum assistance  Sit to Supine: Maximum assistance  Scooting: Maximal assistance  Transfers  Sit to Stand: Dependent/Total(requires maria antonia stedy )  Stand to sit: Dependent/Total(requires maria antonia stedy )  Bed to Chair: Dependent/Total(requires maria antonia stedy )  Comment: maria antonia leach sit<>stand with CGA from steady seat 3x10  from bed to chair Mod of 1 with pt employing UE and LE to assist in transfer in stedy post sit<.stand at end of tx on 2L via nc   Ambulation  Ambulation?: No(too lethragic this date to follow commands to  propel WC or attempt ambulation. )   PT seen for second session:  Patient with Jewitt Brace donned when not supine   Pt up to [facility] and lethargic/drowsy required assist of 2 from [facility] to stedy to bed due to pt lethargy and following instruction poorly. Needed placement of kobe on steady. Transferred WC>bed, assisted pt with brace doffed to place shirt on pt under brace and total assist with max assist to log roll left/right x2. For sup<>sit Max assist and needed  PT to place and cue for LE placement with legs lifted into bed and max assist for trunk from sit<>sup. Sit<>Stand from stedy seat x10 with min assist with pt needing constant cues due to lethargy. PT performed with Max to total assist:   heel slides 2x15 BLE,  Ankle pumps x15BLE  Hip IR/ER x15BLE  Hip abd/Adduction x15BLE  BP supine right UE  143/67 HR 70 sPO2 95% on 2 L via nc. Nursing staff took pt's blood sugar during tx with pt noted to have blood suger >300 and nursing giving pt insuin at end of treatement. Pt declined need for urinal, drink and denied pain at end of session with pt very lethargic whispering, \"I'll be fine. \"                                Assessment   Body structures, Functions, Activity limitations: Decreased functional mobility ; Decreased strength;Decreased endurance;Decreased safe awareness;Decreased cognition;Decreased balance; Increased Pain  Assessment: Pt seen for  therapy in the presence of L1/L2 compression fractures. Pt up to chair with OT in room at end of firs session and during second  session noted to be sitting EOB with adult incontinence garmetn only on and gripping bed rail due to posterior lob with Os via nc off pt and sitting next to patient at EOB. Pt total A to don  O2 and BraceJewitt Brace. Pt participoated in sit<>stand from bed/chair with use of steday and educated in bed mobility with cues for log rolling and to push up with LUE from left sidelying . Needed facilitation/placement  cues for hand placment  for bed mobility and transfers. extremely drowsy/lethargic during PT evaluation. Pt requires constant cueing to remain awake/alert and engaged in task. Vitals stable throughout session. Discussed decreased altertness with RN at second session's end. Pt noted to have elevated blood sugar. Pt supine in bed, all needs in reach and bed alarm on at second session's end. Treatment Diagnosis: impaired transfers  Prognosis: Fair  Patient Education: Role of PT, IP rehab schedule, transfer training   Barriers to Learning: decreased alertness  Activity Tolerance  Activity Tolerance: with 2 L via nc at end of first session SpO2 96% HR 97bpm.   Second session seated EOB with stedy /72 HR 82 sPO2 97% on 2L via nc. Pt more lethragic and confused during second session despite  frequent  redirection and reorientation.           Goals  Short term goals  Time Frame for Short term goals: 1 week (5/25/19)  Short term goal 1: Pt will perform supine<>sit with min A to increase (I) with getting out of bed in home   Short term goal 2: Pt will perform sit<>stand and bed<>chair transfer c LRAD and mod A to increase (I) with functional transfers in home   Short term goal 3: Pt will amb >30 ft c RW and min A to increase (I) with household ambulation   Short term goal 4: Pt will be able to accurately demonstrate log rolling technique with min vc's and </=6/10 pain to improve (I) and decrease stress on low back during bed mobility tasks   Long term goals  Time Frame for Long term goals : 2 weeks (6/1/19)  Long term goal 1: Pt will perform all bed mobility using log rolling technique with CGA for safety to increase (I) getting in to and out of bed in home   Long term goal 2: Pt will perform sit<>stand and bed<>chair transfer c LRAD and CGA for safety to increase (I) with functional transfers in home   Long term goal 3: Pt will amb >50 ft c LRAD and CGA for safety to increase (I) with household ambulation   Long term goal 4: Pt will be (I) with B LE strengthening program to promote B LE strength required for transfers and ambulation   Long term goal 5: Pt's family will be able to demonstrate independence donning and doffing Jewitt Brace to allow for family to care for pt in home   Patient Goals   Patient goals : \"Try to sit when it won't hurt. \" \"To go home. \"    Plan    Plan  Times per week: 5-7x/wk  Times per day: Daily  Plan weeks: 2 weeks   Current Treatment Recommendations: Strengthening, IADL Training, Functional Mobility Training, Neuromuscular Re-education, Home Exercise Program, Transfer Training, Gait Training, Safety Education & Training, Endurance Training, Positioning, Patient/Caregiver Education & Training, Stair training, Balance Training  Safety Devices  Type of devices:  All fall risk precautions in place, Call light within reach, Patient at risk for falls, Gait belt, Sitter present, Telesitter in use, Chair alarm in place, Left in chair(recommend avasys as pt noted to have O2 off on entry no clothes donned seated at eob gripping hand rail and poor balance with intermittent posterior lob backwards with pt catching himself with grabbing handrail. )  Restraints  Initially in place: No     Therapy Time   Individual Concurrent Group Co-treatment   Time In 0800         Time Out 0830         Minutes 30 Timed Code Treatment Minutes: 30 Minutes    Second Session Therapy Time:   Individual Concurrent Group Co-treatment   Time In 1030         Time Out 1130         Minutes 60           Timed Code Treatment Minutes:  30    Total Treatment Minutes:  90      Kaden Juarez PT

## 2019-05-20 NOTE — PROGRESS NOTES
razor        Balance  Standing Balance: Dependent/Total(maria antonia leach)  Standing Balance  Time: <1 min  Activity: transfer via Kathie Amanda     Transfers  Sit to stand: Dependent/Total  Stand to sit: Dependent/Total  Transfer Comments: max A to  maria antonia leach                       Cognition  Overall Cognitive Status: Exceptions  Arousal/Alertness: Delayed responses to stimuli  Following Commands: Inconsistently follows commands  Attention Span: Difficulty attending to directions  Memory: Decreased recall of recent events  Safety Judgement: Decreased awareness of need for safety;Decreased awareness of need for assistance  Problem Solving: Assistance required to generate solutions;Assistance required to implement solutions;Assistance required to identify errors made;Assistance required to correct errors made;Decreased awareness of errors  Insights: Decreased awareness of deficits  Initiation: Requires cues for all  Sequencing: Requires cues for all  Cognition Comment: Drowsy, lethargic, slow response     Perception  Overall Perceptual Status: Impaired  Initiation: Cues to initiate tasks                                   Plan   Plan  Times per week: 5/7x/week  Times per day: Daily  Current Treatment Recommendations: Strengthening, Endurance Training, Patient/Caregiver Education & Training, Cognitive Reorientation, Balance Training, Gait Training, Pain Management, Self-Care / ADL, Functional Mobility Training, Safety Education & Training, Cognitive/Perceptual Training    Goals  Short term goals  Time Frame for Short term goals: 1 week  Short term goal 1: Patient will complete toilet transfer/functional ADL transfer mod Ax1  Short term goal 2: Patient will complete UB dressing mod A  Short term goal 3: Patient will complete toileting/hygiene min A  Short term goal 4: Patient will complete grooming task EOB SBA  Short term goal 5: Patient will complete bed mobility mod Ax1  Long term goals  Time Frame for Long term goals : 2 weeks  Long term goal 1: Patient will complete functional ADL/toilet transfer CGA  Long term goal 2: Patient will complete UB/LB dressing min A  Long term goal 3: Patient will complete bed mobility CGA  Long term goal 4: Patient will complete 5 min dynamic standing activity CGA  Patient Goals   Patient goals : Patient reports goal is to get back home with wife       Therapy Time   Individual Concurrent Group Co-treatment   Time In 0900         Time Out 1000         Minutes 60         Timed Code Treatment Minutes: [address], OT

## 2019-05-20 NOTE — PLAN OF CARE
Pt sitting on edge of bed and very unsteady. Safety education given. Avasys placed in pt room. Safety reminders continue.

## 2019-05-20 NOTE — CONSULTS
Pharmacy to Dose Warfarin    Dx: A-fib  Goal INR range 2-3   Home Warfarin dose:5 mg daily or as directed    Date  INR  Warfarin  5/17                3.29                  held  5/18                2.33                  5 mg  5/19                1.99                  5 mg  5/20                1.61                  5 mg    Recommend Warfarin 5 mg tonight x1. Daily INR ordered. Rx will continue to manage therapy per consult order.   Светлана Herrera PharmD 05/20/19 1:42 PM

## 2019-05-21 ENCOUNTER — APPOINTMENT (OUTPATIENT)
Dept: GENERAL RADIOLOGY | Age: 84
DRG: 551 | End: 2019-05-21
Attending: PHYSICAL MEDICINE & REHABILITATION
Payer: MEDICARE

## 2019-05-21 LAB
GLUCOSE BLD-MCNC: 150 MG/DL (ref 70–99)
GLUCOSE BLD-MCNC: 176 MG/DL (ref 70–99)
GLUCOSE BLD-MCNC: 211 MG/DL (ref 70–99)
GLUCOSE BLD-MCNC: 71 MG/DL (ref 70–99)
GLUCOSE BLD-MCNC: 90 MG/DL (ref 70–99)
INR BLD: 1.7 (ref 0.86–1.14)
PERFORMED ON: ABNORMAL
PERFORMED ON: NORMAL
PERFORMED ON: NORMAL
PROTHROMBIN TIME: 19.4 SEC (ref 9.8–13)

## 2019-05-21 PROCEDURE — 85610 PROTHROMBIN TIME: CPT

## 2019-05-21 PROCEDURE — 6370000000 HC RX 637 (ALT 250 FOR IP): Performed by: PHYSICAL MEDICINE & REHABILITATION

## 2019-05-21 PROCEDURE — 97110 THERAPEUTIC EXERCISES: CPT

## 2019-05-21 PROCEDURE — 71045 X-RAY EXAM CHEST 1 VIEW: CPT

## 2019-05-21 PROCEDURE — 97530 THERAPEUTIC ACTIVITIES: CPT

## 2019-05-21 PROCEDURE — 97535 SELF CARE MNGMENT TRAINING: CPT

## 2019-05-21 PROCEDURE — 36415 COLL VENOUS BLD VENIPUNCTURE: CPT

## 2019-05-21 PROCEDURE — 94761 N-INVAS EAR/PLS OXIMETRY MLT: CPT

## 2019-05-21 PROCEDURE — 2700000000 HC OXYGEN THERAPY PER DAY

## 2019-05-21 PROCEDURE — 1280000000 HC REHAB R&B

## 2019-05-21 PROCEDURE — 97116 GAIT TRAINING THERAPY: CPT

## 2019-05-21 RX ORDER — FUROSEMIDE 40 MG/1
40 TABLET ORAL ONCE
Status: COMPLETED | OUTPATIENT
Start: 2019-05-21 | End: 2019-05-21

## 2019-05-21 RX ORDER — INSULIN GLARGINE 100 [IU]/ML
26 INJECTION, SOLUTION SUBCUTANEOUS EVERY MORNING
Status: DISCONTINUED | OUTPATIENT
Start: 2019-05-22 | End: 2019-05-23

## 2019-05-21 RX ADMIN — OXYCODONE HYDROCHLORIDE AND ACETAMINOPHEN 1 TABLET: 5; 325 TABLET ORAL at 07:36

## 2019-05-21 RX ADMIN — WARFARIN SODIUM 6 MG: 5 TABLET ORAL at 17:25

## 2019-05-21 RX ADMIN — BETHANECHOL CHLORIDE 25 MG: 25 TABLET ORAL at 21:07

## 2019-05-21 RX ADMIN — AMLODIPINE BESYLATE 2.5 MG: 2.5 TABLET ORAL at 07:33

## 2019-05-21 RX ADMIN — CYCLOBENZAPRINE HYDROCHLORIDE 10 MG: 10 TABLET, FILM COATED ORAL at 21:07

## 2019-05-21 RX ADMIN — ASPIRIN 81 MG: 81 TABLET, COATED ORAL at 07:32

## 2019-05-21 RX ADMIN — FUROSEMIDE 40 MG: 40 TABLET ORAL at 17:52

## 2019-05-21 RX ADMIN — LISINOPRIL 10 MG: 10 TABLET ORAL at 07:32

## 2019-05-21 RX ADMIN — POLYETHYLENE GLYCOL (3350) 17 G: 17 POWDER, FOR SOLUTION ORAL at 07:33

## 2019-05-21 RX ADMIN — ATORVASTATIN CALCIUM 80 MG: 80 TABLET, FILM COATED ORAL at 17:25

## 2019-05-21 RX ADMIN — INSULIN LISPRO 2 UNITS: 100 INJECTION, SOLUTION INTRAVENOUS; SUBCUTANEOUS at 12:08

## 2019-05-21 RX ADMIN — BETHANECHOL CHLORIDE 25 MG: 25 TABLET ORAL at 14:11

## 2019-05-21 RX ADMIN — SENNOSIDES, DOCUSATE SODIUM 2 TABLET: 50; 8.6 TABLET, FILM COATED ORAL at 07:32

## 2019-05-21 RX ADMIN — OXYCODONE HYDROCHLORIDE AND ACETAMINOPHEN 1 TABLET: 5; 325 TABLET ORAL at 21:07

## 2019-05-21 RX ADMIN — SENNOSIDES, DOCUSATE SODIUM 2 TABLET: 50; 8.6 TABLET, FILM COATED ORAL at 21:07

## 2019-05-21 RX ADMIN — INSULIN GLARGINE 30 UNITS: 100 INJECTION, SOLUTION SUBCUTANEOUS at 07:42

## 2019-05-21 RX ADMIN — INSULIN LISPRO 2 UNITS: 100 INJECTION, SOLUTION INTRAVENOUS; SUBCUTANEOUS at 21:09

## 2019-05-21 RX ADMIN — INSULIN LISPRO 2 UNITS: 100 INJECTION, SOLUTION INTRAVENOUS; SUBCUTANEOUS at 17:26

## 2019-05-21 RX ADMIN — MAGNESIUM HYDROXIDE 30 ML: 400 SUSPENSION ORAL at 21:07

## 2019-05-21 RX ADMIN — BETHANECHOL CHLORIDE 25 MG: 25 TABLET ORAL at 07:32

## 2019-05-21 RX ADMIN — SPIRONOLACTONE 25 MG: 25 TABLET ORAL at 07:32

## 2019-05-21 ASSESSMENT — PAIN SCALES - GENERAL
PAINLEVEL_OUTOF10: 3
PAINLEVEL_OUTOF10: 0
PAINLEVEL_OUTOF10: 7
PAINLEVEL_OUTOF10: 5
PAINLEVEL_OUTOF10: 0
PAINLEVEL_OUTOF10: 5
PAINLEVEL_OUTOF10: 7
PAINLEVEL_OUTOF10: 0
PAINLEVEL_OUTOF10: 0
PAINLEVEL_OUTOF10: 5

## 2019-05-21 ASSESSMENT — PAIN DESCRIPTION - LOCATION
LOCATION: BACK

## 2019-05-21 ASSESSMENT — PAIN DESCRIPTION - PAIN TYPE
TYPE: ACUTE PAIN

## 2019-05-21 ASSESSMENT — PAIN DESCRIPTION - ORIENTATION: ORIENTATION: POSTERIOR

## 2019-05-21 NOTE — PROGRESS NOTES
Occupational Therapy  Facility/Department: [facility]  Daily Treatment Note  NAME: Mahesh Ibarra  : 3/13/1933  MRN: [id number]    Date of Service: 2019    Discharge Recommendations:  S Level 4, 24 hour supervision or assist, Home with Home health OT(pending family ability to assist)       Assessment   Performance deficits / Impairments: Decreased functional mobility ; Decreased strength;Decreased endurance;Decreased coordination;Decreased ADL status; Decreased safe awareness;Decreased balance;Decreased cognition  Assessment: Pt supine, agreeable to tx, easily awakened. Pt requires max cues for log roll for supine>sit, requires mod A, Jew[place] brace donned dependently. Pt transfers via [address] stedy with decreased assist for sit to  [address] stedy. pt performs EZ exerboard and nuts and bolts tasks, min difficulty with EZ exerboard, min to mod difficulty for nuts and bolts. Cont POC. Prognosis: Fair  Decision Making: High Complexity  Patient Education: role of OT, safety, ADLs, d/c planning, transfers  REQUIRES OT FOLLOW UP: Yes  Activity Tolerance  Activity Tolerance: Patient limited by fatigue;Treatment limited secondary to decreased cognition  Activity Tolerance: Pt lethargic throughout session requiring Min VCs for arousal  Safety Devices  Safety Devices in place: Yes  Type of devices: Gait belt;Call light within reach; Chair alarm in place; Left in chair         Patient Diagnosis(es): There were no encounter diagnoses.       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 has a past surgical history that includes Cholecystectomy; Appendectomy (1939); Prostatectomy (2007); Revision total knee arthroplasty (Left, 06/18/2014); Upper gastrointestinal endoscopy (03/02/10); pacemaker placement; Colonoscopy (10/07/2014); Uvulopalatopharygoplasty; Coronary artery bypass graft (1990); Total knee arthroplasty (Bilateral, 1991); Partial hip arthroplasty (Right, 09/24/2016); and Prostate biopsy (08/10/2007). Restrictions  Restrictions/Precautions  Restrictions/Precautions: Fall Risk, General Precautions, Up as Tolerated  Required Braces or Orthoses?: Yes  Required Braces or Orthoses  Spinal Other: [place]-\"Patient should have brace on when up with activity. Okay to remove while in bed and while bathing. \"  Position Activity Restriction  Other position/activity restrictions: O2-1.5L NC, [place] brace when OOB, ok off while in bed/during ADLs, Up as tolerated   Subjective   General  Chart Reviewed: Yes  Patient assessed for rehabilitation services?: Yes  Additional Pertinent Hx: stage 2 sacral ulcer  Family / Caregiver Present: No  Referring Practitioner: Jovi Morales MD  Diagnosis: Constipation, L1 and L2 compression fxs  Subjective  Subjective: Pt supine, asleep but easily arousable, agreeable  General Comment  Comments: Per RN ok for therapy  Vital Signs  Patient Currently in Pain: Denies   Orientation  Orientation  Overall Orientation Status: Impaired  Orientation Level: Oriented to time;Oriented to person  Objective             Balance  Sitting Balance: Supervision  Standing Balance: Dependent/Total(maria antonia leach)  Standing Balance  Time: ~30 sec x3  Activity: transfer via [place]     Transfers  Stand Pivot Transfers: Dependent/Total(maria antonia leach)        Coordination  Fine Motor: fair for nuts and bolts task, drops ~20% time              Cognition  Overall Cognitive Status: Exceptions  Arousal/Alertness: Delayed responses to stimuli;Inconsistent responses to stimuli  Following Commands: Inconsistently follows commands  Attention Span: Difficulty attending to directions  Memory: Decreased recall of recent events  Safety Judgement: Decreased awareness of need for safety;Decreased awareness of need for assistance  Problem Solving: Assistance required to generate solutions;Assistance required to implement solutions;Assistance required to identify errors made;Assistance required to correct errors made;Decreased awareness of errors  Insights: Decreased awareness of deficits  Initiation: Requires cues for all  Sequencing: Requires cues for all  Cognition Comment: Drowsy, lethargic, slow response                    Type of ROM/Therapeutic Exercise  Type of ROM/Therapeutic Exercise: AROM  Exercises  Other: EZ exerboard with each arm all rollers to increased strength in wrists/forearms  LUE AROM (degrees)  LUE AROM : WFL  LUE General AROM: limited shoulder ROM around 90  RUE AROM (degrees)  RUE AROM : WFL  RUE General AROM: limited shoulder ROM around 90                 Plan   Plan  Times per week: 5/7x/week  Times per day: Daily  Current Treatment Recommendations: Strengthening, Endurance Training, Patient/Caregiver Education & Training, Cognitive Reorientation, Balance Training, Gait Training, Pain Management, Self-Care / ADL, Functional Mobility Training, Safety Education & Training, Cognitive/Perceptual Training    Goals  Short term goals  Time Frame for Short term goals: 1 week  Short term goal 1: Patient will complete toilet transfer/functional ADL transfer mod Ax1  Short term goal 2: Patient will complete UB dressing mod A  Short term goal 3: Patient will complete toileting/hygiene min A  Short term goal 4: Patient will complete grooming task EOB SBA  Short term goal 5: Patient will complete bed mobility mod Ax1  Long term goals  Time Frame for Long term goals : 2 weeks  Long term goal 1: Patient will complete functional ADL/toilet transfer CGA  Long term goal 2: Patient will complete UB/LB dressing min A  Long term goal 3: Patient will complete bed mobility CGA  Long term goal 4: Patient will complete 5 min dynamic standing activity CGA  Patient Goals   Patient goals : Patient reports goal is to get back home with wife         Therapy Time   Individual Concurrent Group Co-treatment   Time In 0930         Time Out 1000         Minutes 30         Timed Code Treatment Minutes: 30 Minutes      Therapy Time   Individual Concurrent Group Co-treatment   Time In 1030         Time Out 1100         Minutes 30         Timed Code Treatment Minutes: [address], OT

## 2019-05-21 NOTE — PLAN OF CARE
Pt with call light in reach, no attempts to self transfer at this time. Impulsive at times, bed/chair alarm and avasys in place. Safety education continues.

## 2019-05-21 NOTE — PLAN OF CARE
Due to patient's cognitive status the call light is not always used appropriately. Staff continues to provide frequent reminders and education regarding falls and safety. Call light and belongings are in reach. Fall precautions including an Avasys monitor for increased visualization remain in place. Remains free of falls this shift.

## 2019-05-21 NOTE — PROGRESS NOTES
Physical Therapy  Facility/Department: [facility]  Daily Treatment Note  NAME: Mahesh Ibarra  : 3/13/1933  MRN: [id number]    Date of Service: 2019    Discharge Recommendations:  24 hour supervision or assist, Home with Home health PT        Patient Diagnosis(es): There were no encounter diagnoses. 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 has a past surgical history that includes Cholecystectomy; Appendectomy (); Prostatectomy (); Revision total knee arthroplasty (Left, 2014); Upper gastrointestinal endoscopy (03/02/10); pacemaker placement; Colonoscopy (10/07/2014); Uvulopalatopharygoplasty; Coronary artery bypass graft (); Total knee arthroplasty (Bilateral, ); Partial hip arthroplasty (Right, 2016); and Prostate biopsy (08/10/2007). Restrictions  Restrictions/Precautions  Restrictions/Precautions: Fall Risk, General Precautions, Up as Tolerated  Required Braces or Orthoses?: Yes  Required Braces or Orthoses  Spinal Other: [place]-\"Patient should have brace on when up with activity. Okay to remove while in bed and while bathing. \"  Position Activity Restriction  Other position/activity restrictions: O2-1.5L NC, [place] brace when OOB, ok off while in bed/during ADLs, Up as tolerated   Subjective   General  Chart Reviewed: Yes  Response To Previous Treatment: Not applicable  Family / Caregiver Present: No  Referring Practitioner: Fahad Orozco  Subjective  Subjective: My back is about a 5 at rest and an 8 when I move it. General Comment  Comments: Pt up in recliner chair, agreeable to PT treatment. Redonned [place] Back brace as pt had brace donned with anterior posterior reversed. PT alert but speech and movements aziza kinetic. Nursing present during session.    Pain Screening  Patient Currently in Pain: Yes  Pain Assessment  Pain Assessment: 0-10  Pain Type: Acute pain  Pain Location: Back  Non-Pharmaceutical Pain Intervention(s): Splinting(re-donned  brace)  Vital Signs  Patient Currently in Pain: Yes       Orientation  Orientation  Orientation Level: Disoriented to time;Oriented to person;Oriented to situation(States it is \"1900\")  Cognition : Pt needed frequent redirection to activity due to decreased alertness     Objective      Transfers  Sit<>stand from WC mod to stand and CGA to min to lower with cues to reach back and push up from transfer surface and to align with it needed 3 trials with FWW>   Sit to Stand: Dependent/Total(min assist for sit>stnad from recliner with [facility] )  Stand to sit: Dependent/Total(min assist to sit from [facility] to [facility] )  Ambulation  Ambulation?: Yes  More Ambulation?: Yes  Ambulation 1  Surface: level tile  Device: Parallel Bars  Other Apparatus: Wheelchair follow  Assistance: 2 Person assistance(WC follow and min assist of 1 with cues for swing leg to pass stance )  Quality of Gait: swing doesn't pass stance forward lean in gait  Distance: 10 feet  fwd and bwd in parallel bars   Ambulation 2  Surface - 2: level tile  Device 2: Rolling Walker  Other Apparatus 2: Wheelchair follow  Assistance 2: 2 Person assistance(min assist for walker navigation after demo from PT and cues to stay close enough to walker and to take larger steps )  Quality of Gait 2: swing doesn't pass stance forward lean in gait  Distance: 342 feet  Comments: cues to keep close enough to walker and to take larger cues for staying close enough to walker and larger step length so swing passes stance with WC follow for household distances and A of 1 for in room distances due to pt's variable alertness level. Pt with increased fatigue and closing eyes during last sessions with pt asking to return to bed. Pt with increased endurance and increased indep with gait to 342 feet with A of 2 and WC propulsion up to 100 feet with assist of 1. Continue to progress with emphasis on more consistent functional endurance. .  Trial car transfer next session. Activity Tolerance  Activity Tolerance: First sessionon 2L via nc 95% SpO2  Denies pain across all sessions.           Goals  Short term goals  Time Frame for Short term goals: 1 week (5/25/19)  Short term goal 1: Pt will perform supine<>sit with min A to increase (I) with getting out of bed in home   Short term goal 2: Pt will perform sit<>stand and bed<>chair transfer c LRAD and mod A to increase (I) with functional transfers in home   Short term goal 3: Pt will amb >30 ft c RW and min A to increase (I) with household ambulation   Short term goal 4: Pt will be able to accurately demonstrate log rolling technique with min vc's and </=6/10 pain to improve (I) and decrease stress on low back during bed mobility tasks   Long term goals  Time Frame for Long term goals : 2 weeks (6/1/19)  Long term goal 1: Pt will perform all bed mobility using log rolling technique with CGA for safety to increase (I) getting in to and out of bed in home   Long term goal 2: Pt will perform sit<>stand and bed<>chair transfer c LRAD and CGA for safety to increase (I) with functional transfers in home   Long term goal 3: Pt will amb >50 ft c LRAD and CGA for safety to increase (I) with household ambulation   Long term goal 4: Pt will be (I) with B LE strengthening program to promote B LE strength required for transfers and ambulation   Long term goal 5: Pt's family will be able to demonstrate independence

## 2019-05-21 NOTE — PROGRESS NOTES
Pharmacy to Dose Warfarin    Dx: A-fib  Goal INR range 2-3   Home Warfarin dose:5 mg daily or as directed    Date  INR  Warfarin  5/17                3.29                  held  5/18                2.33                  5 mg  5/19                1.99                  5 mg  5/20                1.61                  5 mg  5/21                1.70                  6 mg    Recommend Warfarin 6 mg tonight x1. Daily INR ordered. Rx will continue to manage therapy per consult order.   Magali Avila PharmD 05/21/19 1:32 PM

## 2019-05-21 NOTE — PROGRESS NOTES
coccygeal region, stage 2, Prostate cancer ([facility] [address] Vasovagal syncope, and VT (ventricular tachycardia) ([facility] [address] has a past surgical history that includes Cholecystectomy; Appendectomy (1939); Prostatectomy (2007); Revision total knee arthroplasty (Left, 06/18/2014); Upper gastrointestinal endoscopy (03/02/10); pacemaker placement; Colonoscopy (10/07/2014); Uvulopalatopharygoplasty; Coronary artery bypass graft (1990); Total knee arthroplasty (Bilateral, 1991); Partial hip arthroplasty (Right, 09/24/2016); and Prostate biopsy (08/10/2007). Restrictions  Restrictions/Precautions  Restrictions/Precautions: Fall Risk, General Precautions, Up as Tolerated  Required Braces or Orthoses?: Yes  Required Braces or Orthoses  Spinal Other: [place]-\"Patient should have brace on when up with activity. Okay to remove while in bed and while bathing. \"  Position Activity Restriction  Other position/activity restrictions: O2-1.5L NC, [place] brace when OOB, ok off while in bed/during ADLs, Up as tolerated   Subjective   General  Chart Reviewed: Yes  Patient assessed for rehabilitation services?: Yes  Additional Pertinent Hx: stage 2 sacral ulcer  Family / Caregiver Present: No  Referring Practitioner: Enrique Bradley MD  Diagnosis: Constipation, L1 and L2 compression fxs  Subjective  Subjective: Pt in chair, agreeable  General Comment  Comments: Per RN ok for therapy  Vital Signs  Patient Currently in Pain: Denies      Objective    ADL  LE Dressing: Dependent/Total(adjusting socks)  Additional Comments: pt declined further ADLs this session     Balance  Sitting Balance: Stand by assistance  Standing Balance: (maria antonia leach)  Functional Mobility  Functional - Mobility Device: Rolling Walker  Activity: Other  Assist Level:  Moderate assistance  Functional Mobility Comments: x4 steps forward and backward using RW  Toilet Transfers  Toilet Transfers Comments: pt declined need for bathroom this session  Bed mobility  Supine to Sit: Unable to transfer mod Ax1  Short term goal 2: Patient will complete UB dressing mod A  Short term goal 3: Patient will complete toileting/hygiene min A  Short term goal 4: Patient will complete grooming task EOB SBA  Short term goal 5: Patient will complete bed mobility mod Ax1  Long term goals  Time Frame for Long term goals : 2 weeks  Long term goal 1: Patient will complete functional ADL/toilet transfer CGA  Long term goal 2: Patient will complete UB/LB dressing min A  Long term goal 3: Patient will complete bed mobility CGA  Long term goal 4: Patient will complete 5 min dynamic standing activity CGA  Patient Goals   Patient goals : Patient reports goal is to get back home with wife     Therapy Time   Individual Concurrent Group Co-treatment   Time In 1000         Time Out 1030         Minutes 30         Timed Code Treatment Minutes: 30 Minutes       KARISSA Signh/L

## 2019-05-21 NOTE — PROGRESS NOTES
Mahesh Ibarra  5/21/2019  [id number]    Chief Complaint: Lumbar compression fracture, sequela    Subjective:   No acute events overnight. Patient seen this am sitting up in room. No new concerns. Alertness improved today per staff. ROS: No cp, sob, n/v, f/c  Objective:  Patient Vitals for the past 24 hrs:   BP Temp Temp src Pulse Resp SpO2   05/21/19 0730 (!) 154/75 98.1 °F (36.7 °C) Oral 85 16 93 %   05/20/19 2045 (!) 161/77 97.9 °F (36.6 °C) Oral 68 16 96 %     Gen: No distress, pleasant. HEENT: Normocephalic, atraumatic. CV: Regular rate and rhythm. Resp: No respiratory distress. +Basilar crackles. Abd: Soft, nontender   Ext: + edema. Neuro: Alert, oriented, appropriately interactive. Wt Readings from Last 3 Encounters:   05/19/19 198 lb 6.6 oz (90 kg)   05/14/19 190 lb (86.2 kg)   04/10/19 198 lb 3.2 oz (89.9 kg)       Laboratory data:   Lab Results   Component Value Date    WBC 9.2 05/20/2019    HGB 11.5 (L) 05/20/2019    HCT 34.2 (L) 05/20/2019    MCV 90.2 05/20/2019     05/20/2019       Lab Results   Component Value Date     05/20/2019    K 4.4 05/20/2019    CL 94 05/20/2019    CO2 37 05/20/2019    BUN 13 05/20/2019    CREATININE 0.7 05/20/2019    GLUCOSE 126 05/20/2019    CALCIUM 9.1 05/20/2019        Therapy progress:  PT  Required Braces or Orthoses  Spinal Other: [place]-\"Patient should have brace on when up with activity. Okay to remove while in bed and while bathing. \"  Position Activity Restriction  Other position/activity restrictions: O2-3L NC, [place] brace when OOB, ok off while in bed/during ADLs, Up as tolerated   Objective     Sit to Stand: Dependent/Total(min assist for sit>stnad from recliner with stedy )  Stand to sit: Dependent/Total(min assist to sit from stedy to [facility] )  Bed to Chair: Dependent/Total(requires maria antonia stedy )  Device: Parallel Bars  Other Apparatus: Wheelchair follow  Assistance: 2 Person assistance(WC follow and min assist of 1 with cues for swing leg to pass stance )  Distance: 10 feet  fwd and bwd in parallel bars   OT  PT Equipment Recommendations  Equipment Needed: No  Toilet Transfers Comments: pt declined need for bathroom this session  Assessment        SLP                Body mass index is 28.47 kg/m². Assessment and Plan:     Acute L1 compression fracture and progressed L2 compression fracture - [place] brace. Pain control. PT/OT. Acute hypoxic respiratory failure - Per IM likely 2/2 pulmonary edema. Based on exam will give lasix x 1 and repeat CXR.      dCHF - Spironolactone. Daily wt. Afib - warfarin, pharmacy to dose    CAD - ASA, statin    Diabetes - lantus (decrease dose), SSI      Chronic kidney disease - Avoid nephrotoxins. Cog deficits - Suspect acute on chronic. SLP consult. Bowels: H/o ischemic colitis, chronic constipation. Schedule Miralax + Senna S. Follow bowel movements. Enema or suppository if needed.      Bladder: H/o prostate cancer. Check PVR x 3. [address] if PVR > 300ml. Continue Urecholine.     Pain: Percocet is ordered prn. Lidoderm patch. Interdisciplinary team conference was held today with entire rehab treatment team including PT, OT, SLP, Dietician, RN, and SW. Discussion focused on progress toward rehab goals and discharge planning. Making progress. Limited by cognition, mild apraxia, poor balance, fatigue. Will add SLP. Separate conference then held with patient/family, questions answered and concerns addressed. Total treatment time >35 min with greater than 50% spent in care coordination. Anticipated dispo: home with wife  Services:  PT, OT, RN, ? SLP  DME: has all  ELOS: 5/30     Valerie Hurtado MD 5/21/2019, 8:58 AM

## 2019-05-22 LAB
GLUCOSE BLD-MCNC: 102 MG/DL (ref 70–99)
GLUCOSE BLD-MCNC: 167 MG/DL (ref 70–99)
GLUCOSE BLD-MCNC: 168 MG/DL (ref 70–99)
GLUCOSE BLD-MCNC: 58 MG/DL (ref 70–99)
GLUCOSE BLD-MCNC: 98 MG/DL (ref 70–99)
INR BLD: 2.15 (ref 0.86–1.14)
PERFORMED ON: ABNORMAL
PERFORMED ON: NORMAL
PROTHROMBIN TIME: 24.5 SEC (ref 9.8–13)

## 2019-05-22 PROCEDURE — 97530 THERAPEUTIC ACTIVITIES: CPT

## 2019-05-22 PROCEDURE — 97535 SELF CARE MNGMENT TRAINING: CPT

## 2019-05-22 PROCEDURE — 6370000000 HC RX 637 (ALT 250 FOR IP): Performed by: PHYSICAL MEDICINE & REHABILITATION

## 2019-05-22 PROCEDURE — 1280000000 HC REHAB R&B

## 2019-05-22 PROCEDURE — 36415 COLL VENOUS BLD VENIPUNCTURE: CPT

## 2019-05-22 PROCEDURE — 92523 SPEECH SOUND LANG COMPREHEN: CPT

## 2019-05-22 PROCEDURE — 97116 GAIT TRAINING THERAPY: CPT

## 2019-05-22 PROCEDURE — 94761 N-INVAS EAR/PLS OXIMETRY MLT: CPT

## 2019-05-22 PROCEDURE — 92526 ORAL FUNCTION THERAPY: CPT

## 2019-05-22 PROCEDURE — 2700000000 HC OXYGEN THERAPY PER DAY

## 2019-05-22 PROCEDURE — 85610 PROTHROMBIN TIME: CPT

## 2019-05-22 RX ADMIN — OXYCODONE HYDROCHLORIDE AND ACETAMINOPHEN 1 TABLET: 5; 325 TABLET ORAL at 20:32

## 2019-05-22 RX ADMIN — POLYETHYLENE GLYCOL (3350) 17 G: 17 POWDER, FOR SOLUTION ORAL at 08:50

## 2019-05-22 RX ADMIN — INSULIN GLARGINE 26 UNITS: 100 INJECTION, SOLUTION SUBCUTANEOUS at 08:57

## 2019-05-22 RX ADMIN — ASPIRIN 81 MG: 81 TABLET, COATED ORAL at 08:50

## 2019-05-22 RX ADMIN — BETHANECHOL CHLORIDE 25 MG: 25 TABLET ORAL at 17:08

## 2019-05-22 RX ADMIN — ATORVASTATIN CALCIUM 80 MG: 80 TABLET, FILM COATED ORAL at 17:08

## 2019-05-22 RX ADMIN — WARFARIN SODIUM 6 MG: 5 TABLET ORAL at 17:08

## 2019-05-22 RX ADMIN — SPIRONOLACTONE 25 MG: 25 TABLET ORAL at 08:49

## 2019-05-22 RX ADMIN — OXYCODONE HYDROCHLORIDE AND ACETAMINOPHEN 1 TABLET: 5; 325 TABLET ORAL at 08:50

## 2019-05-22 RX ADMIN — SENNOSIDES, DOCUSATE SODIUM 2 TABLET: 50; 8.6 TABLET, FILM COATED ORAL at 08:49

## 2019-05-22 RX ADMIN — AMLODIPINE BESYLATE 2.5 MG: 2.5 TABLET ORAL at 08:50

## 2019-05-22 RX ADMIN — BETHANECHOL CHLORIDE 25 MG: 25 TABLET ORAL at 08:50

## 2019-05-22 RX ADMIN — BETHANECHOL CHLORIDE 25 MG: 25 TABLET ORAL at 20:32

## 2019-05-22 RX ADMIN — SENNOSIDES, DOCUSATE SODIUM 2 TABLET: 50; 8.6 TABLET, FILM COATED ORAL at 20:32

## 2019-05-22 RX ADMIN — CYCLOBENZAPRINE HYDROCHLORIDE 10 MG: 10 TABLET, FILM COATED ORAL at 20:32

## 2019-05-22 RX ADMIN — MAGNESIUM HYDROXIDE 30 ML: 400 SUSPENSION ORAL at 20:32

## 2019-05-22 RX ADMIN — LISINOPRIL 10 MG: 10 TABLET ORAL at 08:50

## 2019-05-22 ASSESSMENT — PAIN SCALES - GENERAL
PAINLEVEL_OUTOF10: 0
PAINLEVEL_OUTOF10: 5
PAINLEVEL_OUTOF10: 5
PAINLEVEL_OUTOF10: 2
PAINLEVEL_OUTOF10: 0
PAINLEVEL_OUTOF10: 5
PAINLEVEL_OUTOF10: 0
PAINLEVEL_OUTOF10: 5
PAINLEVEL_OUTOF10: 7

## 2019-05-22 ASSESSMENT — PAIN DESCRIPTION - ORIENTATION
ORIENTATION: POSTERIOR
ORIENTATION: POSTERIOR

## 2019-05-22 ASSESSMENT — PAIN DESCRIPTION - DESCRIPTORS: DESCRIPTORS: ACHING

## 2019-05-22 ASSESSMENT — PAIN DESCRIPTION - LOCATION
LOCATION: BACK
LOCATION: BACK

## 2019-05-22 ASSESSMENT — PAIN DESCRIPTION - PAIN TYPE
TYPE: ACUTE PAIN
TYPE: ACUTE PAIN

## 2019-05-22 NOTE — PROGRESS NOTES
Pharmacy to Dose Warfarin    Dx: A-fib  Goal INR range 2-3   Home Warfarin dose:5 mg daily or as directed    Date  INR  Warfarin  5/17                3.29                  held  5/18                2.33                  5 mg  5/19                1.99                  5 mg  5/20                1.61                  5 mg  5/21                1.70                  6 mg  5/22                2.15                  6 mg    Recommend Warfarin 6 mg tonight x1. Daily INR ordered. Rx will continue to manage therapy per consult order.   Yared Parekh PharmD 05/22/19 10:14 AM

## 2019-05-22 NOTE — PROGRESS NOTES
Speech Language Pathology  Facility/Department: [facility]  Initial Speech/Language/Cognitive Assessment    NAME: Mahesh Ibarra  : 3/13/1933   MRN: [id number]  ADMISSION DATE: 2019  ADMITTING DIAGNOSIS: has Persistent atrial fibrillation ([facility] Diabetes mellitus type 2, uncontrolled ([facility] [address] Hyperlipidemia; Venous (peripheral) insufficiency; CAD (coronary artery disease); Normocytic anemia; Diastolic dysfunction; Essential hypertension; Lumbar spondylolysis; Bronchiectasis without complication ([facility] [address] Cardiac pacemaker in situ; Carotid stenosis; CKD (chronic kidney disease) stage 3, GFR 30-59 ml/min ([facility]); LPRD (laryngopharyngeal reflux disease); RAD (reactive airway disease), mild persistent, uncomplicated; RBBB (right bundle branch block); Restrictive lung disease; Sensorineural hearing loss; Obstructive and central sleep apnea; Decreased mobility; Presence of total knee joint prosthesis, bilateral; Presence of right hip implant; Allergic rhinitis; Diverticulosis; Vitamin D deficiency; Pressure ulcer of left buttock, stage 2; Constipation; and Lumbar compression fracture, sequela on their problem list.  DATE ONSET:  19    Date of Eval: 2019   Evaluating Therapist: HARSHA Atkisn    RECENT RESULTS  CT OF HEAD/MRI: N/A    Primary Complaint:  Pt was initially admitted with PT/OT orders only. Therapists noted cognitive deficits during completion of functional therapy tasks and speech evaluation subsequently recommended. Pt is known to this SLP from previous ARU stay in March (3/1-3/8). Per MD H&P: Cinthya taveras. o. male with a PMH significant for prostate cancer, ischemic colitis, and diastolic heart failure, who presented to the hospital with constipation and abdominal discomfort for 1 week. He had  CT abdomen and pelvis for workup, this revealed acute L1 compression fracture and progressed L2 compression fracture. Patient has no history of trauma or fall. Patient has no history of bowel or bladder and direct instruction to complete both structured and automatic (eating lunchtime meal) tasks. Hoarse and weak vocal quality noted today, though pt reports this is \"normal\" which is also consistent with evaluation completed during previous ARU admission in March. Continued speech therapy would be beneficial for the patient to promote improved cog-linguistic skills and decrease burden on caregiver upon discharge home. Recommendations:  Requires SLP Intervention: Yes  Duration/Frequency of Treatment: Speech therapy 5x/week to include cog-linguistic tx. D/C Recommendations: 24 hour supervision/assistance    Goals:  Short-term Goals  Timeframe for Short-term Goals: 7 Days (5/29/19)  Goal 1: Pt will complete immediate and delayed recall tasks with 50% accy, min cueing. Goal 2: Pt will complete graded problem solving and reasoning tasks with 50% accy, min cues. Goal 3: Pt will complete structured attention tasks with 75% accy, min cueing. Goal 4: Pt will verbalize awareness to orientation concepts x4 with 100% accy given mod cueing and use of external aid as needed. Long-term Goals  Timeframe for Long-term Goals: 10 Days (6/1/19)  Goal 1: Pt will improve overall cognitive-linguistic skills to promote safe return home at [facility]. Patient/family involved in developing goals and treatment plan: Yes    Subjective:  Chart Reviewed: Yes  Subjective  Subjective: Pt seated upright in chair, fatigued and lethargic, requiring cues to maintain alertness throughout duration of evaluation. Vision  Vision: Impaired  Vision Exceptions: Wears glasses for reading  Hearing  Hearing: Exceptions to [facility]  Hearing Exceptions: Hard of hearing/hearing concerns      Objective:     Oral/Motor  Oral Motor: Within functional limits    Auditory Comprehension  Comprehension: Exceptions  Yes/No Questions: Mild  Complex Questions: Moderate  One Step Basic Commands: Mild  Two Step Basic Commands:  Moderate    Reading Comprehension  Reading Status: Unable to assess    Expression  Primary Mode of Expression: Verbal    Verbal Expression  Verbal Expression: (Suspect overall expressive language WFL; pt presents with delayed processing and reduced verbal responses in general throughout evaluation, making assessment of expression difficult. Will continue to evaluate during therapy and add goals if warranted. )    Written Expression  Dominant Hand: Right  Written Expression: Exceptions to [facility]  Legibility Impairment Severity: Mild    Motor Speech  Motor Speech: Exceptions to [facility]  Intelligibility: Mild(Related to fatigue and lethargy. )  Dysarthria : Mild(May be r/t fatigue; intelligibility improved with periods of increased alertness)    Pragmatics/Social Functioning  Pragmatics: Exceptions to [facility]  Affect: Moderate  Eye Contact: Moderate  Monotone: Severe  Topic Maintenance: Moderate    Cognition:      Orientation  Overall Orientation Status: Impaired(Oriented to self; required cueing for awareness to day of the week, state, and year. )  Attention  Attention: Exceptions to [facility]  Alternating Attention: Severe  Divided Attention: Severe  Selective Attention: Severe  Sustained Attention: Severe  Memory  Memory: Exceptions to [facility]  Daily Routines: Severe  Short-term Memory: Severe  Working Memory: Severe  Problem Solving  Problem Solving: Exceptions to [facility]  Complex Functional Tasks: Severe  Managing Finances: To be assessed in therapy  Managing Medications: To be assessed in therapy  Simple Functional Tasks: Severe  Verbal Reasoning Skills: Severe  Numeric Reasoning  Numeric Reasoning: Exceptions to [facility]   Calculations: Severe  Money Management: To be assessed in therapy  Time: Severe  Abstract Reasoning  Abstract Reasoning: Exceptions to [facility]  Convergent Thinking:  To be assessed in therapy  Divergent Thinking: Severe  Safety/Judgement  Safety/Judgement: Exceptions to [facility]  Insight: Severe    Flexibility of Thought: Severe    Prognosis:  Speech

## 2019-05-22 NOTE — PROGRESS NOTES
Occupational Therapy  Facility/Department: [facility]  Daily Treatment Note  NAME: Mahesh Ibarra  : 3/13/1933  MRN: [id number]    Date of Service: 2019    Discharge Recommendations:  Continue to assess pending progress       Assessment   Performance deficits / Impairments: Decreased functional mobility ; Decreased strength;Decreased endurance;Decreased coordination;Decreased ADL status; Decreased safe awareness;Decreased balance;Decreased cognition  Assessment: Pt asleep in recliner, agreeable to tx. Pt bradkinetic, requires increased time to complete all tasks, decreased initiation and carryover throughout, needs cues to sequence for simple grooming tasks. Pt attempting to drink water in denture cup instead of cup of water or  mouthwash on sink. Pt requires cues for sequencing for all transfers for hand placement and tech, poor carryover. Pt with limited standing tolerance, poor attention to detail for pattern replicating task with 0% accuracy for replicating pattern of shapes/colors. Second session: Pt lethargic sitting in recliner, wife present. Pt confused, minimal verbalizations and poor command following. Pt requires significanlty increased time to perform all tasks and poor command following. Pt able to stnad from recliner with min/mod A, able ot amb to bathroom with RW and CGA/cues for directionality. Pt repeatedly running RW into BSC frame over toilet, unable to problem solve although he states he needed to use urinal. Pt requires total A for clothing mgmt and urinal placement, pt requires sitting on shower chair d/t poor balance while running RW into BSC frame. Pt unable to problem solve for any tasks this afternoon and follows <10% commands. Wife present and asking why pt is lethargic, then states \"Well, he was getting this way at home\".  Pt not progressing d/t cognition, may need SNF stay if wife unable to assist.   Patient Education: role of OT, safety, ADLs, d/c planning, transfers  Activity Tolerance  Activity Tolerance: Patient limited by fatigue;Treatment limited secondary to decreased cognition  Activity Tolerance: Pt lethargic throughout session requiring Min to mod VCs for arousal  Safety Devices  Safety Devices in place: Yes  Type of devices: Gait belt;Call light within reach; Chair alarm in place; Left in chair         Patient Diagnosis(es): There were no encounter diagnoses. 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 has a past surgical history that includes Cholecystectomy; Appendectomy (1939); Prostatectomy (2007); Revision total knee arthroplasty (Left, 06/18/2014); Upper gastrointestinal endoscopy (03/02/10); pacemaker placement; Colonoscopy (10/07/2014); Uvulopalatopharygoplasty; Coronary artery bypass graft (1990); Total knee arthroplasty (Bilateral, 1991); Partial hip arthroplasty (Right, 09/24/2016); and Prostate biopsy (08/10/2007). Restrictions  Restrictions/Precautions  Restrictions/Precautions: Fall Risk, General Precautions, Up as Tolerated  Required Braces or Orthoses?: Yes  Required Braces or Orthoses  Spinal Other: [place]-\"Patient should have brace on when up with activity. Okay to remove while in bed and while bathing. \"  Position Activity Restriction  Other position/activity restrictions: O2-1.5L NC, [place] brace when OOB, ok off while in bed/during ADLs, Up as tolerated   Subjective   General  Chart Reviewed: Yes  Patient assessed for rehabilitation services?: Yes  Additional Pertinent Hx: stage 2 sacral ulcer  Family / Caregiver Present: No  Referring Practitioner: Viktoria Biswas MD  Diagnosis: Constipation, L1 and L2 compression fxs  Subjective  Subjective: Pt in recliner, asleep but easily arousable, agreeable  General Comment  Comments: Per RN ok for therapy  Pain Assessment  Pain Level: 5  Pain Type: Acute pain  Pain Location: Back  Pain Orientation: Posterior  Pain Descriptors: Aching  Vital Signs  Patient Currently in Pain: Yes   Orientation  Orientation  Overall Orientation Status: Impaired  Orientation Level: Oriented to time;Oriented to person  Objective    ADL  Grooming: Moderate assistance;Verbal cueing; Increased time to complete(assist for thoroughness, cues for sequencing)  Additional Comments: pt needs cues for sequencing for simple grooming tasks, attempts to drink denture water instad of using cup/mouthwash on sink        Balance  Sitting Balance: Supervision  Standing Balance:  Moderate assistance(posterior lean at times)  Standing Balance  Time: 1-2 min x 4  Activity: transfers and standing activities  Functional Mobility  Functional - Mobility Device: Rolling Walker  Activity: Other  Assist Level: Minimal assistance  Functional Mobility Comments: taking a few steps from recliner <> w/c     Transfers  Stand Pivot Transfers: Minimal assistance  Sit to stand: Minimal assistance  Stand to sit: Minimal assistance  Transfer Comments: using RW and cues for hand placement/tech        Coordination  Gross Motor: fair for placing blocks on small dowel  Fine Motor: fair for retrieving blocks from bin              Cognition  Overall Cognitive Status: Exceptions  Arousal/Alertness: Delayed responses to stimuli;Inconsistent responses to stimuli  Following Commands: Inconsistently follows commands  Attention Span: Difficulty attending to directions  Memory: Decreased recall of recent events  Safety Judgement: Decreased awareness of need for safety;Decreased awareness of need for assistance  Problem Solving: Assistance required to generate solutions;Assistance required to implement solutions;Assistance required to identify errors made;Assistance required to correct errors made;Decreased awareness of errors  Insights: Decreased awareness of deficits  Initiation: Requires cues for all  Sequencing: Requires cues for all  Cognition Comment: Drowsy, lethargic, slow response     Perception  Overall Perceptual Status: Impaired  Initiation: Cues to initiate tasks                                   Plan   Plan  Times per week: 5/7x/week  Times per day: Daily  Current Treatment Recommendations: Strengthening, Endurance Training, Patient/Caregiver Education & Training, Cognitive Reorientation, Balance Training, Gait Training, Pain Management, Self-Care / ADL, Functional Mobility Training, Safety Education & Training, Cognitive/Perceptual Training    Goals  Short term goals  Time Frame for Short term goals: 1 week  Short term goal 1: Patient will complete toilet transfer/functional ADL transfer mod Ax1  Short term goal 2: Patient will complete UB dressing mod A  Short term goal 3: Patient will complete toileting/hygiene min A  Short term goal 4: Patient will complete grooming task EOB SBA  Short term goal 5: Patient will complete bed mobility mod Ax1  Long term goals  Time Frame for Long term goals : 2 weeks  Long term goal 1: Patient will complete functional ADL/toilet transfer CGA  Long term goal 2: Patient will complete UB/LB dressing min A  Long term goal 3: Patient will complete bed mobility CGA  Long term goal 4: Patient will complete 5 min dynamic standing activity CGA  Patient Goals   Patient goals : Patient reports goal is to get back home with wife         Therapy Time   Individual Concurrent Group Co-treatment   Time In 0900         Time Out 1000         Minutes 60         Timed Code Treatment Minutes: 60 Minutes    Therapy Time   Individual Concurrent Group Co-treatment   Time In 1400         Time Out 1440         Minutes 40         Timed Code Treatment Minutes: [address] [facility] [place], OT

## 2019-05-22 NOTE — PROGRESS NOTES
Pt alert oriented x 3 varying greatly throughout shift. Fatigues easily. Appetite good, needs encouragement to stay awake during meals. Incontinent of urine. Pain to back related to compression fx. Wife in to visit.

## 2019-05-22 NOTE — PROGRESS NOTES
Speech Language Pathology  Facility/Department: [facility]   CLINICAL BEDSIDE SWALLOW EVALUATION    NAME: Mahesh Ibarra  : 3/13/1933  MRN: [id number]    ADMISSION DATE: 2019  ADMITTING DIAGNOSIS: has Persistent atrial fibrillation ([facility] Diabetes mellitus type 2, uncontrolled ([facility] Hyperlipidemia; Venous (peripheral) insufficiency; CAD (coronary artery disease); Normocytic anemia; Diastolic dysfunction; Essential hypertension; Lumbar spondylolysis; Bronchiectasis without complication ([facility] Cardiac pacemaker in situ; Carotid stenosis; CKD (chronic kidney disease) stage 3, GFR 30-59 ml/min ([facility]); LPRD (laryngopharyngeal reflux disease); RAD (reactive airway disease), mild persistent, uncomplicated; RBBB (right bundle branch block); Restrictive lung disease; Sensorineural hearing loss; Obstructive and central sleep apnea; Decreased mobility; Presence of total knee joint prosthesis, bilateral; Presence of right hip implant; Allergic rhinitis; Diverticulosis; Vitamin D deficiency; Pressure ulcer of left buttock, stage 2; Constipation; and Lumbar compression fracture, sequela on their problem list.  ONSET DATE: 19    Recent Chest Xray/CT of Chest: (19)  Impression   Persistent right basilar airspace disease with a probable small right-sided   pleural effusion. Date of Eval: 2019  Evaluating Therapist: Xin Barnhart    Current Diet level:  Current Diet : Regular  Current Liquid Diet : Thin    Primary Complaint  No complaints from patient. Pt was previously seen by SLP during prior ARU stay in March, was on dental soft / TL diet at that time. Pt has, however, been on a regular/TL diet this admission and reports consumption of a regular/TL diet at home. Pt stated he takes some meds whole with water, some with applesauce depending on size; no reported difficulty.      Pain:  Pain Assessment: Denies    Reason for Referral  Mahesh Ibarra was referred for a bedside swallow evaluation to assess the Exceptions  Oral Phase Dysfunction  Lingual/Palatal Residue: (Mild post swallow residue with soft and regular solids. )  Oral Phase  Oral Phase - Comment: Pt presented with oral phase judged to be grossly effective for PO intake of regular/TL. However, AP bolus transit and overall oral phase prolonged, mostly related to pt's SADIE. At times of fatigue, cueing required to propel and clear bolus. With improved SADIE, bolus propulsion and clearance is also improved. Pt should only be allowed PO intake when awake and alert, upright in chair if possible, and able to feed self. Overall, oral phase is adequate for PO intake with proper level of alertness. Pt independently cleared residue with liquid wash. Indicators of Pharyngeal Phase Dysfunction   Pharyngeal Phase  Pharyngeal Phase: WFL  Pharyngeal Phase   Pharyngeal: Overall pharyngeal phase judged to be grossly intact when considering presbyphagia and pt's advanced age. Laryngeal elevation WFL upon palpation of the anterior neck. Swallow initiation was mildly delayed, though did not appear to negatively impact safety of the swallow; no overt s/s of aspiration observed. No coughing, throat clearing, or wet vocal quality. Prognosis  Prognosis  Prognosis for safe diet advancement: fair  Barriers to reach goals: cognitive deficits;fatigue;inconsistent alertness  Individuals consulted  Consulted and agree with results and recommendations: Patient    Education  Patient Education: Education provided to pt re: recommendations and safe swallowing precautions. Discussed importance for SADIE during PO intake. Patient Education Response: Verbalizes understanding;Needs reinforcement  Safety Devices in place: Yes  Type of devices: Left in chair;Call light within reach; Chair alarm in place       Therapy Time  SLP Individual Minutes  Time In: 8400  Time Out: 1926  Minutes: [address], [id number]  Speech-Language Pathologist

## 2019-05-22 NOTE — PROGRESS NOTES
Physical Therapy  Facility/Department: [facility]  Daily Treatment Note  NAME: Mahesh Ibarra  : 3/13/1933  MRN: [id number]    Date of Service: 2019    Discharge Recommendations:  24 hour supervision or assist, Home with Home health PT        Patient Diagnosis(es): There were no encounter diagnoses. 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 has a past surgical history that includes Cholecystectomy; Appendectomy (); Prostatectomy (); Revision total knee arthroplasty (Left, 2014); Upper gastrointestinal endoscopy (03/02/10); pacemaker placement; Colonoscopy (10/07/2014); Uvulopalatopharygoplasty; Coronary artery bypass graft (); Total knee arthroplasty (Bilateral, ); Partial hip arthroplasty (Right, 2016); and Prostate biopsy (08/10/2007). Restrictions  Restrictions/Precautions  Restrictions/Precautions: Fall Risk, General Precautions, Up as Tolerated  Required Braces or Orthoses?: Yes  Required Braces or Orthoses  Spinal Other: [place]-\"Patient should have brace on when up with activity. Okay to remove while in bed and while bathing. \"  Position Activity Restriction  Other position/activity restrictions: O2-1.5L NC, [place] brace when OOB, ok off while in bed/during ADLs, Up as tolerated   Subjective   General  Chart Reviewed: Yes  Response To Previous Treatment: Not applicable  Family / Caregiver Present: No  Referring Practitioner: Lauro Orozco  Subjective  Subjective: Pt reports his back pain is a 10/10 today. Patient  Glide brace donned in supine at start of tx dependent to don. Pain Assessment  Pain Assessment: 0-10  Pain Level: 0  Vital Signs  Temp: 97.7 °F (36.5 °C)  Pulse: 67  Heart Rate Source: Monitor  Resp: 16  BP: (!) 166/68  BP Location: Left upper arm  BP Upper/Lower: Upper  Patient Position: Semi fowlers  Oxygen Therapy  SpO2: 95 %  Pulse Oximeter Device Location: Left;Finger  O2 Device: Nasal cannula  O2 Flow Rate (L/min): 2 L/min       Orientation  Orientation  Overall Orientation Status: Impaired  Cognition      Objective   Bed mobility  Rolling to Left: Moderate assistance(all rolls log roll with cues )  Rolling to Right: Moderate assistance  Supine to Sit: Maximum assistance(cues for log roll Brace donned. )  Transfers  Sit to Stand: Moderate Assistance(FWW cues to lean forward and cues to push up from transfer surface )  Stand to sit: Contact guard assistance(FWW cues to reach back and align to transfer surface)  Bed to Chair: Moderate assistance(FWW mod sit to stand  cues to push up from transfer surface and CG with cues to sit )  Comment: first tx sit<>stand x2 chair, bed x1 and toilet with grab bar and raised toilet seat x1, max assist for clothing management  pt incontinent of urine with spillage on clothing. Ambulation  Ambulation?: Yes  More Ambulation?: Yes  Ambulation 1  Surface: level tile  Device: Rolling Walker  Other Apparatus: Wheelchair follow  Assistance: Stand by assistance  Quality of Gait: swing doesn't pass stance forward lean in gait cued for more upright posture, pt with mildly festinating gait   Distance: 321 feet  Neuromuscular Education  Neuromuscular Comments: dynamic standing balance in alternate semitandem supervision with 1 hand to no hands on FWW 4 min 45 seconds bean bag toss.         Second session:   Pt sleepy in chair drooling upon entry and pt noted \"my  Back seated at eob gripping hand rail and poor balance with intermittent posterior lob backwards with pt catching himself with grabbing handrail. )  Restraints  Initially in place: No     Therapy Time   Individual Concurrent Group Co-treatment   Time In 0730         Time Out 0830         Minutes 60         Timed Code Treatment Minutes: [address] Time:   Individual Concurrent Group Co-treatment   Time In 1030         Time Out 1100         Minutes 30           Timed Code Treatment Minutes:  30     Total Treatment Minutes:  90      Honorio Sullivan PT

## 2019-05-22 NOTE — PROGRESS NOTES
Mahesh Ibarra  5/22/2019  [id number]    Chief Complaint: Lumbar compression fracture, sequela    Subjective:   No acute events overnight. Seen in gym during the afternoon. No new c/o. ROS: No cp, sob, n/v, f/c  Objective:  Patient Vitals for the past 24 hrs:   BP Temp Temp src Pulse Resp SpO2 Weight   05/22/19 0735 (!) 166/68 97.7 °F (36.5 °C) Oral 67 16 95 % --   05/22/19 0524 -- -- -- -- -- -- 195 lb 15.8 oz (88.9 kg)   05/21/19 1949 (!) 161/75 98.1 °F (36.7 °C) Oral 85 18 98 % --     Gen: No distress, pleasant. HEENT: Normocephalic, atraumatic. CV: Regular rate and rhythm. Resp: No respiratory distress. +Basilar crackles. Abd: Soft, nontender   Ext: + edema. Neuro: Alert, oriented, appropriately interactive. Wt Readings from Last 3 Encounters:   05/22/19 195 lb 15.8 oz (88.9 kg)   05/14/19 190 lb (86.2 kg)   04/10/19 198 lb 3.2 oz (89.9 kg)       Laboratory data:   Lab Results   Component Value Date    WBC 9.2 05/20/2019    HGB 11.5 (L) 05/20/2019    HCT 34.2 (L) 05/20/2019    MCV 90.2 05/20/2019     05/20/2019       Lab Results   Component Value Date     05/20/2019    K 4.4 05/20/2019    CL 94 05/20/2019    CO2 37 05/20/2019    BUN 13 05/20/2019    CREATININE 0.7 05/20/2019    GLUCOSE 126 05/20/2019    CALCIUM 9.1 05/20/2019        Therapy progress:  PT  Required Braces or Orthoses  Spinal Other: [place]-\"Patient should have brace on when up with activity. Okay to remove while in bed and while bathing. \"  Position Activity Restriction  Other position/activity restrictions: O2-1.5L NC, [place] brace when OOB, ok off while in bed/during ADLs, Up as tolerated   Objective     Sit to Stand:  Moderate Assistance(FWW cues to lean forward and cues to push up from transfer surface )  Stand to sit: Contact guard assistance(FWW cues to reach back and align to transfer surface)  Bed to Chair: Moderate assistance(FWW mod sit to stand  cues to push up from transfer surface and CG with cues to sit

## 2019-05-23 LAB
ANION GAP SERPL CALCULATED.3IONS-SCNC: 12 MMOL/L (ref 3–16)
BASOPHILS ABSOLUTE: 0 K/UL (ref 0–0.2)
BASOPHILS RELATIVE PERCENT: 0.3 %
BUN BLDV-MCNC: 33 MG/DL (ref 7–20)
CALCIUM SERPL-MCNC: 9.2 MG/DL (ref 8.3–10.6)
CHLORIDE BLD-SCNC: 89 MMOL/L (ref 99–110)
CO2: 33 MMOL/L (ref 21–32)
CREAT SERPL-MCNC: 1.3 MG/DL (ref 0.8–1.3)
EOSINOPHILS ABSOLUTE: 0.2 K/UL (ref 0–0.6)
EOSINOPHILS RELATIVE PERCENT: 1.7 %
GFR AFRICAN AMERICAN: >60
GFR NON-AFRICAN AMERICAN: 52
GLUCOSE BLD-MCNC: 104 MG/DL (ref 70–99)
GLUCOSE BLD-MCNC: 115 MG/DL (ref 70–99)
GLUCOSE BLD-MCNC: 131 MG/DL (ref 70–99)
GLUCOSE BLD-MCNC: 138 MG/DL (ref 70–99)
GLUCOSE BLD-MCNC: 149 MG/DL (ref 70–99)
GLUCOSE BLD-MCNC: 174 MG/DL (ref 70–99)
GLUCOSE BLD-MCNC: 277 MG/DL (ref 70–99)
GLUCOSE BLD-MCNC: 67 MG/DL (ref 70–99)
GLUCOSE BLD-MCNC: 80 MG/DL (ref 70–99)
HCT VFR BLD CALC: 33.6 % (ref 40.5–52.5)
HEMOGLOBIN: 11.1 G/DL (ref 13.5–17.5)
INR BLD: 2.69 (ref 0.86–1.14)
LYMPHOCYTES ABSOLUTE: 2.6 K/UL (ref 1–5.1)
LYMPHOCYTES RELATIVE PERCENT: 23.2 %
MCH RBC QN AUTO: 30.1 PG (ref 26–34)
MCHC RBC AUTO-ENTMCNC: 33.1 G/DL (ref 31–36)
MCV RBC AUTO: 90.8 FL (ref 80–100)
MONOCYTES ABSOLUTE: 1.1 K/UL (ref 0–1.3)
MONOCYTES RELATIVE PERCENT: 9.7 %
NEUTROPHILS ABSOLUTE: 7.2 K/UL (ref 1.7–7.7)
NEUTROPHILS RELATIVE PERCENT: 65.1 %
PDW BLD-RTO: 13.9 % (ref 12.4–15.4)
PERFORMED ON: ABNORMAL
PERFORMED ON: NORMAL
PLATELET # BLD: 280 K/UL (ref 135–450)
PMV BLD AUTO: 7.9 FL (ref 5–10.5)
POTASSIUM REFLEX MAGNESIUM: 4.7 MMOL/L (ref 3.5–5.1)
PROTHROMBIN TIME: 29.8 SEC (ref 9.8–13)
RBC # BLD: 3.7 M/UL (ref 4.2–5.9)
SODIUM BLD-SCNC: 134 MMOL/L (ref 136–145)
WBC # BLD: 11 K/UL (ref 4–11)

## 2019-05-23 PROCEDURE — 2700000000 HC OXYGEN THERAPY PER DAY

## 2019-05-23 PROCEDURE — 94761 N-INVAS EAR/PLS OXIMETRY MLT: CPT

## 2019-05-23 PROCEDURE — 97530 THERAPEUTIC ACTIVITIES: CPT

## 2019-05-23 PROCEDURE — 97535 SELF CARE MNGMENT TRAINING: CPT

## 2019-05-23 PROCEDURE — 6370000000 HC RX 637 (ALT 250 FOR IP): Performed by: PHYSICAL MEDICINE & REHABILITATION

## 2019-05-23 PROCEDURE — 80048 BASIC METABOLIC PNL TOTAL CA: CPT

## 2019-05-23 PROCEDURE — 1280000000 HC REHAB R&B

## 2019-05-23 PROCEDURE — 97110 THERAPEUTIC EXERCISES: CPT

## 2019-05-23 PROCEDURE — 85610 PROTHROMBIN TIME: CPT

## 2019-05-23 PROCEDURE — 97127 HC SP THER IVNTJ W/FOCUS COG FUNCJ: CPT

## 2019-05-23 PROCEDURE — 36415 COLL VENOUS BLD VENIPUNCTURE: CPT

## 2019-05-23 PROCEDURE — 97116 GAIT TRAINING THERAPY: CPT

## 2019-05-23 PROCEDURE — 85025 COMPLETE CBC W/AUTO DIFF WBC: CPT

## 2019-05-23 RX ORDER — INSULIN GLARGINE 100 [IU]/ML
22 INJECTION, SOLUTION SUBCUTANEOUS EVERY MORNING
Status: DISCONTINUED | OUTPATIENT
Start: 2019-05-23 | End: 2019-05-24 | Stop reason: HOSPADM

## 2019-05-23 RX ADMIN — OXYCODONE HYDROCHLORIDE AND ACETAMINOPHEN 1 TABLET: 5; 325 TABLET ORAL at 08:50

## 2019-05-23 RX ADMIN — SENNOSIDES, DOCUSATE SODIUM 2 TABLET: 50; 8.6 TABLET, FILM COATED ORAL at 20:53

## 2019-05-23 RX ADMIN — BETHANECHOL CHLORIDE 25 MG: 25 TABLET ORAL at 08:49

## 2019-05-23 RX ADMIN — INSULIN GLARGINE 22 UNITS: 100 INJECTION, SOLUTION SUBCUTANEOUS at 09:05

## 2019-05-23 RX ADMIN — BETHANECHOL CHLORIDE 25 MG: 25 TABLET ORAL at 20:53

## 2019-05-23 RX ADMIN — LISINOPRIL 10 MG: 10 TABLET ORAL at 08:51

## 2019-05-23 RX ADMIN — INSULIN LISPRO 6 UNITS: 100 INJECTION, SOLUTION INTRAVENOUS; SUBCUTANEOUS at 12:03

## 2019-05-23 RX ADMIN — BETHANECHOL CHLORIDE 25 MG: 25 TABLET ORAL at 15:20

## 2019-05-23 RX ADMIN — POLYETHYLENE GLYCOL (3350) 17 G: 17 POWDER, FOR SOLUTION ORAL at 08:49

## 2019-05-23 RX ADMIN — SENNOSIDES, DOCUSATE SODIUM 2 TABLET: 50; 8.6 TABLET, FILM COATED ORAL at 08:49

## 2019-05-23 RX ADMIN — ONDANSETRON 4 MG: 4 TABLET, ORALLY DISINTEGRATING ORAL at 21:34

## 2019-05-23 RX ADMIN — INSULIN LISPRO 2 UNITS: 100 INJECTION, SOLUTION INTRAVENOUS; SUBCUTANEOUS at 18:04

## 2019-05-23 RX ADMIN — ATORVASTATIN CALCIUM 80 MG: 80 TABLET, FILM COATED ORAL at 17:57

## 2019-05-23 RX ADMIN — BISACODYL 10 MG: 10 SUPPOSITORY RECTAL at 08:51

## 2019-05-23 RX ADMIN — SPIRONOLACTONE 25 MG: 25 TABLET ORAL at 08:51

## 2019-05-23 RX ADMIN — AMLODIPINE BESYLATE 2.5 MG: 2.5 TABLET ORAL at 08:50

## 2019-05-23 RX ADMIN — WARFARIN SODIUM 3 MG: 2 TABLET ORAL at 17:57

## 2019-05-23 RX ADMIN — ASPIRIN 81 MG: 81 TABLET, COATED ORAL at 08:51

## 2019-05-23 RX ADMIN — OXYCODONE HYDROCHLORIDE AND ACETAMINOPHEN 1 TABLET: 5; 325 TABLET ORAL at 17:58

## 2019-05-23 ASSESSMENT — PAIN DESCRIPTION - ORIENTATION
ORIENTATION: LOWER
ORIENTATION: LEFT;RIGHT
ORIENTATION: LOWER

## 2019-05-23 ASSESSMENT — PAIN DESCRIPTION - ONSET
ONSET: GRADUAL
ONSET: GRADUAL

## 2019-05-23 ASSESSMENT — PAIN DESCRIPTION - DESCRIPTORS
DESCRIPTORS: ACHING;SHARP
DESCRIPTORS: ACHING
DESCRIPTORS: ACHING

## 2019-05-23 ASSESSMENT — PAIN SCALES - GENERAL
PAINLEVEL_OUTOF10: 0
PAINLEVEL_OUTOF10: 0
PAINLEVEL_OUTOF10: 4
PAINLEVEL_OUTOF10: 0
PAINLEVEL_OUTOF10: 4
PAINLEVEL_OUTOF10: 0
PAINLEVEL_OUTOF10: 6
PAINLEVEL_OUTOF10: 8
PAINLEVEL_OUTOF10: 8
PAINLEVEL_OUTOF10: 0

## 2019-05-23 ASSESSMENT — PAIN DESCRIPTION - PROGRESSION
CLINICAL_PROGRESSION: GRADUALLY WORSENING
CLINICAL_PROGRESSION: GRADUALLY WORSENING

## 2019-05-23 ASSESSMENT — PAIN - FUNCTIONAL ASSESSMENT
PAIN_FUNCTIONAL_ASSESSMENT: ACTIVITIES ARE NOT PREVENTED
PAIN_FUNCTIONAL_ASSESSMENT: ACTIVITIES ARE NOT PREVENTED

## 2019-05-23 ASSESSMENT — PAIN DESCRIPTION - FREQUENCY
FREQUENCY: CONTINUOUS
FREQUENCY: INTERMITTENT

## 2019-05-23 ASSESSMENT — PAIN DESCRIPTION - LOCATION
LOCATION: BACK
LOCATION: BUTTOCKS
LOCATION: BACK

## 2019-05-23 ASSESSMENT — PAIN DESCRIPTION - PAIN TYPE
TYPE: ACUTE PAIN

## 2019-05-23 NOTE — PLAN OF CARE
Patient able to use pain rating scale 0/10 adequately without problems. Pain medications explained along with frequency and when to notify the nurse with  possible side effects. Verbalizes understanding. Patient satisfied with current pain control regimen. Call light within reach. Denies needs at present.

## 2019-05-23 NOTE — CARE COORDINATION
Placed call to his wife Gabby Urbina. She stated that has been helping patient at home but is challenging to get him up, etc. She stated that she also has been helping him in the bathroom. Inquired what he needed to be at to return home: she wants him to be able to walk for household distances. Will call her after conference today.     Joe Beaulieu MSW, LSW
Placed call to patient's wife Deborah Acevedo and left a message asking for a call back.  Will need to obtain a home health care choice    Jose Torres MSW, LSW
Spoke with patient's wife while he was working with [address] [place] [address] . She is aware that family training is tomorrow at 11:00am with therapy. She stated understanding and this time still works.      She is still reviewing the home care list    Mariama Gallegos MSW, LSW
home    Summary: Patient is a 80year old male presenting to the ARU. He maintained appropriate eye contact throughout the initial assessment. He was able to answer questions but was focused on someone not answering the phone and him being alone here. Explained team conferences and family training. He stated his wife should be the main contact. He denied any questions or concerns. CLAUDIA is familiar with the patient as he was on the ARU in March of 2019. At that time, he went home with his wife and [address]. Will work on reaching out to patient's wife to obtain more information. At 8:52am: Left a message for patient's wife Prudence Born asking for a call back. Electronic continuity of care form is on the chart. Case Management (CM) will continue to follow for recommendations from the treatment team. Please notify Case Management if needs or concerns arise.      Mercedes Espinal MSW, LSW

## 2019-05-23 NOTE — PROGRESS NOTES
Physical Therapy  Facility/Department: [facility]  Daily Treatment Note  NAME: Mahesh Ibarra  : 3/13/1933  MRN: [id number]    Date of Service: 2019    Discharge Recommendations:  24 hour supervision or assist, Home with Home health PT        Patient Diagnosis(es): There were no encounter diagnoses. 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 has a past surgical history that includes Cholecystectomy; Appendectomy (); Prostatectomy (); Revision total knee arthroplasty (Left, 2014); Upper gastrointestinal endoscopy (03/02/10); pacemaker placement; Colonoscopy (10/07/2014); Uvulopalatopharygoplasty; Coronary artery bypass graft (); Total knee arthroplasty (Bilateral, ); Partial hip arthroplasty (Right, 2016); and Prostate biopsy (08/10/2007). Restrictions  Restrictions/Precautions  Restrictions/Precautions: Fall Risk, General Precautions, Up as Tolerated  Required Braces or Orthoses?: Yes  Required Braces or Orthoses  Spinal Other: [place]-\"Patient should have brace on when up with activity. Okay to remove while in bed and while bathing. \"  Position Activity Restriction  Other position/activity restrictions: O2 at 2L NC, [place] brace when OOB, ok off while in bed/during ADLs, Up as tolerated   Subjective  PT states, \"My back is sore and rates as 7/10\"  PT medicated by nursing prior to tx.              Orientation to person and place     Cognition   Cognition  Overall Cognitive Status: Exceptions  Arousal/Alertness: Delayed responses to stimuli;Inconsistent responses to stimuli  Following Commands: Inconsistently follows commands  Attention Span: Difficulty attending to directions  Memory: Decreased recall of recent events  Safety Judgement: Decreased awareness of need for safety;Decreased awareness of need for assistance  Problem Solving: Assistance required to generate solutions;Assistance required to implement solutions;Assistance required to identify errors made;Assistance required to correct errors made;Decreased awareness of errors  Insights: Decreased awareness of deficits  Initiation: Requires cues for all  Sequencing: Requires cues for all  Cognition Comment: Drowsy, lethargic, slow response  Objective JEWITT brace donend throughout session. Bed mobility  Supine to Sit: Minimal assistance(with cues for pushing up with Left elbow in left sidelying and using left rail )  Sit to Supine: Minimal assistance(for left leg )  Comment: pt given cues for log rolling and spine precautions for rolling and getting in and out of bed with use of rail   Transfers  Sit to Stand: Stand by assistance(FWW cued to scoot to edge of seate needed)  Stand to sit: Stand by assistance(cues to reach back for chair needed, FWW)  Comment: transfers x2 WC, x1 regular chair and x1 Bed<>chair   Bed<>chair FWW cues and SBA. Ambulation  Ambulation?: Yes  More Ambulation?: Yes  Ambulation 1  Surface: level tile  Device: Rolling Walker  Other Apparatus: Wheelchair follow  Assistance: Stand by assistance  Quality of Gait: swing doesn't pass stance forward lean in gait cued for more upright posture and longer steps with , pt with mildly festinating gait.  [place] cues more uprightt posture but still flexed in hips and back slightly despite Jewitt brace donned  Distance: 231 feet x2  Comments: Pt reports back pain is 7/10 sitting and standing with back brace on , noted bradykinesia with gait, bed mobility and transfers. With encouragement pt participating throughout session. Exercises  Hip Flexion: in hooklying hip  flexion supine x10 BLE to facilitate for bed mobility   Hip Abduction: supine x15   Knee Short Arc Quad: x30 BLE   Ankle Pumps: x30   Comments: cues and set up for pillows for SAQ. Pt needed cues for each exercise and intermittent redirection  as pt clsoing eyes during supine exercises. Needed counting cues. Pt with decreased alertness througout session with need for encouragement to participate. Assessment   Assessment: PT seen for therapy this session and noted to be alert with bradykinetic movement with flat affect with gait , transfers and bed mobility. Jewitt brace donned throughout session. Pt progressed wtih giat train for closer upright gait in   feet x2 SBA, transfers SBA with cues for scooting and hand placement with FWW, Demo to pt back/spine care for bed mobility with pt requiring on return demo cues and min assist as noted. Pt needed cues for LE therex and redirection to task. Family training scheduled with pt's wife for 5/24/2019. .             Goals:                  Short term goals  Time Frame for Short term goals: 1 week (5/25/19)  Short term goal 1: Pt will perform supine<>sit with min A to increase (I) with getting out of bed in home . GOAL Met 5/23/2019 Pt demo min assist for supine <>sit with use of bed rail and cues. Short term goal 2: Pt will perform sit<>stand and bed<>chair transfer c LRAD and mod A to increase (I) with functional transfers in home GOAL Met 5/23/2019 Pt demo Supervision bed<>chair transfers with cues and FWW. Short term goal 3: Pt will amb >30 ft c RW and min A to increase (I) with household ambulation GOAL Met 5/23/2019 Pt demo 231 feet gait with FWW SBA/close Supervision.    Short term goal 4: Pt will be able to accurately demonstrate log rolling technique with min vc's and </=6/10 pain to improve (I) and decrease stress on low back during bed mobility tasks . GOAL partially  Met 5/23/2019 Pt demo rolling left/right with supervision and log roll with use of bed rail with back pain up to 8/10. Long term goals  Time Frame for Long term goals : 2 weeks (6/1/19)  Long term goal 1: Pt will perform all bed mobility using log rolling technique with CGA for safety to increase (I) getting in to and out of bed in home 5/24/2019 GOAL not met see above STG status. Long term goal 2: Pt will perform sit<>stand and bed<>chair transfer c LRAD and CGA for safety to increase (I) with functional transfers in home GOAL Met 5/23/2019 Pt demo Supervision bed<>chair transfers with cues and FWW. Long term goal 3: Pt will amb >50 ft c LRAD and CGA for safety to increase (I) with household ambulation . GOAL Met 5/23/2019 Pt demo 231 feet gait with FWW SBA/close Supervision. Long term goal 4: Pt will be (I) with B LE strengthening program to promote B LE strength required for transfers and ambulation . GOAL not met pt requires cues, set up and redirection for hep. Long term goal 5: Pt's family will be able to demonstrate independence donning and doffing Jewitt Brace to allow for family to care for pt in home. 5/23/2019 Goal not met family training scheduled for 5/23/2019. Plan    Plan  Times per week: 5-7x/wk  Times per day: Daily  Plan weeks: 2 weeks   Current Treatment Recommendations: Strengthening, IADL Training, Functional Mobility Training, Neuromuscular Re-education, Home Exercise Program, Transfer Training, Gait Training, Safety Education & Training, Endurance Training, Positioning, Patient/Caregiver Education & Training, Stair training, Balance Training  Safety Devices  Type of devices:  All fall risk precautions in place, Call light within reach, Patient at risk for falls, Gait belt, Sitter present, Telesitter in use, Chair alarm in place, Left in

## 2019-05-23 NOTE — PROGRESS NOTES
Overweight    Nutrition Interventions:   Continue current diet, Continue current ONS  Continued Inpatient Monitoring, Education declined    Nutrition Evaluation:   · Evaluation: Progressing toward goals   · Goals: Patient will eat 50% or greater of meals and supplements.       · Monitoring: Meal Intake, Supplement Intake, Diet Tolerance, Weight, Pertinent Labs      Electronically signed by Daisy Mitchell RD, LD on 5/23/19 at 12:50 PM    Contact Number: Office: [phone]; [address]: [phone]

## 2019-05-23 NOTE — PLAN OF CARE
Nutrition Problem: Increased nutrient needs  Intervention: Food and/or Nutrient Delivery: Continue current diet, Continue current ONS  Nutritional Goals: Patient will eat 50% or greater of meals and supplements.

## 2019-05-23 NOTE — PROGRESS NOTES
Mahesh Ibarra  5/23/2019  [id number]    Chief Complaint: Lumbar compression fracture, sequela    Subjective:   No acute events overnight. Patient seen this am sitting up in room. He is fatigued. Confused about situation. Still hypoglycemia this morning despite lantus decrease. ROS: No cp, sob, n/v, f/c  Objective:  Patient Vitals for the past 24 hrs:   BP Temp Temp src Pulse Resp SpO2 Weight   05/23/19 0715 (!) 164/83 97.6 °F (36.4 °C) Oral 71 18 96 % --   05/23/19 0635 -- -- -- -- -- -- 196 lb 10.4 oz (89.2 kg)   05/22/19 2030 (!) 167/68 98 °F (36.7 °C) Oral 87 18 98 % --     Gen: No distress, pleasant. HEENT: Normocephalic, atraumatic. CV: Regular rate and rhythm. Resp: No respiratory distress. +Basilar crackles. Abd: Soft, nontender   Ext: + edema. Neuro: Alert, appropriately interactive. Confused about situation. Wt Readings from Last 3 Encounters:   05/23/19 196 lb 10.4 oz (89.2 kg)   05/14/19 190 lb (86.2 kg)   04/10/19 198 lb 3.2 oz (89.9 kg)       Laboratory data:   Lab Results   Component Value Date    WBC 9.2 05/20/2019    HGB 11.5 (L) 05/20/2019    HCT 34.2 (L) 05/20/2019    MCV 90.2 05/20/2019     05/20/2019       Lab Results   Component Value Date     05/20/2019    K 4.4 05/20/2019    CL 94 05/20/2019    CO2 37 05/20/2019    BUN 13 05/20/2019    CREATININE 0.7 05/20/2019    GLUCOSE 126 05/20/2019    CALCIUM 9.1 05/20/2019        Therapy progress:  PT  Required Braces or Orthoses  Spinal Other: [place]-\"Patient should have brace on when up with activity. Okay to remove while in bed and while bathing. \"  Position Activity Restriction  Other position/activity restrictions: O2-1.5L NC, [place] brace when OOB, ok off while in bed/during ADLs, Up as tolerated   Objective     Sit to Stand: Stand by assistance(FWW cued to scoot to edge of seate needed)  Stand to sit: Stand by assistance(cues to reach back for chair needed, FWW)  Bed to Chair: Moderate assistance(FWW mod sit to stand

## 2019-05-23 NOTE — PROGRESS NOTES
[facility]: ACUTE REHAB UNIT  SPEECH-LANGUAGE PATHOLOGY      [x] Daily  [] Weekly Care Conference Note  [] Discharge    Patient:Mahesh Ibarra      :3/13/1933  FYQ:[id number]  Rehab Dx/Hx: Lumbar compression fracture, sequela [S32.000S]    Precautions: falls and aspirations  Home situation: Lives at home with wife   Dx: [] Aphasia  [] Dysarthria  [] Apraxia   [] Oropharyngeal dysphagia [x] Cognitive Impairment  [x] Other:  Hoarse vocal quality  Date of Admit: 2019  Room #: 0152/0152-01    Current functional status (updated daily):         Pt being seen for : [] Speech/Language Treatment  [] Dysphagia Treatment [x] Cognitive Treatment  [] Other:  Communication: []WFL  [] Aphasia  [] Dysarthria  [] Apraxia  [] Pragmatic Impairment [] Non-verbal  [] Hearing Loss  [x] Other:  Dysphonia / hoarse vocal quality  Cognition: [] WFL  [] Mild  [] Moderate  [x] Severe [] Unable to Assess  [] Other:  Memory: [] WFL  [] Mild  [] Moderate  [x] Severe [] Unable to Assess  [] Other:  Behavior: [] Alert  [x] Cooperative  []  Pleasant  [x] Confused  [] Agitated  [] Uncooperative  [] Distractible [] Motivated  [] Self-Limiting [] Anxious  [x] Other: Fatigued  Endurance:  [] Adequate for participation in SLP sessions  [x] Reduced overall  [] Lethargic  [] Other:  Safety: [] No concerns at this time  [x] Reduced insight into deficits  [x]  Reduced safety awareness [] Not following call light procedures  [] Unable to Assess  [] Other:    Current Diet Order:DIET CARB CONTROL; Carb Control: 4 carb choices (60 gms)/meal  Dietary Nutrition Supplements: Diabetic Oral Supplement  Swallowing Precautions:  Alternate solids and liquids;Small bites/sips;Eat/Feed slowly;Upright as possible for all oral intake;Remain upright for 30-45 minutes after meals         Date: 2019      Tx session 1  [id number] Tx session 2  [id number]   Total Timed Code Min 30 30   Total Treatment Minutes 30 30   Individual Treatment Minutes 30 30   Group Treatment Minutes 0 0   Co-Treat Minutes 0 0   Variance/Reason:  N/A N/A   Pain None reported Denies   Pain Intervention [] RN notified  [] Repositioned  [] Intervention offered and patient declined  [x] N/A  [] Other: [] RN notified  [] Repositioned  [] Intervention offered and patient declined  [x] N/A  [] Other:   Subjective     Pt seated upright in chair, seen in community room. More alert when compared to evaluation yesterday, but still with decreased endurance and cueing required to maintain attention to task. Pt upright in chair in room, multiple family members present but pt and family agreeable for pt to come to therapy. Objective:     Cog-linguistic Goals:  Goal 1: Pt will complete immediate and delayed recall tasks with 50% accy, min cueing   Delayed recall of novel 3 word list:  - Use of writing information down and repetition. Pt recalled 0/3 words with max cues. Immediate recall for awareness to day of the week:  - Incorrect despite MAX cues. Goal 2: Pt will complete graded problem solving and reasoning tasks with 50% accy, min cues. Verbal reasoning:  - Divergent naming / reasoning task  - [place] categories  - Pt completed task with 25% accuracy with max cues; 1/4  - Pt stated \"soup\" for vegetable then prompted for specific vegetables and named tomato   Money manipulation:  - Counting money:   - When first asked to identify the different piles of coins by type pt said \"peanut butter\"  - Pt unable to identify coins by type  - Pt randomly pulled certain coins into a pile and able to count up quickly, but unable to count out a given target amount  - Pt unable to sort coins despite MAX cues  - Pt unable to follow commands to \"put the dime in the dime pile\"    Verbal reasoning:  - Attempted to name a type of candy (any kind) for the first 15 minutes of session  - SLP provided max verbal cues and descriptors for at least 10 different types of candy.   - Pt made minimal to no effort to respond or participate  - At times making vague responses such as \"a chocolate bar with nuts\" or \"a hard stick candy\" but unable to provide an actual name for the candy described. - Pt at times closing eyes, not responding, appearing to doze to sleep, and making random comments about hamburgers despite conversation about candy. Goal 3: Pt will complete structured attention tasks with 75% accy, min cueing. Divided attention:  - Simple sorting cards by suit  - Cueing for initial set up and use of visual aids  - Numerical sequencing / sorting in number order.   - Pt completed task with 100% accuracy with min cues. - Pt's attention was consistent even after wife came and environmental distractions   Poor attention to tasks this session.   - Pt frequently falling asleep and closing eyes  - Needing cues to maintain overall alertness for attempted participation in therapy tasks. Processing severely delayed - requiring frequent repetition of simple task instructions / target. Goal 4: Pt will verbalize awareness to orientation concepts x4 with 100% accy given mod cueing and use of external aid as needed. Did not directly target. Orientation    - Month: independent  - Year: pt stated \"1900\", unable to correct with cues. Gave pt 3 choices, pt did not respond. - VENECIA: SLP previously mentioned current VENECIA moments before asking pt, pt did not recall the VENECIA and/or did not process      Goal 5: **NEW GOAL 5/23/19** Pt will complete diaphragmatic breathing exercises and demonstrate improved awareness to vocal hygiene practices 10/10x with SLP and min-mod cueing to improve overall quality and  Discussed voice with pt's wife:  - per report, pt went to \"the sound [facility] at [facility]\" in the past  - Wife stated the \"diagnosis was that one of his vocal folds had atrophied\"    Discussed various diaphragmatic breathing techniques, basic vocal anatomy / production, and goals for therapy. Wife verbalized understanding.   Did not target. Other areas targeted: N/A N/A   Education:   Education provided to pt and pt's wife re: importance to participate in therapy and reason for tasks Education provided to pt re: importance to stay awake, alert, and participate in therapy   Safety Devices: [x] Call light within reach  [x] Chair alarm activated  [] Bed alarm activated  [] Other: [x] Call light within reach  [x] Chair alarm activated  [] Bed alarm activated  [] Other:    Assessment: Session 1: Pt upright in wheelchair appearing more alert than in yesterday's evaluation. Unable to recall 3 words after a 5 minute delay. Attention was consistent throughout card sorting task despite distractions from community room environment. Pt voiced more frequently throughout session. Divergent naming was challenging only being able to name 1 out of 4 items in concrete categories. Continue goals listed above. Session 2: Pt's overall level of alertness and participation was minimal. Processing appears to be significantly delayed, Pt falling asleep frequently throughout session needing MAX cues to stay awake and redirect to task. Pt unable to name any type of candy despite multiple MAX cues, models, and extended response time. When asked to participate, pt closed eyes; unable to tell if pt was asleep or unwilling to participate. Continue goals above. Plan: Continue as per plan of care.       Additional Information: N/A    Barriers toward progress: Severity of cognitive deficit  Discharge recommendations:  [] Home independently  [] Home with assistance [x]  24 hour supervision  [] ECF [x] Other: See PT/OT  Continued Tx Upon Discharge: ? [x] Yes [] No [] TBD based on progress while on ARU [] Vital Stim indicated [] Other:   Estimated discharge date: 5/30/19    Interventions used this date:  [] Speech/Language Treatment  [] Instruction in HEP [] Group [] Dysphagia Treatment [] Cognitive Treatment   [] Other:    Admitting SLP FIM scores:  Comprehension: 4 - Patient understands basic needs 75-90%+ of the time  Expression: 4 - Expresses basic ideas/needs 75-90%+ of the time  Social Interaction: 1 - Patient appropriate < 25% of the time  Problem Solvin - Patient solves simple/routine tasks < 25%  Memory: 1 - Patient remembers < 25% of the time    Current SLP FIM scores:  Comprehension: 4 - Patient understands basic needs 75-90%+ of the time  Expression: 4 - Expresses basic ideas/needs 75-90%+ of the time  Social Interaction: 1 - Patient appropriate < 25% of the time  Problem Solvin - Patient solves simple/routine tasks < 25%  Memory: 1 - Patient remembers < 25% of the time    Total Time Breakdown / Charges    Time in Time out Total Time / units   Cognitive Tx 1030  1230 1100  1300 30 min / 2 units  30 min / 2 units   Speech Tx -- -- --   Dysphagia Tx -- -- --     Electronically Signed by    Reji Bustamante Jose 87 CCC-SLP #[id number]  Speech-Language Pathologist    Alberto Feliz,  Clinician

## 2019-05-23 NOTE — PROGRESS NOTES
Occupational Therapy  Facility/Department: [facility]  Daily Treatment Note  NAME: Mahesh Ibarra  : 3/13/1933  MRN: [id number]    Date of Service: 2019    Discharge Recommendations:  Continue to assess pending progress       Assessment   Performance deficits / Impairments: Decreased functional mobility ; Decreased strength;Decreased endurance;Decreased coordination;Decreased ADL status; Decreased safe awareness;Decreased balance;Decreased cognition  Assessment: pt in recliner, agreeable with encouragement to therapy. Pt very bradykinetic and requires increased time to complete all tasks. Pt with poor initiation, falls asleep often during session. Pt requires extensive assist for all ADL tasks, needs cues for sequencing each step of bathing to wash each body part, pt will raise arms and wanted therapist to bathe him, but pt able to perform with max cues and encouragement. Pt requires extensive assist for dressing wtih poor effort, total A for LB and total A to fernando/doff Jewitt brace. Pt not progressing d/t cognition and lack of motivation/participation. Patient Education: role of OT, safety, ADLs, d/c planning, transfers  Activity Tolerance  Activity Tolerance: Patient limited by fatigue;Treatment limited secondary to decreased cognition  Activity Tolerance: Pt lethargic throughout session requiring Min to mod VCs for arousal  Safety Devices  Safety Devices in place: Yes  Type of devices: Gait belt;Call light within reach; Chair alarm in place; Left in chair         Patient Diagnosis(es): There were no encounter diagnoses.       has a past medical history of Acute encephalopathy, Acute lower GI bleeding, Acute renal failure (ARF) (HCC), Acute respiratory failure ([address] Atrial flutter ([address] Closed right hip fracture ([address] Diastolic heart failure ([address] Diverticulitis, HCAP (healthcare-associated pneumonia), Iron deficiency anemia, Ischemic colitis ([address] Metabolic encephalopathy, Nonhealing nonsurgical wound of ADL  Grooming: Moderate assistance;Verbal cueing; Increased time to complete(assist for thoroughness, cues for sequencing)  UE Bathing: Moderate assistance;Verbal cueing; Increased time to complete  LE Bathing: Maximum assistance; Increased time to complete;Verbal cueing  UE Dressing: Maximum assistance(pullover polo shirt)  LE Dressing: Dependent/Total(pants, brief, socks)  Additional Comments: pt needs cues for sequencing and initiating all ADL tasks, to wash each body part and for simple grooming tasks         Balance  Sitting Balance: Supervision  Standing Balance: Moderate assistance(posterior lean at times)  Standing Balance  Time: 1-2 min x 6  Activity: transfers and standing ADL tasks   Shower Transfers  Shower - Transfer Type: To and From  Shower - Transfer To:  Transfer tub bench  Shower - Technique: Ambulating  Shower Transfers: Minimal assistance  Shower Transfers Comments: cues for hand placement and tech     Transfers  Stand Pivot Transfers: Minimal assistance  Sit to stand: Minimal assistance  Stand to sit: Minimal assistance  Transfer Comments: using RW and cues for hand placement/tech        Coordination  Gross Motor: fair for ADLs , pt very bradykinetic  Fine Motor: fair for ADLs               Cognition  Overall Cognitive Status: Exceptions  Arousal/Alertness: Delayed responses to stimuli;Inconsistent responses to stimuli  Following Commands: Inconsistently follows commands  Attention Span: Difficulty attending to directions  Memory: Decreased recall of recent events  Safety Judgement: Decreased awareness of need for safety;Decreased awareness of need for assistance  Problem Solving: Assistance required to generate solutions;Assistance required to implement solutions;Assistance required to identify errors made;Assistance required to correct errors made;Decreased awareness of errors  Insights: Decreased awareness of deficits  Initiation: Requires cues for all  Sequencing: Requires cues for all  Cognition Comment: Drowsy, lethargic, slow response     Perception  Overall Perceptual Status: Impaired  Initiation: Cues to initiate tasks        Plan   Plan  Times per week: 5/7x/week  Times per day: Daily  Current Treatment Recommendations: Strengthening, Endurance Training, Patient/Caregiver Education & Training, Cognitive Reorientation, Balance Training, Gait Training, Pain Management, Self-Care / ADL, Functional Mobility Training, Safety Education & Training, Cognitive/Perceptual Training    Goals  Short term goals  Time Frame for Short term goals: 1 week  Short term goal 1: Patient will complete toilet transfer/functional ADL transfer mod Ax1  Short term goal 2: Patient will complete UB dressing mod A  Short term goal 3: Patient will complete toileting/hygiene min A  Short term goal 4: Patient will complete grooming task EOB SBA  Short term goal 5: Patient will complete bed mobility mod Ax1  Long term goals  Time Frame for Long term goals : 2 weeks  Long term goal 1: Patient will complete functional ADL/toilet transfer CGA  Long term goal 2: Patient will complete UB/LB dressing min A  Long term goal 3: Patient will complete bed mobility CGA  Long term goal 4: Patient will complete 5 min dynamic standing activity CGA  Patient Goals   Patient goals : Patient reports goal is to get back home with wife       Therapy Time   Individual Concurrent Group Co-treatment   Time In 0900         Time Out 1000         Minutes 60         Timed Code Treatment Minutes: [address], OT

## 2019-05-24 ENCOUNTER — HOSPITAL ENCOUNTER (INPATIENT)
Age: 84
LOS: 4 days | Discharge: NURSING, REHAB OR LONG-TERM CARE | DRG: 189 | End: 2019-05-28
Attending: INTERNAL MEDICINE | Admitting: INTERNAL MEDICINE
Payer: MEDICARE

## 2019-05-24 ENCOUNTER — APPOINTMENT (OUTPATIENT)
Dept: GENERAL RADIOLOGY | Age: 84
DRG: 551 | End: 2019-05-24
Attending: PHYSICAL MEDICINE & REHABILITATION
Payer: MEDICARE

## 2019-05-24 VITALS
TEMPERATURE: 97.9 F | DIASTOLIC BLOOD PRESSURE: 42 MMHG | WEIGHT: 196.65 LBS | HEART RATE: 60 BPM | RESPIRATION RATE: 18 BRPM | HEIGHT: 70 IN | BODY MASS INDEX: 28.15 KG/M2 | OXYGEN SATURATION: 100 % | SYSTOLIC BLOOD PRESSURE: 92 MMHG

## 2019-05-24 DIAGNOSIS — S32.000S LUMBAR COMPRESSION FRACTURE, SEQUELA: Primary | ICD-10-CM

## 2019-05-24 DIAGNOSIS — E11.65 UNCONTROLLED TYPE 2 DIABETES MELLITUS WITH HYPERGLYCEMIA (HCC): ICD-10-CM

## 2019-05-24 PROBLEM — R91.8 PULMONARY INFILTRATES: Status: ACTIVE | Noted: 2019-05-24

## 2019-05-24 PROBLEM — E87.1 HYPONATREMIA: Status: ACTIVE | Noted: 2019-05-24

## 2019-05-24 PROBLEM — J96.00 ACUTE RESPIRATORY FAILURE (HCC): Status: ACTIVE | Noted: 2019-05-24

## 2019-05-24 PROBLEM — D72.829 LEUKOCYTOSIS: Status: ACTIVE | Noted: 2019-05-24

## 2019-05-24 PROBLEM — S32.010A CLOSED COMPRESSION FRACTURE OF FIRST LUMBAR VERTEBRA (HCC): Status: ACTIVE | Noted: 2019-05-24

## 2019-05-24 PROBLEM — J81.0 ACUTE PULMONARY EDEMA (HCC): Status: ACTIVE | Noted: 2019-05-24

## 2019-05-24 LAB
ANION GAP SERPL CALCULATED.3IONS-SCNC: 14 MMOL/L (ref 3–16)
BACTERIA: ABNORMAL /HPF
BASE EXCESS ARTERIAL: 3.9 MMOL/L (ref -3–3)
BASE EXCESS ARTERIAL: 4 (ref -3–3)
BASE EXCESS ARTERIAL: 4.7 MMOL/L (ref -3–3)
BILIRUBIN URINE: NEGATIVE
BLOOD, URINE: NEGATIVE
BUN BLDV-MCNC: 45 MG/DL (ref 7–20)
CALCIUM IONIZED: 1.08 MMOL/L (ref 1.12–1.32)
CALCIUM SERPL-MCNC: 9.2 MG/DL (ref 8.3–10.6)
CARBOXYHEMOGLOBIN ARTERIAL: 0.3 % (ref 0–1.5)
CARBOXYHEMOGLOBIN ARTERIAL: 0.3 % (ref 0–1.5)
CHLORIDE BLD-SCNC: 85 MMOL/L (ref 99–110)
CLARITY: CLEAR
CO2: 30 MMOL/L (ref 21–32)
COLOR: YELLOW
CREAT SERPL-MCNC: 1.8 MG/DL (ref 0.8–1.3)
CREATININE URINE: 116.8 MG/DL (ref 39–259)
EKG ATRIAL RATE: 234 BPM
EKG DIAGNOSIS: NORMAL
EKG Q-T INTERVAL: 600 MS
EKG QRS DURATION: 162 MS
EKG QTC CALCULATION (BAZETT): 619 MS
EKG R AXIS: -87 DEGREES
EKG T AXIS: 94 DEGREES
EKG VENTRICULAR RATE: 64 BPM
EPITHELIAL CELLS, UA: ABNORMAL /HPF
GFR AFRICAN AMERICAN: 43
GFR NON-AFRICAN AMERICAN: 36
GLUCOSE BLD-MCNC: 115 MG/DL (ref 70–99)
GLUCOSE BLD-MCNC: 128 MG/DL (ref 70–99)
GLUCOSE BLD-MCNC: 168 MG/DL (ref 70–99)
GLUCOSE BLD-MCNC: 194 MG/DL (ref 70–99)
GLUCOSE BLD-MCNC: 221 MG/DL (ref 70–99)
GLUCOSE BLD-MCNC: 229 MG/DL (ref 70–99)
GLUCOSE BLD-MCNC: 231 MG/DL (ref 70–99)
GLUCOSE URINE: NEGATIVE MG/DL
HCO3 ARTERIAL: 30 MMOL/L (ref 21–29)
HCO3 ARTERIAL: 31 MMOL/L (ref 21–29)
HCO3 ARTERIAL: 32.7 MMOL/L (ref 21–29)
HCT VFR BLD CALC: 32.5 % (ref 40.5–52.5)
HEMOGLOBIN, ART, EXTENDED: 11 G/DL (ref 13.5–17.5)
HEMOGLOBIN, ART, EXTENDED: 12.1 G/DL (ref 13.5–17.5)
HEMOGLOBIN: 10.8 G/DL (ref 13.5–17.5)
HEMOGLOBIN: 12 GM/DL (ref 13.5–17.5)
INR BLD: 3.72 (ref 0.86–1.14)
KETONES, URINE: NEGATIVE MG/DL
LACTATE: 1.71 MMOL/L (ref 0.4–2)
LEUKOCYTE ESTERASE, URINE: ABNORMAL
MCH RBC QN AUTO: 30 PG (ref 26–34)
MCHC RBC AUTO-ENTMCNC: 33.2 G/DL (ref 31–36)
MCV RBC AUTO: 90.4 FL (ref 80–100)
METHEMOGLOBIN ARTERIAL: 0.5 %
METHEMOGLOBIN ARTERIAL: 0.6 %
MICROSCOPIC EXAMINATION: YES
NITRITE, URINE: NEGATIVE
O2 CONTENT ARTERIAL: 15 ML/DL
O2 CONTENT ARTERIAL: 16 ML/DL
O2 SAT, ARTERIAL: 95.1 %
O2 SAT, ARTERIAL: 96 % (ref 93–100)
O2 SAT, ARTERIAL: 98.6 %
O2 THERAPY: ABNORMAL
O2 THERAPY: ABNORMAL
PCO2 ARTERIAL: 52.9 MMHG (ref 35–45)
PCO2 ARTERIAL: 65 MM HG (ref 35–45)
PCO2 ARTERIAL: 66.2 MMHG (ref 35–45)
PDW BLD-RTO: 14.2 % (ref 12.4–15.4)
PERFORMED ON: ABNORMAL
PH ARTERIAL: 7.29 (ref 7.35–7.45)
PH ARTERIAL: 7.31 (ref 7.35–7.45)
PH ARTERIAL: 7.37 (ref 7.35–7.45)
PH UA: 5 (ref 5–8)
PLATELET # BLD: 294 K/UL (ref 135–450)
PMV BLD AUTO: 8.1 FL (ref 5–10.5)
PO2 ARTERIAL: 135.7 MMHG (ref 75–108)
PO2 ARTERIAL: 85.9 MMHG (ref 75–108)
PO2 ARTERIAL: 91.3 MM HG (ref 75–108)
POC HEMATOCRIT: 35 % (ref 40.5–52.5)
POC POTASSIUM: 4.9 MMOL/L (ref 3.5–5.1)
POC SAMPLE TYPE: ABNORMAL
POC SODIUM: 128 MMOL/L (ref 136–145)
POTASSIUM SERPL-SCNC: 5.2 MMOL/L (ref 3.5–5.1)
PROTEIN PROTEIN: 22 MG/DL
PROTEIN UA: NEGATIVE MG/DL
PROTEIN/CREAT RATIO: 0.2 MG/DL
PROTHROMBIN TIME: 42.4 SEC (ref 9.8–13)
RBC # BLD: 3.6 M/UL (ref 4.2–5.9)
RBC UA: ABNORMAL /HPF (ref 0–2)
SODIUM BLD-SCNC: 129 MMOL/L (ref 136–145)
SPECIFIC GRAVITY UA: >=1.03 (ref 1–1.03)
TCO2 ARTERIAL: 31.7 MMOL/L
TCO2 ARTERIAL: 33 MMOL/L
TCO2 ARTERIAL: 34.7 MMOL/L
TROPONIN: 0.02 NG/ML
TROPONIN: 0.03 NG/ML
TROPONIN: 0.05 NG/ML
URINE TYPE: ABNORMAL
UROBILINOGEN, URINE: 0.2 E.U./DL
WBC # BLD: 18 K/UL (ref 4–11)
WBC UA: ABNORMAL /HPF (ref 0–5)

## 2019-05-24 PROCEDURE — 94660 CPAP INITIATION&MGMT: CPT

## 2019-05-24 PROCEDURE — 51798 US URINE CAPACITY MEASURE: CPT

## 2019-05-24 PROCEDURE — 82330 ASSAY OF CALCIUM: CPT

## 2019-05-24 PROCEDURE — 6370000000 HC RX 637 (ALT 250 FOR IP): Performed by: INTERNAL MEDICINE

## 2019-05-24 PROCEDURE — 83605 ASSAY OF LACTIC ACID: CPT

## 2019-05-24 PROCEDURE — 93010 ELECTROCARDIOGRAM REPORT: CPT | Performed by: INTERNAL MEDICINE

## 2019-05-24 PROCEDURE — 82803 BLOOD GASES ANY COMBINATION: CPT

## 2019-05-24 PROCEDURE — 84165 PROTEIN E-PHORESIS SERUM: CPT

## 2019-05-24 PROCEDURE — 36600 WITHDRAWAL OF ARTERIAL BLOOD: CPT

## 2019-05-24 PROCEDURE — 84132 ASSAY OF SERUM POTASSIUM: CPT

## 2019-05-24 PROCEDURE — 82947 ASSAY GLUCOSE BLOOD QUANT: CPT

## 2019-05-24 PROCEDURE — 84155 ASSAY OF PROTEIN SERUM: CPT

## 2019-05-24 PROCEDURE — 82570 ASSAY OF URINE CREATININE: CPT

## 2019-05-24 PROCEDURE — 94761 N-INVAS EAR/PLS OXIMETRY MLT: CPT

## 2019-05-24 PROCEDURE — 84484 ASSAY OF TROPONIN QUANT: CPT

## 2019-05-24 PROCEDURE — 2700000000 HC OXYGEN THERAPY PER DAY

## 2019-05-24 PROCEDURE — 99223 1ST HOSP IP/OBS HIGH 75: CPT | Performed by: INTERNAL MEDICINE

## 2019-05-24 PROCEDURE — 71045 X-RAY EXAM CHEST 1 VIEW: CPT

## 2019-05-24 PROCEDURE — 85610 PROTHROMBIN TIME: CPT

## 2019-05-24 PROCEDURE — 85027 COMPLETE CBC AUTOMATED: CPT

## 2019-05-24 PROCEDURE — 81001 URINALYSIS AUTO W/SCOPE: CPT

## 2019-05-24 PROCEDURE — 6360000002 HC RX W HCPCS: Performed by: INTERNAL MEDICINE

## 2019-05-24 PROCEDURE — 36415 COLL VENOUS BLD VENIPUNCTURE: CPT

## 2019-05-24 PROCEDURE — 93005 ELECTROCARDIOGRAM TRACING: CPT | Performed by: INTERNAL MEDICINE

## 2019-05-24 PROCEDURE — 2060000000 HC ICU INTERMEDIATE R&B

## 2019-05-24 PROCEDURE — 84156 ASSAY OF PROTEIN URINE: CPT

## 2019-05-24 PROCEDURE — 84295 ASSAY OF SERUM SODIUM: CPT

## 2019-05-24 PROCEDURE — 80048 BASIC METABOLIC PNL TOTAL CA: CPT

## 2019-05-24 PROCEDURE — 83883 ASSAY NEPHELOMETRY NOT SPEC: CPT

## 2019-05-24 PROCEDURE — 85014 HEMATOCRIT: CPT

## 2019-05-24 RX ORDER — INSULIN GLARGINE 100 [IU]/ML
22 INJECTION, SOLUTION SUBCUTANEOUS EVERY MORNING
Status: DISCONTINUED | OUTPATIENT
Start: 2019-05-24 | End: 2019-05-28 | Stop reason: HOSPADM

## 2019-05-24 RX ORDER — OXYCODONE HYDROCHLORIDE AND ACETAMINOPHEN 5; 325 MG/1; MG/1
1 TABLET ORAL EVERY 6 HOURS PRN
Status: DISCONTINUED | OUTPATIENT
Start: 2019-05-24 | End: 2019-05-28 | Stop reason: HOSPADM

## 2019-05-24 RX ORDER — FUROSEMIDE 10 MG/ML
60 INJECTION INTRAMUSCULAR; INTRAVENOUS ONCE
Status: DISCONTINUED | OUTPATIENT
Start: 2019-05-24 | End: 2019-05-27

## 2019-05-24 RX ORDER — AMLODIPINE BESYLATE 2.5 MG/1
2.5 TABLET ORAL DAILY
Status: DISCONTINUED | OUTPATIENT
Start: 2019-05-24 | End: 2019-05-27

## 2019-05-24 RX ORDER — LIDOCAINE 4 G/G
1 PATCH TOPICAL DAILY
Status: DISCONTINUED | OUTPATIENT
Start: 2019-05-24 | End: 2019-05-28 | Stop reason: HOSPADM

## 2019-05-24 RX ORDER — CYCLOBENZAPRINE HCL 10 MG
10 TABLET ORAL 3 TIMES DAILY PRN
Status: DISCONTINUED | OUTPATIENT
Start: 2019-05-24 | End: 2019-05-28 | Stop reason: HOSPADM

## 2019-05-24 RX ORDER — NICOTINE POLACRILEX 4 MG
15 LOZENGE BUCCAL PRN
Status: DISCONTINUED | OUTPATIENT
Start: 2019-05-24 | End: 2019-05-28 | Stop reason: HOSPADM

## 2019-05-24 RX ORDER — ASPIRIN 81 MG/1
81 TABLET ORAL DAILY
Status: DISCONTINUED | OUTPATIENT
Start: 2019-05-24 | End: 2019-05-28 | Stop reason: HOSPADM

## 2019-05-24 RX ORDER — LISINOPRIL 10 MG/1
10 TABLET ORAL DAILY
Status: DISCONTINUED | OUTPATIENT
Start: 2019-05-24 | End: 2019-05-28 | Stop reason: HOSPADM

## 2019-05-24 RX ORDER — SENNA AND DOCUSATE SODIUM 50; 8.6 MG/1; MG/1
2 TABLET, FILM COATED ORAL 2 TIMES DAILY
Status: DISCONTINUED | OUTPATIENT
Start: 2019-05-24 | End: 2019-05-28 | Stop reason: HOSPADM

## 2019-05-24 RX ORDER — SODIUM CHLORIDE 0.9 % (FLUSH) 0.9 %
SYRINGE (ML) INJECTION
Status: DISCONTINUED
Start: 2019-05-24 | End: 2019-05-24 | Stop reason: HOSPADM

## 2019-05-24 RX ORDER — ACETAMINOPHEN 325 MG/1
650 TABLET ORAL EVERY 4 HOURS PRN
Status: DISCONTINUED | OUTPATIENT
Start: 2019-05-24 | End: 2019-05-28 | Stop reason: HOSPADM

## 2019-05-24 RX ORDER — BETHANECHOL CHLORIDE 25 MG/1
25 TABLET ORAL 3 TIMES DAILY
Status: DISCONTINUED | OUTPATIENT
Start: 2019-05-24 | End: 2019-05-28 | Stop reason: HOSPADM

## 2019-05-24 RX ORDER — LEVOFLOXACIN 5 MG/ML
250 INJECTION, SOLUTION INTRAVENOUS EVERY 24 HOURS
Status: DISCONTINUED | OUTPATIENT
Start: 2019-05-25 | End: 2019-05-28 | Stop reason: HOSPADM

## 2019-05-24 RX ORDER — POLYETHYLENE GLYCOL 3350 17 G/17G
17 POWDER, FOR SOLUTION ORAL DAILY
Status: DISCONTINUED | OUTPATIENT
Start: 2019-05-24 | End: 2019-05-28 | Stop reason: HOSPADM

## 2019-05-24 RX ORDER — LEVOFLOXACIN 5 MG/ML
500 INJECTION, SOLUTION INTRAVENOUS ONCE
Status: COMPLETED | OUTPATIENT
Start: 2019-05-24 | End: 2019-05-24

## 2019-05-24 RX ORDER — SPIRONOLACTONE 25 MG/1
25 TABLET ORAL DAILY
Status: DISCONTINUED | OUTPATIENT
Start: 2019-05-24 | End: 2019-05-28 | Stop reason: HOSPADM

## 2019-05-24 RX ORDER — FUROSEMIDE 40 MG/1
40 TABLET ORAL DAILY
Status: DISCONTINUED | OUTPATIENT
Start: 2019-05-24 | End: 2019-05-27

## 2019-05-24 RX ORDER — SODIUM CHLORIDE 0.9 % (FLUSH) 0.9 %
SYRINGE (ML) INJECTION
Status: DISPENSED
Start: 2019-05-24 | End: 2019-05-25

## 2019-05-24 RX ORDER — BISACODYL 10 MG
10 SUPPOSITORY, RECTAL RECTAL DAILY PRN
Status: DISCONTINUED | OUTPATIENT
Start: 2019-05-24 | End: 2019-05-28 | Stop reason: HOSPADM

## 2019-05-24 RX ORDER — ONDANSETRON 4 MG/1
4 TABLET, ORALLY DISINTEGRATING ORAL PRN
Status: DISCONTINUED | OUTPATIENT
Start: 2019-05-24 | End: 2019-05-28 | Stop reason: HOSPADM

## 2019-05-24 RX ORDER — ATORVASTATIN CALCIUM 80 MG/1
80 TABLET, FILM COATED ORAL EVERY EVENING
Status: DISCONTINUED | OUTPATIENT
Start: 2019-05-24 | End: 2019-05-28 | Stop reason: HOSPADM

## 2019-05-24 RX ADMIN — BETHANECHOL CHLORIDE 25 MG: 25 TABLET ORAL at 13:13

## 2019-05-24 RX ADMIN — OXYCODONE HYDROCHLORIDE AND ACETAMINOPHEN 1 TABLET: 5; 325 TABLET ORAL at 16:11

## 2019-05-24 RX ADMIN — ACETAMINOPHEN 650 MG: 325 TABLET ORAL at 13:44

## 2019-05-24 RX ADMIN — AMLODIPINE BESYLATE 2.5 MG: 2.5 TABLET ORAL at 13:18

## 2019-05-24 RX ADMIN — LISINOPRIL 10 MG: 10 TABLET ORAL at 13:18

## 2019-05-24 RX ADMIN — INSULIN GLARGINE 22 UNITS: 100 INJECTION, SOLUTION SUBCUTANEOUS at 13:19

## 2019-05-24 RX ADMIN — FUROSEMIDE 40 MG: 40 TABLET ORAL at 15:10

## 2019-05-24 RX ADMIN — INSULIN LISPRO 4 UNITS: 100 INJECTION, SOLUTION INTRAVENOUS; SUBCUTANEOUS at 13:22

## 2019-05-24 RX ADMIN — ATORVASTATIN CALCIUM 80 MG: 80 TABLET, FILM COATED ORAL at 16:11

## 2019-05-24 RX ADMIN — ASPIRIN 81 MG: 81 TABLET, COATED ORAL at 13:13

## 2019-05-24 RX ADMIN — SPIRONOLACTONE 25 MG: 25 TABLET ORAL at 13:14

## 2019-05-24 RX ADMIN — BETHANECHOL CHLORIDE 25 MG: 25 TABLET ORAL at 21:05

## 2019-05-24 RX ADMIN — LEVOFLOXACIN 500 MG: 5 INJECTION, SOLUTION INTRAVENOUS at 05:07

## 2019-05-24 ASSESSMENT — PAIN SCALES - GENERAL
PAINLEVEL_OUTOF10: 0
PAINLEVEL_OUTOF10: 5
PAINLEVEL_OUTOF10: 0
PAINLEVEL_OUTOF10: 5
PAINLEVEL_OUTOF10: 0
PAINLEVEL_OUTOF10: 0
PAINLEVEL_OUTOF10: 5
PAINLEVEL_OUTOF10: 5

## 2019-05-24 ASSESSMENT — PAIN DESCRIPTION - PAIN TYPE
TYPE: ACUTE PAIN;CHRONIC PAIN
TYPE: CHRONIC PAIN

## 2019-05-24 ASSESSMENT — PAIN DESCRIPTION - PROGRESSION
CLINICAL_PROGRESSION: NOT CHANGED
CLINICAL_PROGRESSION: NOT CHANGED

## 2019-05-24 ASSESSMENT — PAIN DESCRIPTION - LOCATION
LOCATION: BUTTOCKS
LOCATION: BUTTOCKS

## 2019-05-24 ASSESSMENT — PAIN DESCRIPTION - FREQUENCY: FREQUENCY: CONTINUOUS

## 2019-05-24 ASSESSMENT — PAIN DESCRIPTION - ORIENTATION
ORIENTATION: LEFT
ORIENTATION: LEFT

## 2019-05-24 ASSESSMENT — PAIN DESCRIPTION - DESCRIPTORS: DESCRIPTORS: ACHING

## 2019-05-24 NOTE — PROGRESS NOTES
05/24/19 0408   NIV Type   Equipment Type V60   Mode BIPAP   Mask Type Full face mask   Mask Size Medium   Bonnet size Medium   Settings/Measurements   Comfort Level Good   Using Accessory Muscles No   IPAP 16 cmH20   EPAP 6 cmH2O   Rate Ordered 8   Resp 20   SpO2 98   FiO2  50 %   Vt Exhaled 616 mL   Mask Leak (lpm) 15 lpm   Skin Protection for O2 Device Yes   Breath Sounds   Right Upper Lobe Clear   Right Middle Lobe Clear   Right Lower Lobe Clear   Left Upper Lobe Clear   Left Lower Lobe Clear   Alarm Settings   Alarms On Y

## 2019-05-24 NOTE — PLAN OF CARE
Problem: Infection:  Goal: Will remain free from infection  Description  Will remain free from infection  Outcome: Ongoing     Problem: Safety:  Goal: Free from accidental physical injury  Description  Free from accidental physical injury  Outcome: Ongoing     Problem: Daily Care:  Goal: Daily care needs are met  Description  Daily care needs are met  Outcome: Ongoing     Problem: Pain:  Goal: Patient's pain/discomfort is manageable  Description  Patient's pain/discomfort is manageable  Outcome: Ongoing     Problem: Skin Integrity:  Goal: Skin integrity will stabilize  Description  Skin integrity will stabilize  Outcome: Ongoing

## 2019-05-24 NOTE — PROGRESS NOTES
[facility]: ACUTE REHAB UNIT  SPEECH-LANGUAGE PATHOLOGY      [] Daily  [] Weekly Care Conference Note  [x] Discharge    Patient:Mahesh Ibarra      :3/13/1933  DGR:[id number]  Rehab Dx/Hx: Lumbar compression fracture, sequela [S32.000S]    Precautions: falls and aspirations  Home situation: Lives at home with wife   Dx: [] Aphasia  [] Dysarthria  [] Apraxia   [] Oropharyngeal dysphagia [x] Cognitive Impairment  [x] Other:  Hoarse vocal quality  Date of Admit: 2019  Room #: 0152/0152-01    Current functional status (updated daily):         Pt being seen for : [] Speech/Language Treatment  [] Dysphagia Treatment [x] Cognitive Treatment  [] Other:  Communication: []WFL  [] Aphasia  [] Dysarthria  [] Apraxia  [] Pragmatic Impairment [] Non-verbal  [] Hearing Loss  [x] Other:  Dysphonia / hoarse vocal quality  Cognition: [] WFL  [] Mild  [] Moderate  [x] Severe [] Unable to Assess  [] Other:  Memory: [] WFL  [] Mild  [] Moderate  [x] Severe [] Unable to Assess  [] Other:  Behavior: [] Alert  [x] Cooperative  []  Pleasant  [x] Confused  [] Agitated  [] Uncooperative  [] Distractible [] Motivated  [] Self-Limiting [] Anxious  [x] Other: Fatigued  Endurance:  [] Adequate for participation in SLP sessions  [x] Reduced overall  [] Lethargic  [] Other:  Safety: [] No concerns at this time  [x] Reduced insight into deficits  [x]  Reduced safety awareness [] Not following call light procedures  [] Unable to Assess  [] Other:    Current Diet Order:No diet orders on file  Swallowing Precautions:  Alternate solids and liquids;Small bites/sips;Eat/Feed slowly;Upright as possible for all oral intake;Remain upright for 30-45 minutes after meals         Date: 2019     DISCHARGE SUMMARY     Total Timed Code Min --   Total Treatment Minutes --   Individual Treatment Minutes --   Group Treatment Minutes 0   Co-Treat Minutes 0   Variance/Reason:  N/A   Subjective     Pt unexpectedly transferred to acute floor for further medical workup

## 2019-05-24 NOTE — PROGRESS NOTES
Patient attempted to have bowel movement on commode apprx 2100; during time had episode of blood pressure dropping and becoming diaphoretic. History of vagal episodes, patient transferred back to bed where vitals where WNL and extremely large bowel movement occurred. Patient's vitals continue to be WNL, while patient continued with periods of diaphoretic; blood sugars and vitals monitored. Apprx 0130 patient continued to be diaphoretic, bs slightly elevated, however oxygen was not able to be elevated above 90% without non rebreather mask and blood pressure dropped to 90s/40s, lung sounds noted with crackles. Clinical admin notified and agreed calling MD was appropriate. Notified MD, new order noted for CXR and request for attending to assess. Rapid called to expedite workup as patient continued to require oxygen per non rebreather and decreasing blood pressure. Labs, workup obtained, attending MD elected to direct admit to C4 for Acute Respiratory Failure. Patient and belongings transferred to room 443, bedside report given to RN. Wife, present during all occurences, followed patient to new room.

## 2019-05-24 NOTE — PROGRESS NOTES
05/24/19 0240   NIV Type   $NIV $Daily Charge   NIV Started Yes   Equipment Type V60   Mode BIPAP   Mask Type Full face mask   Mask Size Medium   Bonnet size Medium   Settings/Measurements   Comfort Level Good   Using Accessory Muscles No   IPAP 16 cmH20   EPAP 6 cmH2O   Rate Ordered 8   Resp 18   SpO2 100   FiO2  50 %   Vt Exhaled 694 mL   Mask Leak (lpm) 8 lpm   Skin Protection for O2 Device Yes   Breath Sounds   Right Upper Lobe Clear   Right Middle Lobe Clear   Right Lower Lobe Clear   Left Upper Lobe Clear   Left Lower Lobe Clear   Alarm Settings   Alarms On Y   Press Low Alarm 6 cmH2O   High Pressure Alarm 30 cmH2O   Delay Alarm 20 sec(s)   Resp Rate Low Alarm 6   High Respiratory Rate 40 br/min

## 2019-05-24 NOTE — PROGRESS NOTES
Pharmacy to Dose Warfarin     Dx: A-fib  Goal INR range 2-3   Home Warfarin dose:5 mg daily or as directed     Date                 INR                  Warfarin  5/17                3.29                  held  5/18                2.33                  5 mg  5/19                1.99                  5 mg  5/20                1.61                  5 mg  5/21                1.70                  6 mg  5/22                2.15                  6 mg  5/23                2.69                  3 mg  5/24                3.72                  Hold     Recommend Warfarin be held tonight x1. Daily INR ordered. Rx will continue to manage therapy per consult order. Tushar Baer, PharmD, . [phone] [facility] [phone]  [place] [phone]        Pharmacy to Dose Warfarin  Dx: A-fib  Goal INR range 2-3   Home Warfarin dose:5 mg daily or as directed     Date                 INR                  Warfarin  5/17                3.29                  held  5/18                2.33                  5 mg  5/19                1.99                  5 mg  5/20                1.61                  5 mg  5/21                1.70                  6 mg  5/22                2.15                  6 mg  5/23                2.69                  3 mg  5/24                3.72                  Hold  5/25                 5.43                 Hold    Recommend Warfarin be held tonight x1. Daily INR ordered. Rx will continue to manage therapy per consult order.

## 2019-05-24 NOTE — DISCHARGE SUMMARY
Occupational Therapy  Discharge Summary     Name:Mahesh BURROWS:[id number]  :3/13/1933  Treatment Diagnosis: impaired transfers  Diagnosis: Constipation, back pain, Lumbar compression fractures     Restrictions/Precautions  Restrictions/Precautions: Fall Risk, General Precautions, Up as Tolerated  Required Braces or Orthoses?: Yes           Position Activity Restriction  Other position/activity restrictions: O2 at 2L NC, [place] brace when OOB, ok off while in bed/during ADLs, Up as tolerated      Goals:   Short term goals  Time Frame for Short term goals: 1 week  Short term goal 1: Patient will complete toilet transfer/functional ADL transfer mod Ax1  Short term goal 2: Patient will complete UB dressing mod A-not met 2* cognition and motivation/participation  Short term goal 3: Patient will complete toileting/hygiene min A-not met 2* cognition and motivation/participation  Short term goal 4: Patient will complete grooming task EOB SBA-not met 2* cognition and motivation/participation  Short term goal 5: Patient will complete bed mobility mod Ax1-not met 2* cognition and motivation/participation   Long term goals  Time Frame for Long term goals : 2 weeks  Long term goal 1: Patient will complete functional ADL/toilet transfer CGA-not met 2* cognition and motivation/participation  Long term goal 2: Patient will complete UB/LB dressing min A-not met 2* cognition and motivation/participation  Long term goal 3: Patient will complete bed mobility CGA-not met 2* cognition and motivation/participation  Long term goal 4: Patient will complete 5 min dynamic standing activity CGA-not met 2* cognition and motivation/participation    Pt. Met 0/5 short term goals and 0/4 long term goals. Current Functional Status:   ADL  Feeding: Setup, Increased time to complete  Grooming: Moderate assistance, Verbal cueing, Increased time to complete(assist for thoroughness, cues for sequencing)  UE Bathing:  Moderate assistance, Verbal cueing, Increased time to complete  LE Bathing: Maximum assistance, Increased time to complete, Verbal cueing  UE Dressing: Maximum assistance(pullover polo shirt)  LE Dressing: Dependent/Total(pants, brief, socks)  Toileting: Dependent/Total(assist to steady urinal after initially missing and soiling sheets and brief; patient unable to manage urinal without assist)  Additional Comments: pt needs cues for sequencing and initiating all ADL tasks, to wash each body part and for simple grooming tasks     Bed mobility  Rolling to Left: Moderate assistance(all rolls log roll with cues )  Rolling to Right: Moderate assistance  Supine to Sit: Minimal assistance(with cues for pushing up with Left elbow in left sidelying and using left rail )  Sit to Supine: Minimal assistance(for left leg )  Scooting: Unable to assess  Comment: pt given cues for log rolling and spine precautions for rolling and getting in and out of bed with use of rail     Functional Transfers: Toilet Transfers  Toilet Transfer: Unable to assess  Toilet Transfers Comments: pt declined need for bathroom this session         Shower Transfers  Shower - Transfer Type: To and From  Shower - Transfer To:  Transfer tub bench  Shower - Technique: Ambulating  Shower Transfers: Minimal assistance  Shower Transfers Comments: cues for hand placement and tech           Functional Mobility  Functional - Mobility Device: Rolling Walker  Activity: Other  Assist Level: Minimal assistance  Functional Mobility Comments: taking a few steps from recliner <> w/c                           Perception  Overall Perceptual Status: Impaired  Initiation: Cues to initiate tasks         UE Function: grossly WFL for ADLs                 Admitting OT FIM scores  Eatin - Feeds self with setup/supervision/cues and/or requires only setup/supervision/cues to perform tube feedings  Groomin - Able to perform 1-2 tasks (max assist)  Bathin - Able to bathe 2 or less areas/total assist  Dressing-Upper: 1 - Requires assist with 4 tasks/total assist  Dressing-Lower: 1 - Total assist with lower body dressing  Toiletin - Total assist  Toilet Transfer: 0 - Did not occur  Tub Transfer: 0 - Activity does not occur  Shower Transfer: 0 - Activity does not occur    Discharge OT FIM scores  Eatin - Feeds self with setup/supervision/cues and/or requires only setup/supervision/cues to perform tube feedings  Grooming: 3 - Able to perform 2-3 tasks (mod assist)  Bathin - Able to bathe 3-4 areas  Dressing-Upper: 2 - Requires assist with 3 tasks  Dressing-Lower: 1 - Total assist with lower body dressing  Toiletin - Able to perform 1 task only (e.g. hygiene)  Toilet Transfer: 3 - Requires 25-49% assist getting off toilet  Tub Transfer: 0 - Activity does not occur  Shower Transfer: 3 - Moderate assistance, pt. expends 50%-74% effort    Assessment:   Assessment: pt in recliner, agreeable with encouragement to therapy. Pt very bradykinetic and requires increased time to complete all tasks. Pt with poor initiation, falls asleep often during session. Pt requires extensive assist for all ADL tasks, needs cues for sequencing each step of bathing to wash each body part, pt will raise arms and wanted therapist to bathe him, but pt able to perform with max cues and encouragement. Pt requires extensive assist for dressing wtih poor effort, total A for LB and total A to fernando/doff Jewitt brace. Pt not progressing d/t cognition and lack of motivation/participation.    Prognosis: Fair     REQUIRES OT FOLLOW UP: Yes  Discharge Recommendations: Continue to assess pending progress    Equipment Recommendations:  Cont to assess         Home Exercise Program  N/A pt sent to acute care    Electronically signed by Bernardino Carver OT on 2019 at 7:45 AM

## 2019-05-24 NOTE — CARE COORDINATION
CASE MANAGEMENT INITIAL ASSESSMENT      Reviewed chart and met with patient today, re: Possible d/c needs   Explained Case Management role/services. Family present: Pt's wife Tara Dowling   Primary contact information: Tara Dowling [phone]    Admit date/status: 5/24/19   Diagnosis: ARF     Insurance: Medicare   Precert required for SNF - N        3 night stay required - Y    Living arrangements, Adls, care needs, prior to admission:  Pt was at [facility] prior to admission prior to that he was living at home with his wife    Transportation: TBD No ambulance preference     Durable Medical Equipment at home: Walker_X_Cane_X_RTS__ BSC__Shower Chair__  02_X( 2-3 LNC [facility])_ HHN__ CPAP__  BiPap__  Hospital Bed__ W/C__X_ Other__________    Services in the home and/or outpatient, prior to admission: [facility] [address] [place] [address], Prior to that was active with [facility] at home     Dialysis Facility (if applicable) N/A   · Name:  · Address:  · Dialysis Schedule:  · Phone:  · Fax:    PT/OT recs: Not yet ordered but needed     Hospital Exemption Notification (HEN): Needed for SNF     Barriers to discharge: Needs PT/OT when appropriate     Plan/comments: 5/24/19 Pt was at [facility], pt's wife does not want him to go back she's more interested in SNF. Pt has been at [facility] in the past and the wife was happy with the facility. Pt will need PT/OT orders and recs when appropriate.        ECOC on chart for MD signature

## 2019-05-24 NOTE — PROGRESS NOTES
Pt and wife requesting pt come off the bipap to eat. Call placed to Op[facility]ullivan [facility] Reynoso.. See orders.   RT updated

## 2019-05-24 NOTE — H&P
Hospital Medicine History & Physical      PCP: Efren Lazar    Date of Admission: 5/24/2019    Date of Service: Pt seen/examined on 24 May and Admitted to Inpatient with expected LOS greater than two midnights due to medical therapy. Chief Complaint:  SOB      History Of Present Illness:       80 y.o. male recently discharged to ARU who represented to [facility] with acute severe SOB. He had been undergoing acute rehab for L1/2 compression fracture fitted w/ [place] Brace. He was given IV Lasix for pulmonary edema seen on CXR w/ improvement in sxs. The patient denies any fever/chills or other signs/sxs of systemic illness or identifiable aggravating/alleviating factors.         Past Medical History:          Diagnosis Date    Acute encephalopathy 10/7/2014    Acute lower GI bleeding 10/6/2014    Acute renal failure (ARF) ([address] 10/7/2014    Acute respiratory failure ([address] 11/2/2014    Atrial flutter ([address] 07/11/2015 06/27/2016, AFL DCCV 200 J, successful    Closed right hip fracture ([address] 0/57/6809    Diastolic heart failure ([address] 11/2/2014    Diverticulitis     HCAP (healthcare-associated pneumonia) 11/2/2014    Iron deficiency anemia     Ischemic colitis ([address] 40/92/56    Metabolic encephalopathy 4/89/0610    Nonhealing nonsurgical wound of scalp, limited to breakdown of skin 2/14/2019    Obesity 8/29/2012    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 ([address] 1/7/2014    Pressure ulcer of coccygeal region, stage 2 11/3/2018    Prostate cancer ([address]     Vasovagal syncope     VT (ventricular tachycardia) ([address] 8/13/2012       Past Surgical History:          Procedure Laterality Date    APPENDECTOMY  1939    CHOLECYSTECTOMY      COLONOSCOPY  10/07/2014    Ischemic colitis    CORONARY ARTERY BYPASS GRAFT  1990    PACEMAKER PLACEMENT      PARTIAL HIP ARTHROPLASTY Right 09/24/2016    PROSTATE BIOPSY  08/10/2007    PROSTATECTOMY  2007    radical, with lymph node dissection    REVISION TOTAL KNEE ARTHROPLASTY Left 06/18/2014    TOTAL KNEE ARTHROPLASTY Bilateral 1991    UPPER GASTROINTESTINAL ENDOSCOPY  03/02/10    WNL                 UVULOPALATOPHARYGOPLASTY         Medications Prior to Admission:      Prior to Admission medications    Medication Sig Start Date End Date Taking? Authorizing Provider   warfarin (COUMADIN) 5 MG tablet Follow INR 5/17/19   Karen Toribio MD   oxyCODONE-acetaminophen (PERCOCET) 5-325 MG per tablet Take 1 tablet by mouth every 6 hours as needed for Pain for up to 7 days.  5/17/19 5/24/19  Karen Toribio MD   bisacodyl (DULCOLAX) 5 MG EC tablet Take 1 tablet by mouth daily as needed for Constipation 5/17/19 5/24/19  Karen Toribio MD   polyethylene glycol [facility]) packet Take 17 g by mouth daily 5/18/19 6/17/19  Karen Toribio MD   furosemide (LASIX) 20 MG tablet Take 20 mg by mouth daily 1 Tablet by mouth every Monday, Wednesday, and Friday    Historical Provider, MD   glyBURIDE (DIABETA) 5 MG tablet Take 5 mg by mouth 2 times daily    Historical Provider, MD   insulin detemir (LEVEMIR FLEXTOUCH) 100 UNIT/ML injection pen Inject 30 Units into the skin every morning 4/3/19   AGATHA Welsh CNP   METFORMIN HCL PO Take 500 mg by mouth 2 times daily    Historical Provider, MD   bethanechol (URECHOLINE) 25 MG tablet Take 1 tablet by mouth 3 times daily 3/8/19   Oswaldo Neal MD   Continuous Blood Gluc  (FREESTYLE KRISTY 14 DAY READER) TABITHA 1 Units by Does not apply route as needed (as  needed) 1/31/19   AGATHA Welsh CNP   Continuous Blood Gluc Sensor (FREESTYLE KRISTY 14 DAY SENSOR) MISC 2 Units by Does not apply route as needed (use one sensor for 14 days) 1/31/19   AGATHA Welsh CNP   calcium carbonate (OYSTER SHELL CALCIUM 500 MG) 1250 (500 Ca) MG tablet Take 1 tablet by mouth daily    Historical Provider, MD   spironolactone (ALDACTONE) 25 MG tablet  11/15/16   Historical Provider, MD   amLODIPine-benazepril (LOTREL) 2.5-10 MG per capsule Take 1 capsule by mouth daily  10/6/16   Historical Provider, MD   aspirin 81 MG EC tablet Take 81 mg by mouth daily. Historical Provider, MD   atorvastatin (LIPITOR) 80 MG tablet Take 80 mg by mouth every evening. Historical Provider, MD   dutasteride (AVODART) 0.5 MG capsule Take 0.5 mg by mouth twice a week. Take 0.5mg Monday and Friday     Historical Provider, MD   ezetimibe (ZETIA) 10 MG tablet Take 10 mg by mouth every evening     Historical Provider, MD       Allergies:  Metoprolol    Social History:      The patient currently lives independently    TOBACCO:   reports that he has never smoked. He has never used smokeless tobacco.  ETOH:   reports that he does not drink alcohol. Family History:      Reviewed in detail and negative for DM, CAD, Cancer, CVA. Positive as follows:        Problem Relation Age of Onset    Stroke Mother     Cancer Sister         stomach    Mental Retardation Brother     Cancer Brother         prostate    Other Father         suicide    Alcohol Abuse Father     Cancer Sister         breast    Other Brother         MVA       REVIEW OF SYSTEMS:   Pertinent positives as noted in the HPI. All other systems reviewed and negative. PHYSICAL EXAM:    /67   Pulse 60   Temp 98 °F (36.7 °C)   Resp 18   Ht 5' 10\" (1.778 m)   Wt 198 lb (89.8 kg)   SpO2 100%   BMI 28.41 kg/m²     General appearance:  No apparent distress, appears stated age and cooperative. HEENT:  Normal cephalic, atraumatic without obvious deformity. Pupils equal, round, and reactive to light. Extra ocular muscles intact. Conjunctivae/corneas clear. Neck: Supple, with full range of motion. No jugular venous distention. Trachea midline. Respiratory:  Normal respiratory effort. Clear to auscultation, bilaterally without Rales/Wheezes/Rhonchi. Cardiovascular:  Regular rate and rhythm with normal S1/S2 without murmurs, rubs or gallops.   Abdomen: Soft, non-tender, non-distended with normal bowel sounds. Musculoskeletal:  No clubbing, cyanosis or edema bilaterally. Full range of motion without deformity. Skin: Skin color, texture, turgor normal.  No rashes or lesions. Neurologic:  Neurovascularly intact without any focal sensory/motor deficits. Cranial nerves: II-XII intact, grossly non-focal.  Psychiatric:  Alert and oriented, thought content appropriate, normal insight  Capillary Refill: Brisk,< 3 seconds   Peripheral Pulses: +2 palpable, equal bilaterally       CXR:  I have reviewed the CXR with the following interpretation: Pulmonary edema  EKG:  I have reviewed the EKG with the following interpretation: No acute ischemic changes. Labs:     Recent Labs     05/23/19  0834 05/24/19  0217 05/24/19  0220   WBC 11.0  --  18.0*   HGB 11.1* 12.0* 10.8*   HCT 33.6*  --  32.5*     --  294     Recent Labs     05/23/19  0834 05/24/19  0220   * 129*   K 4.7 5.2*   CL 89* 85*   CO2 33* 30   BUN 33* 45*   CREATININE 1.3 1.8*   CALCIUM 9.2 9.2     No results for input(s): AST, ALT, BILIDIR, BILITOT, ALKPHOS in the last 72 hours.   Recent Labs     05/22/19  0646 05/23/19  0834 05/24/19  0220   INR 2.15* 2.69* 3.72*     Recent Labs     05/24/19  0220 05/24/19  0531 05/24/19  1137   TROPONINI 0.05* 0.03* 0.02*       Urinalysis:      Lab Results   Component Value Date    NITRU Negative 05/14/2019    WBCUA None seen 03/04/2019    BACTERIA Rare 09/29/2016    RBCUA 0-2 03/04/2019    BLOODU Negative 05/14/2019    SPECGRAV 1.015 05/14/2019    GLUCOSEU 500 05/14/2019    GLUCOSEU NEGATIVE 02/06/2010         ASSESSMENT:    Active Hospital Problems    Diagnosis Date Noted    Acute respiratory failure ([facility] [address] [J96.00] 05/24/2019       PLAN:        Acute Respiratory failure - of unclear etiology but w/ recurrent pulmonary edema on  CXR which had been part of the original presentation prior to ARU. , likely acute - less likely PNA w/out fever or leukocytosis.  Supplemental O2 and wean as tolerated. Home Lasix dose increased to 40 QD.      HTN/CAD - w/ known CAD and no evidence of active signs/sxs of ischemia/failure. Currently controlled on home meds w/ vitals reviewed and documented as above. S/P Pacer.      HyperLipidemia - controlled on home Statin. Continue, w/ f/u and med adjustment w/ PCP     CKD - baseline stage 2.  Will continue to follow serial labs - resolved.       DM2 - controlled on home Lantus - continued.  Follow FSBS/SSI med regimen.      Afib - rate controlled and anticoagulated on Coumadin - held. Continue to follow INR - supratherapeutic.       Anemia - mild, of unclear etiology, w/out evidence of active bleeding/hemolysis. Asymptomatic w/out indication for transfusion.      Constipation - Unlikely related specifically to Lumbar compression fx at L1/L2 give hx of chronic constipation but may be related to pain meds for same. Spine surgery consulted last admission and appreciated, w/ plans for conservative mgt w/ Osmani Brace - continued.  Continue bowel regimen which seems to be having some effect.       DVT Prophylaxis: Supratherapeutic INR  Diet: DIET CARB CONTROL;  Code Status: Full Code      PT/OT Eval Status: not yet ordered inpatient but had been following in ARU    Dispo - Likely back to ARU Sunday/Monday 26/27 May       Miguel Angel Draper MD    Thank you Ender Jordan for the opportunity to be involved in this patient's care. If you have any questions or concerns please feel free to contact me at [phone].

## 2019-05-24 NOTE — DISCHARGE SUMMARY
Physical Therapy  Discharge Summary    Name:Mahesh Ibarra  HD:[id number]  :3/13/1933  Treatment Diagnosis: impaired transfers  Diagnosis: Constipation, back pain, Lumbar compression fractures     Restrictions/Precautions  Restrictions/Precautions: Fall Risk, General Precautions, Up as Tolerated  Required Braces or Orthoses?: Yes           Position Activity Restriction  Other position/activity restrictions: O2 at 2L NC, [place] brace when OOB, ok off while in bed/during ADLs, Up as tolerated      Goals:                  Short term goals  Time Frame for Short term goals: 1 week (19)  Short term goal 1: Pt will perform supine<>sit with min A to increase (I) with getting out of bed in home . GOAL Met 2019 Pt demo min assist for supine <>sit with use of bed rail and cues. Short term goal 2: Pt will perform sit<>stand and bed<>chair transfer c LRAD and mod A to increase (I) with functional transfers in home GOAL Met 2019 Pt demo Supervision bed<>chair transfers with cues and FWW. Short term goal 3: Pt will amb >30 ft c RW and min A to increase (I) with household ambulation GOAL Met 2019 Pt demo 231 feet gait with FWW SBA/close Supervision. Short term goal 4: Pt will be able to accurately demonstrate log rolling technique with min vc's and </=6/10 pain to improve (I) and decrease stress on low back during bed mobility tasks . GOAL partially  Met 2019 Pt demo rolling left/right with supervision and log roll with use of bed rail with back pain up to 8/10. Long term goals  Time Frame for Long term goals : 2 weeks (19)  Long term goal 1: Pt will perform all bed mobility using log rolling technique with CGA for safety to increase (I) getting in to and out of bed in home 2019 GOAL not met see above STG status.    Long term goal 2: Pt will perform sit<>stand and bed<>chair transfer c LRAD and CGA for safety to increase (I) with functional transfers in home GOAL Met 2019 Pt demo Supervision bed<>chair transfers with cues and FWW. Long term goal 3: Pt will amb >50 ft c LRAD and CGA for safety to increase (I) with household ambulation . GOAL Met 5/23/2019 Pt demo 231 feet gait with FWW SBA/close Supervision. Long term goal 4: Pt will be (I) with B LE strengthening program to promote B LE strength required for transfers and ambulation . GOAL not met pt requires cues, set up and redirection for hep. Long term goal 5: Pt's family will be able to demonstrate independence donning and doffing Jewitt Brace to allow for family to care for pt in home. 5/23/2019 Goal not met family training scheduled for 5/23/2019. Pt. Met 3/4short term goals and   2/5 long term goals. Admitting PT FIM scores:  Bed, Chair, Wheel Chair: 1 - Requires >75% assitance to transfer  Walk: 1 - Total Assistance Walks < 50 feet OR requires two or more people OR patient performs < 25% of locomotion effort  Distance Walked: 10  Wheel Chair: 1 - Total Assistance Operates wheelchair < 50 feet OR requires two or more people OR patient performs < 25% of locomotion effort  Distance Traveled in Wheel Chair: 30  Stairs: 0 - Activity Does not Occur ( 0 only for the admission assessment)    Discharge PT FIM scores:  Bed, Chair, Wheel Chair: 4 - Requires steadying assistance only <25% assist  and/or requires assist with one leg only  Walk: 5 - Supervision Requires standby supervision or cuing to walk at least 150 feet  Distance Walked: 231'  Wheel Chair: 0 - Activity Not Assessed/Does Not Occur  Distance Traveled in Wheel Chair: 30  Stairs: 0 - Activity Does not Occur ( 0 only for the admission assessment)      Pt. Currently ambulates 231 feet with FWW and close supervision/SBA  Up/down steps NA as unsafe to attempt  Up/down curb step NA as unsafe to attempt  Sit to/from stand with cues/close supervision/SBA  Bed mobility with min assist and cues for log roll with use of bed rail.   Recommend HHPTin order to

## 2019-05-24 NOTE — DISCHARGE SUMMARY
Physical Medicine & Rehabilitation  Discharge Summary     Patient Identification:  Mahesh Ibarra  : 3/13/1933  Admit date: 2019  Discharge date: 2019  Attending provider:   Pam Barney MD  Primary care provider: Ender Jordan     Discharge Diagnoses:   Patient Active Problem List   Diagnosis    Persistent atrial fibrillation ([facility] [address]    Diabetes mellitus type 2, uncontrolled ([facility]    Hyperlipidemia    Venous (peripheral) insufficiency    CAD (coronary artery disease)    Normocytic anemia    Diastolic dysfunction    Essential hypertension    Lumbar spondylolysis    Bronchiectasis without complication ([facility] [address]    Cardiac pacemaker in situ    Carotid stenosis    CKD (chronic kidney disease) stage 3, GFR 30-59 ml/min ([facility])    LPRD (laryngopharyngeal reflux disease)    RAD (reactive airway disease), mild persistent, uncomplicated    RBBB (right bundle branch block)    Restrictive lung disease    Sensorineural hearing loss    Obstructive and central sleep apnea    Decreased mobility    Presence of total knee joint prosthesis, bilateral    Presence of right hip implant    Allergic rhinitis    Diverticulosis    Vitamin D deficiency    Pressure ulcer of left buttock, stage 2    Constipation    Lumbar compression fracture, sequela    Acute respiratory failure (HCC)       History of Present Illness/Acute Hospital Course:  80 y. o. male with a PMH significant for CAD, Afib on coumadin, dCHF, DM2, CKD, prostate cancer, ischemic colitis who presented to the hospital with constipation and abdominal discomfort for 1 week. He had  CT abdomen and pelvis for workup, this revealed acute L1 compression fracture and progressed L2 compression fracture. Patient has no history of trauma or fall. Patient has no history of bowel or bladder incontinence, or saddle numbness.  Patient was seen by orthopedic spine, and patient elected to proceed conservatively.  Patient fitted with a Osmani brace, and he should have the brace on when up with activity. He should Follow up with Dr. Darinel Villagomez in 1-2 weeks for repeat xrays.     Inpatient Rehabilitation Course:   Anabelle Simons is a 80 y.o. male admitted to inpatient rehabilitation on 5/17/2019 with Lumbar compression fracture, sequela. The patient participated in an aggressive multidisciplinary inpatient rehabilitation program involving 3 hours of therapy per day, at least 5 days per week. He was making gradual progress with functional mobility and compensatory strategies. Cognitive deficits were a barrier to carryover with therapies. Early morning 5/24 he developed signs of respiratory distress, oxygen desaturation, and hypotension. Stat CXR ordered and rapid response called. Patient will be transferred to acute medical floor for closer monitoring and further work-up. Medical Management:  Acute L1 compression fracture and progressed L2 compression fracture - [place] brace. Pain control. PT/OT.      Acute hypoxic respiratory failure - New O2 requirement 2L. Present prior to admission to ARU. Per IM likely 2/2 pulmonary edema. I did give him a dose of lasix on (5/21) due to LE edema and crackles on exam. However CXR did not show significant edema. Based on RN description I would be concerned for worsened pulmonary edema as etiology of his acute decompensation on 5/24.      dCHF - Spironolactone. Daily wt down trending since admission to ARU (201-->196lb). Received lasix x1 5/21.      Afib - warfarin, pharmacy dosing     CAD - ASA, statin     Diabetes - lantus (decreased dose multiple times due to am hypoglycemia), SSI      Chronic kidney disease -BUN/Cr increased 5/23 likely due to receiving lasix dose     Cog deficits - Suspect acute on chronic. SLP consult.      H/o ischemic colitis, chronic constipation. Schedule Miralax + Senna S.     H/o prostate cancer. Continue Urecholine.     Pain: Percocet is ordered prn. Lidoderm patch.      Discharge Exam:  Patient not examined due to acuity of transfer. Exam from morning of 5/23:  Gen: No distress, pleasant. HEENT: Normocephalic, atraumatic. CV: Regular rate and rhythm. Resp: No respiratory distress. +Basilar crackles. Abd: Soft, nontender   Ext: + edema. Neuro: Alert, appropriately interactive. Confused about situation. Discharge Functional Status:    Physical therapy:  Bed Mobility: Scooting: Unable to assess  Transfers: Sit to Stand: Stand by assistance(FWW cued to scoot to edge of seate needed)  Stand to sit: Stand by assistance(cues to reach back for chair needed, FWW)  Bed to Chair: Moderate assistance(FWW mod sit to stand  cues to push up from transfer surface and CG with cues to sit ), Ambulation 1  Surface: level tile  Device: Rolling Walker  Other Apparatus: Wheelchair follow  Assistance: Stand by assistance  Quality of Gait: swing doesn't pass stance forward lean in gait cued for more upright posture and longer steps with , pt with mildly festinating gait. [place] cues more uprightt posture but still flexed in hips and back slightly despite [place] brace donned  Distance: 231 feet x2  Comments: Pt reports back pain is 7/10 sitting and standing with back brace on , noted bradykinesia with gait, bed mobility and transfers. With encouragement pt participating throughout session. ,    Mobility: Bed, Chair, Wheel Chair: 4 - Requires steadying assistance only <25% assist  and/or requires assist with one leg only  Walk: 5 - Supervision Requires standby supervision or cuing to walk at least 150 feet  Distance Walked: 231'  Wheel Chair: 0 - Activity Not Assessed/Does Not Occur  Distance Traveled in Wheel Chair: 30  Stairs: 0 - Activity Does not Occur ( 0 only for the admission assessment), PT Equipment Recommendations  Equipment Needed: No, Assessment: PT seen for therapy this session and noted to be alert with bradykinetic movement with flat affect with gait , transfers and bed mobility.   Jew[place] brace donned throughout session. Pt progressed wtih giat train for closer upright gait in   feet x2 SBA, transfers SBA with cues for scooting and hand placement with FWW, Demo tyo pt back/spine care for bed mobility with pt requiring on return demo     Occupational therapy: Eatin - Feeds self with setup/supervision/cues and/or requires only setup/supervision/cues to perform tube feedings  Grooming: 3 - Able to perform 2-3 tasks (mod assist)  Bathin - Able to bathe 3-4 areas  Dressing-Upper: 2 - Requires assist with 3 tasks  Dressing-Lower: 1 - Total assist with lower body dressing  Toiletin - Able to perform 1 task only (e.g. hygiene)  Toilet Transfer: 3 - Requires 25-49% assist getting off toilet  Tub Transfer: 0 - Activity does not occur  Shower Transfer: 3 - Moderate assistance, pt. expends 50%-74% effort,  , Assessment: pt in recliner, agreeable with encouragement to therapy. Pt very bradykinetic and requires increased time to complete all tasks. Pt with poor initiation, falls asleep often during session. Pt requires extensive assist for all ADL tasks, needs cues for sequencing each step of bathing to wash each body part, pt will raise arms and wanted therapist to bathe him, but pt able to perform with max cues and encouragement. Pt requires extensive assist for dressing wtih poor effort, total A for LB and total A to fernando/doff Jewitt brace. Pt not progressing d/t cognition and lack of motivation/participation.      Speech therapy:  Comprehension: 4 - Patient understands basic needs 75-90%+ of the time  Expression: 4 - Expresses basic ideas/needs 75-90%+ of the time  Social Interaction: 3 - Patient appropriate  50%-74% of the time  Problem Solvin - Patient solves simple/routine tasks 25%-49%  Memory: 1 - Patient remembers < 25% of the time      Significant Diagnostics:   Lab Results   Component Value Date    CREATININE 1.8 (H) 2019    BUN 45 (H) 2019     (L) 2019    K 5.2 (H) ALDACTONE     warfarin 5 MG tablet  Commonly known as:  COUMADIN  Follow INR                Rach Nunes MD

## 2019-05-24 NOTE — DISCHARGE INSTR - COC
Continuity of Care Form    Patient Name: Janes Loyola   :  3/13/1933  MRN:  [id number]    Admit date:  2019  Discharge date:  ***    Code Status Order: Full Code   Advance Directives:   Advance Care Flowsheet Documentation     Date/Time Healthcare Directive Type of Healthcare Directive Copy in [address] Agent's Name Healthcare Agent's Phone Number    [phone]  No, patient does not have an advance directive for healthcare treatment -- -- -- -- --          Admitting Physician:  Gabi Schwartz MD  PCP: Macarena Mcneal    Discharging Nurse: [facility] Unit/Room#: 7032/4935-59  Discharging Unit Phone Number: ***    Emergency Contact:   Extended Emergency Contact Information  Primary Emergency Contact: Lupe Brown  Address: [address] [place] [address], [address] [place] [address] [address] Mikaela Furnish of [address] Phone: [phone]  Mobile Phone: [phone]  Relation: Spouse  Secondary Emergency Contact: [facility] Phone: [phone]  Relation: Child    Past Surgical History:  Past Surgical History:   Procedure Laterality Date    APPENDECTOMY      CHOLECYSTECTOMY      COLONOSCOPY  10/07/2014    Ischemic colitis    CORONARY ARTERY BYPASS GRAFT      PACEMAKER PLACEMENT      PARTIAL HIP ARTHROPLASTY Right 2016    PROSTATE BIOPSY  08/10/2007    PROSTATECTOMY  2007    radical, with lymph node dissection    REVISION TOTAL KNEE ARTHROPLASTY Left 2014    TOTAL KNEE ARTHROPLASTY Bilateral     UPPER GASTROINTESTINAL ENDOSCOPY  03/02/10    WNL                 UVULOPALATOPHARYGOPLASTY         Immunization History:   Immunization History   Administered Date(s) Administered    DTaP 2009    Influenza Virus Vaccine 2008, 10/01/2009, 10/07/2010, 10/04/2011, 10/12/2012, 09/10/2013    Influenza, High Dose (Fluzone 65 yrs and older) 2014, 2015, 2016    Pneumococcal 13-valent Conjugate (Bao Manjinder) 2015  Pneumococcal Conjugate 7-valent 04/08/2004, 05/22/2005, 04/03/2012    Pneumococcal Polysaccharide ([id number]) 04/03/2012    Tetanus 08/09/2009       Active Problems:  Patient Active Problem List   Diagnosis Code    Persistent atrial fibrillation (HCC) I48.1    Diabetes mellitus type 2, uncontrolled ([facility] [address] E11.65    Hyperlipidemia E78.5    Venous (peripheral) insufficiency I87.2    CAD (coronary artery disease) I25.10    Normocytic anemia R98.3    Diastolic dysfunction O04.1    Essential hypertension I10    Lumbar spondylolysis M43.06    Bronchiectasis without complication ([facility] [address] C12.8    Cardiac pacemaker in situ Z95.0    Carotid stenosis I65.29    CKD (chronic kidney disease) stage 3, GFR 30-59 ml/min ([facility]) N18.3    LPRD (laryngopharyngeal reflux disease) K21.9    RAD (reactive airway disease), mild persistent, uncomplicated E36.40    RBBB (right bundle branch block) I45.10    Restrictive lung disease J98.4    Sensorineural hearing loss H90.5    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    Decreased mobility R26.89    Presence of total knee joint prosthesis, 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    Presence of right hip implant Z96.641    Allergic rhinitis J30.9    Diverticulosis K57.90    Vitamin D deficiency E55.9    Pressure ulcer of left buttock, stage 2 L89.322    Constipation K59.00    Lumbar compression fracture, sequela S32.000S    Acute respiratory failure (HCC) J96.00       Isolation/Infection:   Isolation          No Isolation            Nurse Assessment:  Last Vital Signs: /67   Pulse 60   Temp 98 °F (36.7 °C)   Resp 18   Ht 5' 10\" (1.778 m)   Wt 198 lb (89.8 kg)   SpO2 100%   BMI 28.41 kg/m²     Last documented pain score (0-10 scale): Pain Level: 0  Last Weight:   Wt Readings from Last 1 Encounters:   05/24/19 198 lb (89.8 kg)     Mental Status:  disoriented, alert and ***    IV Access:  - None    Nursing Mobility/ADLs:  Walking   Assisted  Transfer  Assisted  Bathing Assisted  Dressing  Assisted  Toileting  Assisted  Feeding  Assisted  Med Admin  Assisted  Med Delivery   whole    Wound Care Documentation and Therapy:  Wound 03/13/19 #3, Left buttock, Pressure Ulcer, Stage 2, onset 3/6/19, Pressure (Active)   Wound Image   4/24/2019  1:25 PM   Wound Pressure Stage  2 5/21/2019  9:42 AM   Dressing Status Other (Comment) 5/23/2019 10:02 PM   Dressing Changed Changed/New 5/14/2019  7:42 PM   Dressing/Treatment Foam 5/24/2019  9:18 AM   Wound Cleansed Rinsed/Irrigated with saline 4/24/2019  1:25 PM   Wound Length (cm) 0 cm 4/24/2019  1:25 PM   Wound Width (cm) 0.1 cm 5/14/2019  7:45 PM   Wound Depth (cm) 0.1 cm 5/14/2019  7:45 PM   Wound Surface Area (cm^2) 0 cm^2 4/24/2019  1:25 PM   Change in Wound Size % (l*w) 100 4/24/2019  1:25 PM   Wound Volume (cm^3) 0 cm^3 4/24/2019  1:25 PM   Wound Healing % 100 4/24/2019  1:25 PM   Distance Tunneling (cm) 0 cm 4/24/2019  1:25 PM   Undermining Maxium Distance (cm) 0 4/24/2019  1:25 PM   Wound Assessment Red 5/24/2019  9:18 AM   Drainage Amount None 5/23/2019  8:56 AM   Drainage Description Serosanguinous 4/24/2019  1:25 PM   Odor None 5/23/2019  8:56 AM   Cheryl-wound Assessment Blanchable erythema 5/23/2019  8:56 AM   Number of days: 71        Elimination:  Continence:   · Bowel: No  · Bladder: No  Urinary Catheter: None   Colostomy/Ileostomy/Ileal Conduit: No       Date of Last BM: 5/28/19  No intake or output data in the 24 hours ending 05/24/19 1141  No intake/output data recorded. Safety Concerns:     confusion    Impairments/Disabilities:      Speech, Vision and Hearing    Nutrition Therapy:  Current Nutrition Therapy:   - Oral Diet:  Carb Control 4 carbs/meal (1800kcals/day)  - Oral Nutrition Supplement:  Diabetic  ordered    Routes of Feeding: Oral  Liquids:  Thin Liquids  Daily Fluid Restriction: no  Last Modified Barium Swallow with Video (Video Swallowing Test): not done    Treatments at the Time of Hospital Discharge: Respiratory Treatments: oxygen 1 liter nc  Oxygen Therapy:  is on oxygen at 1-3 L/min per nasal cannula. Ventilator:    - CPAP   only when sleeping    Rehab Therapies: Physical Therapy, Occupational Therapy and Speech/Language Therapy  Weight Bearing Status/Restrictions: No weight bearing restirctions  Other Medical Equipment (for information only, NOT a DME order):  braces jeweet lumbar thoracic brace  Other Treatments: ***    Patient's personal belongings (please select all that are sent with patient):  Dentures upper and lower    RN SIGNATURE:  Electronically signed by Vinay Wiseman RN on 5/28/19 at 5:04 PM    CASE MANAGEMENT/SOCIAL WORK SECTION    Inpatient Status Date: 5/24/19    Readmission Risk Assessment Score:  Readmission Risk              Risk of Unplanned Readmission:        26           Discharging to Facility/ Agency   · Name: [facility]  · Address:  · Phone: [phone]   · Fax: [phone]    / signature: {Esignature:[id number]}    PHYSICIAN SECTION    Prognosis: Good    Condition at Discharge: Stable    Rehab Potential (if transferring to Rehab): Good    Recommended Follow-up, Labs or Other Treatments After Discharge:    PT/OT  Lasix on hold for recent SREE; may need to resume soon if developing any signs of fluid overload.     Coumadin Management:  Dx: A-fib  Goal INR range 2-3     Continue Coumadin 5 mg daily  Monitor INR next on 5/29/19 and then as needed     Date                 INR                  Warfarin  5/17                3.29                  held  5/18                2.33                  5 mg  5/19                1.99                  5 mg  5/20                1.61                  5 mg  5/21                1.70                  6 mg  5/22                2.15                  6 mg  5/23                2.69                  3 mg  5/24                3.72                  Hold  5/25                5.43                  Hold  5/26 2.83                  3 mg  5/27                1.94                  4 mg    5/28                1.82                   ----          Should home care be needed after discharge from [facility] please arrange through [address]. Patient is currently active with agency and services are to remain on hold until patient DC's home. P: [phone]  F: [phone]           Physician Certification: I certify the above information and transfer of Mahesh Ibarra  is necessary for the continuing treatment of the diagnosis listed and that he requires [facility] for less 30 days.      Update Admission H&P: No change in H&P    PHYSICIAN SIGNATURE:  Electronically signed by Cesar Johnson MD on 5/28/19 at 2:22 PM

## 2019-05-25 PROBLEM — R79.1 SUPRATHERAPEUTIC INR: Status: ACTIVE | Noted: 2019-05-25

## 2019-05-25 LAB
ALBUMIN SERPL-MCNC: 2.8 G/DL (ref 3.4–5)
ANION GAP SERPL CALCULATED.3IONS-SCNC: 2 MMOL/L (ref 3–16)
BASE EXCESS ARTERIAL: 10.7 MMOL/L (ref -3–3)
BUN BLDV-MCNC: 44 MG/DL (ref 7–20)
CALCIUM SERPL-MCNC: 8.5 MG/DL (ref 8.3–10.6)
CARBOXYHEMOGLOBIN ARTERIAL: 0.3 % (ref 0–1.5)
CHLORIDE BLD-SCNC: 88 MMOL/L (ref 99–110)
CO2: 35 MMOL/L (ref 21–32)
CREAT SERPL-MCNC: 1.4 MG/DL (ref 0.8–1.3)
GFR AFRICAN AMERICAN: 58
GFR NON-AFRICAN AMERICAN: 48
GLUCOSE BLD-MCNC: 151 MG/DL (ref 70–99)
GLUCOSE BLD-MCNC: 217 MG/DL (ref 70–99)
GLUCOSE BLD-MCNC: 66 MG/DL (ref 70–99)
GLUCOSE BLD-MCNC: 78 MG/DL (ref 70–99)
GLUCOSE BLD-MCNC: 92 MG/DL (ref 70–99)
HCO3 ARTERIAL: 37.5 MMOL/L (ref 21–29)
HCT VFR BLD CALC: 28.5 % (ref 40.5–52.5)
HEMOGLOBIN, ART, EXTENDED: 10.7 G/DL (ref 13.5–17.5)
HEMOGLOBIN: 9.6 G/DL (ref 13.5–17.5)
INR BLD: 5.43 (ref 0.86–1.14)
KAPPA, FREE LIGHT CHAINS, SERUM: 53.42 MG/L (ref 3.3–19.4)
KAPPA/LAMBDA RATIO: 2.27 (ref 0.26–1.65)
KAPPA/LAMBDA TEST COMMENT: ABNORMAL
LAMBDA, FREE LIGHT CHAINS, SERUM: 23.52 MG/L (ref 5.71–26.3)
MCH RBC QN AUTO: 30.5 PG (ref 26–34)
MCHC RBC AUTO-ENTMCNC: 33.8 G/DL (ref 31–36)
MCV RBC AUTO: 90.1 FL (ref 80–100)
METHEMOGLOBIN ARTERIAL: 0.5 %
O2 CONTENT ARTERIAL: 15 ML/DL
O2 SAT, ARTERIAL: 97.6 %
O2 THERAPY: ABNORMAL
PCO2 ARTERIAL: 63.3 MMHG (ref 35–45)
PDW BLD-RTO: 13.9 % (ref 12.4–15.4)
PERFORMED ON: ABNORMAL
PERFORMED ON: ABNORMAL
PERFORMED ON: NORMAL
PERFORMED ON: NORMAL
PH ARTERIAL: 7.39 (ref 7.35–7.45)
PHOSPHORUS: 4.1 MG/DL (ref 2.5–4.9)
PLATELET # BLD: 241 K/UL (ref 135–450)
PMV BLD AUTO: 7.8 FL (ref 5–10.5)
PO2 ARTERIAL: 113.1 MMHG (ref 75–108)
POTASSIUM SERPL-SCNC: 4.8 MMOL/L (ref 3.5–5.1)
PROTHROMBIN TIME: 61.9 SEC (ref 9.8–13)
RBC # BLD: 3.16 M/UL (ref 4.2–5.9)
SODIUM BLD-SCNC: 125 MMOL/L (ref 136–145)
TCO2 ARTERIAL: 39.5 MMOL/L
WBC # BLD: 9.3 K/UL (ref 4–11)

## 2019-05-25 PROCEDURE — 36415 COLL VENOUS BLD VENIPUNCTURE: CPT

## 2019-05-25 PROCEDURE — 2580000003 HC RX 258: Performed by: INTERNAL MEDICINE

## 2019-05-25 PROCEDURE — 82533 TOTAL CORTISOL: CPT

## 2019-05-25 PROCEDURE — 80069 RENAL FUNCTION PANEL: CPT

## 2019-05-25 PROCEDURE — 82803 BLOOD GASES ANY COMBINATION: CPT

## 2019-05-25 PROCEDURE — 85610 PROTHROMBIN TIME: CPT

## 2019-05-25 PROCEDURE — 2700000000 HC OXYGEN THERAPY PER DAY

## 2019-05-25 PROCEDURE — 97530 THERAPEUTIC ACTIVITIES: CPT

## 2019-05-25 PROCEDURE — 6370000000 HC RX 637 (ALT 250 FOR IP): Performed by: INTERNAL MEDICINE

## 2019-05-25 PROCEDURE — 2580000003 HC RX 258

## 2019-05-25 PROCEDURE — 94761 N-INVAS EAR/PLS OXIMETRY MLT: CPT

## 2019-05-25 PROCEDURE — 85027 COMPLETE CBC AUTOMATED: CPT

## 2019-05-25 PROCEDURE — 2060000000 HC ICU INTERMEDIATE R&B

## 2019-05-25 PROCEDURE — 97162 PT EVAL MOD COMPLEX 30 MIN: CPT

## 2019-05-25 PROCEDURE — 94660 CPAP INITIATION&MGMT: CPT

## 2019-05-25 PROCEDURE — 6360000002 HC RX W HCPCS: Performed by: INTERNAL MEDICINE

## 2019-05-25 PROCEDURE — 99233 SBSQ HOSP IP/OBS HIGH 50: CPT | Performed by: INTERNAL MEDICINE

## 2019-05-25 PROCEDURE — 36600 WITHDRAWAL OF ARTERIAL BLOOD: CPT

## 2019-05-25 RX ORDER — SODIUM CHLORIDE 9 MG/ML
INJECTION, SOLUTION INTRAVENOUS CONTINUOUS
Status: DISCONTINUED | OUTPATIENT
Start: 2019-05-25 | End: 2019-05-27

## 2019-05-25 RX ORDER — SODIUM CHLORIDE 9 MG/ML
INJECTION, SOLUTION INTRAVENOUS
Status: COMPLETED
Start: 2019-05-25 | End: 2019-05-25

## 2019-05-25 RX ADMIN — AMLODIPINE BESYLATE 2.5 MG: 2.5 TABLET ORAL at 10:23

## 2019-05-25 RX ADMIN — LISINOPRIL 10 MG: 10 TABLET ORAL at 10:23

## 2019-05-25 RX ADMIN — ATORVASTATIN CALCIUM 80 MG: 80 TABLET, FILM COATED ORAL at 21:14

## 2019-05-25 RX ADMIN — ASPIRIN 81 MG: 81 TABLET, COATED ORAL at 10:23

## 2019-05-25 RX ADMIN — BETHANECHOL CHLORIDE 25 MG: 25 TABLET ORAL at 14:25

## 2019-05-25 RX ADMIN — BETHANECHOL CHLORIDE 25 MG: 25 TABLET ORAL at 21:06

## 2019-05-25 RX ADMIN — POLYETHYLENE GLYCOL 3350 17 G: 17 POWDER, FOR SOLUTION ORAL at 10:23

## 2019-05-25 RX ADMIN — ACETAMINOPHEN 650 MG: 325 TABLET ORAL at 10:46

## 2019-05-25 RX ADMIN — SENNOSIDES,DOCUSATE SODIUM 2 TABLET: 8.6; 5 TABLET, FILM COATED ORAL at 10:23

## 2019-05-25 RX ADMIN — LEVOFLOXACIN 250 MG: 5 INJECTION, SOLUTION INTRAVENOUS at 10:52

## 2019-05-25 RX ADMIN — ACETAMINOPHEN 650 MG: 325 TABLET ORAL at 21:41

## 2019-05-25 RX ADMIN — SODIUM CHLORIDE: 9 INJECTION, SOLUTION INTRAVENOUS at 14:46

## 2019-05-25 RX ADMIN — SODIUM CHLORIDE: 9 INJECTION, SOLUTION INTRAVENOUS at 14:45

## 2019-05-25 RX ADMIN — FUROSEMIDE 40 MG: 40 TABLET ORAL at 10:23

## 2019-05-25 RX ADMIN — INSULIN LISPRO 2 UNITS: 100 INJECTION, SOLUTION INTRAVENOUS; SUBCUTANEOUS at 21:13

## 2019-05-25 RX ADMIN — BETHANECHOL CHLORIDE 25 MG: 25 TABLET ORAL at 10:52

## 2019-05-25 RX ADMIN — SENNOSIDES,DOCUSATE SODIUM 2 TABLET: 8.6; 5 TABLET, FILM COATED ORAL at 21:09

## 2019-05-25 RX ADMIN — SPIRONOLACTONE 25 MG: 25 TABLET ORAL at 10:23

## 2019-05-25 RX ADMIN — INSULIN GLARGINE 22 UNITS: 100 INJECTION, SOLUTION SUBCUTANEOUS at 10:27

## 2019-05-25 ASSESSMENT — PAIN DESCRIPTION - LOCATION: LOCATION: BACK;BUTTOCKS

## 2019-05-25 ASSESSMENT — PAIN DESCRIPTION - FREQUENCY: FREQUENCY: INTERMITTENT

## 2019-05-25 ASSESSMENT — PAIN SCALES - GENERAL
PAINLEVEL_OUTOF10: 0
PAINLEVEL_OUTOF10: 4
PAINLEVEL_OUTOF10: 3

## 2019-05-25 ASSESSMENT — PAIN DESCRIPTION - ORIENTATION: ORIENTATION: LEFT

## 2019-05-25 ASSESSMENT — PAIN DESCRIPTION - PAIN TYPE: TYPE: CHRONIC PAIN

## 2019-05-25 ASSESSMENT — PAIN DESCRIPTION - DESCRIPTORS: DESCRIPTORS: ACHING;DISCOMFORT

## 2019-05-25 NOTE — PROGRESS NOTES
Pt is not ready for bipap at this time, pt on HFNC, RN at bedside/notified,  RT will continue to monitor.

## 2019-05-25 NOTE — PROGRESS NOTES
Pt has been up to the chair for since about 1430, chair cushion in place. Pt/ot worked with him today. Torso brace in place. Pt seems slightly disoriented at this time. Possibly the low sodium. Pt is fidgeting with lines and tubes. Pt has removed telemetry box 2 times recently and is focused on the tele box leads. Wife at bedside. Will cont to mery. Safety alarm in place and active.

## 2019-05-25 NOTE — PROGRESS NOTES
him.  · Normally, he is not on home oxygen although he has previous uvulopalatopharyngoplasty, but he probably still have sleep apnea.   Hyponatremia   · which is acute due to acute renal failure and probably congestive heart failure. Should improve with diuretics. Also, his glucose was 194, also corrected. · Sodium is 131. Mildly high potassium should come down with Lasix. Please call our office at [phone] with any questions or contact me directly. Subjective:     CC / Reason for Consult: SREE w/ CKD    HPI/PMH:  79 y/o WM w/ PMHx of DM type 2, A Flutter, Hip Fx, Ischemic Colitis. Here with constipation and back pain with Vertebral fx transferred from rehab for SREE. Wife relates he has problem with chronic constipation where normally he could go without moving his bowel for a week, now presents to the [facility] with one and a half week of no bowel movement with severe low back pain with movement, was found to have fracture L1-L2 for which he was given brace and was transferred to rehab. He was transferred back from the rehab because of worsening of shortness of breath and acute kidney disease. Reviewing the records in 2016, he was here with acute kidney injury when his creatinine normalized. On 05/20/2019,  SCr was 0.7; on 05/23/2019, 1.3; and now it is 1.8. SCr even in 12/2018, was 1.3 and sometimes in 2017, it was 1.5. He does have grade 3 diastolic dysfunction, has a pacemaker in the care of Dr. Victor M Corea. ROS / Interval Hx: Patient seen in room and sitting up in chair eating lunch. Breathing is fine. No complaints. Urinating ok. No family or nursing present. He is wearing back brace.      Objective:        Gen: alert, well appearing, and in no distress and oriented to person, place, and time     Neck:  supple, no significant adenopathy     Cardio: normal rate, regular rhythm, normal S1, S2, no murmurs, rubs, clicks or gallops      Resp: clear to auscultation, no wheezes,

## 2019-05-25 NOTE — CONSULTS
INPATIENT PULMONARY CRITICAL CARE CONSULT NOTE      Chief Complaint/Referring Provider:  Patient is being seen at the request of Dr. Luis Vogt  for a consultation for Acute resp failure      Presenting HPI: Patient was transferred from ARU to inpatient with increasing shortness of breath    As per PMR physician-86 y. o. male with a PMH significant for prostate cancer, ischemic colitis, and diastolic heart failure, who presented to the hospital with constipation and abdominal discomfort for 1 week. He had  CT abdomen and pelvis for workup, this revealed acute L1 compression fracture and progressed L2 compression fracture. Patient has no history of trauma or fall. Patient has no history of bowel or bladder incontinence, or saddle numbness.  Patient was seen by orthopedic spine, and patient elected to proceed conservatively. Patient fitted with a [place] brace, and he should have the brace on when up with activity. Okay for PT/OT prior to brace arrival. He should Follow up with Dr. Delmer Shaver in 1-2 weeks for repeat xrays. Patient has a past medical history positive for diabetes, hypertension, CAD, hyperlipidemia, chronic kidney disease, and A. fib on Coumadin.  He lives at home with his wife     Patient apparently decompensated and was having increasing shortness of breath and was transferred to progressive care unit on BiPAP, patient when seen this morning continued to be and BiPAP and patient was not responsive, patient was on IPAP of 16 but was generating tidal volumes of 256 mL, patient was not having any increased audible wheezing, patient on 50% oxygen at that time, patient's blood sugars are not controlled, patient had paced rhythm on the monitor, patient was hemodynamically maintained, no other pertinent review of system couldn't be obtained because of patient's clinical status       Patient Active Problem List    Diagnosis Date Noted    Diastolic dysfunction 45/53/5602     Priority: High    Normocytic anemia 10/06/2014     Priority: Medium    CAD (coronary artery disease) 10/05/2014     Priority: Medium    Diabetes mellitus type 2, uncontrolled ([address] 08/12/2012     Priority: Medium    Hyperlipidemia 08/12/2012     Priority: Medium    Venous (peripheral) insufficiency      Priority: Low    Acute respiratory failure ([address] 05/24/2019    Hyponatremia 05/24/2019    Leukocytosis 05/24/2019    Acute pulmonary edema ([address] 05/24/2019    Pulmonary infiltrates 05/24/2019    Closed compression fracture of first lumbar vertebra (HCC) 05/24/2019    Lumbar compression fracture, sequela 05/17/2019    Constipation 05/14/2019    Pressure ulcer of left buttock, stage 2 03/24/2019    Decreased mobility 11/03/2018    Presence of total knee joint prosthesis, bilateral 11/03/2018    Presence of right hip implant 11/03/2018    Allergic rhinitis 11/03/2018    Diverticulosis 11/03/2018    Vitamin D deficiency 11/03/2018    Restrictive lung disease 01/28/2018    Lumbar spondylolysis 12/14/2017    Obstructive and central sleep apnea 09/07/2017    LPRD (laryngopharyngeal reflux disease) 09/01/2017    Bronchiectasis without complication ([address] 82/96/3519    RAD (reactive airway disease), mild persistent, uncomplicated 03/97/9220    CKD (chronic kidney disease) stage 3, GFR 30-59 ml/min ([facility]) 06/27/2016    Cardiac pacemaker in situ 05/24/2016    Essential hypertension 09/14/2015    Carotid stenosis 08/29/2012    RBBB (right bundle branch block) 08/29/2012    Persistent atrial fibrillation ([address] 08/12/2012    Sensorineural hearing loss 08/19/2010       Past Medical History:   Diagnosis Date    Acute encephalopathy 10/7/2014    Acute lower GI bleeding 10/6/2014    Acute renal failure (ARF) ([address] 10/7/2014    Acute respiratory failure ([address] 11/2/2014    Atrial flutter ([address] 07/11/2015 06/27/2016, AFL DCCV 200 J, successful    Closed right hip fracture ([address] 9/21/4590    Diastolic heart failure ([address] 11/2/2014    Diverticulitis     HCAP (healthcare-associated pneumonia) 11/2/2014    Iron deficiency anemia     Ischemic colitis ([facility] 60/97/76    Metabolic encephalopathy 1/72/5554    Nonhealing nonsurgical wound of scalp, limited to breakdown of skin 2/14/2019    Obesity 8/29/2012    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 ([facility] 1/7/2014    Pressure ulcer of coccygeal region, stage 2 11/3/2018    Prostate cancer ([facility]     Vasovagal syncope     VT [facility]ricular tachycardia) ([facility] 8/13/2012        Past Surgical History:   Procedure Laterality Date    APPENDECTOMY  1939    CHOLECYSTECTOMY      COLONOSCOPY  10/07/2014    Ischemic colitis    CORONARY ARTERY BYPASS GRAFT  1990    PACEMAKER PLACEMENT      PARTIAL HIP ARTHROPLASTY Right 09/24/2016    PROSTATE BIOPSY  08/10/2007    PROSTATECTOMY  2007    radical, with lymph node dissection    REVISION TOTAL KNEE ARTHROPLASTY Left 06/18/2014    TOTAL KNEE ARTHROPLASTY Bilateral 1991    UPPER GASTROINTESTINAL ENDOSCOPY  03/02/10    WNL                 UVULOPALATOPHARYGOPLASTY          Family History   Problem Relation Age of Onset    Stroke Mother     Cancer Sister         stomach    Mental Retardation Brother     Cancer Brother         prostate    Other Father         suicide    Alcohol Abuse Father     Cancer Sister         breast    Other Brother         MVA        Social History     Tobacco Use    Smoking status: Never Smoker    Smokeless tobacco: Never Used   Substance Use Topics    Alcohol use: No        Allergies   Allergen Reactions    Metoprolol Nausea And Vomiting     dizzy                 Physical Exam:  Blood pressure (!) 143/62, pulse 63, temperature 97.9 °F (36.6 °C), resp. rate 20, height 5' 10\" (1.778 m), weight 198 lb (89.8 kg), SpO2 91 %.'   Constitutional:  No acute distress. on BIPAP   HENT:  BIPAP mask intact  No thyromegaly. Eyes:  Conjunctivae are normal. Pupils equal, round, and reactive to light. No scleral icterus. Neck: . No tracheal deviation present. No obvious thyroid mass. Cardiovascular: Normal rate, regular rhythm, normal heart sounds. No right ventricular heave. No lower extremity edema. Pulmonary/Chest: No wheezes. B/L rales. Chest wall is not dull to percussion. No accessory muscle usage or stridor. Decreased BSI   Abdominal: Soft. Bowel sounds present. No distension or hernia. No tenderness. Musculoskeletal: No cyanosis. No clubbing. No obvious joint deformity. Lymphadenopathy: No cervical or supraclavicular adenopathy. Skin: Skin is warm and dry. No rash or nodules on the exposed extremities. Neurologic: Unresponsive when seen         Results:  CBC:   Recent Labs     05/23/19  0834 05/24/19  0217 05/24/19  0220   WBC 11.0  --  18.0*   HGB 11.1* 12.0* 10.8*   HCT 33.6*  --  32.5*   MCV 90.8  --  90.4     --  294     BMP:   Recent Labs     05/23/19  0834 05/24/19  0220   * 129*   K 4.7 5.2*   CL 89* 85*   CO2 33* 30   BUN 33* 45*   CREATININE 1.3 1.8*     PT/INR:   Recent Labs     05/22/19  0646 05/23/19  0834 05/24/19  0220   PROTIME 24.5* 29.8* 42.4*   INR 2.15* 2.69* 3.72*     APTT: No results for input(s): APTT in the last 72 hours. UA:  Recent Labs     05/24/19  1330   COLORU Yellow   PHUR 5.0   WBCUA 0-2   RBCUA None seen   BACTERIA Rare*   CLARITYU Clear   SPECGRAV >=1.030   LEUKOCYTESUR SMALL*   UROBILINOGEN 0.2   BILIRUBINUR Negative   BLOODU Negative   GLUCOSEU Negative       Imaging:  I have reviewed radiology images personally.     No orders to display     Xr Chest Standard (2 Vw)    Result Date: 5/14/2019  EXAMINATION: TWO XRAY VIEWS OF THE CHEST 5/14/2019 5:31 pm COMPARISON: 02/25/2019 HISTORY: ORDERING SYSTEM PROVIDED HISTORY: hypoxia TECHNOLOGIST PROVIDED HISTORY: Reason for exam:->hypoxia Ordering Physician Provided Reason for Exam: Hypoxia; SOB; Per patient's family, they had experienced some chest pains recently Acuity: Acute Type of Exam: Initial FINDINGS: There is worsening bibasilar airspace disease. There is also mild pulmonary vascular congestion. Cardiomegaly is stable status post median sternotomy, CABG and dual lead pacemaker. There is no pneumothorax or pleural effusion. No change in the elevated right hemidiaphragm. Degenerative changes involve the bilateral shoulders. 1. Worsening bibasilar airspace disease either due to atelectasis or developing pneumonia. 2. Mild pulmonary vascular congestion. Ct Abdomen Pelvis W Iv Contrast Additional Contrast? None    Result Date: 5/14/2019  EXAMINATION: CT OF THE ABDOMEN AND PELVIS WITH CONTRAST 5/14/2019 3:07 pm TECHNIQUE: CT of the abdomen and pelvis was performed with the administration of intravenous contrast. Multiplanar reformatted images are provided for review. Dose modulation, iterative reconstruction, and/or weight based adjustment of the mA/kV was utilized to reduce the radiation dose to as low as reasonably achievable. COMPARISON: February 25, 2019 HISTORY: ORDERING SYSTEM PROVIDED HISTORY: constipation TECHNOLOGIST PROVIDED HISTORY: If patient is on cardiac monitor and/or pulse ox, they may be taken off cardiac monitor and pulse ox, left on O2 if currently on. All monitors reattached when patient returns to room. Additional Contrast?->None Ordering Physician Provided Reason for Exam: constipation x2 days Acuity: Acute Type of Exam: Initial FINDINGS: Lower Chest: Patchy atelectasis in the lung bases. Coronary artery calcifications. Organs: Cholecystectomy. Pneumobilia. Spleen is unremarkable. Multiple pancreatic cystic lesions which measure up to 1.2 cm. These appear similar to previous exam. No pancreatic duct dilatation. Vascular calcifications. GI/Bowel: No bowel obstruction. Sigmoid diverticulosis. No acute diverticulitis. Pelvis: No bladder stone. Diverticulum arising from the left lateral wall of the bladder. Small fat containing right inguinal hernia. Vascular calcifications. Patient is status post median sternotomy. Dual lead left subclavian approach pacer is again seen. Cardiac and mediastinal contours are without acute process. No acute osseous abnormality. Persistent right basilar airspace disease with a probable small right-sided pleural effusion. Results for Melyssa Villanueva (MRN [id number]) as of 5/24/2019 21:23   Ref. Range 5/24/2019 02:17 5/24/2019 02:20   Sodium Latest Ref Range: 136 - 145 mmol/L  129 (L)   Potassium Latest Ref Range: 3.5 - 5.1 mmol/L  5.2 (H)   Chloride Latest Ref Range: 99 - 110 mmol/L  85 (L)   CO2 Latest Ref Range: 21 - 32 mmol/L  30   BUN Latest Ref Range: 7 - 20 mg/dL  45 (H)   Creatinine Latest Ref Range: 0.8 - 1.3 mg/dL  1.8 (H)   Anion Gap Latest Ref Range: 3 - 16   14   Calcium, Ion Latest Ref Range: 1.12 - 1.32 mmol/L 1.08 (L)    GFR Non- Latest Ref Range: >60   36 (A)   GFR  Latest Ref Range: >60   43 (A)   Lactate, ser/plas Latest Ref Range: 0.40 - 2.00 mmol/L 1.71    Glucose Latest Ref Range: 70 - 99 mg/dL  194 (H)   POC Glucose Latest Ref Range: 70 - 99 mg/dl 229 (H)    Calcium Latest Ref Range: 8.3 - 10.6 mg/dL  9.2   POC Sodium Latest Ref Range: 136 - 145 mmol/L 128 (L)    POC Potassium Latest Ref Range: 3.5 - 5.1 mmol/L 4.9    POC Hematocrit Latest Ref Range: 40.5 - 52.5 % 35.0 (L)    Troponin Latest Ref Range: <0.01 ng/mL  0.05 (H)     Results for Melyssa Villanueva (MRN [id number]) as of 5/24/2019 21:23   Ref.  Range 5/20/2019 07:18 5/23/2019 08:34 5/24/2019 02:17 5/24/2019 02:20   WBC Latest Ref Range: 4.0 - 11.0 K/uL 9.2 11.0  18.0 (H)   RBC Latest Ref Range: 4.20 - 5.90 M/uL 3.79 (L) 3.70 (L)  3.60 (L)   Hemoglobin Quant Latest Ref Range: 13.5 - 17.5 g/dL 11.5 (L) 11.1 (L) 12.0 (L) 10.8 (L)   Hematocrit Latest Ref Range: 40.5 - 52.5 % 34.2 (L) 33.6 (L)  32.5 (L)   POC Hematocrit Latest Ref Range: 40.5 - 52.5 %   35.0 (L)    MCV Latest Ref Range: 80.0 - 100.0 fL 90.2 90.8  90.4   MCH Latest Ref Range: 26.0 - 34.0 pg 30.4 30.1  30.0   MCHC Latest Ref Range: 31.0 - 36.0 g/dL 33.7 33.1  33.2   MPV Latest Ref Range: 5.0 - 10.5 fL 7.5 7.9  8.1   RDW Latest Ref Range: 12.4 - 15.4 % 13.7 13.9  14.2   Platelet Count Latest Ref Range: 135 - 450 K/uL 225 280  294   Neutrophils % Latest Units: % 59.1 65.1       Results for Rafy Morrow (MRN [id number]) as of 5/24/2019 21:23   Ref. Range 5/21/2019 06:49 5/22/2019 06:46 5/23/2019 08:34 5/24/2019 02:20   Prothrombin Time Latest Ref Range: 9.8 - 13.0 sec 19.4 (H) 24.5 (H) 29.8 (H) 42.4 (H)   INR Latest Ref Range: 0.86 - 1.14  1.70 (H) 2.15 (H) 2.69 (H) 3.72 (H)       Results for Rafy Morrow (MRN [id number]) as of 5/24/2019 21:23   Ref. Range 5/24/2019 02:17 5/24/2019 04:30   pH, Arterial Latest Ref Range: 7.350 - 7.450  7.286 (L) 7.311 (L)   pCO2, Arterial Latest Ref Range: 35.0 - 45.0 mmHg 65.0 (H) 66.2 (H)   pO2, Arterial Latest Ref Range: 75.0 - 108.0 mmHg 91.3 85.9   HCO3, Arterial Latest Ref Range: 21.0 - 29.0 mmol/L 31.0 (H) 32.7 (H)   TCO2 (calc), Art Latest Ref Range: Not Established mmol/L 33 34.7   Base Excess, Arterial Latest Ref Range: -3.0 - 3.0 mmol/L 4 (H) 4.7 (H)   O2 Sat, Arterial Latest Ref Range: >92 % 96 95.1   O2 Content, Arterial Latest Ref Range: Not Established mL/dL  16   Methemoglobin, Arterial Latest Ref Range: <1.5 %  0.5   Carboxyhgb, Arterial Latest Ref Range: 0.0 - 1.5 %  0.3     No obstruction.       Compression deformities involving L1 and L2.  Compression fracture L1 appears   new and slightly progressed at L2. Echocardiogram:Summary   Normal left ventricle size and systolic function with an estimated ejection   fraction of 55%.  No regional wall motion abnormalities are seen.  Rosa Maria Hoots is mild concentric left ventricular hypertrophy.   Diastolic filling parameters suggests grade II diastolic dysfunction .   Mildly dilated left atrium with increased left atrial volume.   Right atrial size is mildly dilated .   Trivial aortic regurgitation.   Trivial tricuspid regurgitation.   Systolic pulmonary artery pressure (SPAP) is normal and estimated at 37 mmHg   (RA pressure 8 mmHg).   The mitral valve is mildly calcified with associated mild mitral annular   calcification. The mitral valve function appears normal. There is no   significant regurgitation.   The aortic valve appears trileaflet but sclerotic. There is adequate opening   without evidence for stenosis. There is trace regurgitation. PFT:In 2016-Mild OAD with FEV 1 of 66%     ONE XRAY VIEW OF THE CHEST       5/24/2019 1:50 am       COMPARISON:   05/21/2019       HISTORY:   ORDERING SYSTEM PROVIDED HISTORY: decreased o2   TECHNOLOGIST PROVIDED HISTORY:   Reason for exam:->decreased o2   Ordering Physician Provided Reason for Exam: decreased o2   Acuity: Unknown   Type of Exam: Unknown       FINDINGS:   Pulmonary edema is present with improving right basilar airspace disease. The heart size is at the upper limits of normal.  Sternal wires and a left   chest pacemaker are again noted.  There is no discernible pneumothorax.           Impression   1. Pulmonary edema. 2. Improving right basilar atelectasis or pneumonia. Assessment:  Principal Problem:    Acute respiratory failure (HCC)  Active Problems:    Diastolic dysfunction    Diabetes mellitus type 2, uncontrolled (HCC)    CKD (chronic kidney disease) stage 3, GFR 30-59 ml/min (HCC)    Obstructive and central sleep apnea    Hyponatremia    Leukocytosis    Acute pulmonary edema (HCC)    Pulmonary infiltrates    Closed compression fracture of first lumbar vertebra (HCC)  Resolved Problems:    * No resolved hospital problems.  *          Plan:   · BiPAP with oxygen supplementation to keep saturation between 90-94% only  · Patient's IPAP is increased from 16 to19  · Nursing was requested to repeat an ABG in 2 hours time and to call for the ABG results and depending on that further course of action to be taken  · If patient

## 2019-05-25 NOTE — PROGRESS NOTES
Hospitalist Progress Note      PCP: Lois Canseco    Date of Admission: 5/24/2019    Chief Complaint: sob    Hospital Course:       80 y.o. male recently discharged to ARU who presented to [facility] with acute severe SOB. He had been undergoing acute rehab for L1/2 compression fracture fitted w/ [place] Brace. He was given IV Lasix for pulmonary edema seen on CXR w/ improvement in sxs.    He has a pmh  significant for prostate cancer, ischemic colitis, and diastolic heart failure, diabetes, htn, CAD, hyperlipidemia, chronic kidney disease, and A. fib on Coumadin. Subjective: He is sitting up in a chair, states he is feeling better this morning eating and drinking little better shortness of breath seems to be improving.       Medications:  Reviewed    Infusion Medications    sodium chloride       Scheduled Medications    furosemide  60 mg Intravenous Once    amLODIPine  2.5 mg Oral Daily    And    lisinopril  10 mg Oral Daily    aspirin  81 mg Oral Daily    atorvastatin  80 mg Oral QPM    bethanechol  25 mg Oral TID    insulin glargine  22 Units Subcutaneous QAM    insulin lispro  0-12 Units Subcutaneous TID WC    insulin lispro  0-6 Units Subcutaneous Nightly    lidocaine  1 patch Transdermal Daily    polyethylene glycol  17 g Oral Daily    sennosides-docusate sodium  2 tablet Oral BID    spironolactone  25 mg Oral Daily    [START ON 5/19/2020] warfarin (COUMADIN) daily dosing (placeholder)   Other RX Placeholder    levofloxacin  250 mg Intravenous Q24H    furosemide  40 mg Oral Daily     PRN Meds: acetaminophen, bisacodyl, bisacodyl, cyclobenzaprine, glucagon (rDNA), glucose, magnesium hydroxide, ondansetron, oxyCODONE-acetaminophen      Intake/Output Summary (Last 24 hours) at 5/25/2019 1421  Last data filed at 5/25/2019 1332  Gross per 24 hour   Intake 670 ml   Output 2300 ml   Net -1630 ml       Physical Exam Performed:    BP (!) 146/62   Pulse 64   Temp 97.9 °F (36.6 °C) Resp 20   Ht 5' 10\" (1.778 m)   Wt 193 lb 12.6 oz (87.9 kg)   SpO2 100%   BMI 27.81 kg/m²     General appearance: He is up in a chair, looks weak and tired he is alert and cooperative. He has a brace in place  HEENT: Pupils equal, round, and reactive to light. Conjunctivae/corneas clear. Neck: Supple, with full range of motion. Trachea midline. Respiratory:  Bilateral breath sounds, decreased both bases, no Rales/Wheezes/Rhonchi,  Are noted, no use of accessory muscles at this time. Cardiovascular: Regular rate and rhythm with normal S1/S2   Abdomen: Soft, non-tender, non-distended with normal bowel sounds. Musculoskeletal: No clubbing, cyanosis, =1extremity  edema bilaterally. Neurologic:  Neurovascularly intact without any focal sensory/motor deficits. Cranial nerves: II-XII intact, grossly non-focal.  Psychiatric: Alert and oriented, thought content appropriate, normal insight  Capillary Refill: Brisk,< 3 seconds   Peripheral Pulses: +2 palpable, equal bilaterally       Labs:   Recent Labs     05/23/19  0834 05/24/19  0217 05/24/19 0220 05/25/19 0459   WBC 11.0  --  18.0* 9.3   HGB 11.1* 12.0* 10.8* 9.6*   HCT 33.6*  --  32.5* 28.5*     --  294 241     Recent Labs     05/23/19  0834 05/24/19 0220 05/25/19 0459   * 129* 125*   K 4.7 5.2* 4.8   CL 89* 85* 88*   CO2 33* 30 35*   BUN 33* 45* 44*   CREATININE 1.3 1.8* 1.4*   CALCIUM 9.2 9.2 8.5   PHOS  --   --  4.1     No results for input(s): AST, ALT, BILIDIR, BILITOT, ALKPHOS in the last 72 hours.   Recent Labs     05/23/19  0834 05/24/19 0220 05/25/19 0459   INR 2.69* 3.72* 5.43*     Recent Labs     05/24/19 0220 05/24/19  0531 05/24/19  1137   TROPONINI 0.05* 0.03* 0.02*       Urinalysis:      Lab Results   Component Value Date    NITRU Negative 05/24/2019    WBCUA 0-2 05/24/2019    BACTERIA Rare 05/24/2019    RBCUA None seen 05/24/2019    BLOODU Negative 05/24/2019    SPECGRAV >=1.030 05/24/2019    GLUCOSEU Negative 05/24/2019 Anuel Harding 994 NEGATIVE 02/06/2010       Radiology:  No orders to display           Assessment/Plan:    Active Hospital Problems    Diagnosis    Diastolic dysfunction [G92.1]     Priority: High    Diabetes mellitus type 2, uncontrolled ([facility] [E11.65]     Priority: Medium    Acute respiratory failure (HCC) [J96.00]    Hyponatremia [E87.1]    Leukocytosis [D72.829]    Acute pulmonary edema (HCC) [J81.0]    Pulmonary infiltrates [R91.8]    Closed compression fracture of first lumbar vertebra ([facility] [S32.010A]    Obstructive and central sleep apnea [G47.30]    CKD (chronic kidney disease) stage 3, GFR 30-59 ml/min ([facility]) [N18.3]     Acute Respiratory failure - of unclear etiology but w/ recurrent pulmonary edema on  CXR which had been part of the original presentation prior to ARU. , likely acute - less likely PNA w/out fever or leukocytosis.  Supplemental O2 and wean as tolerated. Home Lasix dose increased to 40 QD. Will continue to follow clinical response    Pulmonary infiltrates: This was noted time admission, continue on antibiotics/levofloxacin and follow      HTN/CAD - w/ known CAD and no evidence of active signs/sxs of ischemia/failure. Currently controlled on home meds w/ vitals reviewed and documented as above. S/P Pacer.      HyperLipidemia - controlled on home Statin. Continue, w/ f/u and med adjustment w/ PCP     CKD - baseline stage 2.  Will continue to follow serial labs - resolved.       DM2 - controlled on home Lantus - continued.  Follow FSBS/SSI med regimen.      Afib - rate controlled and anticoagulated on Coumadin - held. Continue to follow INR - supratherapeutic. INR= 5.43 today continue to hold Coumadin     Anemia - mild, of unclear etiology, w/out evidence of active bleeding/hemolysis.  Asymptomatic w/out indication for transfusion.      Constipation - Unlikely related specifically to Lumbar compression fx at L1/L2 give hx of chronic constipation but may be related to pain meds for

## 2019-05-25 NOTE — PROGRESS NOTES
* 129* 125*   K 4.7 5.2* 4.8   CL 89* 85* 88*   CO2 33* 30 35*   PHOS  --   --  4.1   BUN 33* 45* 44*   CREATININE 1.3 1.8* 1.4*     PT/INR:   Recent Labs     05/23/19  0834 05/24/19  0220 05/25/19  0459   PROTIME 29.8* 42.4* 61.9*   INR 2.69* 3.72* 5.43*     APTT: No results for input(s): APTT in the last 72 hours. UA:  Recent Labs     05/24/19  1330   COLORU Yellow   PHUR 5.0   WBCUA 0-2   RBCUA None seen   BACTERIA Rare*   CLARITYU Clear   SPECGRAV >=1.030   LEUKOCYTESUR SMALL*   UROBILINOGEN 0.2   BILIRUBINUR Negative   BLOODU Negative   GLUCOSEU Negative       Imaging:  I have reviewed radiology images personally. No orders to display     Xr Chest Standard (2 Vw)    Result Date: 5/14/2019  EXAMINATION: TWO XRAY VIEWS OF THE CHEST 5/14/2019 5:31 pm COMPARISON: 02/25/2019 HISTORY: ORDERING SYSTEM PROVIDED HISTORY: hypoxia TECHNOLOGIST PROVIDED HISTORY: Reason for exam:->hypoxia Ordering Physician Provided Reason for Exam: Hypoxia; SOB; Per patient's family, they had experienced some chest pains recently Acuity: Acute Type of Exam: Initial FINDINGS: There is worsening bibasilar airspace disease. There is also mild pulmonary vascular congestion. Cardiomegaly is stable status post median sternotomy, CABG and dual lead pacemaker. There is no pneumothorax or pleural effusion. No change in the elevated right hemidiaphragm. Degenerative changes involve the bilateral shoulders. 1. Worsening bibasilar airspace disease either due to atelectasis or developing pneumonia. 2. Mild pulmonary vascular congestion. Ct Abdomen Pelvis W Iv Contrast Additional Contrast? None    Result Date: 5/14/2019  EXAMINATION: CT OF THE ABDOMEN AND PELVIS WITH CONTRAST 5/14/2019 3:07 pm TECHNIQUE: CT of the abdomen and pelvis was performed with the administration of intravenous contrast. Multiplanar reformatted images are provided for review.  Dose modulation, iterative reconstruction, and/or weight based adjustment of the mA/kV was utilized to reduce the radiation dose to as low as reasonably achievable. COMPARISON: February 25, 2019 HISTORY: ORDERING SYSTEM PROVIDED HISTORY: constipation TECHNOLOGIST PROVIDED HISTORY: If patient is on cardiac monitor and/or pulse ox, they may be taken off cardiac monitor and pulse ox, left on O2 if currently on. All monitors reattached when patient returns to room. Additional Contrast?->None Ordering Physician Provided Reason for Exam: constipation x2 days Acuity: Acute Type of Exam: Initial FINDINGS: Lower Chest: Patchy atelectasis in the lung bases. Coronary artery calcifications. Organs: Cholecystectomy. Pneumobilia. Spleen is unremarkable. Multiple pancreatic cystic lesions which measure up to 1.2 cm. These appear similar to previous exam. No pancreatic duct dilatation. Vascular calcifications. GI/Bowel: No bowel obstruction. Sigmoid diverticulosis. No acute diverticulitis. Pelvis: No bladder stone. Diverticulum arising from the left lateral wall of the bladder. Small fat containing right inguinal hernia. Vascular calcifications. Peritoneum/Retroperitoneum: No abdominal aortic aneurysm. Adrenal glands unremarkable. Kidneys are unremarkable. Bones/Soft Tissues: No acute bony abnormality. Severe disc height loss L5-S1. Facet arthropathy and severe narrowing of the L5-S1 foramen. Compression fracture along the inferior aspect of L1 without significant vertebral body height loss. Compression deformity of L2, slightly progressed with loss of approximately 40% vertebral body height. No obstruction. Compression deformities involving L1 and L2. Compression fracture L1 appears new and slightly progressed at L2.      Xr Chest Portable    Result Date: 5/24/2019  EXAMINATION: ONE XRAY VIEW OF THE CHEST 5/24/2019 1:50 am COMPARISON: 05/21/2019 HISTORY: ORDERING SYSTEM PROVIDED HISTORY: decreased o2 TECHNOLOGIST PROVIDED HISTORY: Reason for exam:->decreased o2 Ordering Physician Provided Reason for Exam: decreased o2 Acuity: Unknown Type of Exam: Unknown FINDINGS: Pulmonary edema is present with improving right basilar airspace disease. The heart size is at the upper limits of normal.  Sternal wires and a left chest pacemaker are again noted. There is no discernible pneumothorax. 1. Pulmonary edema. 2. Improving right basilar atelectasis or pneumonia. Xr Chest Portable    Result Date: 5/21/2019  EXAMINATION: ONE XRAY VIEW OF THE CHEST 5/21/2019 9:04 am COMPARISON: Chest radiograph May 14, 2019 HISTORY: ORDERING SYSTEM PROVIDED HISTORY: evaluating for fluid overload TECHNOLOGIST PROVIDED HISTORY: Reason for exam:->evaluating for fluid overload Ordering Physician Provided Reason for Exam: evaluating for fluid overload Acuity: Acute Type of Exam: Initial FINDINGS: There is persistent right basilar airspace disease which is linear in appearance. There is a probable small right-sided pleural effusion. Elevation of the right hemidiaphragm is again seen. There is mild pulmonary vascular congestion. No overt signs of edema. No pneumothorax. Patient is status post median sternotomy. Dual lead left subclavian approach pacer is again seen. Cardiac and mediastinal contours are without acute process. No acute osseous abnormality. Persistent right basilar airspace disease with a probable small right-sided pleural effusion. Results for Colin Elise (MRN [id number]) as of 5/25/2019 19:07   Ref.  Range 5/24/2019 02:17 5/24/2019 02:20 5/24/2019 08:12 5/24/2019 12:14 5/24/2019 12:41 5/24/2019 13:30 5/24/2019 16:52 5/24/2019 19:58 5/25/2019 04:59   Sodium Latest Ref Range: 136 - 145 mmol/L  129 (L)       125 (L)   Potassium Latest Ref Range: 3.5 - 5.1 mmol/L  5.2 (H)       4.8   Chloride Latest Ref Range: 99 - 110 mmol/L  85 (L)       88 (L)   CO2 Latest Ref Range: 21 - 32 mmol/L  30       35 (H)   BUN Latest Ref Range: 7 - 20 mg/dL  45 (H)       44 (H)   Creatinine Latest Ref Range: 0.8 - 1.3 mg/dL  1.8 (H)       1.4 (H)   Anion Gap Latest Ref Range: 3 - 16   14       2 (L)   Calcium, Ion Latest Ref Range: 1.12 - 1.32 mmol/L 1.08 (L)           GFR Non- Latest Ref Range: >60   36 (A)       48 (A)   GFR  Latest Ref Range: >60   43 (A)       58 (A)   Protein/Creat Ratio Latest Units: mg/dL      0.2      Lactate, ser/plas Latest Ref Range: 0.40 - 2.00 mmol/L 1.71           Glucose Latest Ref Range: 70 - 99 mg/dL  194 (H)       66 (L)   POC Glucose Latest Ref Range: 70 - 99 mg/dl 229 (H)  221 (H) 231 (H)   115 (H) 128 (H)    Calcium Latest Ref Range: 8.3 - 10.6 mg/dL  9.2       8.5   Phosphorus Latest Ref Range: 2.5 - 4.9 mg/dL         4.1   Total Protein Latest Ref Range: 6.4 - 8.2 g/dL     5.8 (L)         Results for Don Points (MRN [id number]) as of 5/25/2019 19:07   Ref. Range 5/20/2019 07:18 5/23/2019 08:34 5/24/2019 02:17 5/24/2019 02:20 5/25/2019 04:59   WBC Latest Ref Range: 4.0 - 11.0 K/uL 9.2 11.0  18.0 (H) 9.3   RBC Latest Ref Range: 4.20 - 5.90 M/uL 3.79 (L) 3.70 (L)  3.60 (L) 3.16 (L)   Hemoglobin Quant Latest Ref Range: 13.5 - 17.5 g/dL 11.5 (L) 11.1 (L) 12.0 (L) 10.8 (L) 9.6 (L)   Hematocrit Latest Ref Range: 40.5 - 52.5 % 34.2 (L) 33.6 (L)  32.5 (L) 28.5 (L)   POC Hematocrit Latest Ref Range: 40.5 - 52.5 %   35.0 (L)     MCV Latest Ref Range: 80.0 - 100.0 fL 90.2 90.8  90.4 90.1   MCH Latest Ref Range: 26.0 - 34.0 pg 30.4 30.1  30.0 30.5   MCHC Latest Ref Range: 31.0 - 36.0 g/dL 33.7 33.1  33.2 33.8   MPV Latest Ref Range: 5.0 - 10.5 fL 7.5 7.9  8.1 7.8   RDW Latest Ref Range: 12.4 - 15.4 % 13.7 13.9  14.2 13.9   Platelet Count Latest Ref Range: 135 - 450 K/uL 225 280  294 241   Neutrophils % Latest Units: % 59.1 65.1      Lymphocyte % Latest Units: % 26.3 23.2        Results for Don Points (MRN [id number]) as of 5/25/2019 19:07   Ref.  Range 9/26/2016 11:03 5/24/2019 02:17 5/24/2019 04:30 5/24/2019 11:24 5/25/2019 08:50   Hemoglobin, Art, Extended Latest Ref 90-94% only  · Nursing was told that patient can be given a break from the BIPAP and reassessed depending upon the clinical status  · Patient's Creatinine has improved but the sodium appears to have worsened -management as per IM   · Patient has been started on IV Levaquin for pulmonary infiltrates and leukocytosis-dosing as per creatinine clearance  · Lantus insulin as per blood glucose monitoring as per IM-would recommend d/cing long acting insulin as the blood sugars are decreasing and does not have enough PO intake   · Blood glucose monitoring with sliding scale insulin  · Cortisol level reviewed   · Please monitor for any hypoglycemia  · No need of any steroids from pulmonary standpoint of view  · Patient's INR is on the higher side and Coumadin as per pharmacy as per INR  · Monitor for any bleeding   · Avoid narcotics and sedatives  · Patient has a history of obstructive and central sleep apnea which may be contributing to patient's symptomatology and clinical picture and may require reevaluation or BiPAP at home in order to prevent recurrence of acute respiratory failure and hypercarbia-case management to look into it  · PUD and DVT prophylaxis     Case discussed with nursing    Patient still has potential for further decompensation and needs to be monitored closely       Electronically signed by:  Shady Jaffe MD    5/25/2019    7:24 PM.

## 2019-05-25 NOTE — CONSULTS
pacemaker in the care of Dr. Van Norman. In the past, he has normal serum protein electrophoresis. In 2018, he  has mildly high free kappa/lambda ratio of 1.9 in 2016. Other workup in  the past was negative, hepatitis B and C and BEBE and complement. He  does have a history of incontinence, no use of nonsteroidal as far as I  can tell. His chest x-ray show some early congestion. He also has no respiratory acidosis. His pCO2 of 66 for which he is on  BiPAP. Other history include history of diabetes with A1c 8.4, respiratory  failure, atrial flutter, previous hip fracture, ischemic colitis. I am seeing in the room where he is very comfortable on BiPAP without  any shortness of breath. REVIEW OF SYSTEMS:  GENERAL:  No fever or chills. CARDIAC:  No chest pain. PULMONARY:  Shortness of breath for which he was on BiPAP. GENITOURINARY:  Incontinent, but no hematuria. MUSCULOSKELETAL:  No use of nonsteroidal, but back pain. ENDOCRINE:  Diabetes, A1c high. VASCULAR:  No history of claudication. Review of the rest of the systems is negative. PAST HISTORY:  Coronary artery bypass, diastolic dysfunction grade 3,  ischemic colitis, previous episode of acute kidney injury, has a  pacemaker, previous prostatectomy, knee replacement,  uvulopalatopharyngoplasty, previous GI bleed, obesity, prostate cancer. PERSONAL AND SOCIAL HISTORY:  , lives with his wife, history of  smoking in the remote past.  No history of alcohol. FAMILY HISTORY:  No family history of chronic kidney disease, diabetes,  and coronary disease noted in the family. MEDICATIONS:  His home medications included torsemide 20 mg Monday,  Wednesday, Friday; glyburide, insulin, metformin, calcium carbonate,  spironolactone, amlodipine with benazepril/Lotrel, Coumadin, aspirin,  Lipitor, Zetia. ALLERGIES:  Allergy to METOPROLOL.     PHYSICAL EXAMINATION:  GENERAL:  A very pleasant gentleman, fully awake and alert, but on  BiPAP, unable to talk. VITAL SIGNS:  Temperature 98, respiratory rate 12, pulse 60, blood  pressure 126/67. HEENT:  Head:  Normocephalic, atraumatic. Conjunctivae:  No icterus. CARDIAC:  He has normal heart sounds. No rub, murmur, or gallop. NECK:  JVD difficult to see with BiPAP on and short neck. CHEST:  Clear to auscultation posteriorly and anteriorly. ABDOMEN:  Soft, nontender, bowel sounds present. Liver, spleen,  kidneys, bladder nonpalpable. Has a Gray catheter. EXTREMITIES:  Bilateral lower extremities, he has no edema. NEUROLOGICAL:  Neurologically, he is awake and alert. No focal  neurological signs. LABORATORY DATA:  Sodium 129, potassium 5.2, chloride 85, CO2 30, BUN  45, creatinine 1.8, ______ is low 1.08, lactate 1.7, glucose 194,  calcium 9.2. White cell 18,000, hemoglobin 10.8, platelet count 290. Differential count is normal.  His INR is 3.7. Blood gas initially pH  7.311, pCO2 66, pO2 was 85. IMAGING:  Chest x-ray shows some congestion with some pulmonary edema  pattern, improving atelectasis, pneumonia. Last echo in 2016, grade 3  diastolic dysfunction, mild mitral regurgitation, mildly enlarged left  atrium, right side was dilated, pulmonary artery pressure was 40. ASSESSMENT AND PLAN:  Acute kidney injury, probably hemodynamic related  due to his history of heart failure with some diuretic use. At this  point, he does need diuretics because of failure on the Accu-Chek BNP. Continue with Lasix. He got a dose today, reassess him tomorrow. Check urinalysis, which was done this admission. Rule out obstruction. The patient is incontinent, place a Gray  catheter. Check for myeloma screen because of recent lumbar fracture, but it is  very unlikely he had a high free kappa/lambda in 2016 of 1.9, but  probably that was also associated with acute kidney injury at that time. Episode of acute kidney injury in 2016, which resolved.     His serum protein electrophoresis was normal in 01/2018. Diabetes with A1c 8.4, he could have chronic kidney disease, to check  protein-creatinine ratio. Coronary artery disease, previous CABG, history of grade 3 diastolic  dysfunction, has a pacemaker, now fluid overload on chest x-ray. IV  Lasix. Fracture of lumbar vertebra at L1-L2. Further management as per medical  team and the rehab team.  Rule out myeloma. Respiratory acidosis, probably chronic in nature. Needs to keep his  bicarb at high, which already is 30, but keep an eye on that. He is on BiPAP and Dr. Ariel Sadler is seeing him. Normally, he is not on home oxygen although he has previous  uvulopalatopharyngoplasty, but he probably still have sleep apnea. History of prostate cancer, incontinent, place Gray catheter. Hyponatremia which is acute due to acute renal failure and probably  congestive heart failure. Should improve with diuretics. Also, his  glucose was 194, also corrected. Sodium is 131. Mildly high potassium  should come down with Lasix.     Yours sincerely,        Sylvia Garcia MD    D: 05/24/2019 11:53:17       T: 05/24/2019 14:24:38     [email or web address]  Job#: [id number]     Doc#: [id number]

## 2019-05-25 NOTE — PROGRESS NOTES
on 11L high-flow O2). Pt states he feels \"too worn out\" to attempt amb today. Patient Diagnosis(es): There were no encounter diagnoses. 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 has a past surgical history that includes Cholecystectomy; Appendectomy (1939); Prostatectomy (2007); Revision total knee arthroplasty (Left, 06/18/2014); Upper gastrointestinal endoscopy (03/02/10); pacemaker placement; Colonoscopy (10/07/2014); Uvulopalatopharygoplasty; Coronary artery bypass graft (1990); Total knee arthroplasty (Bilateral, 1991); Partial hip arthroplasty (Right, 09/24/2016); and Prostate biopsy (08/10/2007). Restrictions  Restrictions/Precautions  Restrictions/Precautions: Fall Risk, General Precautions, Up as Tolerated  Required Braces or Orthoses?: Yes  Required Braces or Orthoses  Spinal Other: Ferdinand hyperextension orthosis - \"Patient should have brace on when up with activity. Okay to remove while in bed and while bathing. \"  Position Activity Restriction  Other position/activity restrictions: High fall risk per nursing assessment, Ferdinand brace when OOB (okay to be off while in bed/during ADL's), 11L high-flow O2, telemetry with continuous pulse ox, Gray, IV lines  Vision/Hearing  Vision: Impaired  Vision Exceptions: Wears glasses for reading  Hearing: Exceptions to [facility]  Hearing Exceptions: Hard of hearing/hearing concerns Subjective  General  Chart Reviewed: Yes  Patient assessed for rehabilitation services?: Yes  Additional Pertinent Hx: L1-L2 compression fx's  Family / Caregiver Present: Yes(wife)  Referring Practitioner: Dr. Raquel Garcia  Referral Date : 05/25/19  Diagnosis: Acute respiratory failure  Follows Commands: Within Functional Limits  General Comment  Comments: Pt sitting up in chair upon entry of PT, RN cleared pt to work with therapy  Subjective  Subjective: Pt agreeable to work with PT this afternoon, although states he feels \"too worn out\" to attempt ambulation this session. Denies pain. Pain Screening  Patient Currently in Pain: Denies  Intervention List: Patient able to continue with treatment    Orientation  Orientation  Overall Orientation Status: Impaired  Orientation Level: Oriented to person;Disoriented to time;Oriented to situation;Oriented to place  Social/Functional History  Social/Functional History  Lives With: Spouse(wife)  Type of Home: House  Home Layout: One level  Home Access: Level entry  Bathroom Shower/Tub: Walk-in shower, Shower chair with back  Bathroom Toilet: Standard  Bathroom Equipment: Toilet raiser, Grab bars in shower, Hand-held shower  Home Equipment: Rolling walker, Cane, Standard walker, Wheelchair-manual, 4 wheeled walker, Oxygen(pt typically doesn't wear home O2 during the day, just at night with his CPAP machine (per wife))  Receives Help From: Home health  ADL Assistance: Needs assistance   Bath: Maximum assistance   Dressing: Maximum assistance   Toileting: Needs assistance  Homemaking Assistance: Needs assistance  Homemaking Responsibilities: No(wife performs all homemaking)  Ambulation Assistance: Independent(using cane)  Transfer Assistance: Independent  Active : No  Patient's  Info:  Wife  Mode of Transportation: Family  Occupation: Retired  Additional Comments: Per previous chart review (from pt's IP rehab stay), pt was needing wife's assist with ADL's and household management but was modified(I) for mobility with use of straight cane prior to hospitalization. Objective  Observation/Palpation  Posture: Fair  Observation: Pt with lateral trunk lean toward R side while seated in chair    RLE AROM: WFL  LLE AROM : WFL  Strength RLE: Grossly 3+/5 to 4-/5 throughout  Strength LLE: Grossly 3+/5 to 4-/5 throughout    Bed mobility  Supine to Sit: Unable to assess(pt up in chair before & after therapy session)  Scooting: Minimal assistance(to scoot forward<>backward in chair)     Transfers  Sit to Stand: Minimal Assistance(from chair to RW)  Stand to sit: Minimal Assistance(from RW to chair)  Bed to Chair: Unable to assess(pt up in chair before & after therapy session)     Ambulation  Ambulation?: No(pt declined 2* fatigue after standing at walker for ~2 minutes)     Balance  Posture: Fair  Sitting - Static: Fair;+  Sitting - Dynamic: Fair;-  Standing - Static: Fair  Standing - Dynamic: Fair;-  Comments: Pt with lateral trunk lean toward R side while upright in chair (modA needed to correct posture and return trunk to midline). Able to stand at RW x 2 minutes with Shanika x 1 for balance (standing time limited by fatigue). Plan   Times per week: 3-5x/week in acute care  Times per day: Daily  Specific instructions for Next Treatment: Progress ther ex and mobility as tolerated  Current Treatment Recommendations: Strengthening, Functional Mobility Training, Neuromuscular Re-education, Home Exercise Program, Transfer Training, Gait Training, Safety Education & Training, Endurance Training, Positioning, Patient/Caregiver Education & Training, Balance Training  Safety Devices:  All fall risk precautions in place, Call light within reach, Patient at risk for falls, Gait belt, Chair alarm in place, Left in chair, Nurse notified    AM-PAC Score  AM-PAC Inpatient Mobility without Stair Climbing Raw Score : 12  AM-PAC Inpatient without Stair Climbing T-Scale Score : 37.26  Mobility Inpatient CMS 0-100% Score: 63.03  Mobility Inpatient without Stair CMS G-Code Modifier : CL    Goals  Short term goals  Time Frame for Short term goals: 1 week, 6/01/19 (unless otherwise specified)  Short term goal 1: Pt will transfer supine <-> sit with Shanika x 1  Short term goal 2: Pt will transfer sit <-> stand and bed>chair using RW with CGA x 1  Short term goal 3: Pt will ambulate x 40 feet using RW with CGA x 1  Short term goal 4: By 5/27/19: Pt will tolerate 12-15 reps BLE exercise for strengthening, balance, and endurance in order to increase level of (I) with functional mobility  Patient Goals   Patient goals :  \"To get stronger and eventually get back home\"       Therapy Time   Individual Concurrent Group Co-treatment   Time In 1450         Time Out 1510         Minutes 20         Timed Code Treatment Minutes: Wilbert 19, PT, DPT #[id number]

## 2019-05-26 LAB
ANION GAP SERPL CALCULATED.3IONS-SCNC: 7 MMOL/L (ref 3–16)
BUN BLDV-MCNC: 35 MG/DL (ref 7–20)
CALCIUM SERPL-MCNC: 8.2 MG/DL (ref 8.3–10.6)
CHLORIDE BLD-SCNC: 93 MMOL/L (ref 99–110)
CO2: 33 MMOL/L (ref 21–32)
CREAT SERPL-MCNC: 1.2 MG/DL (ref 0.8–1.3)
GFR AFRICAN AMERICAN: >60
GFR NON-AFRICAN AMERICAN: 57
GLUCOSE BLD-MCNC: 102 MG/DL (ref 70–99)
GLUCOSE BLD-MCNC: 168 MG/DL (ref 70–99)
GLUCOSE BLD-MCNC: 198 MG/DL (ref 70–99)
GLUCOSE BLD-MCNC: 252 MG/DL (ref 70–99)
GLUCOSE BLD-MCNC: 99 MG/DL (ref 70–99)
HCT VFR BLD CALC: 27.1 % (ref 40.5–52.5)
HEMOGLOBIN: 9.2 G/DL (ref 13.5–17.5)
INR BLD: 2.83 (ref 0.86–1.14)
MCH RBC QN AUTO: 30.4 PG (ref 26–34)
MCHC RBC AUTO-ENTMCNC: 33.9 G/DL (ref 31–36)
MCV RBC AUTO: 89.9 FL (ref 80–100)
PDW BLD-RTO: 13.7 % (ref 12.4–15.4)
PERFORMED ON: ABNORMAL
PERFORMED ON: NORMAL
PLATELET # BLD: 218 K/UL (ref 135–450)
PMV BLD AUTO: 7.6 FL (ref 5–10.5)
POTASSIUM SERPL-SCNC: 4.6 MMOL/L (ref 3.5–5.1)
PROTHROMBIN TIME: 32.3 SEC (ref 9.8–13)
RBC # BLD: 3.01 M/UL (ref 4.2–5.9)
SODIUM BLD-SCNC: 133 MMOL/L (ref 136–145)
WBC # BLD: 8.2 K/UL (ref 4–11)

## 2019-05-26 PROCEDURE — 6370000000 HC RX 637 (ALT 250 FOR IP): Performed by: INTERNAL MEDICINE

## 2019-05-26 PROCEDURE — 36415 COLL VENOUS BLD VENIPUNCTURE: CPT

## 2019-05-26 PROCEDURE — 2580000003 HC RX 258

## 2019-05-26 PROCEDURE — 2700000000 HC OXYGEN THERAPY PER DAY

## 2019-05-26 PROCEDURE — 97535 SELF CARE MNGMENT TRAINING: CPT

## 2019-05-26 PROCEDURE — 80048 BASIC METABOLIC PNL TOTAL CA: CPT

## 2019-05-26 PROCEDURE — 6360000002 HC RX W HCPCS: Performed by: INTERNAL MEDICINE

## 2019-05-26 PROCEDURE — 99233 SBSQ HOSP IP/OBS HIGH 50: CPT | Performed by: INTERNAL MEDICINE

## 2019-05-26 PROCEDURE — 94660 CPAP INITIATION&MGMT: CPT

## 2019-05-26 PROCEDURE — 2060000000 HC ICU INTERMEDIATE R&B

## 2019-05-26 PROCEDURE — 85027 COMPLETE CBC AUTOMATED: CPT

## 2019-05-26 PROCEDURE — 2580000003 HC RX 258: Performed by: INTERNAL MEDICINE

## 2019-05-26 PROCEDURE — 85610 PROTHROMBIN TIME: CPT

## 2019-05-26 PROCEDURE — 94761 N-INVAS EAR/PLS OXIMETRY MLT: CPT

## 2019-05-26 PROCEDURE — 97165 OT EVAL LOW COMPLEX 30 MIN: CPT

## 2019-05-26 RX ORDER — SODIUM CHLORIDE 9 MG/ML
INJECTION, SOLUTION INTRAVENOUS
Status: COMPLETED
Start: 2019-05-26 | End: 2019-05-26

## 2019-05-26 RX ADMIN — SENNOSIDES,DOCUSATE SODIUM 2 TABLET: 8.6; 5 TABLET, FILM COATED ORAL at 10:04

## 2019-05-26 RX ADMIN — AMLODIPINE BESYLATE 2.5 MG: 2.5 TABLET ORAL at 08:13

## 2019-05-26 RX ADMIN — SENNOSIDES,DOCUSATE SODIUM 2 TABLET: 8.6; 5 TABLET, FILM COATED ORAL at 20:31

## 2019-05-26 RX ADMIN — SODIUM CHLORIDE: 9 INJECTION, SOLUTION INTRAVENOUS at 01:55

## 2019-05-26 RX ADMIN — ATORVASTATIN CALCIUM 80 MG: 80 TABLET, FILM COATED ORAL at 17:07

## 2019-05-26 RX ADMIN — BETHANECHOL CHLORIDE 25 MG: 25 TABLET ORAL at 10:09

## 2019-05-26 RX ADMIN — SODIUM CHLORIDE: 900 INJECTION, SOLUTION INTRAVENOUS at 10:14

## 2019-05-26 RX ADMIN — POLYETHYLENE GLYCOL 3350 17 G: 17 POWDER, FOR SOLUTION ORAL at 10:05

## 2019-05-26 RX ADMIN — ASPIRIN 81 MG: 81 TABLET, COATED ORAL at 10:04

## 2019-05-26 RX ADMIN — FUROSEMIDE 40 MG: 40 TABLET ORAL at 10:04

## 2019-05-26 RX ADMIN — INSULIN LISPRO 1 UNITS: 100 INJECTION, SOLUTION INTRAVENOUS; SUBCUTANEOUS at 20:31

## 2019-05-26 RX ADMIN — BETHANECHOL CHLORIDE 25 MG: 25 TABLET ORAL at 13:17

## 2019-05-26 RX ADMIN — INSULIN LISPRO 6 UNITS: 100 INJECTION, SOLUTION INTRAVENOUS; SUBCUTANEOUS at 17:07

## 2019-05-26 RX ADMIN — ACETAMINOPHEN 650 MG: 325 TABLET ORAL at 07:57

## 2019-05-26 RX ADMIN — SODIUM CHLORIDE: 9 INJECTION, SOLUTION INTRAVENOUS at 16:10

## 2019-05-26 RX ADMIN — WARFARIN SODIUM 3 MG: 2 TABLET ORAL at 17:07

## 2019-05-26 RX ADMIN — INSULIN LISPRO 2 UNITS: 100 INJECTION, SOLUTION INTRAVENOUS; SUBCUTANEOUS at 13:17

## 2019-05-26 RX ADMIN — LISINOPRIL 10 MG: 10 TABLET ORAL at 08:13

## 2019-05-26 RX ADMIN — SPIRONOLACTONE 25 MG: 25 TABLET ORAL at 10:04

## 2019-05-26 RX ADMIN — BETHANECHOL CHLORIDE 25 MG: 25 TABLET ORAL at 20:31

## 2019-05-26 RX ADMIN — LEVOFLOXACIN 250 MG: 5 INJECTION, SOLUTION INTRAVENOUS at 10:12

## 2019-05-26 ASSESSMENT — PAIN SCALES - GENERAL
PAINLEVEL_OUTOF10: 10
PAINLEVEL_OUTOF10: 0
PAINLEVEL_OUTOF10: 10

## 2019-05-26 ASSESSMENT — PAIN DESCRIPTION - PAIN TYPE
TYPE: CHRONIC PAIN
TYPE: CHRONIC PAIN

## 2019-05-26 ASSESSMENT — PAIN DESCRIPTION - LOCATION
LOCATION: BACK
LOCATION: BACK

## 2019-05-26 NOTE — PROGRESS NOTES
Nutrition Assessment    Type and Reason for Visit: Initial, Positive Nutrition Screen    Nutrition Recommendations:   · Continue carb control diet  · Start Glucerna BID  · Monitor and encourage PO intake  · Monitor nutrition adequacy, weights, pertinent labs, BMs and clinical progress    Nutrition Assessment: Positive screen for pressure wounds. Pt recently in [address] [facility], now on floor d/t SOB. Pt with good PO intakes in ARU, now continues to eat % of meals. Pt with stage 2 wound to buttock, will add Glucerna to provide additional protein to aid in wound healing and adequate nutrition. Pt with hx of days without bowel movements, per hx prune juice helps pt's regularity. Encouraged meals and ONS as able. Will continue to monitor nutrition status,    Malnutrition Assessment:  · Malnutrition Status: No malnutrition    Nutrition Risk Level:  Moderate    Nutrient Needs:  · Estimated Daily Total Kcal: 5289-9639  · Estimated Daily Protein (g): 75-90 kcal/kg  · Estimated Daily Total Fluid (ml/day): 1 ml/kcal or per MD    Nutrition Diagnosis:   · Problem: Increased nutrient needs(protein)  · Etiology: related to Insufficient energy/nutrient consumption     Signs and symptoms:  as evidenced by Presence of wounds    Objective Information:  · Nutrition-Focused Physical Findings: +BM 5/24  · Wound Type: Stage II, Pressure Ulcer(buttock)  · Current Nutrition Therapies:  · Oral Diet Orders: Carb Control 4 Carbs/Meal   · Oral Diet intake: %  · Oral Nutrition Supplement (ONS) Orders: None  · Anthropometric Measures:  · Ht: 5' 10\" (177.8 cm)   · Current Body Wt: 191 lb (86.6 kg)  · Ideal Body Wt: 166 lb (75.3 kg)   · BMI Classification: BMI 25.0 - 29.9 Overweight    Nutrition Interventions:   Continue current diet, Start ONS  Continued Inpatient Monitoring    Nutrition Evaluation:   · Evaluation: Goals set   · Goals: Pt will have PO intakes of 50% or more of meals and ONS this admission    · Monitoring: Meal Intake,

## 2019-05-26 NOTE — PLAN OF CARE
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 Comorbidities reviewed and education provided.     >>For CHF and Comorbidity documentation on Education Time and Topics, please see Education Tab        Problem: FLUID AND ELECTROLYTE IMBALANCE  Goal: Fluid and electrolyte balance are achieved/maintained  5/26/2019 1208 by Guille Prakash RN  Outcome: Ongoing  5/26/2019 0415 by Mickey Hurtado RN  Outcome: Ongoing     Problem: Discharge Planning:  Goal: Discharged to appropriate level of care  Description  Discharged to appropriate level of care  5/26/2019 1208 by Guille Prakash RN  Outcome: Ongoing  5/26/2019 0415 by Mickey Hurtado RN  Outcome: Ongoing     Problem: Serum Glucose Level - Abnormal:  Goal: Ability to maintain appropriate glucose levels will improve  Description  Ability to maintain appropriate glucose levels will improve  Outcome: Ongoing     Problem: Sensory Perception - Impaired:  Goal: Ability to maintain a stable neurologic state will improve  Description  Ability to maintain a stable neurologic state will improve  Outcome: Ongoing

## 2019-05-26 NOTE — PROGRESS NOTES
Hospitalist Progress Note      PCP: Amna Rivas    Date of Admission: 5/24/2019    Chief Complaint: [facility] Course:   80 y. o. male recently discharged to ARU who presented to [facility] with acute severe Delcia Heath had been undergoing acute rehab for L1/2 compression fracture fitted w/ Osmani Brace. He was given IV Lasix for pulmonary edema seen on CXR w/ improvement in sxs.    He has a pmh  significant for prostate cancer, ischemic colitis, and diastolic heart failure, diabetes, htn, CAD, hyperlipidemia, chronic kidney disease, and A. fib on Coumadin. Subjective: He is up in bed, his wife is at the bedside. He seems to feel a little better today but is complaining of some back pain.   No chest pain or shortness of breath      Medications:  Reviewed    Infusion Medications    sodium chloride 75 mL/hr at 05/26/19 1610     Scheduled Medications    warfarin  3 mg Oral Once    furosemide  60 mg Intravenous Once    amLODIPine  2.5 mg Oral Daily    And    lisinopril  10 mg Oral Daily    aspirin  81 mg Oral Daily    atorvastatin  80 mg Oral QPM    bethanechol  25 mg Oral TID    insulin glargine  22 Units Subcutaneous QAM    insulin lispro  0-12 Units Subcutaneous TID WC    insulin lispro  0-6 Units Subcutaneous Nightly    lidocaine  1 patch Transdermal Daily    polyethylene glycol  17 g Oral Daily    sennosides-docusate sodium  2 tablet Oral BID    spironolactone  25 mg Oral Daily    [START ON 5/19/2020] warfarin (COUMADIN) daily dosing (placeholder)   Other RX Placeholder    levofloxacin  250 mg Intravenous Q24H    furosemide  40 mg Oral Daily     PRN Meds: acetaminophen, bisacodyl, bisacodyl, cyclobenzaprine, glucagon (rDNA), glucose, magnesium hydroxide, ondansetron, oxyCODONE-acetaminophen      Intake/Output Summary (Last 24 hours) at 5/26/2019 1617  Last data filed at 5/26/2019 1000  Gross per 24 hour   Intake 1891.25 ml   Output 2150 ml   Net -258.75 ml       Physical Exam Performed:    BP (!) 166/70   Pulse 65   Temp 98.3 °F (36.8 °C) (Oral)   Resp 18   Ht 5' 10\" (1.778 m)   Wt 191 lb 9.3 oz (86.9 kg)   SpO2 100%   BMI 27.49 kg/m²     General appearance: He is up in bed, looks weak and tired he is alert and cooperative. He has a brace in place  HEENT: Pupils equal, round, and reactive to light. Conjunctivae/corneas clear. Neck: Supple, with full range of motion. Trachea midline. Respiratory:  Bilateral breath sounds, decreased both bases, no Rales/Wheezes/Rhonchi,  Are noted, no use of accessory muscles at this time. Cardiovascular: Regular rate and rhythm with normal S1/S2   Abdomen: Soft, non-tender, non-distended with normal bowel sounds. Musculoskeletal: No clubbing, cyanosis, =1extremity  edema bilaterally. Neurologic:  Neurovascularly intact without any focal sensory/motor deficits. Cranial nerves: II-XII intact, grossly non-focal.  Psychiatric: Alert and oriented, thought content appropriate, normal insight  Capillary Refill: Brisk,< 3 seconds   Peripheral Pulses: +2 palpable, equal bilaterally           Labs:   Recent Labs     05/24/19 0220 05/25/19 0459 05/26/19 0436   WBC 18.0* 9.3 8.2   HGB 10.8* 9.6* 9.2*   HCT 32.5* 28.5* 27.1*    241 218     Recent Labs     05/24/19 0220 05/25/19 0459 05/26/19  0436   * 125* 133*   K 5.2* 4.8 4.6   CL 85* 88* 93*   CO2 30 35* 33*   BUN 45* 44* 35*   CREATININE 1.8* 1.4* 1.2   CALCIUM 9.2 8.5 8.2*   PHOS  --  4.1  --      No results for input(s): AST, ALT, BILIDIR, BILITOT, ALKPHOS in the last 72 hours.   Recent Labs     05/24/19 0220 05/25/19 0459 05/26/19  0436   INR 3.72* 5.43* 2.83*     Recent Labs     05/24/19 0220 05/24/19 0531 05/24/19  1137   TROPONINI 0.05* 0.03* 0.02*       Urinalysis:      Lab Results   Component Value Date    NITRU Negative 05/24/2019    WBCUA 0-2 05/24/2019    BACTERIA Rare 05/24/2019    RBCUA None seen 05/24/2019    BLOODU Negative 05/24/2019    SPECGRAV >=1.030 Lumbar compression fx at L1/L2 give hx of chronic constipation but may be related to pain meds for same. Spine surgery consulted last admission and appreciated, w/ plans for conservative mgt w/ [place] Brace - continued.  Continue bowel regimen which seems to be having some effect.      Hyponatremia : Sodium is noted. Suspect secondary to risk and probably congestive heart failure. Continue diuresis. Nephrology also following for further input and sodium is improved today           DVT Prophylaxis: coumadin  Diet: DIET CARB CONTROL;   Dietary Nutrition Supplements: Diabetic Oral Supplement  Code Status: Full Code    PT/OT Eval Status: consul;t will need snf    Dispo - 1-2 days     Pallavi Cat MD

## 2019-05-26 NOTE — PROGRESS NOTES
Occupational Therapy   Occupational Therapy Initial Assessment and Treatment  Date: 2019   Patient Name: Jamil Barroso  MRN: [id number]     : 3/13/1933    Date of Service: 2019    Discharge Recommendations:  [address]  OT Equipment Recommendations  Equipment Needed: No  Other: Defer to rehab    Assessment   Performance deficits / Impairments: Decreased functional mobility ; Decreased strength;Decreased endurance;Decreased coordination;Decreased ADL status; Decreased safe awareness;Decreased balance;Decreased cognition  After evaluation and treatment, pt found to be presenting with the above mentioned deficits. Pt would benefit from continued skilled occupational therapy to address these deficits, increasing safety and independence with ADL and functional mobility. Prognosis: Fair  Decision Making: Low Complexity  REQUIRES OT FOLLOW UP: Yes  Activity Tolerance  Activity Tolerance: Patient Tolerated treatment well  Activity Tolerance: Vitals stable throughout session on supplemental O2  Safety Devices  Safety Devices in place: Yes  Type of devices: Gait belt;Call light within reach; Chair alarm in place; Left in chair;Nurse notified           Patient Diagnosis(es): There were no encounter diagnoses. 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 has a past surgical history that includes Cholecystectomy;  Appendectomy (8193); Prostatectomy (2007); Revision total knee arthroplasty (Left, 06/18/2014); Upper gastrointestinal endoscopy (03/02/10); pacemaker placement; Colonoscopy (10/07/2014); Uvulopalatopharygoplasty; Coronary artery bypass graft (1990); Total knee arthroplasty (Bilateral, 1991); Partial hip arthroplasty (Right, 09/24/2016); and Prostate biopsy (08/10/2007). Restrictions  Restrictions/Precautions  Restrictions/Precautions: Fall Risk, General Precautions, Up as Tolerated  Required Braces or Orthoses?: Yes  Required Braces or Orthoses  Spinal Other: Osmani hyperextension orthosis - \"Patient should have brace on when up with activity. Okay to remove while in bed and while bathing. \"  Position Activity Restriction  Other position/activity restrictions: High fall risk per nursing assessment, Hartman brace when OOB (okay to be off while in bed/during ADL's), 11L high-flow O2, telemetry with continuous pulse ox, Gray, IV lines     Subjective   General  Patient assessed for rehabilitation services?: Yes  Referring Practitioner: Sonny Sun MD     Pt c/o 7/10 low back pain. RN aware and administering medicated patch while pt was seated EOB with therapy.  Pt satisfied with increased activity followed by repositioning and rest.     Social/Functional History  Social/Functional History  Lives With: Spouse(wife)  Type of Home: House  Home Layout: One level  Home Access: Level entry  Bathroom Shower/Tub: Walk-in shower, Shower chair with back  Bathroom Toilet: Standard  Bathroom Equipment: Toilet raiser, Grab bars in shower, Hand-held shower  Home Equipment: Rolling walker, Cane, Standard walker, Wheelchair-manual, 4 wheeled walker, Oxygen(pt typically doesn't wear home O2 during the day, just at night with his CPAP machine (per wife))  Receives Help From: Home health  ADL Assistance: Needs assistance  Bath: Maximum assistance  Dressing: Maximum assistance  Toileting: Needs assistance  Homemaking Assistance: Needs assistance  Homemaking Responsibilities: No(wife performs all homemaking)  Ambulation Assistance: Independent(using cane)  Transfer Assistance: Independent  Active : No  Patient's  Info: Wife  Mode of Transportation: Family  Occupation: Retired  Additional Comments: Per previous chart review (from pt's IP rehab stay), pt was needing wife's assist with ADL's and household management but was modified(I) for mobility with use of straight cane prior to hospitalization. Objective   Vision: Impaired  Vision Exceptions: Wears glasses for reading  Hearing: Exceptions to [facility]  Hearing Exceptions: Hard of hearing/hearing concerns      Orientation  Overall Orientation Status: Impaired  Orientation Level: Oriented to person;Disoriented to situation;Disoriented to time;Disoriented to place(off on date by 2 days, oriented to type of place, but not exact hospital. Pt re-oriented by therapist)        Balance  Sitting Balance: Supervision(static EOB)  Standing Balance: Contact guard assistance(with RW)  Functional Mobility  Functional Mobility Comments: Pt walked ~5 ft from bed to chair with gait belt, RW, and CGA. ADL  Feeding: Setup(seated to eat banana at end of session)  UE Dressing: Dependent/Total(to don Jewitt brace)  Toileting: Dependent/Total(rodriguez)     Tone RUE  RUE Tone: Normotonic  Tone LUE  LUE Tone: Normotonic  Coordination  Movements Are Fluid And Coordinated: Yes        Bed mobility  Supine to Sit: Stand by assistance(HOB raised, use of bed rail, VCs)  Scooting: Stand by assistance(to EOB with increased time)     Transfers  Sit to stand: Minimal assistance  Stand to sit: Contact guard assistance  Transfer Comments: using RW and cues for hand placement/tech        Cognition  Overall Cognitive Status: Exceptions  Arousal/Alertness: Delayed responses to stimuli  Following Commands:  Follows one step commands consistently  Attention Span: Difficulty dividing attention  Safety Judgement: Decreased awareness of need for safety;Decreased awareness of need for assistance  Problem Solving: Assistance required to generate solutions;Assistance required to implement solutions;Assistance required to identify errors made;Assistance required to correct errors made;Decreased awareness of errors  Insights: Decreased awareness of deficits  Initiation: Requires cues for all  Sequencing: Requires cues for all                 LUE AROM (degrees)  LUE AROM : WFL  LUE General AROM: limited shoulder ROM around 90  RUE AROM (degrees)  RUE AROM : WFL  LUE Strength  Gross LUE Strength: WFL  RUE Strength  Gross RUE Strength: WFL                Education: Role of OT, safe t/f training, safe use of DME, awareness of deficits, discharge planning, ADL as therapeutic exercise, importance of OOB, cognitive orientation, back precautions, back brace    Plan   Plan  Times per week: 3-5  Current Treatment Recommendations: Strengthening, Endurance Training, Patient/Caregiver Education & Training, Cognitive Reorientation, Balance Training, Gait Training, Pain Management, Self-Care / ADL, Functional Mobility Training, Safety Education & Training, Cognitive/Perceptual Training    AM-PAC Score        AM-PAC Inpatient Daily Activity Raw Score: 12  AM-PAC Inpatient ADL T-Scale Score : 30.6  ADL Inpatient CMS 0-100% Score: 66.57  ADL Inpatient CMS G-Code Modifier : CL    Goals  Short term goals  Time Frame for Short term goals: 6/4/19  Short term goal 1: Patient will complete toilet transfer/functional ADL transfer SBA  Short term goal 2: Patient will complete UB dressing mod A including brace  Short term goal 3: Patient will complete toileting/hygiene min A  Short term goal 4: Patient will complete grooming task standing with CGA by 6/1/19  Short term goal 5: Patient will complete bed mobility with I, HOB flat  Patient Goals   Patient goals : Patient reports goal is to get back home with wife       Therapy Time   Individual Concurrent Group Co-treatment   Time In 0951         Time Out 1009         Minutes 18         Timed Code Treatment Minutes: 8 Minutes       If patient is discharged prior to next treatment session, this note will serve as the discharge summary.   Derrick Cobb, OTR/L #[id number]

## 2019-05-26 NOTE — PLAN OF CARE
Problem: OXYGENATION/RESPIRATORY FUNCTION  Goal: Patient will maintain patent airway  Outcome: Ongoing     Problem: HEMODYNAMIC STATUS  Goal: Patient has stable vital signs and fluid balance  Outcome: Ongoing  Note:     Patient's EF (Ejection Fraction) is greater than 40%    Patient's weights and intake/output reviewed:    Patient's Last Weight: 193  lbs obtained by bed scale. Today's weight is noted to be  less than last documented weight. Intake/Output Summary (Last 24 hours) at 5/26/2019 0415  Last data filed at 5/26/2019 0358  Gross per 24 hour   Intake 1661.25 ml   Output 2550 ml   Net -888.75 ml       Patient's current functional capacity:  Slight limitation of physical activity. Comfortable at rest. Ordinary physical activity results in fatigue, palpitation, dyspnea. Pt resting in bed at this time on BiPAP. Pt denies shortness of breath. Pt with nonpitting lower extremity edema. Patient and family's stated goal of care: reduce shortness of breath, increase activity tolerance, better understand heart failure and disease management, be more comfortable and reduce lower extremity edema prior to discharge        Patient has a past medical history of Acute encephalopathy, Acute lower GI bleeding, Acute renal failure (ARF) ([address] Acute respiratory failure ([address] Atrial flutter ([address] Closed right hip fracture ([address] Diastolic heart failure ([address] Diverticulitis, HCAP (healthcare-associated pneumonia), Iron deficiency anemia, Ischemic colitis ([address] Metabolic encephalopathy, Nonhealing nonsurgical wound of scalp, limited to breakdown of skin, Obesity, 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 Comorbidities reviewed and education provided.     >>For CHF and Comorbidity documentation on Education Time and Topics, please see Education Tab          Problem: FLUID AND

## 2019-05-26 NOTE — PROGRESS NOTES
05/26/19 0347   NIV Type   $NIV $Daily Charge   Equipment Type v60   Mode BIPAP   Mask Type Full face mask   Mask Size Medium   Settings/Measurements   Comfort Level Good   Using Accessory Muscles No   IPAP 19 cmH20   EPAP 6 cmH2O   Rate Ordered 14   Resp 15   SpO2 97   FiO2  35 %   I Time/ I Time % 1 s   Vt Exhaled 542 mL   Mask Leak (lpm) 0 lpm

## 2019-05-26 NOTE — PROGRESS NOTES
Pt resting quietly in bed, on Bipap machine. Pt appears comfortable. Safety/fall prevention maintained. Personal belongings and call light within reach. Will continue to monitor.  MKRN

## 2019-05-26 NOTE — FLOWSHEET NOTE
for ethnicity; Ecchymosis   Skin Condition/Temp Dry;Poor turgor; Warm   Skin Integrity   Location scattered   Skin Integrity Abrasion;Bruising   Preventative Dressing No   Dressing Site Sacrum   Assessed this shift? Yes  (Pre existin stage II, with Moisture barrier cream )   Skin Integrity Site 2   Skin Integrity Location 2 Abrasion;Bruising   Location 2 scattered BUE, abdomen, BLE   Skin Integrity Site 3   Skin Integrity Location 3 Redness    Location 3 Boith heels   Musculoskeletal   RUE Full movement   RL Extremity Limited movement   LUE Full movement   LL Extremity Limited movement   Musculoskeletal Details   Lower Back Injury/trauma   Genitourinary   Genitourinary (WDL)   (Pt has rodriguez cath)   Flank Tenderness No   Suprapubic Tenderness No   Dysuria MAGGIE   Anus/Rectum   Anus/Rectum (WDL) WDL   Wound 03/13/19 #3, Left buttock, Pressure Ulcer, Stage 2, onset 3/6/19, Pressure   Date First Assessed/Time First Assessed: 03/13/19 1523   Primary Wound Type: Pressure Injury  Wound Description (Comments): #3, Left buttock, Pressure Ulcer, Stage 2, onset 3/6/19, Pressure   Wound Assessment Pink;Red  (Pt has moisture barrier cream applied by Day Shift on area)   Urethral Catheter 16 fr   No Placement Date or Time found. Urethral Catheter Timeout: Patient; Appropriate Equipment;Procedure;Site/Side  Inserted by: rs rn  Tube Size (fr): 16 fr  Catheter Balloon Size: 10 mL  Collection Container: Standard  Securement Method: Securing devic. .. Catheter Indications Need for fluid management in critically ill patients in a critical care setting not able to be managed by other means such as BSC with hat, bedpan, urinal, condom catheter, or short term intermittent urethral catherization   Site Assessment No urethral drainage   Urine Color Yellow; Melissa   Urine Appearance Hazy

## 2019-05-26 NOTE — PLAN OF CARE
Nutrition Problem: Increased nutrient needs(protein)  Intervention: Food and/or Nutrient Delivery: Continue current diet, Start ONS  Nutritional Goals: Pt will have PO intakes of 50% or more of meals and ONS this admission

## 2019-05-26 NOTE — PLAN OF CARE
Problem: Infection:  Goal: Will remain free from infection  Description  Will remain free from infection  Outcome: Ongoing     Problem: Pain:  Goal: Patient's pain/discomfort is manageable  Description  Patient's pain/discomfort is manageable  Outcome: Ongoing  Note:   Pt repositioned in bed for comfort. PRN medication for pain offered/given as needed. Pt able to sleep @ night. No c/o voiced. MKRN     Problem: Skin Integrity:  Goal: Skin integrity will stabilize  Description  Skin integrity will stabilize  Outcome: Ongoing  Note:   Pt assisted in turning to sides while in bed with pillow support, Incontinent care done, skin kept clean and dry, Moisture barrier cream applied PRn/ Incontinent care. Skin integrity maintained. MKRN     Problem: Falls - Risk of:  Goal: Will remain free from falls  Description  Will remain free from falls  Outcome: Ongoing  Note:   Safety/fall prevention maintained. Bed locked on lowest position, side rails up 2 x 4, Bed alarm on, Personal belongings and call light within reach. Hourly rounding done, Attended to needs promptly. Pt free from falls.  MKRN

## 2019-05-26 NOTE — PROGRESS NOTES
Pharmacy to Dose Warfarin     Dx: A-fib  Goal INR range 2-3   Home Warfarin dose:5 mg daily or as directed     Date                 INR                  Warfarin  5/17                3.29                  held  5/18                2.33                  5 mg  5/19                1.99                  5 mg  5/20                1.61                  5 mg  5/21                1.70                  6 mg  5/22                2.15                  6 mg  5/23                2.69                  3 mg  5/24                3.72                  Hold  5/25                5.43                  Hold  5/26                2.83                  3 mg    Recommend Warfarin 3 mg tonight x1. Daily INR ordered. Rx will continue to manage therapy per consult order.     Gloria Bennett PharmD 05/26/19 7:35 AM

## 2019-05-26 NOTE — PROGRESS NOTES
INPATIENT PULMONARY CRITICAL CARE PROGRESS NOTE      Reason for visit    Acute resp failure     SUBJECTIVE:  Patient when seen this morning was not feeling good and was having increased back pain which is chronic;patient used his BIPAP last night;patient when seen was on 3.5 L of oxygen with Sao2 of 97% ,patient is not having increasing cough/expectoration/chest pain ; patient's blood pressure was on higher side patient has had adequate urine output overnight with cumuative fluid balance of -2.2 L, patient's blood sugars were acceptable , no other pertinent review of system of concern      Physical Exam:  Blood pressure (!) 172/73, pulse 62, temperature (!) 32 °F (0 °C), resp. rate 18, height 5' 10\" (1.778 m), weight 191 lb 9.3 oz (86.9 kg), SpO2 97 %.' Temp was 97.4 F    Constitutional:  No acute distress. off BIPAP   HENT:  No facial asymmetry  No thyromegaly. Eyes:  Conjunctivae are normal. Pupils equal, round, and reactive to light. No scleral icterus. Neck: . No tracheal deviation present. No obvious thyroid mass. Cardiovascular: Normal rate, regular rhythm, normal heart sounds. No right ventricular heave. No lower extremity edema. Pulmonary/Chest: No wheezes. decreased B/L rales. Chest wall is not dull to percussion. No accessory muscle usage or stridor. Decreased BSI   Abdominal: Soft. Bowel sounds present. No distension or hernia. No tenderness. Musculoskeletal: No cyanosis. No clubbing. No obvious joint deformity. Tenderness in back   Lymphadenopathy: No cervical or supraclavicular adenopathy. Skin: Skin is warm and dry. No rash or nodules on the exposed extremities.   Neurologic: Alert and communicative and in pain  when seen             Results:  CBC:   Recent Labs     05/24/19 0220 05/25/19 0459 05/26/19 0436   WBC 18.0* 9.3 8.2   HGB 10.8* 9.6* 9.2*   HCT 32.5* 28.5* 27.1*   MCV 90.4 90.1 89.9    241 218     BMP:   Recent Labs     05/24/19 0220 05/25/19 0459 05/26/19  0436   NA Latest Ref Range: 13.5 - 17.5 g/dL 11.1 (L) 12.0 (L) 10.8 (L) 9.6 (L) 9.2 (L)   Hematocrit Latest Ref Range: 40.5 - 52.5 % 33.6 (L)  32.5 (L) 28.5 (L) 27.1 (L)   POC Hematocrit Latest Ref Range: 40.5 - 52.5 %  35.0 (L)      MCV Latest Ref Range: 80.0 - 100.0 fL 90.8  90.4 90.1 89.9   MCH Latest Ref Range: 26.0 - 34.0 pg 30.1  30.0 30.5 30.4   MCHC Latest Ref Range: 31.0 - 36.0 g/dL 33.1  33.2 33.8 33.9   MPV Latest Ref Range: 5.0 - 10.5 fL 7.9  8.1 7.8 7.6   RDW Latest Ref Range: 12.4 - 15.4 % 13.9  14.2 13.9 13.7   Platelet Count Latest Ref Range: 135 - 450 K/uL 280  294 241 218   Neutrophils % Latest Units: % 65.1         Results for Xu Nelson (MRN [id number]) as of 5/26/2019 11:55   Ref. Range 5/22/2019 06:46 5/23/2019 08:34 5/24/2019 02:20 5/25/2019 04:59 5/26/2019 04:36   Prothrombin Time Latest Ref Range: 9.8 - 13.0 sec 24.5 (H) 29.8 (H) 42.4 (H) 61.9 (H) 32.3 (H)   INR Latest Ref Range: 0.86 - 1.14  2.15 (H) 2.69 (H) 3.72 (H) 5.43 (HH) 2.83 (H)       Assessment:  Principal Problem:    Acute respiratory failure (HCC)  Active Problems:    Diastolic dysfunction    Diabetes mellitus type 2, uncontrolled (HCC)    CKD (chronic kidney disease) stage 3, GFR 30-59 ml/min (HCC)    Obstructive and central sleep apnea    Hyponatremia    Leukocytosis    Acute pulmonary edema (HCC)    Pulmonary infiltrates    Closed compression fracture of first lumbar vertebra (HCC)    Supratherapeutic INR  Resolved Problems:    * No resolved hospital problems.  *          Plan:   · Oxygen supplementation to keep saturation between 90-94% only-nursing requested to decrease the oxygen to 2.5 L/min and reassess  · BIPAP was changed to intermittent -family were told to get home CPAP machine   · Patient's Creatinine has improved but the sodium appears to have improved-sodium is correcting fast management as per IM   · Patient has been started on IV Levaquin for pulmonary infiltrates and leukocytosis-dosing as per creatinine clearance  · Insulins as per IM   · Blood glucose monitoring with sliding scale insulin  · Cortisol level reviewed   · Please monitor for any hypoglycemia  · No need of any steroids from pulmonary standpoint of view  · Patient's INR is on the higher side and Coumadin as per pharmacy as per INR  · Monitor for any bleeding   · Evaluation and management of backpain as per IM   · Avoid narcotics and sedatives  · Patient has a history of obstructive and central sleep apnea which may be contributing to patient's symptomatology and clinical picture and may require reevaluation or BiPAP at home in order to prevent recurrence of acute respiratory failure and hypercarbia-case management to look into it  · PUD and DVT prophylaxis     Case discussed with nursing    Patient needs to be monitored closely       Electronically signed by:  David Lincoln MD    5/26/2019    11:51 AM.

## 2019-05-26 NOTE — PROGRESS NOTES
Pt awake in bed, calm, confused to time, place situation, Pt stated \" No\" when asked for pain/discomfort. On O2 @ 4 L per NC. No SOB noted. Wife @ bedside, for support. Denies any needs at this time. Safety/fall prevention maintained. Personal belongings and call light within reach. Will continue to monitor.  TASHARN

## 2019-05-27 ENCOUNTER — APPOINTMENT (OUTPATIENT)
Dept: GENERAL RADIOLOGY | Age: 84
DRG: 189 | End: 2019-05-27
Attending: INTERNAL MEDICINE
Payer: MEDICARE

## 2019-05-27 LAB
ALBUMIN SERPL-MCNC: 2.6 G/DL (ref 3.1–4.9)
ALPHA-1-GLOBULIN: 0.4 G/DL (ref 0.2–0.4)
ALPHA-2-GLOBULIN: 1 G/DL (ref 0.4–1.1)
ANION GAP SERPL CALCULATED.3IONS-SCNC: 10 MMOL/L (ref 3–16)
BASE EXCESS ARTERIAL: 5.1 MMOL/L (ref -3–3)
BETA GLOBULIN: 1.1 G/DL (ref 0.9–1.6)
BUN BLDV-MCNC: 19 MG/DL (ref 7–20)
CALCIUM SERPL-MCNC: 8.7 MG/DL (ref 8.3–10.6)
CARBOXYHEMOGLOBIN ARTERIAL: 1 % (ref 0–1.5)
CHLORIDE BLD-SCNC: 94 MMOL/L (ref 99–110)
CO2: 31 MMOL/L (ref 21–32)
CORTISOL - AM: 9 UG/DL (ref 4.3–22.4)
CREAT SERPL-MCNC: 0.9 MG/DL (ref 0.8–1.3)
GAMMA GLOBULIN: 0.7 G/DL (ref 0.6–1.8)
GFR AFRICAN AMERICAN: >60
GFR NON-AFRICAN AMERICAN: >60
GLUCOSE BLD-MCNC: 129 MG/DL (ref 70–99)
GLUCOSE BLD-MCNC: 135 MG/DL (ref 70–99)
GLUCOSE BLD-MCNC: 142 MG/DL (ref 70–99)
GLUCOSE BLD-MCNC: 153 MG/DL (ref 70–99)
GLUCOSE BLD-MCNC: 199 MG/DL (ref 70–99)
HCO3 ARTERIAL: 30.8 MMOL/L (ref 21–29)
HEMOGLOBIN, ART, EXTENDED: 13.5 G/DL (ref 13.5–17.5)
INR BLD: 1.94 (ref 0.86–1.14)
METHEMOGLOBIN ARTERIAL: 0.6 %
O2 CONTENT ARTERIAL: 18 ML/DL
O2 SAT, ARTERIAL: 94.1 %
O2 THERAPY: ABNORMAL
PCO2 ARTERIAL: 49.4 MMHG (ref 35–45)
PERFORMED ON: ABNORMAL
PH ARTERIAL: 7.41 (ref 7.35–7.45)
PO2 ARTERIAL: 72.3 MMHG (ref 75–108)
POTASSIUM SERPL-SCNC: 4.4 MMOL/L (ref 3.5–5.1)
PROTHROMBIN TIME: 22.1 SEC (ref 9.8–13)
SODIUM BLD-SCNC: 135 MMOL/L (ref 136–145)
TCO2 ARTERIAL: 32.3 MMOL/L
TOTAL PROTEIN: 5.8 G/DL (ref 6.4–8.2)

## 2019-05-27 PROCEDURE — 94761 N-INVAS EAR/PLS OXIMETRY MLT: CPT

## 2019-05-27 PROCEDURE — 82803 BLOOD GASES ANY COMBINATION: CPT

## 2019-05-27 PROCEDURE — 6360000002 HC RX W HCPCS: Performed by: INTERNAL MEDICINE

## 2019-05-27 PROCEDURE — 80048 BASIC METABOLIC PNL TOTAL CA: CPT

## 2019-05-27 PROCEDURE — 99233 SBSQ HOSP IP/OBS HIGH 50: CPT | Performed by: INTERNAL MEDICINE

## 2019-05-27 PROCEDURE — 2060000000 HC ICU INTERMEDIATE R&B

## 2019-05-27 PROCEDURE — 2580000003 HC RX 258: Performed by: INTERNAL MEDICINE

## 2019-05-27 PROCEDURE — 6370000000 HC RX 637 (ALT 250 FOR IP): Performed by: INTERNAL MEDICINE

## 2019-05-27 PROCEDURE — 71045 X-RAY EXAM CHEST 1 VIEW: CPT

## 2019-05-27 PROCEDURE — 2700000000 HC OXYGEN THERAPY PER DAY

## 2019-05-27 PROCEDURE — 85610 PROTHROMBIN TIME: CPT

## 2019-05-27 PROCEDURE — 36415 COLL VENOUS BLD VENIPUNCTURE: CPT

## 2019-05-27 PROCEDURE — 36600 WITHDRAWAL OF ARTERIAL BLOOD: CPT

## 2019-05-27 RX ORDER — NIFEDIPINE 30 MG/1
60 TABLET, FILM COATED, EXTENDED RELEASE ORAL DAILY
Status: DISCONTINUED | OUTPATIENT
Start: 2019-05-27 | End: 2019-05-28 | Stop reason: HOSPADM

## 2019-05-27 RX ORDER — WARFARIN SODIUM 2 MG/1
4 TABLET ORAL
Status: COMPLETED | OUTPATIENT
Start: 2019-05-27 | End: 2019-05-27

## 2019-05-27 RX ADMIN — INSULIN LISPRO 2 UNITS: 100 INJECTION, SOLUTION INTRAVENOUS; SUBCUTANEOUS at 16:33

## 2019-05-27 RX ADMIN — SODIUM CHLORIDE: 9 INJECTION, SOLUTION INTRAVENOUS at 03:14

## 2019-05-27 RX ADMIN — SENNOSIDES,DOCUSATE SODIUM 2 TABLET: 8.6; 5 TABLET, FILM COATED ORAL at 10:02

## 2019-05-27 RX ADMIN — OXYCODONE HYDROCHLORIDE AND ACETAMINOPHEN 1 TABLET: 5; 325 TABLET ORAL at 23:34

## 2019-05-27 RX ADMIN — ACETAMINOPHEN 650 MG: 325 TABLET ORAL at 01:31

## 2019-05-27 RX ADMIN — SENNOSIDES,DOCUSATE SODIUM 2 TABLET: 8.6; 5 TABLET, FILM COATED ORAL at 22:04

## 2019-05-27 RX ADMIN — LISINOPRIL 10 MG: 10 TABLET ORAL at 10:03

## 2019-05-27 RX ADMIN — INSULIN LISPRO 2 UNITS: 100 INJECTION, SOLUTION INTRAVENOUS; SUBCUTANEOUS at 12:57

## 2019-05-27 RX ADMIN — POLYETHYLENE GLYCOL 3350 17 G: 17 POWDER, FOR SOLUTION ORAL at 10:02

## 2019-05-27 RX ADMIN — FUROSEMIDE 40 MG: 40 TABLET ORAL at 10:02

## 2019-05-27 RX ADMIN — WARFARIN SODIUM 4 MG: 2 TABLET ORAL at 17:45

## 2019-05-27 RX ADMIN — LEVOFLOXACIN 250 MG: 5 INJECTION, SOLUTION INTRAVENOUS at 10:49

## 2019-05-27 RX ADMIN — AMLODIPINE BESYLATE 2.5 MG: 2.5 TABLET ORAL at 10:02

## 2019-05-27 RX ADMIN — BETHANECHOL CHLORIDE 25 MG: 25 TABLET ORAL at 22:05

## 2019-05-27 RX ADMIN — NIFEDIPINE 60 MG: 30 TABLET, FILM COATED, EXTENDED RELEASE ORAL at 17:45

## 2019-05-27 RX ADMIN — ASPIRIN 81 MG: 81 TABLET, COATED ORAL at 10:02

## 2019-05-27 RX ADMIN — OXYCODONE HYDROCHLORIDE AND ACETAMINOPHEN 1 TABLET: 5; 325 TABLET ORAL at 14:33

## 2019-05-27 RX ADMIN — SPIRONOLACTONE 25 MG: 25 TABLET ORAL at 10:03

## 2019-05-27 RX ADMIN — INSULIN LISPRO 1 UNITS: 100 INJECTION, SOLUTION INTRAVENOUS; SUBCUTANEOUS at 22:05

## 2019-05-27 RX ADMIN — BETHANECHOL CHLORIDE 25 MG: 25 TABLET ORAL at 14:33

## 2019-05-27 RX ADMIN — INSULIN GLARGINE 22 UNITS: 100 INJECTION, SOLUTION SUBCUTANEOUS at 10:04

## 2019-05-27 RX ADMIN — BETHANECHOL CHLORIDE 25 MG: 25 TABLET ORAL at 10:03

## 2019-05-27 RX ADMIN — ATORVASTATIN CALCIUM 80 MG: 80 TABLET, FILM COATED ORAL at 17:45

## 2019-05-27 ASSESSMENT — PAIN SCALES - GENERAL
PAINLEVEL_OUTOF10: 8
PAINLEVEL_OUTOF10: 4
PAINLEVEL_OUTOF10: 7
PAINLEVEL_OUTOF10: 4

## 2019-05-27 NOTE — PROGRESS NOTES
Pt took his cpap off per pt wife \" His fidgeting he decided not to wear it\". Pt informed MD recommends for him to use it overnight , he will need to have his blood gas checked as well  to re evaluate if Home cpap machine needs some adjustment on setting.  PIERRE

## 2019-05-27 NOTE — PROGRESS NOTES
Pt resting quietly in bed. No c/o pain. No SOB noted and presented by Pt. Cont on ! L per NC. Will continue to monitor.  PIERRE

## 2019-05-27 NOTE — PROGRESS NOTES
Hospitalist Progress Note      PCP: Amna Rivas    Date of Admission: 5/24/2019    Chief Complaint: [facility] Course:  80 y. o. male recently discharged to ARU who presented to [facility] with acute severe Delcia Heath had been undergoing acute rehab for L1/2 compression fracture fitted w/ [place] Brace. He was given IV Lasix for pulmonary edema seen on CXR w/ improvement in sxs.    He has a pmh  significant for prostate cancer, ischemic colitis, and diastolic heart failure, diabetes, htn, CAD, hyperlipidemia, chronic kidney disease, and A. fib on Coumadin    Subjective:   He is sitting up in bed, in no acute distress this time complains of some back pain.   Denies any chest pain shortness of breath      Medications:  Reviewed    Infusion Medications   Scheduled Medications    warfarin  4 mg Oral Once    NIFEdipine  60 mg Oral Daily    lisinopril  10 mg Oral Daily    aspirin  81 mg Oral Daily    atorvastatin  80 mg Oral QPM    bethanechol  25 mg Oral TID    insulin glargine  22 Units Subcutaneous QAM    insulin lispro  0-12 Units Subcutaneous TID WC    insulin lispro  0-6 Units Subcutaneous Nightly    lidocaine  1 patch Transdermal Daily    polyethylene glycol  17 g Oral Daily    sennosides-docusate sodium  2 tablet Oral BID    spironolactone  25 mg Oral Daily    [START ON 5/19/2020] warfarin (COUMADIN) daily dosing (placeholder)   Other RX Placeholder    levofloxacin  250 mg Intravenous Q24H     PRN Meds: acetaminophen, bisacodyl, bisacodyl, cyclobenzaprine, glucagon (rDNA), glucose, magnesium hydroxide, ondansetron, oxyCODONE-acetaminophen      Intake/Output Summary (Last 24 hours) at 5/27/2019 1709  Last data filed at 5/27/2019 1632  Gross per 24 hour   Intake 1012.5 ml   Output 5250 ml   Net -4237.5 ml       Physical Exam Performed:    BP (!) 162/71   Pulse 65   Temp 97.6 °F (36.4 °C) (Axillary)   Resp 18   Ht 5' 10\" (1.778 m)   Wt 192 lb 0.3 oz (87.1 kg)   SpO2 95%   BMI 27.55 kg/m²     General appearance: He is up in bed, looks weak and tired he is awake he does have some confusion but is  cooperative.   HEENT: Pupils equal, round, and reactive to light. Conjunctivae/corneas clear. Neck: Supple, with full range of motion. Trachea midline. Respiratory:  Bilateral breath sounds, decreased both bases, no Rales/Wheezes/Rhonchi,  Are noted, no use of accessory muscles at this time. Cardiovascular: Regular rate and rhythm with normal S1/S2   Abdomen: Soft, non-tender, non-distended with normal bowel sounds. Musculoskeletal: No clubbing, cyanosis, =1extremity  edema bilaterally. Neurologic:  Neurovascularly intact without any focal sensory/motor deficits. Cranial nerves: II-XII intact, grossly non-focal.  Psychiatric: Awake he is confused poor  insight  Capillary Refill: Brisk,< 3 seconds   Peripheral Pulses: +2 palpable, equal bilaterally              Labs:   Recent Labs     05/25/19 0459 05/26/19 0436   WBC 9.3 8.2   HGB 9.6* 9.2*   HCT 28.5* 27.1*    218     Recent Labs     05/25/19 0459 05/26/19 0436 05/27/19  0441   * 133* 135*   K 4.8 4.6 4.4   CL 88* 93* 94*   CO2 35* 33* 31   BUN 44* 35* 19   CREATININE 1.4* 1.2 0.9   CALCIUM 8.5 8.2* 8.7   PHOS 4.1  --   --      No results for input(s): AST, ALT, BILIDIR, BILITOT, ALKPHOS in the last 72 hours. Recent Labs     05/25/19 0459 05/26/19 0436 05/27/19  0441   INR 5.43* 2.83* 1.94*     No results for input(s): CKTOTAL, TROPONINI in the last 72 hours. Urinalysis:      Lab Results   Component Value Date    NITRU Negative 05/24/2019    WBCUA 0-2 05/24/2019    BACTERIA Rare 05/24/2019    RBCUA None seen 05/24/2019    BLOODU Negative 05/24/2019    SPECGRAV >=1.030 05/24/2019    GLUCOSEU Negative 05/24/2019    GLUCOSEU NEGATIVE 02/06/2010       Radiology:  XR CHEST PORTABLE   Preliminary Result   Atelectasis or infiltrate in the right lung base.   Patchy airspace disease in   the left lung base which could represent atelectasis or infiltrate. Follow   up to resolution is suggested. Assessment/Plan:    Active Hospital Problems    Diagnosis    Diastolic dysfunction [M24.4]     Priority: High    Diabetes mellitus type 2, uncontrolled ([facility] [address] [E11.65]     Priority: Medium    Supratherapeutic INR [R79.1]    Acute respiratory failure (HCC) [J96.00]    Hyponatremia [E87.1]    Leukocytosis [D72.829]    Acute pulmonary edema (HCC) [J81.0]    Pulmonary infiltrates [R91.8]    Closed compression fracture of first lumbar vertebra (HCC) [S32.010A]    Obstructive and central sleep apnea [G47.30]    CKD (chronic kidney disease) stage 3, GFR 30-59 ml/min ([facility]) [N18.3]     Acute Respiratory failure - of unclear etiology but w/ recurrent pulmonary edema on  CXR which had been part of the original presentation prior to ARU. , likely acute - less likely PNA w/out fever or leukocytosis.  Supplemental O2 and wean as tolerated. Home Lasix dose was increased to 40 QD but now has been discontinued per nephrology. Will continue to follow clinically       Pulmonary infiltrates: This was noted time admission, continue on antibiotics/levofloxacin and follow. Chest x-ray from 5/27/19 has been reviewed      HTN/CAD - w/ known CAD and no evidence of active signs/sxs of ischemia/failure. Blood pressure has been noted be elevated, antihypertensive have been adjusted today and will follow blood pressure response      HyperLipidemia - controlled on home Statin. Continue, w/ f/u and med adjustment w/ PCP     CKD - baseline stage 2.  Will continue to follow serial labs - resolved.       DM2 - controlled on home Lantus - continued.  Follow FSBS/SSI med regimen.      Afib - rate controlled and anticoagulated on Coumadin.  Continue to follow INR - pharmacy is dosing Coumadin     Anemia - mild, of unclear etiology, w/out evidence of active bleeding/hemolysis.  Asymptomatic w/out indication for transfusion.      Constipation Suzette Spurling related specifically to Lumbar compression fx at L1/L2 give hx of chronic constipation but may be related to pain meds for same. Spine surgery consulted last admission and appreciated, w/ plans for conservative mgt w/ [place] Brace - continued.  Continue bowel regimen which seems to be having some effect.      Hyponatremia : Sodium is noted.  Suspect secondary to risk and probably congestive heart failure.  Sodium has improved with diuretics.  Nephrology also following for further input and sodium is improved today                    DVT Prophylaxis: on coumadin  Diet: DIET CARB CONTROL;   Dietary Nutrition Supplements: Diabetic Oral Supplement  Code Status: Full Code    PT/OT Eval Status: consult will need snf        Dispo - 1-2 days when suitable disposition arranged    Mai Oconnor MD

## 2019-05-27 NOTE — PROGRESS NOTES
Patient Name: [address] [place] [address]                                                    Primary Physician: Rosaura Mckeon MD  Admitting Dx: Acute respiratory failure [facility]) [J96.00]    Dr. Sargent Led      Nephrology [facility]  [email or web address]                                       Interval Plan:     · Renal function recovered SCr 0.9 and K is stable at 4.4. Despite being started on Lisinopril. Leave for now as long as renal function improving. · Good UOP of 5000cc and holding lasix since not eating / drinking well with confusion. · BP good 120-140s mostly and prefer 140/80s for renal perfusion. · Sodium better at 135 and no edema. No contributing meds. Monitor for now. Fluid restriction. · BP quit high on aldactone and Lisinopril. Watch K.  Stop Amlodipine and started Nifedipine at 60 mg. No BB given low HR in 60s. Assessment:     SREE  · hemodynamic related due to his history of heart failure with some diuretic use. At this point, he does need diuretics because of failure on the Accu-Chek BNP. · Continue with Lasix. He got a dose today, reassess him tomorrow. · Check urinalysis, which was done this admission. · Rule out obstruction. The patient is incontinent, place a Gray catheter. · Check for myeloma screen because of recent lumbar fracture, but it is very unlikely he had a high free kappa/lambda in 2016 of 1.9, but probably that was also associated with acute kidney injury at that time. · Episode of acute kidney injury in 2016, which resolved. · His serum protein electrophoresis was normal in 01/2018. · Diabetes with A1c 8.4, he could have chronic kidney disease, to check protein-creatinine ratio. CAD  · previous CABG, history of grade 3 diastolic dysfunction, has a pacemaker, now fluid overload on chest x-ray. IV Lasix. Fracture of lumbar vertebra at L1-L2.    · Further management as per medical team and the rehab team.  Rule out myeloma. Respiratory acidosis  · probably chronic in nature. Needs to keep his bicarb at high, which already is 30, but keep an eye on that. · He is on BiPAP and Dr. Deepika Marr is seeing him. · Normally, he is not on home oxygen although he has previous uvulopalatopharyngoplasty, but he probably still have sleep apnea.   Hyponatremia   · which is acute due to acute renal failure and probably congestive heart failure. Should improve with diuretics. Also, his glucose was 194, also corrected. · Sodium is 131. Mildly high potassium should come down with Lasix. Please call our office at [phone] with any questions or contact me directly. Subjective:     CC / Reason for Consult: SREE w/ CKD    HPI/PMH:  81 y/o WM w/ PMHx of DM type 2, A Flutter, Hip Fx, Ischemic Colitis. Here with constipation and back pain with Vertebral fx transferred from rehab for SREE. Wife relates he has problem with chronic constipation where normally he could go without moving his bowel for a week, now presents to the [facility] with one and a half week of no bowel movement with severe low back pain with movement, was found to have fracture L1-L2 for which he was given brace and was transferred to rehab. He was transferred back from the rehab because of worsening of shortness of breath and acute kidney disease. Reviewing the records in 2016, he was here with acute kidney injury when his creatinine normalized. On 05/20/2019,  SCr was 0.7; on 05/23/2019, 1.3; and now it is 1.8. SCr even in 12/2018, was 1.3 and sometimes in 2017, it was 1.5. He does have grade 3 diastolic dysfunction, has a pacemaker in the care of Dr. Libby Patel. ROS / Interval Hx: Patient seen in room and resting in bed more confused per nursing. Breathing is fine. Gray in place. No family present. Not wearing back brace.      Objective:        Gen: alert, well appearing, and in no distress and oriented to person, place, and time     Neck:  supple, no significant adenopathy     Cardio: normal rate, regular rhythm, normal S1, S2, no murmurs, rubs, clicks or gallops      Resp: clear to auscultation, no wheezes, rales or rhonchi, symmetric air entry. GI:  soft, nontender, nondistended, no masses or organomegaly. Ext:  peripheral pulses normal, no pedal edema, no clubbing or cyanosis      MS: no joint tenderness, deformity or swelling      DERM: normal coloration and turgor, no rashesor bruising    Vitals:     BP (!) 173/65   Pulse 65   Temp 98.5 °F (36.9 °C) (Axillary)   Resp 16   Ht 5' 10\" (1.778 m)   Wt 192 lb 0.3 oz (87.1 kg)   SpO2 96%   BMI 27.55 kg/m²      I/Os: Reviewed    Meds: Reviewed. Please see plan for changes made.     Labs:    Lab Results   Component Value Date    WBC 8.2 05/26/2019    HGB 9.2 05/26/2019    HCT 27.1 05/26/2019    MCV 89.9 05/26/2019     05/26/2019      Lab Results   Component Value Date    CREATININE 0.9 05/27/2019    BUN 19 05/27/2019     05/27/2019    K 4.4 05/27/2019    K 4.7 05/23/2019    CL 94 05/27/2019    CO2 31 05/27/2019     No components found for: GLU   Lab Results   Component Value Date    PTH 14.7 09/29/2016    CALCIUM 8.7 05/27/2019    CAION 1.08 05/24/2019    PHOS 4.1 05/25/2019      No results found for: [id number]   Lab Results   Component Value Date    IRON 18 09/29/2016    TIBC 190 09/29/2016    FERRITIN 280.7 09/29/2016      Lab Results   Component Value Date    MICROALBUR Normal 01/10/2017

## 2019-05-27 NOTE — PROGRESS NOTES
Pharmacy to Dose Warfarin     Dx: A-fib  Goal INR range 2-3   Home Warfarin dose:5 mg daily or as directed     Date                 INR                  Warfarin  5/17                3.29                  held  5/18                2.33                  5 mg  5/19                1.99                  5 mg  5/20                1.61                  5 mg  5/21                1.70                  6 mg  5/22                2.15                  6 mg  5/23                2.69                  3 mg  5/24                3.72                  Hold  5/25                5.43                  Hold  5/26                2.83                  3 mg  5/27                1.94                  4 mg    Recommend Warfarin 4 mg tonight x1. Daily INR ordered. Rx will continue to manage therapy per consult order.   Kiana Cordero [facility] 5/27/2019 7:27 AM

## 2019-05-27 NOTE — FLOWSHEET NOTE
05/26/19 2030   Assessment   Charting Type Shift assessment   Neurological   Orientation Level Oriented to person;Disoriented to place; Disoriented to time;Disoriented to situation   Cognition Follows commands   Language Appropriate for developmental age;Delayed responses   LUE Motor Response Responds to command;Normal extension;Normal flexion   LLE Motor Response Responds to command   RUE Motor Response Responds to command;Normal extension;Normal flexion   RLE Motor Response Responds to command   Level of Consciousness 0   Reena Coma Scale   Eye Opening 4   Best Verbal Response 4  (confused to time, place situation)   Best Motor Response 6   [place] Coma Scale Score 14   HEENT   Right Eye Intact; Impaired vision   Left Eye Intact; Impaired vision   Right Ear Impaired hearing   Left Ear Impaired hearing   Nose Intact   Mucous Membrane Moist;Pink; Intact   Teeth Dentures upper;Partial plate lower   Respiratory   Respiratory Quality/Effort Unlabored;Dyspnea with exertion   Chest Assessment Chest expansion symmetrical   Respiratory Pattern Irregular   Respiratory Depth Shallow   Breath Sounds   Right Upper Lobe Diminished   Right Middle Lobe Diminished   Right Lower Lobe Diminished   Left Upper Lobe Diminished   Left Lower Lobe Diminished   Cough/Sputum   Cough Moist;Non-productive   Frequency Paroxysmal   Cardiac   Cardiac Regularity Irregular   Heart Sounds S1, S2   Cardiac Rhythm Ventricular Paced;Atrial fibrillation   Rhythm Interpretation   Pulse 88   Cardiac Monitor   Telemetry Monitor On Yes   Telemetry Audible Yes   Telemetry Alarms Set Yes   Telemetry Box Number 9   Pacemaker   Pacemaker Yes   Pacemaker Location Left chest   Gastrointestinal   RUQ Bowel Sounds Active   RLQ Bowel Sounds Active   LUQ Bowel Sounds Active   LLQ Bowel Sounds Active   Tenderness Soft; No rebound   Abdomen Inspection Rounded; Soft   GI Symptoms Constipation   Relieved By Stool softener   Peripheral Vascular   RUE Edema Non-pitting;Trace   LUE Edema Non-pitting;Trace   RLE Edema Non-pitting;Trace   LLE Edema Non-pitting;Trace   Skin Color/Condition   Skin Color Appropriate for ethnicity; Ecchymosis   Skin Condition/Temp Poor turgor; Warm   Skin Integrity   Location scattered   Skin Integrity Bruising   Preventative Dressing No   Dressing Site Sacrum  (moisture barrier cream)   Skin Integrity Site 2   Skin Integrity Location 2 Abrasion;Bruising   Location 2 scattered BUE, BLE   Musculoskeletal   RUE Full movement   RL Extremity Limited movement   LUE Full movement   LL Extremity Limited movement   Genitourinary   Genitourinary (WDL)   (Pt has rodriguez cath)   Flank Tenderness No   Suprapubic Tenderness No   Dysuria MAGGIE   Anus/Rectum   Anus/Rectum (WDL) WDL   Wound 03/13/19 #3, Left buttock, Pressure Ulcer, Stage 2, onset 3/6/19, Pressure   Date First Assessed/Time First Assessed: 03/13/19 1523   Primary Wound Type: Pressure Injury  Wound Description (Comments): #3, Left buttock, Pressure Ulcer, Stage 2, onset 3/6/19, Pressure   Wound Assessment Pink;Red   Cheryl-wound Assessment Blanchable erythema   Urethral Catheter 16 fr   No Placement Date or Time found. Urethral Catheter Timeout: Patient; Appropriate Equipment;Procedure;Site/Side  Inserted by: rs rn  Tube Size (fr): 16 fr  Catheter Balloon Size: 10 mL  Collection Container: Standard  Securement Method: Securing devic. .. Catheter Indications Need for fluid management in critically ill patients in a critical care setting not able to be managed by other means such as BSC with hat, bedpan, urinal, condom catheter, or short term intermittent urethral catherization   Site Assessment No urethral drainage   Urine Color Yellow   Urine Appearance Clear   Psychosocial   Patient Behaviors Flat affect; Withdrawn

## 2019-05-27 NOTE — PLAN OF CARE
increase activity tolerance, better understand heart failure and disease management, be more comfortable and reduce lower extremity edema prior to discharge        Patient has a past medical history of Acute encephalopathy, Acute lower GI bleeding, Acute renal failure (ARF) ([address] Acute respiratory failure ([address] Atrial flutter ([address] Closed right hip fracture ([address] Diastolic heart failure ([address] Diverticulitis, HCAP (healthcare-associated pneumonia), Iron deficiency anemia, Ischemic colitis ([address] Metabolic encephalopathy, Nonhealing nonsurgical wound of scalp, limited to breakdown of skin, Obesity, 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 Comorbidities reviewed and education provided.     >>For CHF and Comorbidity documentation on Education Time and Topics, please see Education Tab          Problem: Discharge Planning:  Goal: Discharged to appropriate level of care  Description  Discharged to appropriate level of care  Outcome: Ongoing     Problem: Nutrition  Goal: Optimal nutrition therapy  5/26/2019 1358 by Cheo Trejo RD, LD  Outcome: Ongoing

## 2019-05-27 NOTE — FLOWSHEET NOTE
05/27/19 0830   Assessment   Charting Type Shift assessment   Neurological   Orientation Level Oriented to person;Disoriented to place; Disoriented to time;Disoriented to situation   Cognition Follows commands   Language Appropriate for developmental age;Delayed responses   LUE Motor Response Responds to command;Normal extension;Normal flexion   LLE Motor Response Responds to command   RUE Motor Response Responds to command;Normal extension;Normal flexion   RLE Motor Response Responds to command   Level of Consciousness 0   Reena Coma Scale   Eye Opening 4   Best Verbal Response 4   Best Motor Response 6   Reena Coma Scale Score 14   NIH/MNHISS Stroke Scale   NIH/MNIHSS Stroke Scale Assessed No   HEENT   Right Eye Intact; Impaired vision   Left Eye Intact; Impaired vision   Right Ear Impaired hearing   Left Ear Impaired hearing   Nose Intact   Mucous Membrane Moist;Pink; Intact   Teeth Dentures upper;Partial plate lower   Respiratory   Respiratory Quality/Effort Unlabored;Dyspnea with exertion   Chest Assessment Chest expansion symmetrical   Respiratory Pattern Regular   Respiratory Depth Shallow   Breath Sounds   Right Upper Lobe Diminished   Right Middle Lobe Diminished   Right Lower Lobe Diminished   Left Upper Lobe Diminished   Left Lower Lobe Diminished   Cough/Sputum   Cough Moist;Non-productive   Cardiac   Cardiac Regularity Irregular   Heart Sounds S1, S2   Cardiac Rhythm Ventricular Paced;Atrial fibrillation   Rhythm Interpretation   Pulse 65   Cardiac Monitor   Telemetry Monitor On Yes   Telemetry Audible Yes   Telemetry Alarms Set Yes   Telemetry Box Number 9   Pacemaker   Pacemaker Yes   Gastrointestinal   RUQ Bowel Sounds Active   RLQ Bowel Sounds Active   LUQ Bowel Sounds Active   LLQ Bowel Sounds Active   Tenderness Soft; No rebound   Abdomen Inspection Rounded; Soft   GI Symptoms Constipation   Relieved By Stool softener   Peripheral Vascular   RUE Edema Non-pitting;Trace   LUE Edema Non-pitting;Trace   RLE Edema Non-pitting;Trace   LLE Edema Non-pitting;Trace   Musculoskeletal   RUE Full movement   RL Extremity Limited movement   LUE Full movement   LL Extremity Limited movement   Genitourinary   Genitourinary (WDL)   (Pt has rodriguez cath)   Flank Tenderness No   Suprapubic Tenderness No   Dysuria MAGGIE   Anus/Rectum   Anus/Rectum (WDL) WDL   Wound 03/13/19 #3, Left buttock, Pressure Ulcer, Stage 2, onset 3/6/19, Pressure   Date First Assessed/Time First Assessed: 03/13/19 1523   Primary Wound Type: Pressure Injury  Wound Description (Comments): #3, Left buttock, Pressure Ulcer, Stage 2, onset 3/6/19, Pressure   Wound Assessment Pink;Red   Cheryl-wound Assessment Blanchable erythema   Urethral Catheter 16 fr   No Placement Date or Time found. Urethral Catheter Timeout: Patient; Appropriate Equipment;Procedure;Site/Side  Inserted by: rs rn  Tube Size (fr): 16 fr  Catheter Balloon Size: 10 mL  Collection Container: Standard  Securement Method: Securing devic. .. Catheter Indications Need for fluid management in critically ill patients in a critical care setting not able to be managed by other means such as BSC with hat, bedpan, urinal, condom catheter, or short term intermittent urethral catherization   Site Assessment No urethral drainage   Urine Color Yellow   Urine Appearance Clear   Psychosocial   Patient Behaviors Flat affect; Withdrawn

## 2019-05-27 NOTE — PROGRESS NOTES
Pt appears agitated, refused wearing his own Cpap machine. Pt oriented to person, knows he is in hospital,per Pt \" gives we headache and throat sore\" His wife @ bedside, unable to convince Pt to wear machine. Placed back on 1 L per NC. Will continue to monitor.  PIERRE

## 2019-05-28 VITALS
DIASTOLIC BLOOD PRESSURE: 56 MMHG | HEIGHT: 70 IN | TEMPERATURE: 98.2 F | BODY MASS INDEX: 26.73 KG/M2 | WEIGHT: 186.73 LBS | HEART RATE: 66 BPM | OXYGEN SATURATION: 95 % | RESPIRATION RATE: 16 BRPM | SYSTOLIC BLOOD PRESSURE: 128 MMHG

## 2019-05-28 LAB
ANION GAP SERPL CALCULATED.3IONS-SCNC: 7 MMOL/L (ref 3–16)
BUN BLDV-MCNC: 17 MG/DL (ref 7–20)
CALCIUM SERPL-MCNC: 8.6 MG/DL (ref 8.3–10.6)
CHLORIDE BLD-SCNC: 92 MMOL/L (ref 99–110)
CO2: 34 MMOL/L (ref 21–32)
CREAT SERPL-MCNC: 1 MG/DL (ref 0.8–1.3)
GFR AFRICAN AMERICAN: >60
GFR NON-AFRICAN AMERICAN: >60
GLUCOSE BLD-MCNC: 106 MG/DL (ref 70–99)
GLUCOSE BLD-MCNC: 119 MG/DL (ref 70–99)
GLUCOSE BLD-MCNC: 208 MG/DL (ref 70–99)
HCT VFR BLD CALC: 30.3 % (ref 40.5–52.5)
HEMOGLOBIN: 10.3 G/DL (ref 13.5–17.5)
INR BLD: 1.82 (ref 0.86–1.14)
MCH RBC QN AUTO: 29.9 PG (ref 26–34)
MCHC RBC AUTO-ENTMCNC: 33.9 G/DL (ref 31–36)
MCV RBC AUTO: 88.3 FL (ref 80–100)
PDW BLD-RTO: 13.3 % (ref 12.4–15.4)
PERFORMED ON: ABNORMAL
PERFORMED ON: ABNORMAL
PLATELET # BLD: 260 K/UL (ref 135–450)
PMV BLD AUTO: 7.2 FL (ref 5–10.5)
POTASSIUM SERPL-SCNC: 4.3 MMOL/L (ref 3.5–5.1)
PROTHROMBIN TIME: 20.7 SEC (ref 9.8–13)
RBC # BLD: 3.43 M/UL (ref 4.2–5.9)
SODIUM BLD-SCNC: 133 MMOL/L (ref 136–145)
SPE/IFE INTERPRETATION: NORMAL
WBC # BLD: 7.2 K/UL (ref 4–11)

## 2019-05-28 PROCEDURE — 85027 COMPLETE CBC AUTOMATED: CPT

## 2019-05-28 PROCEDURE — 6370000000 HC RX 637 (ALT 250 FOR IP): Performed by: INTERNAL MEDICINE

## 2019-05-28 PROCEDURE — 85610 PROTHROMBIN TIME: CPT

## 2019-05-28 PROCEDURE — 36415 COLL VENOUS BLD VENIPUNCTURE: CPT

## 2019-05-28 PROCEDURE — 99232 SBSQ HOSP IP/OBS MODERATE 35: CPT | Performed by: INTERNAL MEDICINE

## 2019-05-28 PROCEDURE — 6360000002 HC RX W HCPCS: Performed by: INTERNAL MEDICINE

## 2019-05-28 PROCEDURE — 80048 BASIC METABOLIC PNL TOTAL CA: CPT

## 2019-05-28 RX ORDER — LIDOCAINE 4 G/G
1 PATCH TOPICAL DAILY
DISCHARGE
Start: 2019-05-29

## 2019-05-28 RX ORDER — SENNA AND DOCUSATE SODIUM 50; 8.6 MG/1; MG/1
2 TABLET, FILM COATED ORAL 2 TIMES DAILY
DISCHARGE
Start: 2019-05-28 | End: 2019-06-26 | Stop reason: ALTCHOICE

## 2019-05-28 RX ORDER — SPIRONOLACTONE 25 MG/1
25 TABLET ORAL DAILY
Qty: 30 TABLET | Refills: 3 | Status: ON HOLD | DISCHARGE
Start: 2019-05-29 | End: 2019-10-21 | Stop reason: HOSPADM

## 2019-05-28 RX ORDER — NIFEDIPINE 60 MG/1
60 TABLET, FILM COATED, EXTENDED RELEASE ORAL DAILY
Qty: 30 TABLET | Refills: 3 | DISCHARGE
Start: 2019-05-29 | End: 2019-07-10 | Stop reason: ALTCHOICE

## 2019-05-28 RX ORDER — LISINOPRIL 10 MG/1
10 TABLET ORAL DAILY
Qty: 30 TABLET | Refills: 3 | DISCHARGE
Start: 2019-05-29 | End: 2019-07-10 | Stop reason: ALTCHOICE

## 2019-05-28 RX ORDER — ACETAMINOPHEN 325 MG/1
650 TABLET ORAL EVERY 4 HOURS PRN
Qty: 120 TABLET | Refills: 3 | DISCHARGE
Start: 2019-05-28

## 2019-05-28 RX ORDER — WARFARIN SODIUM 5 MG/1
5 TABLET ORAL
Status: COMPLETED | OUTPATIENT
Start: 2019-05-28 | End: 2019-05-28

## 2019-05-28 RX ORDER — OXYCODONE HYDROCHLORIDE AND ACETAMINOPHEN 5; 325 MG/1; MG/1
1 TABLET ORAL EVERY 6 HOURS PRN
Qty: 12 TABLET | Refills: 0 | Status: SHIPPED | OUTPATIENT
Start: 2019-05-28 | End: 2019-05-31

## 2019-05-28 RX ADMIN — LISINOPRIL 10 MG: 10 TABLET ORAL at 09:58

## 2019-05-28 RX ADMIN — ATORVASTATIN CALCIUM 80 MG: 80 TABLET, FILM COATED ORAL at 17:15

## 2019-05-28 RX ADMIN — INSULIN GLARGINE 22 UNITS: 100 INJECTION, SOLUTION SUBCUTANEOUS at 09:59

## 2019-05-28 RX ADMIN — POLYETHYLENE GLYCOL 3350 17 G: 17 POWDER, FOR SOLUTION ORAL at 10:11

## 2019-05-28 RX ADMIN — LEVOFLOXACIN 250 MG: 5 INJECTION, SOLUTION INTRAVENOUS at 09:57

## 2019-05-28 RX ADMIN — SPIRONOLACTONE 25 MG: 25 TABLET ORAL at 09:57

## 2019-05-28 RX ADMIN — WARFARIN SODIUM 5 MG: 5 TABLET ORAL at 17:15

## 2019-05-28 RX ADMIN — BETHANECHOL CHLORIDE 25 MG: 25 TABLET ORAL at 09:56

## 2019-05-28 RX ADMIN — ASPIRIN 81 MG: 81 TABLET, COATED ORAL at 09:57

## 2019-05-28 RX ADMIN — BETHANECHOL CHLORIDE 25 MG: 25 TABLET ORAL at 15:16

## 2019-05-28 RX ADMIN — NIFEDIPINE 60 MG: 30 TABLET, FILM COATED, EXTENDED RELEASE ORAL at 09:57

## 2019-05-28 RX ADMIN — INSULIN LISPRO 4 UNITS: 100 INJECTION, SOLUTION INTRAVENOUS; SUBCUTANEOUS at 13:07

## 2019-05-28 RX ADMIN — INSULIN LISPRO 4 UNITS: 100 INJECTION, SOLUTION INTRAVENOUS; SUBCUTANEOUS at 17:16

## 2019-05-28 RX ADMIN — SENNOSIDES,DOCUSATE SODIUM 2 TABLET: 8.6; 5 TABLET, FILM COATED ORAL at 09:57

## 2019-05-28 ASSESSMENT — PAIN SCALES - GENERAL
PAINLEVEL_OUTOF10: 0
PAINLEVEL_OUTOF10: 0

## 2019-05-28 NOTE — PROGRESS NOTES
INPATIENT PULMONARY CRITICAL CARE PROGRESS NOTE      Reason for visit    Acute resp failure     SUBJECTIVE:  Patient when seen this morning was not feeling good and had a rough night as per family ;patient did not use his home CPAP machine much and was restless and was confused this morning and somewhat tired ;no increased work of breathing ;patient when seen was on 1 L of oxygen with Sao2 of 96% ,patient is not having increasing cough/expectoration/chest pain ; patient's blood pressure was on higher side when seen ; patient has had adequate urine output overnight with cumuative fluid balance of -5.5 L, patient's blood sugars were slightly on higher side  , no other pertinent review of system of concern      Physical Exam:  Blood pressure (!) 142/66, pulse 68, temperature 98.8 °F (37.1 °C), temperature source Oral, resp. rate 16, height 5' 10\" (1.778 m), weight 192 lb 0.3 oz (87.1 kg), SpO2 96 %.'     Constitutional:  No acute distress. HENT:  No facial asymmetry  No thyromegaly. Eyes:  Conjunctivae are normal. Pupils equal, round, and reactive to light. No scleral icterus. Neck: . No tracheal deviation present. No obvious thyroid mass. Cardiovascular: Normal rate, regular rhythm, normal heart sounds. No right ventricular heave. No lower extremity edema. Pulmonary/Chest: No wheezes. decreased B/L rales. Chest wall is not dull to percussion. No accessory muscle usage or stridor. Decreased BSI   Abdominal: Soft. Bowel sounds present. No distension or hernia. No tenderness. Musculoskeletal: No cyanosis. No clubbing. No obvious joint deformity. Tenderness in back   Lymphadenopathy: No cervical or supraclavicular adenopathy. Skin: Skin is warm and dry. No rash or nodules on the exposed extremities.   Neurologic: Somewhat lethargic when seen             Results:  CBC:   Recent Labs     05/25/19  0459 05/26/19  0436   WBC 9.3 8.2   HGB 9.6* 9.2*   HCT 28.5* 27.1*   MCV 90.1 89.9    218     BMP: Recent Labs     05/25/19 0459 05/26/19 0436 05/27/19  0441   * 133* 135*   K 4.8 4.6 4.4   CL 88* 93* 94*   CO2 35* 33* 31   PHOS 4.1  --   --    BUN 44* 35* 19   CREATININE 1.4* 1.2 0.9     PT/INR:   Recent Labs     05/25/19 0459 05/26/19 0436 05/27/19 0441   PROTIME 61.9* 32.3* 22.1*   INR 5.43* 2.83* 1.94*       Results for Debby Pruitt (MRN [id number]) as of 5/27/2019 21:04   Ref. Range 5/27/2019 04:41 5/27/2019 08:25 5/27/2019 12:28   Sodium Latest Ref Range: 136 - 145 mmol/L 135 (L)     Potassium Latest Ref Range: 3.5 - 5.1 mmol/L 4.4     Chloride Latest Ref Range: 99 - 110 mmol/L 94 (L)     CO2 Latest Ref Range: 21 - 32 mmol/L 31     BUN Latest Ref Range: 7 - 20 mg/dL 19     Creatinine Latest Ref Range: 0.8 - 1.3 mg/dL 0.9     Anion Gap Latest Ref Range: 3 - 16  10     GFR Non- Latest Ref Range: >60  >60     GFR  Latest Ref Range: >60  >60     Glucose Latest Ref Range: 70 - 99 mg/dL 135 (H)     POC Glucose Latest Ref Range: 70 - 99 mg/dl  129 (H) 199 (H)   Calcium Latest Ref Range: 8.3 - 10.6 mg/dL 8.7         Results for Debby Pruitt (MRN [id number]) as of 5/27/2019 21:04   Ref. Range 5/24/2019 02:20 5/25/2019 04:59 5/26/2019 04:36   WBC Latest Ref Range: 4.0 - 11.0 K/uL 18.0 (H) 9.3 8.2   RBC Latest Ref Range: 4.20 - 5.90 M/uL 3.60 (L) 3.16 (L) 3.01 (L)   Hemoglobin Quant Latest Ref Range: 13.5 - 17.5 g/dL 10.8 (L) 9.6 (L) 9.2 (L)   Hematocrit Latest Ref Range: 40.5 - 52.5 % 32.5 (L) 28.5 (L) 27.1 (L)   MCV Latest Ref Range: 80.0 - 100.0 fL 90.4 90.1 89.9   MCH Latest Ref Range: 26.0 - 34.0 pg 30.0 30.5 30.4   MCHC Latest Ref Range: 31.0 - 36.0 g/dL 33.2 33.8 33.9   MPV Latest Ref Range: 5.0 - 10.5 fL 8.1 7.8 7.6   RDW Latest Ref Range: 12.4 - 15.4 % 14.2 13.9 13.7   Platelet Count Latest Ref Range: 135 - 450 K/uL 294 241 218     Results for Debby Pruitt (MRN [id number]) as of 5/27/2019 21:04   Ref.  Range 5/24/2019 04:30 5/24/2019 11:24 5/25/2019 08:50 5/27/2019 07:50   Hemoglobin, Art, Extended Latest Ref Range: 13.5 - 17.5 g/dL 12.1 (L) 11.0 (L) 10.7 (L) 13.5   pH, Arterial Latest Ref Range: 7.350 - 7.450  7.311 (L) 7.372 7.391 7.412   pCO2, Arterial Latest Ref Range: 35.0 - 45.0 mmHg 66.2 (H) 52.9 (H) 63.3 (H) 49.4 (H)   pO2, Arterial Latest Ref Range: 75.0 - 108.0 mmHg 85.9 135.7 (H) 113.1 (H) 72.3 (L)   HCO3, Arterial Latest Ref Range: 21.0 - 29.0 mmol/L 32.7 (H) 30.0 (H) 37.5 (H) 30.8 (H)   TCO2 (calc), Art Latest Ref Range: Not Established mmol/L 34.7 31.7 39.5 32.3   Base Excess, Arterial Latest Ref Range: -3.0 - 3.0 mmol/L 4.7 (H) 3.9 (H) 10.7 (H) 5.1 (H)   O2 Sat, Arterial Latest Ref Range: >92 % 95.1 98.6 97.6 94.1   O2 Content, Arterial Latest Ref Range: Not Established mL/dL 16 15 15 18   Methemoglobin, Arterial Latest Ref Range: <1.5 % 0.5 0.6 0.5 0.6   Carboxyhgb, Arterial Latest Ref Range: 0.0 - 1.5 % 0.3 0.3 0.3 1.0     ONE XRAY VIEW OF THE CHEST       5/27/2019 7:02 am       COMPARISON:   05/24/2019       HISTORY:   ORDERING SYSTEM PROVIDED HISTORY: Evaluate for infiltrate   TECHNOLOGIST PROVIDED HISTORY:   Reason for exam:->Evaluate for infiltrate   Ordering Physician Provided Reason for Exam: eval for infiltrate       Initial evaluation       FINDINGS:   Monitor wires overlie the chest. The patient has had a median sternotomy. The   patient has a left pacemaker. There is atelectasis or infiltrate in the right   lung base.  There is patchy airspace disease in the left lung base which may   represent atelectasis.  Follow up to resolution is suggested.           Impression   Atelectasis or infiltrate in the right lung base.  Patchy airspace disease in   the left lung base which could represent atelectasis or infiltrate.  Follow   up to resolution is suggested.            Assessment:  Principal Problem:    Acute respiratory failure (HCC)  Active Problems:    Diastolic dysfunction    Diabetes mellitus type 2, uncontrolled (HCC)    CKD (chronic kidney disease) stage 3, GFR 30-59 ml/min (HCC)    Obstructive and central sleep apnea    Hyponatremia    Leukocytosis    Acute pulmonary edema (HCC)    Pulmonary infiltrates    Closed compression fracture of first lumbar vertebra (HCC)    Supratherapeutic INR  Resolved Problems:    * No resolved hospital problems.  *          Plan:   · Oxygen supplementation to keep saturation between 90-94% only-  · Patient needs to be more complaint with home CPAP machine   · Patient's Creatinine has improved but the sodium appears to have   · Patient has been started on IV Levaquin for pulmonary infiltrates and leukocytosis-dosing as per creatinine clearance  · IS and acapella    · Patient's CXR reviewed -persistent atelectasis/infiltrates-may need bronchoscopy for diagnostic and therapeutic purposes -will assess in AM   · Patient is on percocet and Flexeril which can cause confusion/lethargy/agitation -IM to consider alternatives,if deemed appropriate   · Insulins as per IM   · Blood glucose monitoring with sliding scale insulin  · Cortisol level reviewed   · Please monitor for any hypoglycemia  · No need of any steroids from pulmonary standpoint of view  · Patient's INR is on the higher side and Coumadin as per pharmacy as per INR  · Monitor for any bleeding   · Evaluation and management of backpain as per IM   · Avoid narcotics and sedatives  · Patient has a history of obstructive and central sleep apnea which may be contributing to patient's symptomatology and clinical picture and may require reevaluation or BiPAP at home in order to prevent recurrence of acute respiratory failure and hypercarbia-case management to look into it  · PUD and DVT prophylaxis     Case discussed with nursing and family     Patient needs to be monitored closely       Electronically signed by:  Shady Jaffe MD    5/27/2019    9:00 PM.

## 2019-05-28 NOTE — PROGRESS NOTES
[facility]   [phone]  [facility]. [employer]  Inpatient Progress note      Interval Plan:     · Renal function better  · Lasix on hold  ·  sodium 133, low but stable    Assessment:     SREE  · hemodynamic related due to his history of heart failure with some diuretic use. · Got iv lasix, on hold now  · Has rodriguez  · K/L 2.27 not very high, SPEP- no monoclonal gammopathy, Pr/cr ratio is 22 mg/eulalia  · Episode of acute kidney injury in 2016, which resolved. · Diabetes with A1c 8.4, could have CKD    CAD  · previous CABG, history of grade 3 diastolic dysfunction, has a pacemaker, now fluid overload on chest x-ray. IV Lasix. · Now on hold  · Has pulmonary issues, and still on oxygen and possible bronchoscopy with admission    Fracture of lumbar vertebra at L1-L2. · Further management as per medical team and the rehab team.  Ruled out myeloma. Respiratory acidosis  · Seen by pulmonary  · On and off Bipap  Hyponatremia   · Getting better with diuretics    Please call our office at [phone] with any questions or contact me directly. Subjective:     CC / Reason for Consult: SREE w/ CKD    HPI/PMH:    From consult note-   81 y/o WM w/ PMHx of DM type 2, A Flutter, Hip Fx, Ischemic Colitis. Here with constipation and back pain with Vertebral fx transferred from rehab for SREE. Wife relates he has problem with chronic constipation where normally he could go without moving his bowel for a week, now presents to the [facility] with one and a half week of no bowel movement with severe low back pain with movement, was found to have fracture L1-L2 for which he was given brace and was transferred to rehab. He was transferred back from the rehab because of worsening of shortness of breath and acute kidney disease. Reviewing the records in 2016, he was here with acute kidney injury when his creatinine normalized. On 05/20/2019,  SCr was 0.7; on 05/23/2019, 1.3; and now it is 1.8.   SCr even in 12/2018, was 1.3 and sometimes in 2017, it was 1.5. He does have grade 3 diastolic dysfunction, has a pacemaker in the care of Dr. Gilles Chan. ROS / Interval Hx:   Unable to obtain ROS due to  Altered mental status/       Objective:     General appearance: confused,  HEENT: Lips- normal, teeth- ok , oral mucosa- dry  Neck : Mass- no, appears symmetrical, JVD- not visible  Respiratory: Respiratory effort-  Appear comfortable with oxygen, wheeze- no, crackles - no  Cardiovascular: Ausculation- No M/R/G, Edema none  Abdomen: visible mass- no, distention- no, scar- no, tenderness- no                            hepatosplenomegaly-  No--  Musculoskeletal:  clubbing no,cyanosis- no , digital ischemia- no                           muscle strength- patient unable to co-operate     , tone - patient unable to co-operate   Skin: rashes- no , ulcers- no, induration- no, tightening - no  Psychiatric:   confused      Vitals:     BP (!) 146/55   Pulse 68   Temp 98.7 °F (37.1 °C) (Oral)   Resp 16   Ht 5' 10\" (1.778 m)   Wt 186 lb 11.7 oz (84.7 kg)   SpO2 95%   BMI 26.79 kg/m²      I/Os: Reviewed    Meds: Reviewed. Please see plan for changes made.     Labs:    Lab Results   Component Value Date    WBC 7.2 05/28/2019    HGB 10.3 05/28/2019    HCT 30.3 05/28/2019    MCV 88.3 05/28/2019     05/28/2019      Lab Results   Component Value Date    CREATININE 1.0 05/28/2019    BUN 17 05/28/2019     05/28/2019    K 4.3 05/28/2019    K 4.7 05/23/2019    CL 92 05/28/2019    CO2 34 05/28/2019     No components found for: GLU   Lab Results   Component Value Date    PTH 14.7 09/29/2016    CALCIUM 8.6 05/28/2019    CAION 1.08 05/24/2019    PHOS 4.1 05/25/2019      No results found for: [id number]   Lab Results   Component Value Date    IRON 18 09/29/2016    TIBC 190 09/29/2016    FERRITIN 280.7 09/29/2016      Lab Results   Component Value Date    MICROALBUR Normal 01/10/2017

## 2019-05-28 NOTE — PLAN OF CARE
Problem: Infection:  Goal: Will remain free from infection  Description  Will remain free from infection  5/28/2019 1442 by Sudha Costa RN  Outcome: Ongoing  5/28/2019 1440 by Sudha Costa RN  Outcome: Ongoing     Problem: Safety:  Goal: Free from accidental physical injury  Description  Free from accidental physical injury  5/28/2019 1442 by Sudha Costa RN  Outcome: Ongoing  5/28/2019 1440 by Sudha Costa RN  Outcome: Ongoing     Problem: Daily Care:  Goal: Daily care needs are met  Description  Daily care needs are met  5/28/2019 1442 by Sudha Costa RN  Outcome: Ongoing  5/28/2019 1440 by Sudha Costa RN  Outcome: Ongoing     Problem: Pain:  Goal: Patient's pain/discomfort is manageable  Description  Patient's pain/discomfort is manageable  5/28/2019 1442 by Sudha Costa RN  Outcome: Ongoing  5/28/2019 1440 by Sudha Costa RN  Outcome: Ongoing  Goal: Pain level will decrease  Description  Pain level will decrease  5/28/2019 1442 by Sudha Costa RN  Outcome: Ongoing  5/28/2019 1440 by Sudha Costa RN  Outcome: Ongoing  Goal: Control of acute pain  Description  Control of acute pain  5/28/2019 1442 by Sudha Costa RN  Outcome: Ongoing  5/28/2019 1440 by Sudha Costa RN  Outcome: Ongoing  Goal: Control of chronic pain  Description  Control of chronic pain  5/28/2019 1442 by Sudha Costa RN  Outcome: Ongoing  5/28/2019 1440 by Sudha Costa RN  Outcome: Ongoing     Problem: Skin Integrity:  Goal: Skin integrity will stabilize  Description  Skin integrity will stabilize  5/28/2019 1442 by Sudha Costa RN  Outcome: Ongoing  5/28/2019 1440 by Sudha Costa RN  Outcome: Ongoing     Problem: Risk for Impaired Skin Integrity  Goal: Tissue integrity - skin and mucous membranes  Description  Structural intactness and normal physiological function of skin and  mucous membranes.   5/28/2019 1442 by Sudha Costa RN  Outcome: Ongoing  5/28/2019 1440 by Faina Castelan [employer]  Outcome: Ongoing     Problem: Falls - Risk of:  Goal: Will remain free from falls  Description  Will remain free from falls  5/28/2019 1442 by Bassam Mohr RN  Outcome: Ongoing  5/28/2019 1440 by Bassam Mohr RN  Outcome: Ongoing  Goal: Absence of physical injury  Description  Absence of physical injury  5/28/2019 1442 by Bassam Mohr RN  Outcome: Ongoing  5/28/2019 1440 by Bassam Mohr RN  Outcome: Ongoing     Problem: OXYGENATION/RESPIRATORY FUNCTION  Goal: Patient will maintain patent airway  5/28/2019 1442 by Bassam Mohr RN  Outcome: Ongoing  Note:     Patient's EF (Ejection Fraction) is greater than 40%    Patient's weights and intake/output reviewed:    Patient's Last Weight: 186lbs 11.7 oz lbs obtained by bed scale. Today's weight is noted to be 6 lbs lessless than last documented weight. Intake/Output Summary (Last 24 hours) at 5/28/2019 1440  Last data filed at 5/28/2019 1323  Gross per 24 hour   Intake 190 ml   Output 3025 ml   Net -2835 ml       Patient's current functional capacity:  Marked limitation of physical activity. Comfortable at rest. Less than ordinary activity causes fatigue, palpitation, or dyspnea. Pt resting in bed at this time on 1 L O2. Pt denies shortness of breath. Pt with pitting lower extremity edema.      Patient and family's stated goal of care: reduce shortness of breath, increase activity tolerance, better understand heart failure and disease management, be more comfortable and reduce lower extremity edema prior to discharge        Patient has a past medical history of Acute encephalopathy, Acute lower GI bleeding, Acute renal failure (ARF) ([address] Acute respiratory failure ([address] Atrial flutter ([address] Closed right hip fracture ([address] Diastolic heart failure ([address] Diverticulitis, HCAP (healthcare-associated pneumonia), Iron deficiency anemia, Ischemic colitis ([address] Metabolic encephalopathy, Nonhealing nonsurgical wound of scalp, limited to breakdown of skin, Obesity, 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 [address] Comorbidities reviewed and education provided.     >>For CHF and Comorbidity documentation on Education Time and Topics, please see Education Tab       5/28/2019 1440 by Sarah Doran RN  Outcome: Ongoing  Goal: Patient will achieve/maintain normal respiratory rate/effort  Description  Respiratory rate and effort will be within normal limits for the patient  5/28/2019 1442 by Sarah Doran RN  Outcome: Ongoing  5/28/2019 1440 by Sarah Doran RN  Outcome: Ongoing     Problem: HEMODYNAMIC STATUS  Goal: Patient has stable vital signs and fluid balance  5/28/2019 1442 by Sarah Doran RN  Outcome: Ongoing  5/28/2019 1440 by Sarah Doran RN  Outcome: Ongoing     Problem: FLUID AND ELECTROLYTE IMBALANCE  Goal: Fluid and electrolyte balance are achieved/maintained  5/28/2019 1442 by Sarah Doran RN  Outcome: Ongoing  5/28/2019 1440 by Sarah Doran RN  Outcome: Ongoing     Problem: Discharge Planning:  Goal: Discharged to appropriate level of care  Description  Discharged to appropriate level of care  5/28/2019 1442 by Sarah Doran RN  Outcome: Ongoing  Note:   Pt is med and mery for bgl wnl  5/28/2019 1440 by Sarah Doran RN  Outcome: Ongoing     Problem: Serum Glucose Level - Abnormal:  Goal: Ability to maintain appropriate glucose levels will improve  Description  Ability to maintain appropriate glucose levels will improve  5/28/2019 1442 by Sarah Doran RN  Outcome: Ongoing  5/28/2019 1440 by Sarah Doran RN  Outcome: Ongoing     Problem: Sensory Perception - Impaired:  Goal: Ability to maintain a stable neurologic state will improve  Description  Ability to maintain a stable neurologic state will improve  5/28/2019 1442 by Sarah Doran RN  Outcome: Ongoing  5/28/2019 1440 by Sarah Doran RN  Outcome: Ongoing     Problem: Nutrition  Goal: Optimal nutrition therapy  5/28/2019 1442 by Sarah Doran RN  Outcome: Ongoing  5/28/2019 1440 by Sarah Doran RN  Outcome: Ongoing   Reviewed poc

## 2019-05-28 NOTE — PROGRESS NOTES
Pharmacy to Dose Warfarin     Dx: A-fib  Goal INR range 2-3   Home Warfarin dose:5 mg daily or as directed     Date                 INR                  Warfarin  5/17                3.29                  held  5/18                2.33                  5 mg  5/19                1.99                  5 mg  5/20                1.61                  5 mg  5/21                1.70                  6 mg  5/22                2.15                  6 mg  5/23                2.69                  3 mg  5/24                3.72                  Hold  5/25                5.43                  Hold  5/26                2.83                  3 mg  5/27                1.94                  4 mg  5/28                1.82                  5mg    Recommend Warfarin 5 mg tonight x1. Daily INR ordered. Rx will continue to manage therapy per consult order.       Felix Grant, PharmD, [address] [phone]

## 2019-05-28 NOTE — CARE COORDINATION
Per conversation with Dr Luana Ham, pt \"is ready to go from his standpoint\". Per MD, pt is agreeable to skilled nursing facility. CM spoke with patient and spouse at bedside. Both are in agreement for SNF. States pt has been to [facility] and [facility] in the past. Would like those as first and second choices. Referral called and faxed to [facility] at [facility] [place].     Mahi Rivera RN DCP

## 2019-05-28 NOTE — CARE COORDINATION
Discharge order noted. No call back from [facility] if able to accept (referral called earlier today). In anticipation of discharge, CM contacted [facility] and set up transport for 1700. Discharge to SNF pending call back from [facility] [place] if able to accept.     Esme Ontiveros, RN DCP

## 2019-05-28 NOTE — PROGRESS NOTES
Called report to [facility] took a message, receiving rn to call me back to get report. Pt to go to private room. Wife at bedside. Transport here to take patient to care center. Pt has all his belongings.

## 2019-05-28 NOTE — DISCHARGE SUMMARY
Hospital Medicine Discharge Summary    Patient: Mahesh Ibarra     Age: 80 y.o. Gender: male  : 3/13/1933   MRN: [id number]  Code Status: Full     Admit Date: 2019   Discharge Date: 2019    Disposition:  [address] [facility])    Condition at Discharge: Stable    Primary Care Provider: Ender Jordan    Admitting Physician: Miguel Angel Draper MD  Discharge Physician: Cesar Johnson MD       Discharge Diagnoses: Active Hospital Problems    Diagnosis    Diastolic dysfunction [B58.37]     Priority: High    Diabetes mellitus type 2, uncontrolled ([facility] [E11.65]     Priority: Medium    Hyponatremia [E87.1]    Closed compression fracture of first lumbar vertebra (HCC) [S32.010A]    Obstructive and central sleep apnea [G47.30]    CKD (chronic kidney disease) stage 3, GFR 30-59 ml/min ([facility] [N18.3]       Hospital Course:   80 y. o. male recently discharged to ARU who presented to [facility] with acute severe Angie Nevarez had been undergoing acute rehab for L1/2 compression fracture fitted w/ [place] Brace. He was given IV Lasix for pulmonary edema seen on CXR w/ improvement in sxs.    He has a pmh  significant for prostate cancer, ischemic colitis, and diastolic heart failure, diabetes, htn, CAD, hyperlipidemia, chronic kidney disease, and A. fib on Coumadin    Assessment/Plan:    Acute Respiratory failure - of unclear etiology but w/ recurrent pulmonary edema on  CXR which had been part of the original presentation prior to ARU. Likely acute - less likely PNA w/out fever or leukocytosis.  Supplemental O2 and wean as tolerated. Home Lasix dose was increased to 40 QD but now has been discontinued per nephrology. Symptoms improved. Completed 5 days of Levaquin.        HTN/CAD - w/ known CAD and no evidence of active signs/sxs of ischemia/failure. Blood pressure has been noted be elevated, antihypertensive have been adjusted. HyperLipidemia - controlled on home Statin.  Continue, w/ f/u and med adjustment w/ PCP     CKD - baseline stage 2.  Will continue to follow serial labs - resolved.       DM2 - controlled on home Lantus - continued.      Afib - rate controlled and anticoagulated on Coumadin.      Anemia - mild, of unclear etiology, w/out evidence of active bleeding/hemolysis. Asymptomatic w/out indication for transfusion.      Constipation - Unlikely related specifically to Lumbar compression fx at L1/L2 give hx of chronic constipation but may be related to pain meds for same. Spine surgery consulted last admission and appreciated, w/ plans for conservative mgt w/ [place] Brace - continued.  Continue bowel regimen which seems to be having some effect.      Hyponatremia : Sodium is noted.  Suspect secondary to risk and probably congestive heart failure.  Sodium has improved with diuretics.  Nephrology also following for further input and sodium is improved.        Exam:   BP (!) 128/56   Pulse 66   Temp 98.2 °F (36.8 °C)   Resp 16   Ht 5' 10\" (1.778 m)   Wt 186 lb 11.7 oz (84.7 kg)   SpO2 95%   BMI 26.79 kg/m²   General appearance: He is up in bed, looks weak and tired he is awake he does have some confusion but is  cooperative.   HEENT: Pupils equal, round, and reactive to light. Conjunctivae/corneas clear. Neck: Supple, with full range of motion. Trachea midline. Respiratory:  Bilateral breath sounds, decreased both bases, no Rales/Wheezes/Rhonchi,  Are noted, no use of accessory muscles at this time. Cardiovascular: Regular rate and rhythm with normal S1/S2   Abdomen: Soft, non-tender, non-distended with normal bowel sounds. Musculoskeletal: No clubbing, cyanosis, =1extremity  edema bilaterally. Neurologic:  Neurovascularly intact without any focal sensory/motor deficits.  Cranial nerves: II-XII intact, grossly non-focal.  Psychiatric: Awake he is confused poor  insight  Capillary Refill: Brisk,< 3 seconds   Peripheral Pulses: +2 palpable, equal bilaterally       Patient Discharge mg by mouth every evening Historical Med           Discharge Medication List as of 5/28/2019  5:13 PM      STOP taking these medications       furosemide (LASIX) 20 MG tablet Comments:   Reason for Stopping:         glyBURIDE (DIABETA) 5 MG tablet Comments:   Reason for Stopping:         METFORMIN HCL PO Comments:   Reason for Stopping:         Continuous Blood Gluc  ([employer] KRISTY 14 DAY READER) TABITHA Comments:   Reason for Stopping:         amLODIPine-benazepril (LOTREL) 2.5-10 MG per capsule Comments:   Reason for Stopping:                 Significant Test Results    Xr Chest Portable    Result Date: 6/24/2019  EXAMINATION: ONE X-RAY VIEW OF THE CHEST, 6/21/2019 11:47 am COMPARISON: 05/27/2019 HISTORY: ORDERING SYSTEM PROVIDED HISTORY: SOB TECHNOLOGIST PROVIDED HISTORY: Reason for exam:->SOB Ordering Physician Provided Reason for Exam: Chest pain Acuity: Acute Type of Exam: Initial FINDINGS: AICD/pacer device is stable. Bibasilar right greater than left atelectasis is again noted. Mild central pulmonary vascular congestion. Median sternotomy wires are noted. Cardiomediastinal silhouette and bony thorax are unchanged. No pneumothorax. Bibasilar atelectasis with mild central pulmonary vascular congestion, relatively stable since prior exam.         Consults:     IP CONSULT TO PULMONOLOGY  IP CONSULT TO NEPHROLOGY    Labs:  For convenience and continuity at follow-up the following most recent labs are provided:    Lab Results   Component Value Date    WBC 6.6 06/21/2019    HGB 11.2 06/21/2019    HCT 33.1 06/21/2019    MCV 89.3 06/21/2019     06/21/2019     06/21/2019    K 4.4 06/21/2019    CL 88 06/21/2019    CO2 30 06/21/2019    BUN 17 06/21/2019    CREATININE 0.9 06/21/2019    CALCIUM 9.1 06/21/2019    PHOS 4.1 05/25/2019    TROPONINI 0.02 06/21/2019    ALKPHOS 125 06/21/2019    ALT 14 06/21/2019    AST 13 06/21/2019    BILITOT 0.4 06/21/2019    BILIDIR 0.20 07/13/2012    LABALBU 3.6 06/21/2019    LDLCALC 30 03/26/2018    TRIG 87 03/26/2018    LABA1C 8.4 05/15/2019     Lab Results   Component Value Date    INR 1.60 (H) 06/28/2019    INR 1.99 (H) 06/21/2019    INR 1.82 (H) 05/28/2019         The patient was seen and examined on day of discharge and this discharge summary is in conjunction with any daily progress note from day of discharge. Time spent on discharge is more than 30 minutes in the examination, evaluation, counseling and review of medications and discharge plan. Signed:    Lupe Valadez MD   6/29/2019    Thank you Tono Thompson for the opportunity to be involved in this patient's care. If you have any questions or concerns please feel free to contact my office [phone].

## 2019-05-28 NOTE — CARE COORDINATION
CASE MANAGEMENT DISCHARGE SUMMARY      Discharge to: [facility] to [address] completed: [facility] Exemption Notification (HENS) completed: yes    New Durable Medical Equipment ordered/agency: n/a    Transportation:    Medical Transport explained to pt/family. Pt/family voice no agency preference. Agency used: Denis Andrade  up time: 1700   Ambulance form completed: Yes    Notified:    Family: spouse, Virgene Pretty   Facility/Agency: [facility] faxed   RN: Kali Arthur   Phone number for report to facility: [phone]    Note: Discharging nurse to complete [facility], reconcile [facility], and place final copy with patient's discharge packet. RN to ensure that written prescriptions for  Level II medications are sent with patient to the facility as per protocol.     Cora Barr RN DCP

## 2019-05-28 NOTE — CARE COORDINATION
Call back received from [facility] [place]. States they ARE able to accept. Pt and spouse asleep in room. Spouse woken by CM to inform of discharge/transport  time. Primary nurse, Rocio Mendes, updated.      Aaliyah Santiago RN DCP

## 2019-05-28 NOTE — FLOWSHEET NOTE
Report received from [facility]. Patient and vital signs stable at this time. Will continue to monitor.           05/27/19 2333   Vital Signs   Temp 98 °F (36.7 °C)   Temp Source Oral   Pulse 69   Heart Rate Source Monitor   Resp 16   /62   BP Location Right upper arm   Patient Position Semi fowlers   Level of Consciousness 0   MEWS Score 1   Patient Currently in Pain Yes   Pain Assessment   Pain Assessment 0-10   Pain Level 7   Oxygen Therapy   SpO2 93 %   O2 Device Nasal cannula   O2 Flow Rate (L/min) 1 L/min

## 2019-05-28 NOTE — PROGRESS NOTES
INPATIENT PULMONARY CRITICAL CARE PROGRESS NOTE      Reason for visit    Acute resp failure     SUBJECTIVE:  Patient when seen this morning was much better ;patient was alert and communicative and having no resp distress;no increased cough/expectoration/chest pain;patient did not use home CPAP last night  ;patient when seen was on 1 L of oxygen with Sao2 of 96% ,patient is not having increasing cough/expectoration/chest pain ; patient's blood pressure was on higher side when seen ; patient has had adequate urine output overnight with cumuative fluid balance of -8.1 L, patient's blood sugars were slightly on higher side intermittently ;patient is still weka and patient and family are contemplating rehab transfer   , no other pertinent review of system of concern      Physical Exam:  Blood pressure (!) 144/68, pulse 65, temperature 97.7 °F (36.5 °C), resp. rate 16, height 5' 10\" (1.778 m), weight 186 lb 11.7 oz (84.7 kg), SpO2 97 %.'     Constitutional:  No acute distress. HENT:  No facial asymmetry  No thyromegaly. Eyes:  Conjunctivae are normal. Pupils equal, round, and reactive to light. No scleral icterus. Neck: . No tracheal deviation present. No obvious thyroid mass. Cardiovascular: Normal rate, regular rhythm, normal heart sounds. No right ventricular heave. No lower extremity edema. Pulmonary/Chest: No wheezes. no significant B/L rales. Chest wall is not dull to percussion. No accessory muscle usage or stridor. Decreased BSI   Abdominal: Soft. Bowel sounds present. No distension or hernia. No tenderness. Musculoskeletal: No cyanosis. No clubbing. No obvious joint deformity. Tenderness in back   Lymphadenopathy: No cervical or supraclavicular adenopathy. Skin: Skin is warm and dry. No rash or nodules on the exposed extremities.   Neurologic: alert and communicative  when seen             Results:  CBC:   Recent Labs     05/26/19  0436 05/28/19  0507   WBC 8.2 7.2   HGB 9.2* 10.3*   HCT 27.1* Acute respiratory failure (HCC)  Active Problems:    Diastolic dysfunction    Diabetes mellitus type 2, uncontrolled (HCC)    CKD (chronic kidney disease) stage 3, GFR 30-59 ml/min (HCC)    Obstructive and central sleep apnea    Hyponatremia    Leukocytosis    Acute pulmonary edema (HCC)    Pulmonary infiltrates    Closed compression fracture of first lumbar vertebra (HCC)    Supratherapeutic INR  Resolved Problems:    * No resolved hospital problems.  *          Plan:   · Oxygen supplementation to keep saturation between 90-94% only-  · Home CPAP machine to be given to the patient   · Patient's Creatinine has improved but the sodium appears to have   · Patient has been started on IV Levaquin for pulmonary infiltrates and leukocytosis-dosing as per creatinine clearance  · IS and acapella    · Patient's CXR reviewed -persistent atelectasis/infiltrates-no need for any bronchoscopy at this time   · Patient is on percocet and Flexeril which can cause confusion/lethargy/agitation -IM to consider alternatives,if deemed appropriate   · Insulins as per IM   · Blood glucose monitoring with sliding scale insulin  · No need of any steroids from pulmonary standpoint of view  · Coumadin as per pharmacy as per INR   · Monitor for any bleeding   · Evaluation and management of backpain as per IM   · Avoid narcotics and sedatives  · Patient has a history of obstructive and central sleep apnea which may be contributing to patient's symptomatology and clinical picture and may require reevaluation or BiPAP at home in order to prevent recurrence of acute respiratory failure and hypercarbia-case management to look into it  · PUD and DVT prophylaxis     Case discussed with nursing and family   Case d/w case management        Electronically signed by:  Shady Jaffe MD    5/28/2019    12:37 PM.

## 2019-05-28 NOTE — CARE COORDINATION
Marie Mendoza is aware a referral has been made to SNF. Pt was active with General acute hospital PTA. Should pt dc home he will be enrolled in [facility]. Should pt DC to SNF I will notify [facility]'s CRE and pt's services will remain on hold until he DC's back home. HOME HEALTH CARE: LEVEL 3 SAFETY     Home health agency to establish plan of care for patient over 60 day period   Nursing  Initial home SN evaluation visit to occur within 24-48 hours for:  medication management  VS and clinical assessment  S&S chronic disease exacerbation education + when to contact MD / NP  care coordination  Medication Reconciliation during 1st SN visit    PT/OT  Evaluations in home within 24-48 hours of discharge to include DME and home safety   Frontload therapy 5 days, then 3x a week   OT to evaluate if patient has [address] needs for personal care      evaluation within 24-48 hours to evaluate resources & insurance for potential [place], [place], LTC, and Medicaid options     PCP Visit scheduled within 3 - 7 days of hospital discharge        Update 4:28 General acute hospital  Writer aware pt is DC'ing to [facility] [place]. Writer made CRE with General acute hospital aware of pt's dispo so CRE can f/u with pt during stay at SNF, should [facility] be needed. Pt was active with General acute hospital PTA.      Kevon Vale RN CTN with [address]  ROSAS:[phone]

## 2019-06-21 ENCOUNTER — HOSPITAL ENCOUNTER (EMERGENCY)
Age: 84
Discharge: HOME | End: 2019-06-21
Attending: EMERGENCY MEDICINE
Payer: MEDICARE

## 2019-06-21 ENCOUNTER — APPOINTMENT (OUTPATIENT)
Dept: GENERAL RADIOLOGY | Age: 84
End: 2019-06-21
Payer: MEDICARE

## 2019-06-21 VITALS
BODY MASS INDEX: 26.63 KG/M2 | HEIGHT: 70 IN | SYSTOLIC BLOOD PRESSURE: 158 MMHG | OXYGEN SATURATION: 96 % | DIASTOLIC BLOOD PRESSURE: 66 MMHG | HEART RATE: 65 BPM | TEMPERATURE: 97.7 F | WEIGHT: 186 LBS | RESPIRATION RATE: 19 BRPM

## 2019-06-21 DIAGNOSIS — R07.89 OTHER CHEST PAIN: Primary | ICD-10-CM

## 2019-06-21 LAB
A/G RATIO: 1.1 (ref 1.1–2.2)
ALBUMIN SERPL-MCNC: 3.6 G/DL (ref 3.4–5)
ALP BLD-CCNC: 125 U/L (ref 40–129)
ALT SERPL-CCNC: 14 U/L (ref 10–40)
ANION GAP SERPL CALCULATED.3IONS-SCNC: 9 MMOL/L (ref 3–16)
AST SERPL-CCNC: 13 U/L (ref 15–37)
BASOPHILS ABSOLUTE: 0 K/UL (ref 0–0.2)
BASOPHILS RELATIVE PERCENT: 0.7 %
BILIRUB SERPL-MCNC: 0.4 MG/DL (ref 0–1)
BUN BLDV-MCNC: 17 MG/DL (ref 7–20)
CALCIUM SERPL-MCNC: 9.1 MG/DL (ref 8.3–10.6)
CHLORIDE BLD-SCNC: 88 MMOL/L (ref 99–110)
CO2: 30 MMOL/L (ref 21–32)
CREAT SERPL-MCNC: 0.9 MG/DL (ref 0.8–1.3)
EKG ATRIAL RATE: 72 BPM
EKG DIAGNOSIS: NORMAL
EKG Q-T INTERVAL: 470 MS
EKG QRS DURATION: 190 MS
EKG QTC CALCULATION (BAZETT): 524 MS
EKG R AXIS: -70 DEGREES
EKG T AXIS: 100 DEGREES
EKG VENTRICULAR RATE: 75 BPM
EOSINOPHILS ABSOLUTE: 0.1 K/UL (ref 0–0.6)
EOSINOPHILS RELATIVE PERCENT: 1.7 %
GFR AFRICAN AMERICAN: >60
GFR NON-AFRICAN AMERICAN: >60
GLOBULIN: 3.2 G/DL
GLUCOSE BLD-MCNC: 194 MG/DL (ref 70–99)
HCT VFR BLD CALC: 33.1 % (ref 40.5–52.5)
HEMOGLOBIN: 11.2 G/DL (ref 13.5–17.5)
INR BLD: 1.99 (ref 0.86–1.14)
LACTIC ACID: 1.4 MMOL/L (ref 0.4–2)
LYMPHOCYTES ABSOLUTE: 1.4 K/UL (ref 1–5.1)
LYMPHOCYTES RELATIVE PERCENT: 21.8 %
MCH RBC QN AUTO: 30.2 PG (ref 26–34)
MCHC RBC AUTO-ENTMCNC: 33.9 G/DL (ref 31–36)
MCV RBC AUTO: 89.3 FL (ref 80–100)
MONOCYTES ABSOLUTE: 0.6 K/UL (ref 0–1.3)
MONOCYTES RELATIVE PERCENT: 9.3 %
NEUTROPHILS ABSOLUTE: 4.4 K/UL (ref 1.7–7.7)
NEUTROPHILS RELATIVE PERCENT: 66.5 %
PDW BLD-RTO: 13.9 % (ref 12.4–15.4)
PLATELET # BLD: 179 K/UL (ref 135–450)
PMV BLD AUTO: 7.3 FL (ref 5–10.5)
POTASSIUM REFLEX MAGNESIUM: 4.4 MMOL/L (ref 3.5–5.1)
PRO-BNP: 697 PG/ML (ref 0–449)
PROTHROMBIN TIME: 22.7 SEC (ref 9.8–13)
RBC # BLD: 3.71 M/UL (ref 4.2–5.9)
SODIUM BLD-SCNC: 127 MMOL/L (ref 136–145)
TOTAL PROTEIN: 6.8 G/DL (ref 6.4–8.2)
TROPONIN: 0.02 NG/ML
WBC # BLD: 6.6 K/UL (ref 4–11)

## 2019-06-21 PROCEDURE — 85610 PROTHROMBIN TIME: CPT

## 2019-06-21 PROCEDURE — 84484 ASSAY OF TROPONIN QUANT: CPT

## 2019-06-21 PROCEDURE — 71045 X-RAY EXAM CHEST 1 VIEW: CPT

## 2019-06-21 PROCEDURE — 80053 COMPREHEN METABOLIC PANEL: CPT

## 2019-06-21 PROCEDURE — 93005 ELECTROCARDIOGRAM TRACING: CPT | Performed by: EMERGENCY MEDICINE

## 2019-06-21 PROCEDURE — 99285 EMERGENCY DEPT VISIT HI MDM: CPT

## 2019-06-21 PROCEDURE — 93010 ELECTROCARDIOGRAM REPORT: CPT | Performed by: INTERNAL MEDICINE

## 2019-06-21 PROCEDURE — 85025 COMPLETE CBC W/AUTO DIFF WBC: CPT

## 2019-06-21 PROCEDURE — 83880 ASSAY OF NATRIURETIC PEPTIDE: CPT

## 2019-06-21 PROCEDURE — 83605 ASSAY OF LACTIC ACID: CPT

## 2019-06-21 NOTE — ED PROVIDER NOTES
Emergency Physician Note    Chief Complaint  Hypotension (Resident of [facility]. In rehab following c-spine sugery. Per life squad,  systolic BP in the 29'O)       History of Present Illness  Mahesh Maria is a 80 y.o. male who presents to the ED for hypotension. Patient reports that he was in a wheelchair and was having chest pain from his brace and they checked his blood pressure and it was low. He states that after they removed his brace the chest pain and shortness of breath resolved. He states the brace is too tight and he does not like how it fits. I spoke with the nurse at the nursing home who confirms this history. Patient currently at the time of my evaluation is no chest pain or shortness of breath or any other symptoms. 10 systems reviewed, pertinent positives per HPI otherwise noted to be negative    I have reviewed the following from the nursing documentation:      Prior to Admission medications    Medication Sig Start Date End Date Taking?  Authorizing Provider   acetaminophen (TYLENOL) 325 MG tablet Take 2 tablets by mouth every 4 hours as needed for Pain or Fever 5/28/19   Roro Alex MD   insulin detemir (LEVEMIR FLEXTOUCH) 100 UNIT/ML injection pen Inject 22 Units into the skin every morning 5/28/19   Roro Alex MD   insulin lispro (HUMALOG) 100 UNIT/ML injection vial Inject 0-12 Units into the skin 3 times daily (with meals) 5/28/19   Roro Alex MD   insulin lispro (HUMALOG) 100 UNIT/ML injection vial Inject 0-6 Units into the skin nightly 5/28/19   Roro Alex MD   lidocaine 4 % external patch Place 1 patch onto the skin daily 5/29/19   Roro Alex MD   lisinopril (PRINIVIL;ZESTRIL) 10 MG tablet Take 1 tablet by mouth daily 5/29/19   Roro Alex MD   NIFEdipine (ADALAT CC) 60 MG extended release tablet Take 1 tablet by mouth daily 5/29/19   Roro Alex MD   sennosides-docusate sodium (SENOKOT-S) 8.6-50 MG tablet Take 2 tablets by mouth 2 times daily 5/28/19   Neel Tarnago MD   spironolactone (ALDACTONE) 25 MG tablet Take 1 tablet by mouth daily 5/29/19   Neel Tarango MD   warfarin (COUMADIN) 5 MG tablet Follow INR 5/17/19   Jesus Alvarenga MD   bethanechol (URECHOLINE) 25 MG tablet Take 1 tablet by mouth 3 times daily 3/8/19   Alfonso Neal MD   Continuous Blood Gluc Sensor (FREESTYLE KRISTY 14 DAY SENSOR) MISC 2 Units by Does not apply route as needed (use one sensor for 14 days) 1/31/19   Emilia Seals APRN - CNP   calcium carbonate (OYSTER SHELL CALCIUM 500 MG) 1250 (500 Ca) MG tablet Take 1 tablet by mouth daily    Historical Provider, MD   aspirin 81 MG EC tablet Take 81 mg by mouth daily. Historical Provider, MD   atorvastatin (LIPITOR) 80 MG tablet Take 80 mg by mouth every evening. Historical Provider, MD   dutasteride (AVODART) 0.5 MG capsule Take 0.5 mg by mouth twice a week.  Take 0.5mg Monday and Friday     Historical Provider, MD   ezetimibe (ZETIA) 10 MG tablet Take 10 mg by mouth every evening     Historical Provider, MD       Allergies as of 06/21/2019 - Review Complete 06/21/2019   Allergen Reaction Noted    Metoprolol Nausea And Vomiting 11/02/2016       Past Medical History:   Diagnosis Date    Acute encephalopathy 10/7/2014    Acute lower GI bleeding 10/6/2014    Acute renal failure (ARF) ([address] 10/7/2014    Acute respiratory failure ([address] 11/2/2014    Atrial flutter ([address] 07/11/2015 06/27/2016, AFL DCCV 200 J, successful    Closed right hip fracture ([address] 5/91/4575    Diastolic heart failure ([address] 11/2/2014    Diverticulitis     HCAP (healthcare-associated pneumonia) 11/2/2014    Iron deficiency anemia     Ischemic colitis ([address] 27/89/30    Metabolic encephalopathy 0/03/2605    Nonhealing nonsurgical wound of scalp, limited to breakdown of skin 2/14/2019    Obesity 8/29/2012    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 ([address] 1/7/2014    Pressure ulcer of coccygeal region, stage 2 11/3/2018    Prostate cancer ([facility]     Vasovagal syncope     VT (ventricular tachycardia) ([facility] 8/13/2012        Surgical History:   Past Surgical History:   Procedure Laterality Date    APPENDECTOMY  1939    CHOLECYSTECTOMY      COLONOSCOPY  10/07/2014    Ischemic colitis    CORONARY ARTERY BYPASS GRAFT  1990    PACEMAKER PLACEMENT      PARTIAL HIP ARTHROPLASTY Right 09/24/2016    PROSTATE BIOPSY  08/10/2007    PROSTATECTOMY  2007    radical, with lymph node dissection    REVISION TOTAL KNEE ARTHROPLASTY Left 06/18/2014    TOTAL KNEE ARTHROPLASTY Bilateral 1991    UPPER GASTROINTESTINAL ENDOSCOPY  03/02/10    WNL                 UVULOPALATOPHARYGOPLASTY          Family History:    Family History   Problem Relation Age of Onset    Stroke Mother     Cancer Sister         stomach    Mental Retardation Brother     Cancer Brother         prostate    Other Father         suicide    Alcohol Abuse Father     Cancer Sister         breast    Other Brother         MVA       Social History     Socioeconomic History    Marital status:      Spouse name: Not on file    Number of children: Not on file    Years of education: Not on file    Highest education level: Not on file   Occupational History    Not on file   Social Needs    Financial resource strain: Not on file    Food insecurity:     Worry: Not on file     Inability: Not on file    Transportation needs:     Medical: Not on file     Non-medical: Not on file   Tobacco Use    Smoking status: Never Smoker    Smokeless tobacco: Never Used   Substance and Sexual Activity    Alcohol use: No    Drug use: No    Sexual activity: Yes     Partners: Female   Lifestyle    Physical activity:     Days per week: Not on file     Minutes per session: Not on file    Stress: Not on file   Relationships    Social connections:     Talks on phone: Not on file     Gets together: Not on file     Attends Caodaism service: Not on file     Active member of club or organization: Not on file     Attends meetings of clubs or organizations: Not on file     Relationship status: Not on file    Intimate partner violence:     Fear of current or ex partner: Not on file     Emotionally abused: Not on file     Physically abused: Not on file     Forced sexual activity: Not on file   Other Topics Concern    Not on file   Social History Narrative    Not on file       Nursing notes reviewed. ED Triage Vitals [06/21/19 1120]   Enc Vitals Group      BP (!) 154/76      Pulse 76      Resp 20      Temp 97.7 °F (36.5 °C)      Temp Source Oral      SpO2 96 %      Weight 186 lb (84.4 kg)      Height 5' 10\" (1.778 m)      Head Circumference       Peak Flow       Pain Score       Pain Loc       Pain Edu? Excl. in [address]? GENERAL:  Awake, alert. Well developed, well nourished with no apparent distress. HENT:  Normocephalic, Atraumatic, moist mucous membranes. EYES:  Pupils equal round and reactive to light, Conjunctiva normal, extraocular movements normal.  NECK:  No meningeal signs, Supple. CHEST:  Regular rate and rhythm, chest wall non-tender. LUNGS:  Clear to auscultation bilaterally, no respiratory distress. ABDOMEN:  Soft, non-tender, no rebound, rigidity or guarding, non-distended, normal bowel sounds. No costovertebral angle tenderness to palpation. EXTREMITIES:  Normal range of motion, no edema, no tenderness, no deformity, distal pulses present. BACK:  No tenderness. SKIN: Warm, dry and intact. NEUROLOGIC: Oriented to person and place and time. Moving all extremities to command. LABS and DIAGNOSTIC RESULTS  EKG  The Ekg interpreted by me shows  normal pacemaker rhythm with a rate of 75  Axis is   Left axis deviation  QTc is  524ms  ST Segments: no acute change, no Sgarbossa criteria  No significant change from prior EKG dated 24 may 2019    RADIOLOGY  X-RAYS:  I have reviewed radiologic plain film image(s).   ALL OTHER NON-PLAIN FILM IMAGES SUCH INR 1.99 (H) 0.86 - 1.14   Lactic Acid, Plasma   Result Value Ref Range    Lactic Acid 1.4 0.4 - 2.0 mmol/L       I estimate there is LOW risk for PULMONARY EMBOLISM, ACUTE CORONARY SYNDROME, OR THORACIC AORTIC DISSECTION, thus I consider the discharge disposition reasonable. Mahesh Stacey and I have discussed the diagnosis and risks, and we agree with discharging home to follow-up with their primary doctor. We also discussed returning to the Emergency Department immediately if new or worsening symptoms occur. We have discussed the symptoms which are most concerning (e.g., bloody sputum, fever, worsening pain or shortness of breath, vomiting) that necessitate immediate return. FINAL Impression    1. Other chest pain        Blood pressure (!) 154/76, pulse 76, temperature 97.7 °F (36.5 °C), temperature source Oral, resp. rate 20, height 5' 10\" (1.778 m), weight 186 lb (84.4 kg), SpO2 96 %. Patient was given scripts for the following medications. I counseled patient how to take these medications. New Prescriptions    No medications on file       Disposition  Pt is in good condition upon Discharge to nursing home. This chart was generated using the [employer] dictation system. I created this record but it may contain dictation errors.           Abdirahman Watts MD  06/21/19 4708

## 2019-06-21 NOTE — ED NOTES
[facility] for transportation back to Nursing facility and spoke to [place]. Pt will be picked up at 1315.      1308 - [facility] is here for transport of pt back to to nursing facility.      Malini Lopez  06/21/19 1864

## 2019-06-21 NOTE — ED NOTES
Called nursing home per MD Jeremias Macias and talked to , who transferred call to RN Carollynn Hodgkins. Call transferred to Dr Jeremias Macias at this time.      Yaquelin Boo  06/21/19 6051

## 2019-06-21 NOTE — ED NOTES
Pt incontinent of stool. Perineal care provided. Pt sampson well.        Julita Christiansen, RN  06/21/19 9402

## 2019-06-26 ENCOUNTER — HOSPITAL ENCOUNTER (OUTPATIENT)
Dept: WOUND CARE | Age: 84
Discharge: HOME | End: 2019-06-26
Payer: MEDICARE

## 2019-06-26 VITALS
SYSTOLIC BLOOD PRESSURE: 178 MMHG | TEMPERATURE: 97.4 F | WEIGHT: 184 LBS | BODY MASS INDEX: 26.34 KG/M2 | DIASTOLIC BLOOD PRESSURE: 64 MMHG | HEART RATE: 72 BPM | HEIGHT: 70 IN | RESPIRATION RATE: 16 BRPM

## 2019-06-26 DIAGNOSIS — L89.312 PRESSURE INJURY OF RIGHT BUTTOCK, STAGE 2 (HCC): ICD-10-CM

## 2019-06-26 PROCEDURE — 99214 OFFICE O/P EST MOD 30 MIN: CPT

## 2019-06-26 PROCEDURE — 99213 OFFICE O/P EST LOW 20 MIN: CPT | Performed by: INTERNAL MEDICINE

## 2019-06-26 RX ORDER — LIDOCAINE 40 MG/G
CREAM TOPICAL ONCE
Status: DISCONTINUED | OUTPATIENT
Start: 2019-06-26 | End: 2019-06-27 | Stop reason: HOSPADM

## 2019-06-26 ASSESSMENT — PAIN DESCRIPTION - LOCATION: LOCATION: BUTTOCKS

## 2019-06-26 ASSESSMENT — PAIN SCALES - GENERAL
PAINLEVEL_OUTOF10: 8
PAINLEVEL_OUTOF10: 0

## 2019-06-26 ASSESSMENT — PAIN DESCRIPTION - ONSET: ONSET: ON-GOING

## 2019-06-26 ASSESSMENT — PAIN DESCRIPTION - ORIENTATION: ORIENTATION: LEFT;RIGHT

## 2019-06-26 ASSESSMENT — PAIN DESCRIPTION - FREQUENCY: FREQUENCY: CONTINUOUS

## 2019-06-26 ASSESSMENT — PAIN - FUNCTIONAL ASSESSMENT: PAIN_FUNCTIONAL_ASSESSMENT: PREVENTS OR INTERFERES SOME ACTIVE ACTIVITIES AND ADLS

## 2019-06-26 ASSESSMENT — PAIN DESCRIPTION - PROGRESSION: CLINICAL_PROGRESSION: NOT CHANGED

## 2019-06-26 ASSESSMENT — PAIN DESCRIPTION - PAIN TYPE: TYPE: ACUTE PAIN

## 2019-06-26 ASSESSMENT — PAIN DESCRIPTION - DESCRIPTORS: DESCRIPTORS: BURNING

## 2019-06-27 PROBLEM — S32.000S LUMBAR COMPRESSION FRACTURE, SEQUELA: Status: RESOLVED | Noted: 2019-05-17 | Resolved: 2019-06-27

## 2019-06-27 PROBLEM — L89.152 PRESSURE ULCER OF COCCYGEAL REGION, STAGE 2 (HCC): Status: ACTIVE | Noted: 2019-03-24

## 2019-06-27 PROBLEM — R91.8 PULMONARY INFILTRATES: Status: RESOLVED | Noted: 2019-05-24 | Resolved: 2019-06-27

## 2019-06-27 PROBLEM — R79.1 SUPRATHERAPEUTIC INR: Status: RESOLVED | Noted: 2019-05-25 | Resolved: 2019-06-27

## 2019-06-27 PROBLEM — D72.829 LEUKOCYTOSIS: Status: RESOLVED | Noted: 2019-05-24 | Resolved: 2019-06-27

## 2019-06-27 PROBLEM — L89.312 PRESSURE INJURY OF RIGHT BUTTOCK, STAGE 2 (HCC): Status: ACTIVE | Noted: 2019-06-27

## 2019-06-27 PROBLEM — J96.00 ACUTE RESPIRATORY FAILURE (HCC): Status: RESOLVED | Noted: 2019-05-24 | Resolved: 2019-06-27

## 2019-06-27 PROBLEM — R41.89 COGNITIVE DECLINE: Status: ACTIVE | Noted: 2019-04-24

## 2019-06-27 PROBLEM — J81.0 ACUTE PULMONARY EDEMA (HCC): Status: RESOLVED | Noted: 2019-05-24 | Resolved: 2019-06-27

## 2019-06-27 NOTE — PLAN OF CARE
[address] [facility] Summary    1. General --    Active wound etiologies:  pressure (stage 2). PCP and pertinent consultants: Rogerio Hu            Other plans for overall health: Instructed pt. & wife To increase protein in pts. diet. [address]:  [facility]    Wound Care Supplies:  provided by home care company    Most recent [facility] lab results:    Lab Results   Component Value Date    LABA1C 8.4 05/15/2019     Lab Results   Component Value Date    LABALBU 3.6 06/21/2019      Lab Results   Component Value Date    CREATININE 0.9 06/21/2019     Lab Results   Component Value Date    HGB 11.2 (L) 06/21/2019        2. Circulatory status, if lower extremity ulcer --    Most recent TIAN:  No data recorded  No data recorded  Most recent arterial imaging:  N/a    Most recent arterial Rx:  N/a    Current management of edema: N/A -- no edema present. Most recent venous imaging:  N/a    3. Debridement --    Debridement methods, past or present: None. Pertinent surgical history for wound(s): None    4. Infection --     Recent culture results: N/a    Recent imaging:  N/a    Recent antibiotic Rx:  N/a    5. Topical therapies --    Previous dressings on current wound(s):      Current dressings being used:       Coccyx:  Skin Prep to clara-wound  [employer] dressing   Change every other day or as needed if it comes off    6. Offloading --    Pressure ulcer offloading: educated patient on importance of turning and repositioning at least every 2 hours, pillows / wedges, ROHO-type cushion and pt. Just got a new mattress at home . Neuropathic ulcer offloading: N/a .    7.  Adjunctive therapies --     Date       Wnd # Location  CTP Rx HBOT  NPWT

## 2019-06-27 NOTE — PROGRESS NOTES
history that includes Cholecystectomy; Appendectomy (1939); Prostatectomy (2007); Revision total knee arthroplasty (Left, 06/18/2014); Upper gastrointestinal endoscopy (03/02/10); pacemaker placement; Colonoscopy (10/07/2014); Uvulopalatopharygoplasty; Coronary artery bypass graft (1990); Total knee arthroplasty (Bilateral, 1991); Partial hip arthroplasty (Right, 09/24/2016); and Prostate biopsy (08/10/2007). His family history includes Alcohol Abuse in his father; Cancer in his brother, sister, and sister; Mental Retardation in his brother; Other in his brother and father; Stroke in his mother. Mr. Mariaa Luo reports that he has never smoked. He has never used smokeless tobacco. He reports that he does not drink alcohol or use drugs. His current medication list consists of FREESTYLE KRISTY 14 DAY SENSOR, NIFEdipine, acetaminophen, aspirin, atorvastatin, bethanechol, calcium carbonate, dutasteride, ezetimibe, insulin detemir, insulin lispro, lidocaine, lisinopril, spironolactone, and warfarin. Allergies: Metoprolol    Pertinent items from the review of systems are discussed in the HPI; the remainder of the ROS was reviewed and is negative.      Objective:     Vitals:    06/26/19 1021 06/26/19 1027   BP: (!) 178/64    Pulse: 72    Resp: 16    Temp: 97.4 °F (36.3 °C)    TempSrc: Oral    Weight: 184 lb 9.6 oz (83.7 kg) 184 lb (83.5 kg)   Height: 5' 10\" (1.778 m) 5' 10\" (1.778 m)       Constitutional:  well-developed, well-nourished, definitely weaker and more fatigued than when I last saw him  Psychiatric:  Awake and alert, but flatter personality and affect, less verbal, less crisp in his thought processes and conversation   Eyes:  pupils equal, round and reactive to light; sclerae anicteric, conjunctivae not pale  Musculoskeletal:  no clubbing, cyanosis or petechiae; hips and pelvic area with no gross effusions, joint misalignment or acute arthritis  Cheryl-ulcer skin: indurated, pink, erythematous , warm and some hyperkeratosis, changes of friction. Ulcer(s): stage 2 cluster of pressure ulceration across the lower sacrum and coccygeal area, red dermal tissue, mild inflammation, a bit of fibrin, serous exduate, no signs of infection. Photos also saved in electronic chart.     Today's Wound Measurements:  Wound 06/26/19 #4, Coccyx, Pressure Ulcer, Stage 2, Onset 5/30/19-Wound Length (cm): 1.4 cm    Wound 06/26/19 #4, Coccyx, Pressure Ulcer, Stage 2, Onset 5/30/19-Wound Width (cm): 4 cm    Wound 06/26/19 #4, Coccyx, Pressure Ulcer, Stage 2, Onset 5/30/19-Wound Depth (cm): 0.1 cm  ______________________________    Lab Results   Component Value Date    LABALBU 3.6 06/21/2019     Lab Results   Component Value Date    CREATININE 0.9 06/21/2019     Lab Results   Component Value Date    HGB 11.2 (L) 06/21/2019     Lab Results   Component Value Date    LABA1C 8.4 05/15/2019     Assessment:     Patient Active Problem List   Diagnosis Code    Persistent atrial fibrillation (HCC) I48.1    Diabetes mellitus type 2, uncontrolled ([facility] E11.65    Hyperlipidemia E78.5    Venous (peripheral) insufficiency I87.2    CAD (coronary artery disease) I25.10    Normocytic anemia R60.7    Diastolic dysfunction N58.53    Essential hypertension I10    Lumbar spondylolysis M43.06    Bronchiectasis without complication ([facility] T48.5    Cardiac pacemaker in situ Z95.0    Carotid stenosis I65.29    CKD (chronic kidney disease) stage 3, GFR 30-59 ml/min (formerly [facility]) N18.3    LPRD (laryngopharyngeal reflux disease) K21.9    RAD (reactive airway disease), mild persistent, uncomplicated V23.59    RBBB (right bundle branch block) I45.10    Restrictive lung disease J98.4    Sensorineural hearing loss H90.5    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    Decreased mobility R26.89    Presence of total knee joint prosthesis, 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    Presence of right hip implant Z96.641    Allergic rhinitis J30.9    Diverticulosis K57.90    Vitamin D deficiency E55.9    Constipation K59.00    Hyponatremia E87.1    Closed compression fracture of first lumbar vertebra (HCC) S32.010A    Cognitive decline R41.89    Pressure injury of coccyx, stage 2 L89.312       Assessment of today's active condition(s): decreased mobility, cognitive decline, recent admission for cardiorespiratory failure, development of stage 2 coccyx ulcer, no signs of infection -- primary goal has to be better offloading, which will help discomfort, drainage, skin changes, etc. Nutritional status does still seem to be ok, at least (he HAS lost 20# since last year, and 15# since April, but some of that was fluid overload, and 184# is closer to an ideal body weight for him; most recent albumin still in normal range, despite acute illness). Factors contributing to occurrence and/or persistence of the chronic ulcer include chronic pressure, decreased mobility and shear force. Medical necessity of today's visit is shown by the above documentation. Sharp debridement is not indicated today, based upon the exam findings in the wound(s) above. Discharge plan:     Treatment in the wound care center today: Wound 06/26/19 #4, Coccyx, Pressure Ulcer, Stage 2, Onset 5/30/19-Dressing/Treatment: (Mepilex border). Keep focused on:    - Pressure relief -- frequent position changes, avoiding excessive time seated, use of ROHO cushion whenever possible, keeping it properly inflated (she has instructions). - Nutrition -- protein intake, appropriate level of hydration.  - Keeping surrounding skin dry and protected. Home treatment: good handwashing before and after any dressing changes. Cleanse ulcer with saline or soap & water before dressing change. May use Vaseline (petrolatum), Aquaphor, Aveeno, CeraVe, Cetaphil, Eucerin, Lubriderm, etc for dry skin. Dressing type for home: [facility], every other day. Written discharge instructions given to patient.  Follow up in 2 weeks, but call sooner with any concerns or questions. We gave her a couple of sample bordered foam dressings for the short term, but will forward order for [employer] dressings to home care, who are responsible for getting wound-care supplies while they're involved. If there is a delay in getting them, I let Mrs. Ibarra know that an OTC hydrogel dressing (often labeled as a \"burn pad\") ought to be available at local drug stores.     Electronically signed by Sam Duvall MD on 6/27/2019 at 2:20 PM.

## 2019-07-08 ENCOUNTER — TELEPHONE (OUTPATIENT)
Dept: ENDOCRINOLOGY | Age: 84
End: 2019-07-08

## 2019-07-09 ENCOUNTER — TELEPHONE (OUTPATIENT)
Dept: ENDOCRINOLOGY | Age: 84
End: 2019-07-09

## 2019-07-09 NOTE — TELEPHONE ENCOUNTER
Patient called to make sure her husbands appt had been cancelled. Advised wife appt had been cancelled yesterday, she showed understanding.

## 2019-07-10 ENCOUNTER — HOSPITAL ENCOUNTER (OUTPATIENT)
Dept: WOUND CARE | Age: 84
Discharge: HOME | End: 2019-07-10
Payer: MEDICARE

## 2019-07-10 VITALS
RESPIRATION RATE: 16 BRPM | HEART RATE: 78 BPM | TEMPERATURE: 98.7 F | SYSTOLIC BLOOD PRESSURE: 155 MMHG | DIASTOLIC BLOOD PRESSURE: 75 MMHG

## 2019-07-10 PROCEDURE — 99212 OFFICE O/P EST SF 10 MIN: CPT

## 2019-07-10 PROCEDURE — 99212 OFFICE O/P EST SF 10 MIN: CPT | Performed by: INTERNAL MEDICINE

## 2019-07-10 RX ORDER — AMLODIPINE BESYLATE 5 MG/1
5 TABLET ORAL DAILY
COMMUNITY
End: 2019-07-10

## 2019-07-10 RX ORDER — LIDOCAINE HYDROCHLORIDE 40 MG/ML
SOLUTION TOPICAL ONCE
Status: DISCONTINUED | OUTPATIENT
Start: 2019-07-10 | End: 2019-07-11 | Stop reason: HOSPADM

## 2019-07-10 RX ORDER — AMLODIPINE BESYLATE AND BENAZEPRIL HYDROCHLORIDE 5; 20 MG/1; MG/1
1 CAPSULE ORAL DAILY
Status: ON HOLD | COMMUNITY
End: 2019-11-08 | Stop reason: HOSPADM

## 2019-07-10 ASSESSMENT — PAIN SCALES - GENERAL: PAINLEVEL_OUTOF10: 0

## 2019-07-16 NOTE — PROGRESS NOTES
[facility] Progress Note    Mahesh Ibarra     : 3/13/1933    DATE OF VISIT:  7/10/2019    Subjective:     Mahesh Ibarra is a 80 y.o. male who has a pressure ulcer located on the coccyx. Current complaint of pain in this ulcer? yes. Quality of pain: burning and sharp  Timing: intermittent and stable  Severity: mild-moderate  Associated Signs/Symptoms: redness and drainage (light, clear)  Other significant symptoms or pertinent ulcer history: doing ok overall with dressing changes. ROHO cushion back in regular use. Not very mobile, but his wife is trying to help him change positions more often. Oral intake fair. No fever, no diarrhea. Additional ulcer(s) noted? no.      Mr. 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 He has a past surgical history that includes Cholecystectomy; Appendectomy (); Prostatectomy (); Revision total knee arthroplasty (Left, 2014); Upper gastrointestinal endoscopy (03/02/10); pacemaker placement; Colonoscopy (10/07/2014); Uvulopalatopharygoplasty; Coronary artery bypass graft (); Total knee arthroplasty (Bilateral, ); Partial hip arthroplasty (Right, 2016); and Prostate biopsy (08/10/2007).     His family history includes Alcohol Abuse in his father; Cancer in his brother, sister, and

## 2019-07-24 ENCOUNTER — HOSPITAL ENCOUNTER (OUTPATIENT)
Dept: WOUND CARE | Age: 84
Discharge: HOME | End: 2019-07-24
Payer: MEDICARE

## 2019-07-24 VITALS
SYSTOLIC BLOOD PRESSURE: 153 MMHG | DIASTOLIC BLOOD PRESSURE: 62 MMHG | HEIGHT: 70 IN | TEMPERATURE: 97.5 F | RESPIRATION RATE: 16 BRPM | HEART RATE: 66 BPM | BODY MASS INDEX: 27.06 KG/M2 | WEIGHT: 189 LBS

## 2019-07-24 PROCEDURE — 17250 CHEM CAUT OF GRANLTJ TISSUE: CPT

## 2019-07-24 PROCEDURE — 17250 CHEM CAUT OF GRANLTJ TISSUE: CPT | Performed by: INTERNAL MEDICINE

## 2019-07-24 RX ORDER — LIDOCAINE 40 MG/G
CREAM TOPICAL ONCE
Status: DISCONTINUED | OUTPATIENT
Start: 2019-07-24 | End: 2019-07-25 | Stop reason: HOSPADM

## 2019-07-24 ASSESSMENT — PAIN SCALES - GENERAL: PAINLEVEL_OUTOF10: 0

## 2019-07-24 NOTE — PLAN OF CARE
[address] [facility] Summary     1. General --     Active wound etiologies:                     pressure (stage 2). PCP and pertinent consultants:          Ariel Amaro         Other plans for overall health: Instructed pt. & wife To increase protein in pts. diet. [address]:                        [facility]     Wound Care Supplies:                        provided by home care company     Most recent [address] lab results:               Lab Results   Component Value Date     LABA1C 8.4 05/15/2019                Lab Results   Component Value Date     LABALBU 3.6 06/21/2019                Lab Results   Component Value Date     CREATININE 0.9 06/21/2019                Lab Results   Component Value Date     HGB 11.2 (L) 06/21/2019         2. Circulatory status, if lower extremity ulcer --     Most recent TIAN:                     No data recorded  No data recorded  Most recent arterial imaging:              N/a     Most recent arterial Rx:                      N/a     Current management of edema:        N/A -- no edema present. Most recent venous imaging:              N/a     3.         Debridement --     Debridement methods, past or present:         None. Pertinent surgical history for wound(s):          None     4. Infection --      Recent culture results:            N/a     Recent imaging:                      N/a     Recent antibiotic Rx:               N/a     5. Topical therapies --     Previous dressings on current wound(s):            Current dressings being used:   7/24/19                         Coccyx:  Dr. Nicholas Plummer used Silver Nitrate on your wound today. The wound will be black or silver in appearance  for the next several days, this is normal.   Skin Prep to clara-wound  [employer] dressing (no substitute should be ordered from [place] [id number])  Change every other day or as needed if it comes off       6.

## 2019-08-02 ENCOUNTER — TELEPHONE (OUTPATIENT)
Dept: WOUND CARE | Age: 84
End: 2019-08-02

## 2019-08-07 ENCOUNTER — HOSPITAL ENCOUNTER (OUTPATIENT)
Age: 84
Setting detail: SPECIMEN
Discharge: HOME | End: 2019-08-07
Payer: MEDICARE

## 2019-08-07 ENCOUNTER — HOSPITAL ENCOUNTER (OUTPATIENT)
Dept: WOUND CARE | Age: 84
Discharge: HOME | End: 2019-08-07
Payer: MEDICARE

## 2019-08-07 VITALS
RESPIRATION RATE: 16 BRPM | HEART RATE: 90 BPM | SYSTOLIC BLOOD PRESSURE: 146 MMHG | BODY MASS INDEX: 26.48 KG/M2 | WEIGHT: 185 LBS | DIASTOLIC BLOOD PRESSURE: 77 MMHG | HEIGHT: 70 IN | OXYGEN SATURATION: 100 % | TEMPERATURE: 97.8 F

## 2019-08-07 LAB
BACTERIA: ABNORMAL /HPF
BILIRUBIN URINE: NEGATIVE
BLOOD, URINE: ABNORMAL
CLARITY: ABNORMAL
COLOR: YELLOW
EPITHELIAL CELLS, UA: ABNORMAL /HPF
GLUCOSE URINE: NEGATIVE MG/DL
KETONES, URINE: NEGATIVE MG/DL
LEUKOCYTE ESTERASE, URINE: ABNORMAL
MICROSCOPIC EXAMINATION: YES
NITRITE, URINE: NEGATIVE
PH UA: 6.5 (ref 5–8)
PROTEIN UA: NEGATIVE MG/DL
RBC UA: ABNORMAL /HPF (ref 0–2)
SPECIFIC GRAVITY UA: 1.01 (ref 1–1.03)
URINE TYPE: ABNORMAL
UROBILINOGEN, URINE: 0.2 E.U./DL
WBC UA: ABNORMAL /HPF (ref 0–5)

## 2019-08-07 PROCEDURE — 99212 OFFICE O/P EST SF 10 MIN: CPT | Performed by: INTERNAL MEDICINE

## 2019-08-07 PROCEDURE — 99213 OFFICE O/P EST LOW 20 MIN: CPT

## 2019-08-07 PROCEDURE — 81001 URINALYSIS AUTO W/SCOPE: CPT

## 2019-08-07 RX ORDER — LIDOCAINE 40 MG/G
CREAM TOPICAL ONCE
Status: DISCONTINUED | OUTPATIENT
Start: 2019-08-07 | End: 2019-08-08 | Stop reason: HOSPADM

## 2019-08-07 ASSESSMENT — PAIN DESCRIPTION - FREQUENCY: FREQUENCY: CONTINUOUS

## 2019-08-07 ASSESSMENT — PAIN DESCRIPTION - PAIN TYPE: TYPE: ACUTE PAIN

## 2019-08-07 ASSESSMENT — PAIN DESCRIPTION - PROGRESSION: CLINICAL_PROGRESSION: NOT CHANGED

## 2019-08-07 ASSESSMENT — PAIN DESCRIPTION - LOCATION: LOCATION: BUTTOCKS

## 2019-08-07 ASSESSMENT — PAIN - FUNCTIONAL ASSESSMENT: PAIN_FUNCTIONAL_ASSESSMENT: PREVENTS OR INTERFERES SOME ACTIVE ACTIVITIES AND ADLS

## 2019-08-07 ASSESSMENT — PAIN DESCRIPTION - ORIENTATION: ORIENTATION: RIGHT;LEFT

## 2019-08-07 ASSESSMENT — PAIN DESCRIPTION - ONSET: ONSET: ON-GOING

## 2019-08-07 ASSESSMENT — PAIN SCALES - GENERAL: PAINLEVEL_OUTOF10: 4

## 2019-08-15 PROBLEM — L92.9 HYPERGRANULATION: Status: RESOLVED | Noted: 2019-02-14 | Resolved: 2019-08-15

## 2019-08-15 NOTE — PROGRESS NOTES
Onset 5/30/19-Dressing/Treatment: (mepilex border). Continue to work on protein intake, glucose control, stay hydrated. Continue ROHO cushion use whenever seated, but try to be up and active more, also lying in bed on his sides more right now, if needed to help his ulcer to heal.  Check ROHO periodically for proper inflation. PT should be able to work with him at home on better transfer techniques, to minimize shear and friction. Home treatment: good handwashing before and after any dressing changes. Cleanse ulcer with saline or soap & water before dressing change. May use Vaseline (petrolatum), Aquaphor, Aveeno, CeraVe, Cetaphil, Eucerin, Lubriderm, etc for dry skin. Dressing type for home: [facility], three times weekly. Written discharge instructions given to patient. Follow up in 2 weeks, but call sooner with any concerns or questions.     Electronically signed by Lauryn Nieto MD on 8/15/2019 at 11:47 AM.

## 2019-08-21 ENCOUNTER — HOSPITAL ENCOUNTER (OUTPATIENT)
Dept: WOUND CARE | Age: 84
Discharge: HOME | End: 2019-08-21
Payer: MEDICARE

## 2019-08-21 VITALS
SYSTOLIC BLOOD PRESSURE: 154 MMHG | WEIGHT: 189 LBS | DIASTOLIC BLOOD PRESSURE: 60 MMHG | HEIGHT: 70 IN | RESPIRATION RATE: 16 BRPM | TEMPERATURE: 97.9 F | HEART RATE: 68 BPM | BODY MASS INDEX: 27.06 KG/M2

## 2019-08-21 PROCEDURE — 99212 OFFICE O/P EST SF 10 MIN: CPT | Performed by: INTERNAL MEDICINE

## 2019-08-21 PROCEDURE — 99213 OFFICE O/P EST LOW 20 MIN: CPT

## 2019-08-21 RX ORDER — WARFARIN SODIUM 2.5 MG/1
2.5 TABLET ORAL SEE ADMIN INSTRUCTIONS
Status: ON HOLD | COMMUNITY
End: 2019-11-08 | Stop reason: SDUPTHER

## 2019-08-21 RX ORDER — FLUCONAZOLE 100 MG/1
100 TABLET ORAL DAILY
Qty: 5 TABLET | Refills: 0 | Status: SHIPPED | OUTPATIENT
Start: 2019-08-21 | End: 2019-08-26

## 2019-08-21 RX ORDER — LIDOCAINE 40 MG/G
CREAM TOPICAL ONCE
Status: DISCONTINUED | OUTPATIENT
Start: 2019-08-21 | End: 2019-08-22 | Stop reason: HOSPADM

## 2019-08-21 ASSESSMENT — PAIN SCALES - GENERAL: PAINLEVEL_OUTOF10: 0

## 2019-08-22 NOTE — PLAN OF CARE
hours, pillows / wedges, ROHO-type cushion and pt. Just got a new mattress at home . Neuropathic ulcer offloading:  N/a .     7. Adjunctive therapies --      Date           Wnd #        Location                      CTP Rx           HBOT              NPWT                                                  Wound stable, no debridement. Some skin irritation-possibly fungal noted, script for Diflucan sent to pharmacy. Will otherwise cont. With current wound care regime as ordered above. PT and OT to cont. To work with patient on getting stronger to help him prevent friction & shearing from scooting & better with transferring. Reinforced importance of off-loading & pt. To use his  ROHO cushion, wife verbalizes understanding & pt. Using ROHO. F/u in [facility] in 2 week as ordered. Discharge instructions reviewed with patient & wife, all questions answered, copy given to patient. Dressings were applied to all wounds per M.D. Instructions at this visit.

## 2019-08-27 NOTE — PROGRESS NOTES
Assessment:     Patient Active Problem List   Diagnosis Code    Persistent atrial fibrillation ([facility]) I48.1    Diabetes mellitus type 2, uncontrolled ([facility] E11.65    Hyperlipidemia E78.5    Venous (peripheral) insufficiency I87.2    CAD (coronary artery disease) I25.10    Normocytic anemia O39.6    Diastolic dysfunction Z56.12    Essential hypertension I10    Lumbar spondylolysis M43.06    Bronchiectasis without complication ([facility] [address] I81.4    Cardiac pacemaker in situ Z95.0    Carotid stenosis I65.29    CKD (chronic kidney disease) stage 3, GFR 30-59 ml/min ([facility]) N18.3    LPRD (laryngopharyngeal reflux disease) K21.9    RAD (reactive airway disease), mild persistent, uncomplicated J28.22    RBBB (right bundle branch block) I45.10    Restrictive lung disease J98.4    Sensorineural hearing loss H90.5    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    Decreased mobility R26.89    Presence of total knee joint prosthesis, 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    Presence of right hip implant Z96.641    Allergic rhinitis J30.9    Diverticulosis K57.90    Vitamin D deficiency E55.9    Pressure ulcer of coccygeal region, stage 2 L89.152    Constipation K59.00    Hyponatremia E87.1    Closed compression fracture of first lumbar vertebra ([facility]) S32.010A    Cognitive decline R41.89       Assessment of today's active condition(s): dementia, poor mobility, recurrent stage 2 pressure ulcer, no necrosis or infection; just needs more consistent offloading, with particular attention to transfer techniques that minimize shear and friction. Factors contributing to occurrence and/or persistence of the chronic ulcer include diabetes, poor glucose control, chronic pressure, decreased mobility and shear force. Medical necessity of today's visit is shown by the above documentation. Sharp debridement is not indicated today, based upon the exam findings in the wound(s) above.      Discharge plan:     Treatment in the wound care center

## 2019-09-11 ENCOUNTER — HOSPITAL ENCOUNTER (OUTPATIENT)
Dept: WOUND CARE | Age: 84
Discharge: HOME | End: 2019-09-11
Payer: MEDICARE

## 2019-09-11 VITALS
RESPIRATION RATE: 16 BRPM | HEIGHT: 70 IN | HEART RATE: 72 BPM | DIASTOLIC BLOOD PRESSURE: 56 MMHG | SYSTOLIC BLOOD PRESSURE: 126 MMHG | BODY MASS INDEX: 25.77 KG/M2 | TEMPERATURE: 97.4 F | WEIGHT: 180 LBS

## 2019-09-11 PROCEDURE — 99212 OFFICE O/P EST SF 10 MIN: CPT | Performed by: INTERNAL MEDICINE

## 2019-09-11 PROCEDURE — 99212 OFFICE O/P EST SF 10 MIN: CPT

## 2019-09-11 ASSESSMENT — PAIN SCALES - GENERAL: PAINLEVEL_OUTOF10: 0

## 2019-09-11 ASSESSMENT — PAIN DESCRIPTION - PROGRESSION: CLINICAL_PROGRESSION: RESOLVED

## 2019-09-17 NOTE — PROGRESS NOTES
88 [facility] Progress Note    Mahesh Ibarra     : 3/13/1933    DATE OF VISIT:  2019    Subjective:     Mahesh Ibarra is a 80 y.o. male who has a pressure ulcer located on the coccyx. Current complaint of pain in this ulcer? yes. Quality of pain: aching  Timing: intermittent  Severity: mild  Associated Signs/Symptoms: redness and ecchymotic changes  Other significant symptoms or pertinent ulcer history: dressings doing fine; not very mobile at all; does use his ROHO cushion when up in a chair; eating well. Additional ulcer(s) noted? no.      Mr. 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 He has a past surgical history that includes Cholecystectomy; Appendectomy (); Prostatectomy (); Revision total knee arthroplasty (Left, 2014); Upper gastrointestinal endoscopy (03/02/10); pacemaker placement; Colonoscopy (10/07/2014); Uvulopalatopharygoplasty; Coronary artery bypass graft (); Total knee arthroplasty (Bilateral, ); Partial hip arthroplasty (Right, 2016); and Prostate biopsy (08/10/2007). His family history includes Alcohol Abuse in his father; Cancer in his brother, sister, and sister; Mental Retardation in his brother; Other in his brother and father; Stroke in his mother.      Mr. Adriana Childers reports that he has never smoked. He has never used smokeless tobacco. He reports that he does not drink alcohol or use drugs. His current medication list consists of FREESTYLE KRISTY 14 DAY SENSOR, OXYGEN, acetaminophen, amLODIPine-benazepril, aspirin, atorvastatin, bethanechol, calcium carbonate, dutasteride, ezetimibe, insulin detemir, insulin lispro, lidocaine, spironolactone, and warfarin. Allergies: Metoprolol    Pertinent items from the review of systems are discussed in the HPI; the remainder of the ROS was reviewed and is negative. Objective:     Vitals:    09/11/19 1356 09/11/19 1419   BP:  (!) 126/56   Pulse:  72   Resp: 16    Temp: 97.4 °F (36.3 °C)    TempSrc: Oral    Weight: 180 lb (81.6 kg)    Height: 5' 10\" (1.778 m)        Constitutional:  well-developed, well-nourished, chronically ill appearing, more fatigued and weaker than months ago  Psychiatric:  oriented to person and some details of place and time; flat affect   Eyes:  pupils equal, round and reactive to light; sclerae anicteric, conjunctivae not pale  Musculoskeletal:  no clubbing, cyanosis or petechiae; hips and pelvic area with no gross effusions, joint misalignment or acute arthritis  Cheryl-ulcer skin: indurated, pink, ecchymotic and warm. Ulcer(s): healed and dry, with a very small area of hyperkeratosis. Photos also saved in electronic chart.     Today's Wound Measurements:  [REMOVED] Wound 06/26/19 #4, Coccyx, Pressure Ulcer, Stage 2, Onset 5/30/19-Wound Length (cm): 0 cm    [REMOVED] Wound 06/26/19 #4, Coccyx, Pressure Ulcer, Stage 2, Onset 5/30/19-Wound Width (cm): 0 cm    [REMOVED] Wound 06/26/19 #4, Coccyx, Pressure Ulcer, Stage 2, Onset 5/30/19-Wound Depth (cm): 0 cm  ______________________________    Lab Results   Component Value Date    LABALBU 3.6 06/21/2019     Lab Results   Component Value Date    CREATININE 0.9 06/21/2019     Lab Results   Component Value Date    HGB 11.2 (L) 06/21/2019     Lab Results   Component Value Date    LABA1C 5/30/19-Dressing/Treatment: (skin prep mepilex border). General comments for pressure ulcers: *  Make sure you stay well-hydrated, and maintain good protein intake. *  Reposition at least every two hours, to keep from having pressure in one spot for too long. *  Continue to use ROHO cushion at all times; keep properly inflated. Home treatment: good handwashing before and after any dressing changes. Cleanse ulcer with saline or soap & water before dressing change. May use Vaseline (petrolatum), Aquaphor, Aveeno, CeraVe, Cetaphil, Eucerin, Lubriderm, etc for dry skin. Dressing type for home: [facility] until those run out (QOD or PRN), then change to just a heavy barrier ointment daily and PRN. Written discharge instructions given to patient. Follow up here PRN. If he does follow up here with recurrent pressure ulcers, and his general health seems to be continuing to decline, I think we should discuss a more palliative approach to his care, and perhaps a formal hospice evaluation.     Electronically signed by Juan C Lutz MD on 9/17/2019 at 10:54 AM.

## 2019-10-14 ENCOUNTER — HOSPITAL ENCOUNTER (INPATIENT)
Age: 84
LOS: 7 days | Discharge: NURSING, REHAB OR LONG-TERM CARE | DRG: 871 | End: 2019-10-21
Attending: EMERGENCY MEDICINE | Admitting: INTERNAL MEDICINE
Payer: MEDICARE

## 2019-10-14 ENCOUNTER — APPOINTMENT (OUTPATIENT)
Dept: GENERAL RADIOLOGY | Age: 84
DRG: 871 | End: 2019-10-14
Payer: MEDICARE

## 2019-10-14 ENCOUNTER — APPOINTMENT (OUTPATIENT)
Dept: CT IMAGING | Age: 84
DRG: 871 | End: 2019-10-14
Payer: MEDICARE

## 2019-10-14 DIAGNOSIS — J18.9 PNEUMONIA DUE TO ORGANISM: ICD-10-CM

## 2019-10-14 DIAGNOSIS — A41.9 SEPSIS WITHOUT ACUTE ORGAN DYSFUNCTION, DUE TO UNSPECIFIED ORGANISM (HCC): ICD-10-CM

## 2019-10-14 DIAGNOSIS — E86.0 DEHYDRATION: Primary | ICD-10-CM

## 2019-10-14 LAB
A/G RATIO: 1.2 (ref 1.1–2.2)
ALBUMIN SERPL-MCNC: 4.1 G/DL (ref 3.4–5)
ALP BLD-CCNC: 151 U/L (ref 40–129)
ALT SERPL-CCNC: 68 U/L (ref 10–40)
ANION GAP SERPL CALCULATED.3IONS-SCNC: 13 MMOL/L (ref 3–16)
AST SERPL-CCNC: 43 U/L (ref 15–37)
BASOPHILS ABSOLUTE: 0 K/UL (ref 0–0.2)
BASOPHILS RELATIVE PERCENT: 0.1 %
BILIRUB SERPL-MCNC: 0.8 MG/DL (ref 0–1)
BUN BLDV-MCNC: 26 MG/DL (ref 7–20)
CALCIUM SERPL-MCNC: 9.3 MG/DL (ref 8.3–10.6)
CHLORIDE BLD-SCNC: 86 MMOL/L (ref 99–110)
CO2: 31 MMOL/L (ref 21–32)
CREAT SERPL-MCNC: 1.1 MG/DL (ref 0.8–1.3)
EOSINOPHILS ABSOLUTE: 0 K/UL (ref 0–0.6)
EOSINOPHILS RELATIVE PERCENT: 0 %
GFR AFRICAN AMERICAN: >60
GFR NON-AFRICAN AMERICAN: >60
GLOBULIN: 3.5 G/DL
GLUCOSE BLD-MCNC: 330 MG/DL (ref 70–99)
GLUCOSE BLD-MCNC: 335 MG/DL (ref 70–99)
HCT VFR BLD CALC: 38.9 % (ref 40.5–52.5)
HEMOGLOBIN: 13 G/DL (ref 13.5–17.5)
LACTIC ACID: 2.3 MMOL/L (ref 0.4–2)
LIPASE: 43 U/L (ref 13–60)
LYMPHOCYTES ABSOLUTE: 0.3 K/UL (ref 1–5.1)
LYMPHOCYTES RELATIVE PERCENT: 2.7 %
MCH RBC QN AUTO: 29.8 PG (ref 26–34)
MCHC RBC AUTO-ENTMCNC: 33.4 G/DL (ref 31–36)
MCV RBC AUTO: 89.2 FL (ref 80–100)
MONOCYTES ABSOLUTE: 0.7 K/UL (ref 0–1.3)
MONOCYTES RELATIVE PERCENT: 5.6 %
NEUTROPHILS ABSOLUTE: 10.7 K/UL (ref 1.7–7.7)
NEUTROPHILS RELATIVE PERCENT: 91.6 %
PDW BLD-RTO: 15 % (ref 12.4–15.4)
PERFORMED ON: ABNORMAL
PLATELET # BLD: 198 K/UL (ref 135–450)
PMV BLD AUTO: 8 FL (ref 5–10.5)
POTASSIUM SERPL-SCNC: 4.8 MMOL/L (ref 3.5–5.1)
PROCALCITONIN: 0.73 NG/ML (ref 0–0.15)
RAPID INFLUENZA  B AGN: NEGATIVE
RAPID INFLUENZA A AGN: NEGATIVE
RBC # BLD: 4.36 M/UL (ref 4.2–5.9)
SODIUM BLD-SCNC: 130 MMOL/L (ref 136–145)
TOTAL PROTEIN: 7.6 G/DL (ref 6.4–8.2)
TROPONIN: 0.03 NG/ML
WBC # BLD: 11.7 K/UL (ref 4–11)

## 2019-10-14 PROCEDURE — 83605 ASSAY OF LACTIC ACID: CPT

## 2019-10-14 PROCEDURE — 84484 ASSAY OF TROPONIN QUANT: CPT

## 2019-10-14 PROCEDURE — 87186 SC STD MICRODIL/AGAR DIL: CPT

## 2019-10-14 PROCEDURE — 87150 DNA/RNA AMPLIFIED PROBE: CPT

## 2019-10-14 PROCEDURE — 80053 COMPREHEN METABOLIC PANEL: CPT

## 2019-10-14 PROCEDURE — 71046 X-RAY EXAM CHEST 2 VIEWS: CPT

## 2019-10-14 PROCEDURE — 87804 INFLUENZA ASSAY W/OPTIC: CPT

## 2019-10-14 PROCEDURE — 74177 CT ABD & PELVIS W/CONTRAST: CPT

## 2019-10-14 PROCEDURE — 6360000004 HC RX CONTRAST MEDICATION: Performed by: PHYSICIAN ASSISTANT

## 2019-10-14 PROCEDURE — 83036 HEMOGLOBIN GLYCOSYLATED A1C: CPT

## 2019-10-14 PROCEDURE — 93005 ELECTROCARDIOGRAM TRACING: CPT | Performed by: PHYSICIAN ASSISTANT

## 2019-10-14 PROCEDURE — 2580000003 HC RX 258: Performed by: PHYSICIAN ASSISTANT

## 2019-10-14 PROCEDURE — 94761 N-INVAS EAR/PLS OXIMETRY MLT: CPT

## 2019-10-14 PROCEDURE — 1200000000 HC SEMI PRIVATE

## 2019-10-14 PROCEDURE — 6370000000 HC RX 637 (ALT 250 FOR IP): Performed by: PHYSICIAN ASSISTANT

## 2019-10-14 PROCEDURE — 96361 HYDRATE IV INFUSION ADD-ON: CPT

## 2019-10-14 PROCEDURE — 87040 BLOOD CULTURE FOR BACTERIA: CPT

## 2019-10-14 PROCEDURE — 6360000002 HC RX W HCPCS: Performed by: PHYSICIAN ASSISTANT

## 2019-10-14 PROCEDURE — 83690 ASSAY OF LIPASE: CPT

## 2019-10-14 PROCEDURE — 84145 PROCALCITONIN (PCT): CPT

## 2019-10-14 PROCEDURE — 96365 THER/PROPH/DIAG IV INF INIT: CPT

## 2019-10-14 PROCEDURE — 6370000000 HC RX 637 (ALT 250 FOR IP): Performed by: NURSE PRACTITIONER

## 2019-10-14 PROCEDURE — 2700000000 HC OXYGEN THERAPY PER DAY

## 2019-10-14 PROCEDURE — 85025 COMPLETE CBC W/AUTO DIFF WBC: CPT

## 2019-10-14 PROCEDURE — 2580000003 HC RX 258: Performed by: NURSE PRACTITIONER

## 2019-10-14 PROCEDURE — 99285 EMERGENCY DEPT VISIT HI MDM: CPT

## 2019-10-14 PROCEDURE — 94150 VITAL CAPACITY TEST: CPT

## 2019-10-14 RX ORDER — SODIUM CHLORIDE 0.9 % (FLUSH) 0.9 %
10 SYRINGE (ML) INJECTION PRN
Status: DISCONTINUED | OUTPATIENT
Start: 2019-10-14 | End: 2019-10-21 | Stop reason: HOSPADM

## 2019-10-14 RX ORDER — ASPIRIN 81 MG/1
81 TABLET ORAL DAILY
Status: DISCONTINUED | OUTPATIENT
Start: 2019-10-15 | End: 2019-10-21 | Stop reason: HOSPADM

## 2019-10-14 RX ORDER — IPRATROPIUM BROMIDE AND ALBUTEROL SULFATE 2.5; .5 MG/3ML; MG/3ML
1 SOLUTION RESPIRATORY (INHALATION)
Status: DISCONTINUED | OUTPATIENT
Start: 2019-10-15 | End: 2019-10-15

## 2019-10-14 RX ORDER — ONDANSETRON 2 MG/ML
4 INJECTION INTRAMUSCULAR; INTRAVENOUS EVERY 6 HOURS PRN
Status: DISCONTINUED | OUTPATIENT
Start: 2019-10-14 | End: 2019-10-21 | Stop reason: HOSPADM

## 2019-10-14 RX ORDER — WARFARIN SODIUM 2.5 MG/1
2.5 TABLET ORAL DAILY
Status: DISCONTINUED | OUTPATIENT
Start: 2019-10-14 | End: 2019-10-15 | Stop reason: DRUGHIGH

## 2019-10-14 RX ORDER — SODIUM CHLORIDE 0.9 % (FLUSH) 0.9 %
10 SYRINGE (ML) INJECTION EVERY 12 HOURS SCHEDULED
Status: DISCONTINUED | OUTPATIENT
Start: 2019-10-14 | End: 2019-10-21 | Stop reason: HOSPADM

## 2019-10-14 RX ORDER — DEXTROSE MONOHYDRATE 25 G/50ML
12.5 INJECTION, SOLUTION INTRAVENOUS PRN
Status: DISCONTINUED | OUTPATIENT
Start: 2019-10-14 | End: 2019-10-21 | Stop reason: HOSPADM

## 2019-10-14 RX ORDER — NICOTINE POLACRILEX 4 MG
15 LOZENGE BUCCAL PRN
Status: DISCONTINUED | OUTPATIENT
Start: 2019-10-14 | End: 2019-10-21 | Stop reason: HOSPADM

## 2019-10-14 RX ORDER — LISINOPRIL 20 MG/1
20 TABLET ORAL DAILY
Status: DISCONTINUED | OUTPATIENT
Start: 2019-10-15 | End: 2019-10-16

## 2019-10-14 RX ORDER — ACETAMINOPHEN 325 MG/1
650 TABLET ORAL EVERY 4 HOURS PRN
Status: DISCONTINUED | OUTPATIENT
Start: 2019-10-14 | End: 2019-10-21 | Stop reason: HOSPADM

## 2019-10-14 RX ORDER — SPIRONOLACTONE 25 MG/1
25 TABLET ORAL DAILY
Status: DISCONTINUED | OUTPATIENT
Start: 2019-10-15 | End: 2019-10-16

## 2019-10-14 RX ORDER — DOCUSATE SODIUM 100 MG/1
100 CAPSULE, LIQUID FILLED ORAL 2 TIMES DAILY
Status: DISCONTINUED | OUTPATIENT
Start: 2019-10-14 | End: 2019-10-21 | Stop reason: HOSPADM

## 2019-10-14 RX ORDER — AMLODIPINE BESYLATE 5 MG/1
5 TABLET ORAL DAILY
Status: DISCONTINUED | OUTPATIENT
Start: 2019-10-15 | End: 2019-10-20

## 2019-10-14 RX ORDER — 0.9 % SODIUM CHLORIDE 0.9 %
1000 INTRAVENOUS SOLUTION INTRAVENOUS ONCE
Status: COMPLETED | OUTPATIENT
Start: 2019-10-14 | End: 2019-10-14

## 2019-10-14 RX ORDER — DEXTROSE MONOHYDRATE 50 MG/ML
100 INJECTION, SOLUTION INTRAVENOUS PRN
Status: DISCONTINUED | OUTPATIENT
Start: 2019-10-14 | End: 2019-10-21 | Stop reason: HOSPADM

## 2019-10-14 RX ORDER — INSULIN GLARGINE 100 [IU]/ML
22 INJECTION, SOLUTION SUBCUTANEOUS EVERY MORNING
Status: DISCONTINUED | OUTPATIENT
Start: 2019-10-15 | End: 2019-10-15

## 2019-10-14 RX ORDER — ACETAMINOPHEN 325 MG/1
650 TABLET ORAL ONCE
Status: COMPLETED | OUTPATIENT
Start: 2019-10-14 | End: 2019-10-14

## 2019-10-14 RX ORDER — LIDOCAINE 4 G/G
1 PATCH TOPICAL DAILY
Status: DISCONTINUED | OUTPATIENT
Start: 2019-10-15 | End: 2019-10-21 | Stop reason: HOSPADM

## 2019-10-14 RX ORDER — ATORVASTATIN CALCIUM 80 MG/1
80 TABLET, FILM COATED ORAL EVERY EVENING
Status: DISCONTINUED | OUTPATIENT
Start: 2019-10-14 | End: 2019-10-21 | Stop reason: HOSPADM

## 2019-10-14 RX ORDER — BETHANECHOL CHLORIDE 25 MG/1
25 TABLET ORAL 3 TIMES DAILY
Status: DISCONTINUED | OUTPATIENT
Start: 2019-10-14 | End: 2019-10-21 | Stop reason: HOSPADM

## 2019-10-14 RX ORDER — AMLODIPINE BESYLATE AND BENAZEPRIL HYDROCHLORIDE 5; 20 MG/1; MG/1
1 CAPSULE ORAL DAILY
Status: DISCONTINUED | OUTPATIENT
Start: 2019-10-15 | End: 2019-10-14 | Stop reason: SDUPTHER

## 2019-10-14 RX ORDER — CALCIUM CARBONATE 500(1250)
500 TABLET ORAL DAILY
Status: DISCONTINUED | OUTPATIENT
Start: 2019-10-15 | End: 2019-10-21 | Stop reason: HOSPADM

## 2019-10-14 RX ORDER — SODIUM CHLORIDE 9 MG/ML
INJECTION, SOLUTION INTRAVENOUS CONTINUOUS
Status: DISCONTINUED | OUTPATIENT
Start: 2019-10-14 | End: 2019-10-15

## 2019-10-14 RX ADMIN — INSULIN LISPRO 6 UNITS: 100 INJECTION, SOLUTION INTRAVENOUS; SUBCUTANEOUS at 23:59

## 2019-10-14 RX ADMIN — AZITHROMYCIN MONOHYDRATE 500 MG: 500 INJECTION, POWDER, LYOPHILIZED, FOR SOLUTION INTRAVENOUS at 22:20

## 2019-10-14 RX ADMIN — ACETAMINOPHEN 650 MG: 325 TABLET ORAL at 20:36

## 2019-10-14 RX ADMIN — ATORVASTATIN CALCIUM 80 MG: 80 TABLET, FILM COATED ORAL at 23:59

## 2019-10-14 RX ADMIN — SODIUM CHLORIDE 1000 ML: 9 INJECTION, SOLUTION INTRAVENOUS at 20:36

## 2019-10-14 RX ADMIN — DOCUSATE SODIUM 100 MG: 100 CAPSULE, LIQUID FILLED ORAL at 23:59

## 2019-10-14 RX ADMIN — SODIUM CHLORIDE: 9 INJECTION, SOLUTION INTRAVENOUS at 23:59

## 2019-10-14 RX ADMIN — IOPAMIDOL 75 ML: 755 INJECTION, SOLUTION INTRAVENOUS at 21:08

## 2019-10-14 RX ADMIN — BETHANECHOL CHLORIDE 25 MG: 25 TABLET ORAL at 23:59

## 2019-10-14 RX ADMIN — CEFTRIAXONE SODIUM 1 G: 1 INJECTION, POWDER, FOR SOLUTION INTRAMUSCULAR; INTRAVENOUS at 22:20

## 2019-10-14 ASSESSMENT — PAIN SCALES - GENERAL
PAINLEVEL_OUTOF10: 0
PAINLEVEL_OUTOF10: 0

## 2019-10-14 ASSESSMENT — PAIN DESCRIPTION - LOCATION: LOCATION: ABDOMEN

## 2019-10-14 ASSESSMENT — PAIN DESCRIPTION - PAIN TYPE: TYPE: ACUTE PAIN

## 2019-10-14 ASSESSMENT — PAIN SCALES - WONG BAKER: WONGBAKER_NUMERICALRESPONSE: 2

## 2019-10-15 LAB
ANION GAP SERPL CALCULATED.3IONS-SCNC: 9 MMOL/L (ref 3–16)
BASOPHILS ABSOLUTE: 0 K/UL (ref 0–0.2)
BASOPHILS RELATIVE PERCENT: 0.1 %
BILIRUBIN URINE: NEGATIVE
BLOOD, URINE: ABNORMAL
BUN BLDV-MCNC: 21 MG/DL (ref 7–20)
CALCIUM SERPL-MCNC: 8.4 MG/DL (ref 8.3–10.6)
CHLORIDE BLD-SCNC: 94 MMOL/L (ref 99–110)
CLARITY: CLEAR
CO2: 30 MMOL/L (ref 21–32)
COLOR: YELLOW
CREAT SERPL-MCNC: 0.9 MG/DL (ref 0.8–1.3)
EKG ATRIAL RATE: 89 BPM
EKG DIAGNOSIS: NORMAL
EKG P AXIS: 83 DEGREES
EKG P-R INTERVAL: 288 MS
EKG Q-T INTERVAL: 378 MS
EKG QRS DURATION: 130 MS
EKG QTC CALCULATION (BAZETT): 462 MS
EKG R AXIS: 28 DEGREES
EKG T AXIS: -35 DEGREES
EKG VENTRICULAR RATE: 90 BPM
EOSINOPHILS ABSOLUTE: 0 K/UL (ref 0–0.6)
EOSINOPHILS RELATIVE PERCENT: 0 %
ESTIMATED AVERAGE GLUCOSE: 157.1 MG/DL
GFR AFRICAN AMERICAN: >60
GFR NON-AFRICAN AMERICAN: >60
GLUCOSE BLD-MCNC: 134 MG/DL (ref 70–99)
GLUCOSE BLD-MCNC: 135 MG/DL (ref 70–99)
GLUCOSE BLD-MCNC: 153 MG/DL (ref 70–99)
GLUCOSE BLD-MCNC: 202 MG/DL (ref 70–99)
GLUCOSE BLD-MCNC: 346 MG/DL (ref 70–99)
GLUCOSE URINE: 500 MG/DL
HBA1C MFR BLD: 7.1 %
HCT VFR BLD CALC: 30.6 % (ref 40.5–52.5)
HEMOGLOBIN: 10.1 G/DL (ref 13.5–17.5)
INR BLD: 4.86 (ref 0.86–1.14)
INR BLD: 5.02 (ref 0.86–1.14)
KETONES, URINE: NEGATIVE MG/DL
L. PNEUMOPHILA SEROGP 1 UR AG: NORMAL
LACTIC ACID, SEPSIS: 1.4 MMOL/L (ref 0.4–1.9)
LACTIC ACID, SEPSIS: 1.6 MMOL/L (ref 0.4–1.9)
LEUKOCYTE ESTERASE, URINE: NEGATIVE
LYMPHOCYTES ABSOLUTE: 0.4 K/UL (ref 1–5.1)
LYMPHOCYTES RELATIVE PERCENT: 4 %
MCH RBC QN AUTO: 29.3 PG (ref 26–34)
MCHC RBC AUTO-ENTMCNC: 32.9 G/DL (ref 31–36)
MCV RBC AUTO: 89.2 FL (ref 80–100)
MICROSCOPIC EXAMINATION: YES
MONOCYTES ABSOLUTE: 1.1 K/UL (ref 0–1.3)
MONOCYTES RELATIVE PERCENT: 10.3 %
NEUTROPHILS ABSOLUTE: 9.5 K/UL (ref 1.7–7.7)
NEUTROPHILS RELATIVE PERCENT: 85.6 %
NITRITE, URINE: NEGATIVE
PDW BLD-RTO: 14.3 % (ref 12.4–15.4)
PERFORMED ON: ABNORMAL
PH UA: 6.5 (ref 5–8)
PLATELET # BLD: 145 K/UL (ref 135–450)
PMV BLD AUTO: 7.7 FL (ref 5–10.5)
POTASSIUM REFLEX MAGNESIUM: 4.6 MMOL/L (ref 3.5–5.1)
PROTEIN UA: ABNORMAL MG/DL
PROTHROMBIN TIME: 55.4 SEC (ref 9.8–13)
PROTHROMBIN TIME: 57.2 SEC (ref 9.8–13)
RBC # BLD: 3.43 M/UL (ref 4.2–5.9)
RBC UA: ABNORMAL /HPF (ref 0–2)
REPORT: NORMAL
SODIUM BLD-SCNC: 133 MMOL/L (ref 136–145)
SPECIFIC GRAVITY UA: 1.01 (ref 1–1.03)
STREP PNEUMONIAE ANTIGEN, URINE: NORMAL
URINE REFLEX TO CULTURE: ABNORMAL
URINE TYPE: ABNORMAL
UROBILINOGEN, URINE: 0.2 E.U./DL
WBC # BLD: 11.1 K/UL (ref 4–11)
WBC UA: ABNORMAL /HPF (ref 0–5)

## 2019-10-15 PROCEDURE — 6370000000 HC RX 637 (ALT 250 FOR IP): Performed by: NURSE PRACTITIONER

## 2019-10-15 PROCEDURE — 2580000003 HC RX 258: Performed by: NURSE PRACTITIONER

## 2019-10-15 PROCEDURE — 85610 PROTHROMBIN TIME: CPT

## 2019-10-15 PROCEDURE — 51798 US URINE CAPACITY MEASURE: CPT

## 2019-10-15 PROCEDURE — 6360000002 HC RX W HCPCS: Performed by: NURSE PRACTITIONER

## 2019-10-15 PROCEDURE — 87070 CULTURE OTHR SPECIMN AEROBIC: CPT

## 2019-10-15 PROCEDURE — 87205 SMEAR GRAM STAIN: CPT

## 2019-10-15 PROCEDURE — 97162 PT EVAL MOD COMPLEX 30 MIN: CPT

## 2019-10-15 PROCEDURE — 6360000002 HC RX W HCPCS: Performed by: FAMILY MEDICINE

## 2019-10-15 PROCEDURE — 94640 AIRWAY INHALATION TREATMENT: CPT

## 2019-10-15 PROCEDURE — 85025 COMPLETE CBC W/AUTO DIFF WBC: CPT

## 2019-10-15 PROCEDURE — 1200000000 HC SEMI PRIVATE

## 2019-10-15 PROCEDURE — 94761 N-INVAS EAR/PLS OXIMETRY MLT: CPT

## 2019-10-15 PROCEDURE — 87449 NOS EACH ORGANISM AG IA: CPT

## 2019-10-15 PROCEDURE — 97166 OT EVAL MOD COMPLEX 45 MIN: CPT

## 2019-10-15 PROCEDURE — 93010 ELECTROCARDIOGRAM REPORT: CPT | Performed by: INTERNAL MEDICINE

## 2019-10-15 PROCEDURE — 97127 HC SP THER IVNTJ W/FOCUS COG FUNCJ: CPT

## 2019-10-15 PROCEDURE — 90686 IIV4 VACC NO PRSV 0.5 ML IM: CPT | Performed by: NURSE PRACTITIONER

## 2019-10-15 PROCEDURE — 83605 ASSAY OF LACTIC ACID: CPT

## 2019-10-15 PROCEDURE — 97530 THERAPEUTIC ACTIVITIES: CPT

## 2019-10-15 PROCEDURE — 80048 BASIC METABOLIC PNL TOTAL CA: CPT

## 2019-10-15 PROCEDURE — 2700000000 HC OXYGEN THERAPY PER DAY

## 2019-10-15 PROCEDURE — G0008 ADMIN INFLUENZA VIRUS VAC: HCPCS | Performed by: NURSE PRACTITIONER

## 2019-10-15 PROCEDURE — 92526 ORAL FUNCTION THERAPY: CPT

## 2019-10-15 PROCEDURE — 2580000003 HC RX 258: Performed by: FAMILY MEDICINE

## 2019-10-15 PROCEDURE — 36415 COLL VENOUS BLD VENIPUNCTURE: CPT

## 2019-10-15 PROCEDURE — 92610 EVALUATE SWALLOWING FUNCTION: CPT

## 2019-10-15 PROCEDURE — 81001 URINALYSIS AUTO W/SCOPE: CPT

## 2019-10-15 PROCEDURE — 51702 INSERT TEMP BLADDER CATH: CPT

## 2019-10-15 PROCEDURE — 6370000000 HC RX 637 (ALT 250 FOR IP): Performed by: FAMILY MEDICINE

## 2019-10-15 RX ORDER — CLONIDINE HYDROCHLORIDE 0.1 MG/1
0.1 TABLET ORAL ONCE
Status: COMPLETED | OUTPATIENT
Start: 2019-10-15 | End: 2019-10-15

## 2019-10-15 RX ORDER — HYDRALAZINE HYDROCHLORIDE 20 MG/ML
10 INJECTION INTRAMUSCULAR; INTRAVENOUS EVERY 6 HOURS PRN
Status: DISCONTINUED | OUTPATIENT
Start: 2019-10-15 | End: 2019-10-21 | Stop reason: HOSPADM

## 2019-10-15 RX ORDER — POLYETHYLENE GLYCOL 3350 17 G/17G
17 POWDER, FOR SOLUTION ORAL 2 TIMES DAILY
Status: DISCONTINUED | OUTPATIENT
Start: 2019-10-15 | End: 2019-10-21 | Stop reason: HOSPADM

## 2019-10-15 RX ORDER — IPRATROPIUM BROMIDE AND ALBUTEROL SULFATE 2.5; .5 MG/3ML; MG/3ML
1 SOLUTION RESPIRATORY (INHALATION)
Status: DISCONTINUED | OUTPATIENT
Start: 2019-10-15 | End: 2019-10-15

## 2019-10-15 RX ORDER — INSULIN GLARGINE 100 [IU]/ML
25 INJECTION, SOLUTION SUBCUTANEOUS EVERY MORNING
Status: DISCONTINUED | OUTPATIENT
Start: 2019-10-16 | End: 2019-10-16

## 2019-10-15 RX ORDER — FUROSEMIDE 20 MG/1
20 TABLET ORAL SEE ADMIN INSTRUCTIONS
Status: ON HOLD | COMMUNITY
End: 2019-11-08 | Stop reason: HOSPADM

## 2019-10-15 RX ORDER — MINERAL OIL 100 G/100G
1 OIL RECTAL ONCE
Status: COMPLETED | OUTPATIENT
Start: 2019-10-15 | End: 2019-10-15

## 2019-10-15 RX ORDER — IPRATROPIUM BROMIDE AND ALBUTEROL SULFATE 2.5; .5 MG/3ML; MG/3ML
1 SOLUTION RESPIRATORY (INHALATION) EVERY 4 HOURS PRN
Status: DISCONTINUED | OUTPATIENT
Start: 2019-10-15 | End: 2019-10-21 | Stop reason: HOSPADM

## 2019-10-15 RX ORDER — GLYBURIDE 5 MG/1
5 TABLET ORAL
Status: ON HOLD | COMMUNITY
End: 2019-11-08 | Stop reason: HOSPADM

## 2019-10-15 RX ORDER — SENNA AND DOCUSATE SODIUM 50; 8.6 MG/1; MG/1
1 TABLET, FILM COATED ORAL 2 TIMES DAILY
Status: DISCONTINUED | OUTPATIENT
Start: 2019-10-15 | End: 2019-10-21 | Stop reason: HOSPADM

## 2019-10-15 RX ADMIN — SPIRONOLACTONE 25 MG: 25 TABLET ORAL at 08:16

## 2019-10-15 RX ADMIN — IPRATROPIUM BROMIDE AND ALBUTEROL SULFATE 1 AMPULE: .5; 3 SOLUTION RESPIRATORY (INHALATION) at 19:49

## 2019-10-15 RX ADMIN — INSULIN LISPRO 6 UNITS: 100 INJECTION, SOLUTION INTRAVENOUS; SUBCUTANEOUS at 18:31

## 2019-10-15 RX ADMIN — CALCIUM 500 MG: 500 TABLET ORAL at 08:16

## 2019-10-15 RX ADMIN — ATORVASTATIN CALCIUM 80 MG: 80 TABLET, FILM COATED ORAL at 18:31

## 2019-10-15 RX ADMIN — INSULIN LISPRO 12 UNITS: 100 INJECTION, SOLUTION INTRAVENOUS; SUBCUTANEOUS at 12:10

## 2019-10-15 RX ADMIN — ONDANSETRON 4 MG: 2 INJECTION INTRAMUSCULAR; INTRAVENOUS at 02:29

## 2019-10-15 RX ADMIN — SENNOSIDES, DOCUSATE SODIUM 1 TABLET: 50; 8.6 TABLET, FILM COATED ORAL at 02:23

## 2019-10-15 RX ADMIN — Medication 10 ML: at 08:16

## 2019-10-15 RX ADMIN — IPRATROPIUM BROMIDE AND ALBUTEROL SULFATE 1 AMPULE: .5; 3 SOLUTION RESPIRATORY (INHALATION) at 15:41

## 2019-10-15 RX ADMIN — PIPERACILLIN AND TAZOBACTAM 3.38 G: 3; .375 INJECTION, POWDER, FOR SOLUTION INTRAVENOUS at 23:03

## 2019-10-15 RX ADMIN — SENNOSIDES, DOCUSATE SODIUM 1 TABLET: 50; 8.6 TABLET, FILM COATED ORAL at 08:16

## 2019-10-15 RX ADMIN — LISINOPRIL 20 MG: 20 TABLET ORAL at 08:16

## 2019-10-15 RX ADMIN — CLONIDINE HYDROCHLORIDE 0.1 MG: 0.1 TABLET ORAL at 03:07

## 2019-10-15 RX ADMIN — AMLODIPINE BESYLATE 5 MG: 5 TABLET ORAL at 08:16

## 2019-10-15 RX ADMIN — Medication 10 ML: at 21:50

## 2019-10-15 RX ADMIN — HYDRALAZINE HYDROCHLORIDE 10 MG: 20 INJECTION INTRAMUSCULAR; INTRAVENOUS at 00:52

## 2019-10-15 RX ADMIN — BETHANECHOL CHLORIDE 25 MG: 25 TABLET ORAL at 08:15

## 2019-10-15 RX ADMIN — SENNOSIDES, DOCUSATE SODIUM 1 TABLET: 50; 8.6 TABLET, FILM COATED ORAL at 21:50

## 2019-10-15 RX ADMIN — POLYETHYLENE GLYCOL 3350 17 G: 17 POWDER, FOR SOLUTION ORAL at 08:16

## 2019-10-15 RX ADMIN — INFLUENZA A VIRUS A/BRISBANE/02/2018 IVR-190 (H1N1) ANTIGEN (PROPIOLACTONE INACTIVATED), INFLUENZA A VIRUS A/KANSAS/14/2017 X-327 (H3N2) ANTIGEN (PROPIOLACTONE INACTIVATED), INFLUENZA B VIRUS B/MARYLAND/15/2016 ANTIGEN (PROPIOLACTONE INACTIVATED), INFLUENZA B VIRUS B/PHUKET/3073/2013 BVR-1B ANTIGEN (PROPIOLACTONE INACTIVATED) 0.5 ML: 15; 15; 15; 15 INJECTION, SUSPENSION INTRAMUSCULAR at 09:07

## 2019-10-15 RX ADMIN — Medication 10 ML: at 00:00

## 2019-10-15 RX ADMIN — DOCUSATE SODIUM 100 MG: 100 CAPSULE, LIQUID FILLED ORAL at 21:50

## 2019-10-15 RX ADMIN — DOCUSATE SODIUM 100 MG: 100 CAPSULE, LIQUID FILLED ORAL at 08:16

## 2019-10-15 RX ADMIN — ASPIRIN 81 MG: 81 TABLET ORAL at 08:16

## 2019-10-15 RX ADMIN — INSULIN LISPRO 2 UNITS: 100 INJECTION, SOLUTION INTRAVENOUS; SUBCUTANEOUS at 21:52

## 2019-10-15 RX ADMIN — IPRATROPIUM BROMIDE AND ALBUTEROL SULFATE 1 AMPULE: .5; 3 SOLUTION RESPIRATORY (INHALATION) at 07:54

## 2019-10-15 RX ADMIN — PIPERACILLIN AND TAZOBACTAM 3.38 G: 3; .375 INJECTION, POWDER, FOR SOLUTION INTRAVENOUS at 15:05

## 2019-10-15 RX ADMIN — SODIUM CHLORIDE: 9 INJECTION, SOLUTION INTRAVENOUS at 13:34

## 2019-10-15 RX ADMIN — BETHANECHOL CHLORIDE 25 MG: 25 TABLET ORAL at 21:50

## 2019-10-15 RX ADMIN — POLYETHYLENE GLYCOL 3350 17 G: 17 POWDER, FOR SOLUTION ORAL at 21:50

## 2019-10-15 RX ADMIN — MINERAL OIL 1 ENEMA: 100 ENEMA RECTAL at 02:24

## 2019-10-15 RX ADMIN — BETHANECHOL CHLORIDE 25 MG: 25 TABLET ORAL at 15:05

## 2019-10-15 ASSESSMENT — PAIN SCALES - GENERAL
PAINLEVEL_OUTOF10: 0

## 2019-10-16 LAB
ANION GAP SERPL CALCULATED.3IONS-SCNC: 10 MMOL/L (ref 3–16)
ANION GAP SERPL CALCULATED.3IONS-SCNC: 9 MMOL/L (ref 3–16)
BASOPHILS ABSOLUTE: 0 K/UL (ref 0–0.2)
BASOPHILS RELATIVE PERCENT: 0.2 %
BUN BLDV-MCNC: 33 MG/DL (ref 7–20)
BUN BLDV-MCNC: 34 MG/DL (ref 7–20)
CALCIUM SERPL-MCNC: 8.2 MG/DL (ref 8.3–10.6)
CALCIUM SERPL-MCNC: 8.4 MG/DL (ref 8.3–10.6)
CHLORIDE BLD-SCNC: 90 MMOL/L (ref 99–110)
CHLORIDE BLD-SCNC: 91 MMOL/L (ref 99–110)
CO2: 29 MMOL/L (ref 21–32)
CO2: 30 MMOL/L (ref 21–32)
CREAT SERPL-MCNC: 1.3 MG/DL (ref 0.8–1.3)
CREAT SERPL-MCNC: 1.4 MG/DL (ref 0.8–1.3)
EOSINOPHILS ABSOLUTE: 0.1 K/UL (ref 0–0.6)
EOSINOPHILS RELATIVE PERCENT: 1.2 %
GFR AFRICAN AMERICAN: 58
GFR AFRICAN AMERICAN: >60
GFR NON-AFRICAN AMERICAN: 48
GFR NON-AFRICAN AMERICAN: 52
GLUCOSE BLD-MCNC: 110 MG/DL (ref 70–99)
GLUCOSE BLD-MCNC: 119 MG/DL (ref 70–99)
GLUCOSE BLD-MCNC: 128 MG/DL (ref 70–99)
GLUCOSE BLD-MCNC: 130 MG/DL (ref 70–99)
GLUCOSE BLD-MCNC: 70 MG/DL (ref 70–99)
GLUCOSE BLD-MCNC: 86 MG/DL (ref 70–99)
HCT VFR BLD CALC: 27.9 % (ref 40.5–52.5)
HEMOGLOBIN: 9.2 G/DL (ref 13.5–17.5)
INR BLD: 3.89 (ref 0.86–1.14)
LYMPHOCYTES ABSOLUTE: 1.9 K/UL (ref 1–5.1)
LYMPHOCYTES RELATIVE PERCENT: 18.7 %
MCH RBC QN AUTO: 29.8 PG (ref 26–34)
MCHC RBC AUTO-ENTMCNC: 33.1 G/DL (ref 31–36)
MCV RBC AUTO: 90 FL (ref 80–100)
MONOCYTES ABSOLUTE: 1.4 K/UL (ref 0–1.3)
MONOCYTES RELATIVE PERCENT: 14.1 %
NEUTROPHILS ABSOLUTE: 6.5 K/UL (ref 1.7–7.7)
NEUTROPHILS RELATIVE PERCENT: 65.8 %
PDW BLD-RTO: 14.8 % (ref 12.4–15.4)
PERFORMED ON: ABNORMAL
PERFORMED ON: NORMAL
PLATELET # BLD: 148 K/UL (ref 135–450)
PMV BLD AUTO: 7.8 FL (ref 5–10.5)
POTASSIUM SERPL-SCNC: 4.6 MMOL/L (ref 3.5–5.1)
POTASSIUM SERPL-SCNC: 4.8 MMOL/L (ref 3.5–5.1)
PROTHROMBIN TIME: 44.3 SEC (ref 9.8–13)
RBC # BLD: 3.1 M/UL (ref 4.2–5.9)
SODIUM BLD-SCNC: 129 MMOL/L (ref 136–145)
SODIUM BLD-SCNC: 130 MMOL/L (ref 136–145)
WBC # BLD: 9.9 K/UL (ref 4–11)

## 2019-10-16 PROCEDURE — 2580000003 HC RX 258: Performed by: INTERNAL MEDICINE

## 2019-10-16 PROCEDURE — 80048 BASIC METABOLIC PNL TOTAL CA: CPT

## 2019-10-16 PROCEDURE — 36415 COLL VENOUS BLD VENIPUNCTURE: CPT

## 2019-10-16 PROCEDURE — 6370000000 HC RX 637 (ALT 250 FOR IP): Performed by: NURSE PRACTITIONER

## 2019-10-16 PROCEDURE — 85610 PROTHROMBIN TIME: CPT

## 2019-10-16 PROCEDURE — 2580000003 HC RX 258: Performed by: FAMILY MEDICINE

## 2019-10-16 PROCEDURE — 99222 1ST HOSP IP/OBS MODERATE 55: CPT | Performed by: INTERNAL MEDICINE

## 2019-10-16 PROCEDURE — 2580000003 HC RX 258: Performed by: NURSE PRACTITIONER

## 2019-10-16 PROCEDURE — 6360000002 HC RX W HCPCS: Performed by: INTERNAL MEDICINE

## 2019-10-16 PROCEDURE — 1200000000 HC SEMI PRIVATE

## 2019-10-16 PROCEDURE — 2700000000 HC OXYGEN THERAPY PER DAY

## 2019-10-16 PROCEDURE — 85025 COMPLETE CBC W/AUTO DIFF WBC: CPT

## 2019-10-16 PROCEDURE — 94761 N-INVAS EAR/PLS OXIMETRY MLT: CPT

## 2019-10-16 PROCEDURE — 6360000002 HC RX W HCPCS: Performed by: FAMILY MEDICINE

## 2019-10-16 RX ORDER — INSULIN GLARGINE 100 [IU]/ML
22 INJECTION, SOLUTION SUBCUTANEOUS EVERY MORNING
Status: DISCONTINUED | OUTPATIENT
Start: 2019-10-17 | End: 2019-10-17

## 2019-10-16 RX ADMIN — POLYETHYLENE GLYCOL 3350 17 G: 17 POWDER, FOR SOLUTION ORAL at 21:49

## 2019-10-16 RX ADMIN — Medication 10 ML: at 21:56

## 2019-10-16 RX ADMIN — CALCIUM 500 MG: 500 TABLET ORAL at 09:36

## 2019-10-16 RX ADMIN — BETHANECHOL CHLORIDE 25 MG: 25 TABLET ORAL at 09:36

## 2019-10-16 RX ADMIN — DOCUSATE SODIUM 100 MG: 100 CAPSULE, LIQUID FILLED ORAL at 09:36

## 2019-10-16 RX ADMIN — MEROPENEM 500 MG: 500 INJECTION, POWDER, FOR SOLUTION INTRAVENOUS at 16:34

## 2019-10-16 RX ADMIN — MEROPENEM 500 MG: 500 INJECTION, POWDER, FOR SOLUTION INTRAVENOUS at 21:49

## 2019-10-16 RX ADMIN — ATORVASTATIN CALCIUM 80 MG: 80 TABLET, FILM COATED ORAL at 17:48

## 2019-10-16 RX ADMIN — SPIRONOLACTONE 25 MG: 25 TABLET ORAL at 09:36

## 2019-10-16 RX ADMIN — LISINOPRIL 20 MG: 20 TABLET ORAL at 09:37

## 2019-10-16 RX ADMIN — SENNOSIDES, DOCUSATE SODIUM 1 TABLET: 50; 8.6 TABLET, FILM COATED ORAL at 21:47

## 2019-10-16 RX ADMIN — DOCUSATE SODIUM 100 MG: 100 CAPSULE, LIQUID FILLED ORAL at 21:47

## 2019-10-16 RX ADMIN — Medication 10 ML: at 09:37

## 2019-10-16 RX ADMIN — ASPIRIN 81 MG: 81 TABLET ORAL at 09:36

## 2019-10-16 RX ADMIN — PIPERACILLIN AND TAZOBACTAM 3.38 G: 3; .375 INJECTION, POWDER, FOR SOLUTION INTRAVENOUS at 09:37

## 2019-10-16 RX ADMIN — SENNOSIDES, DOCUSATE SODIUM 1 TABLET: 50; 8.6 TABLET, FILM COATED ORAL at 09:36

## 2019-10-16 RX ADMIN — BETHANECHOL CHLORIDE 25 MG: 25 TABLET ORAL at 21:47

## 2019-10-16 RX ADMIN — POLYETHYLENE GLYCOL 3350 17 G: 17 POWDER, FOR SOLUTION ORAL at 09:40

## 2019-10-16 RX ADMIN — BETHANECHOL CHLORIDE 25 MG: 25 TABLET ORAL at 16:34

## 2019-10-16 RX ADMIN — AMLODIPINE BESYLATE 5 MG: 5 TABLET ORAL at 09:36

## 2019-10-16 ASSESSMENT — PAIN SCALES - GENERAL: PAINLEVEL_OUTOF10: 0

## 2019-10-17 ENCOUNTER — APPOINTMENT (OUTPATIENT)
Dept: ULTRASOUND IMAGING | Age: 84
DRG: 871 | End: 2019-10-17
Payer: MEDICARE

## 2019-10-17 LAB
A/G RATIO: 1 (ref 1.1–2.2)
ALBUMIN SERPL-MCNC: 2.9 G/DL (ref 3.4–5)
ALP BLD-CCNC: 140 U/L (ref 40–129)
ALT SERPL-CCNC: 70 U/L (ref 10–40)
ANION GAP SERPL CALCULATED.3IONS-SCNC: 6 MMOL/L (ref 3–16)
AST SERPL-CCNC: 54 U/L (ref 15–37)
BASOPHILS ABSOLUTE: 0 K/UL (ref 0–0.2)
BASOPHILS RELATIVE PERCENT: 0.3 %
BILIRUB SERPL-MCNC: 0.4 MG/DL (ref 0–1)
BLOOD CULTURE, ROUTINE: ABNORMAL
BUN BLDV-MCNC: 33 MG/DL (ref 7–20)
CALCIUM SERPL-MCNC: 8.3 MG/DL (ref 8.3–10.6)
CHLORIDE BLD-SCNC: 93 MMOL/L (ref 99–110)
CO2: 32 MMOL/L (ref 21–32)
CREAT SERPL-MCNC: 1.2 MG/DL (ref 0.8–1.3)
CULTURE, BLOOD 2: ABNORMAL
CULTURE, BLOOD 2: ABNORMAL
CULTURE, RESPIRATORY: NORMAL
EOSINOPHILS ABSOLUTE: 0.2 K/UL (ref 0–0.6)
EOSINOPHILS RELATIVE PERCENT: 2.6 %
GFR AFRICAN AMERICAN: >60
GFR NON-AFRICAN AMERICAN: 57
GLOBULIN: 2.8 G/DL
GLUCOSE BLD-MCNC: 114 MG/DL (ref 70–99)
GLUCOSE BLD-MCNC: 157 MG/DL (ref 70–99)
GLUCOSE BLD-MCNC: 67 MG/DL (ref 70–99)
GLUCOSE BLD-MCNC: 73 MG/DL (ref 70–99)
GLUCOSE BLD-MCNC: 75 MG/DL (ref 70–99)
GLUCOSE BLD-MCNC: 81 MG/DL (ref 70–99)
GRAM STAIN RESULT: NORMAL
HCT VFR BLD CALC: 27.7 % (ref 40.5–52.5)
HEMOGLOBIN: 9.5 G/DL (ref 13.5–17.5)
INR BLD: 3.21 (ref 0.86–1.14)
LYMPHOCYTES ABSOLUTE: 1.4 K/UL (ref 1–5.1)
LYMPHOCYTES RELATIVE PERCENT: 18.5 %
MCH RBC QN AUTO: 30.4 PG (ref 26–34)
MCHC RBC AUTO-ENTMCNC: 34.4 G/DL (ref 31–36)
MCV RBC AUTO: 88.5 FL (ref 80–100)
MONOCYTES ABSOLUTE: 1 K/UL (ref 0–1.3)
MONOCYTES RELATIVE PERCENT: 13.8 %
NEUTROPHILS ABSOLUTE: 4.8 K/UL (ref 1.7–7.7)
NEUTROPHILS RELATIVE PERCENT: 64.8 %
ORGANISM: ABNORMAL
PDW BLD-RTO: 14.4 % (ref 12.4–15.4)
PERFORMED ON: ABNORMAL
PERFORMED ON: NORMAL
PERFORMED ON: NORMAL
PLATELET # BLD: 162 K/UL (ref 135–450)
PMV BLD AUTO: 7.4 FL (ref 5–10.5)
POTASSIUM SERPL-SCNC: 5 MMOL/L (ref 3.5–5.1)
PROTHROMBIN TIME: 36.6 SEC (ref 9.8–13)
RBC # BLD: 3.13 M/UL (ref 4.2–5.9)
SODIUM BLD-SCNC: 131 MMOL/L (ref 136–145)
TOTAL PROTEIN: 5.7 G/DL (ref 6.4–8.2)
WBC # BLD: 7.3 K/UL (ref 4–11)

## 2019-10-17 PROCEDURE — 85610 PROTHROMBIN TIME: CPT

## 2019-10-17 PROCEDURE — 1200000000 HC SEMI PRIVATE

## 2019-10-17 PROCEDURE — 97530 THERAPEUTIC ACTIVITIES: CPT

## 2019-10-17 PROCEDURE — 97535 SELF CARE MNGMENT TRAINING: CPT

## 2019-10-17 PROCEDURE — 99232 SBSQ HOSP IP/OBS MODERATE 35: CPT | Performed by: INTERNAL MEDICINE

## 2019-10-17 PROCEDURE — 6370000000 HC RX 637 (ALT 250 FOR IP): Performed by: NURSE PRACTITIONER

## 2019-10-17 PROCEDURE — 2580000003 HC RX 258: Performed by: INTERNAL MEDICINE

## 2019-10-17 PROCEDURE — 6360000002 HC RX W HCPCS: Performed by: INTERNAL MEDICINE

## 2019-10-17 PROCEDURE — 94761 N-INVAS EAR/PLS OXIMETRY MLT: CPT

## 2019-10-17 PROCEDURE — 2580000003 HC RX 258: Performed by: NURSE PRACTITIONER

## 2019-10-17 PROCEDURE — 36415 COLL VENOUS BLD VENIPUNCTURE: CPT

## 2019-10-17 PROCEDURE — 2700000000 HC OXYGEN THERAPY PER DAY

## 2019-10-17 PROCEDURE — 6370000000 HC RX 637 (ALT 250 FOR IP): Performed by: FAMILY MEDICINE

## 2019-10-17 PROCEDURE — 76705 ECHO EXAM OF ABDOMEN: CPT

## 2019-10-17 PROCEDURE — 6370000000 HC RX 637 (ALT 250 FOR IP): Performed by: INTERNAL MEDICINE

## 2019-10-17 PROCEDURE — 87040 BLOOD CULTURE FOR BACTERIA: CPT

## 2019-10-17 PROCEDURE — 97110 THERAPEUTIC EXERCISES: CPT

## 2019-10-17 PROCEDURE — 80053 COMPREHEN METABOLIC PANEL: CPT

## 2019-10-17 PROCEDURE — 85025 COMPLETE CBC W/AUTO DIFF WBC: CPT

## 2019-10-17 RX ORDER — FUROSEMIDE 20 MG/1
20 TABLET ORAL SEE ADMIN INSTRUCTIONS
Status: DISCONTINUED | OUTPATIENT
Start: 2019-10-17 | End: 2019-10-21 | Stop reason: HOSPADM

## 2019-10-17 RX ORDER — CIPROFLOXACIN 500 MG/1
500 TABLET, FILM COATED ORAL EVERY 12 HOURS SCHEDULED
Status: DISCONTINUED | OUTPATIENT
Start: 2019-10-17 | End: 2019-10-21 | Stop reason: HOSPADM

## 2019-10-17 RX ORDER — INSULIN GLARGINE 100 [IU]/ML
10 INJECTION, SOLUTION SUBCUTANEOUS EVERY MORNING
Status: DISCONTINUED | OUTPATIENT
Start: 2019-10-18 | End: 2019-10-21 | Stop reason: HOSPADM

## 2019-10-17 RX ADMIN — CIPROFLOXACIN HYDROCHLORIDE 500 MG: 500 TABLET, FILM COATED ORAL at 14:18

## 2019-10-17 RX ADMIN — DOCUSATE SODIUM 100 MG: 100 CAPSULE, LIQUID FILLED ORAL at 20:28

## 2019-10-17 RX ADMIN — DEXTROSE MONOHYDRATE 12.5 G: 500 INJECTION PARENTERAL at 16:15

## 2019-10-17 RX ADMIN — MEROPENEM 500 MG: 500 INJECTION, POWDER, FOR SOLUTION INTRAVENOUS at 04:19

## 2019-10-17 RX ADMIN — MAGNESIUM CITRATE 296 ML: 1.75 LIQUID ORAL at 16:07

## 2019-10-17 RX ADMIN — SENNOSIDES, DOCUSATE SODIUM 1 TABLET: 50; 8.6 TABLET, FILM COATED ORAL at 09:42

## 2019-10-17 RX ADMIN — INSULIN LISPRO 3 UNITS: 100 INJECTION, SOLUTION INTRAVENOUS; SUBCUTANEOUS at 09:47

## 2019-10-17 RX ADMIN — AMLODIPINE BESYLATE 5 MG: 5 TABLET ORAL at 09:42

## 2019-10-17 RX ADMIN — Medication 10 ML: at 09:42

## 2019-10-17 RX ADMIN — Medication 10 ML: at 20:33

## 2019-10-17 RX ADMIN — ASPIRIN 81 MG: 81 TABLET ORAL at 09:42

## 2019-10-17 RX ADMIN — DOCUSATE SODIUM 100 MG: 100 CAPSULE, LIQUID FILLED ORAL at 09:42

## 2019-10-17 RX ADMIN — NYSTATIN 500000 UNITS: 100000 SUSPENSION ORAL at 20:31

## 2019-10-17 RX ADMIN — BETHANECHOL CHLORIDE 25 MG: 25 TABLET ORAL at 20:28

## 2019-10-17 RX ADMIN — BETHANECHOL CHLORIDE 25 MG: 25 TABLET ORAL at 09:46

## 2019-10-17 RX ADMIN — SENNOSIDES, DOCUSATE SODIUM 1 TABLET: 50; 8.6 TABLET, FILM COATED ORAL at 20:29

## 2019-10-17 RX ADMIN — CIPROFLOXACIN HYDROCHLORIDE 500 MG: 500 TABLET, FILM COATED ORAL at 20:30

## 2019-10-17 RX ADMIN — NYSTATIN 500000 UNITS: 100000 SUSPENSION ORAL at 14:18

## 2019-10-17 RX ADMIN — POLYETHYLENE GLYCOL 3350 17 G: 17 POWDER, FOR SOLUTION ORAL at 20:27

## 2019-10-17 RX ADMIN — BETHANECHOL CHLORIDE 25 MG: 25 TABLET ORAL at 14:18

## 2019-10-17 RX ADMIN — CALCIUM 500 MG: 500 TABLET ORAL at 09:42

## 2019-10-17 RX ADMIN — POLYETHYLENE GLYCOL 3350 17 G: 17 POWDER, FOR SOLUTION ORAL at 09:42

## 2019-10-17 RX ADMIN — ATORVASTATIN CALCIUM 80 MG: 80 TABLET, FILM COATED ORAL at 18:52

## 2019-10-17 ASSESSMENT — PAIN SCALES - GENERAL: PAINLEVEL_OUTOF10: 0

## 2019-10-18 ENCOUNTER — APPOINTMENT (OUTPATIENT)
Dept: MRI IMAGING | Age: 84
DRG: 871 | End: 2019-10-18
Payer: MEDICARE

## 2019-10-18 LAB
ANION GAP SERPL CALCULATED.3IONS-SCNC: 10 MMOL/L (ref 3–16)
BASOPHILS ABSOLUTE: 0.1 K/UL (ref 0–0.2)
BASOPHILS RELATIVE PERCENT: 0.6 %
BUN BLDV-MCNC: 28 MG/DL (ref 7–20)
CALCIUM SERPL-MCNC: 8.9 MG/DL (ref 8.3–10.6)
CHLORIDE BLD-SCNC: 93 MMOL/L (ref 99–110)
CO2: 31 MMOL/L (ref 21–32)
CREAT SERPL-MCNC: 0.9 MG/DL (ref 0.8–1.3)
EOSINOPHILS ABSOLUTE: 0.1 K/UL (ref 0–0.6)
EOSINOPHILS RELATIVE PERCENT: 1.4 %
GFR AFRICAN AMERICAN: >60
GFR NON-AFRICAN AMERICAN: >60
GLUCOSE BLD-MCNC: 116 MG/DL (ref 70–99)
GLUCOSE BLD-MCNC: 228 MG/DL (ref 70–99)
GLUCOSE BLD-MCNC: 74 MG/DL (ref 70–99)
GLUCOSE BLD-MCNC: 75 MG/DL (ref 70–99)
GLUCOSE BLD-MCNC: 90 MG/DL (ref 70–99)
HCT VFR BLD CALC: 32.4 % (ref 40.5–52.5)
HEMOGLOBIN: 11 G/DL (ref 13.5–17.5)
INR BLD: 2.65 (ref 0.86–1.14)
LYMPHOCYTES ABSOLUTE: 1.3 K/UL (ref 1–5.1)
LYMPHOCYTES RELATIVE PERCENT: 15.1 %
MCH RBC QN AUTO: 30.2 PG (ref 26–34)
MCHC RBC AUTO-ENTMCNC: 33.8 G/DL (ref 31–36)
MCV RBC AUTO: 89.2 FL (ref 80–100)
MONOCYTES ABSOLUTE: 0.9 K/UL (ref 0–1.3)
MONOCYTES RELATIVE PERCENT: 10 %
NEUTROPHILS ABSOLUTE: 6.2 K/UL (ref 1.7–7.7)
NEUTROPHILS RELATIVE PERCENT: 72.9 %
PDW BLD-RTO: 14.6 % (ref 12.4–15.4)
PERFORMED ON: ABNORMAL
PERFORMED ON: ABNORMAL
PERFORMED ON: NORMAL
PERFORMED ON: NORMAL
PLATELET # BLD: 199 K/UL (ref 135–450)
PMV BLD AUTO: 7.5 FL (ref 5–10.5)
POTASSIUM SERPL-SCNC: 5.1 MMOL/L (ref 3.5–5.1)
PROTHROMBIN TIME: 30.2 SEC (ref 9.8–13)
RBC # BLD: 3.63 M/UL (ref 4.2–5.9)
SODIUM BLD-SCNC: 134 MMOL/L (ref 136–145)
WBC # BLD: 8.6 K/UL (ref 4–11)

## 2019-10-18 PROCEDURE — 6370000000 HC RX 637 (ALT 250 FOR IP): Performed by: FAMILY MEDICINE

## 2019-10-18 PROCEDURE — 74183 MRI ABD W/O CNTR FLWD CNTR: CPT

## 2019-10-18 PROCEDURE — 51702 INSERT TEMP BLADDER CATH: CPT

## 2019-10-18 PROCEDURE — 6360000002 HC RX W HCPCS: Performed by: NURSE PRACTITIONER

## 2019-10-18 PROCEDURE — A9579 GAD-BASE MR CONTRAST NOS,1ML: HCPCS | Performed by: INTERNAL MEDICINE

## 2019-10-18 PROCEDURE — 6360000004 HC RX CONTRAST MEDICATION: Performed by: INTERNAL MEDICINE

## 2019-10-18 PROCEDURE — 2700000000 HC OXYGEN THERAPY PER DAY

## 2019-10-18 PROCEDURE — 2580000003 HC RX 258: Performed by: NURSE PRACTITIONER

## 2019-10-18 PROCEDURE — 1200000000 HC SEMI PRIVATE

## 2019-10-18 PROCEDURE — 94150 VITAL CAPACITY TEST: CPT

## 2019-10-18 PROCEDURE — 85610 PROTHROMBIN TIME: CPT

## 2019-10-18 PROCEDURE — 85025 COMPLETE CBC W/AUTO DIFF WBC: CPT

## 2019-10-18 PROCEDURE — 6370000000 HC RX 637 (ALT 250 FOR IP): Performed by: INTERNAL MEDICINE

## 2019-10-18 PROCEDURE — 97530 THERAPEUTIC ACTIVITIES: CPT

## 2019-10-18 PROCEDURE — 99232 SBSQ HOSP IP/OBS MODERATE 35: CPT | Performed by: INTERNAL MEDICINE

## 2019-10-18 PROCEDURE — 80048 BASIC METABOLIC PNL TOTAL CA: CPT

## 2019-10-18 PROCEDURE — 6370000000 HC RX 637 (ALT 250 FOR IP): Performed by: NURSE PRACTITIONER

## 2019-10-18 PROCEDURE — 36415 COLL VENOUS BLD VENIPUNCTURE: CPT

## 2019-10-18 PROCEDURE — 94761 N-INVAS EAR/PLS OXIMETRY MLT: CPT

## 2019-10-18 RX ADMIN — DOCUSATE SODIUM 100 MG: 100 CAPSULE, LIQUID FILLED ORAL at 13:30

## 2019-10-18 RX ADMIN — NYSTATIN 500000 UNITS: 100000 SUSPENSION ORAL at 20:58

## 2019-10-18 RX ADMIN — DOCUSATE SODIUM 100 MG: 100 CAPSULE, LIQUID FILLED ORAL at 20:59

## 2019-10-18 RX ADMIN — ASPIRIN 81 MG: 81 TABLET ORAL at 13:29

## 2019-10-18 RX ADMIN — WARFARIN SODIUM 3 MG: 2 TABLET ORAL at 19:54

## 2019-10-18 RX ADMIN — AMLODIPINE BESYLATE 5 MG: 5 TABLET ORAL at 13:29

## 2019-10-18 RX ADMIN — Medication 10 ML: at 21:39

## 2019-10-18 RX ADMIN — CIPROFLOXACIN HYDROCHLORIDE 500 MG: 500 TABLET, FILM COATED ORAL at 20:58

## 2019-10-18 RX ADMIN — ACETAMINOPHEN 650 MG: 325 TABLET ORAL at 21:38

## 2019-10-18 RX ADMIN — GADOTERIDOL 17 ML: 279.3 INJECTION, SOLUTION INTRAVENOUS at 12:12

## 2019-10-18 RX ADMIN — SENNOSIDES, DOCUSATE SODIUM 1 TABLET: 50; 8.6 TABLET, FILM COATED ORAL at 20:58

## 2019-10-18 RX ADMIN — INSULIN LISPRO 3 UNITS: 100 INJECTION, SOLUTION INTRAVENOUS; SUBCUTANEOUS at 21:31

## 2019-10-18 RX ADMIN — Medication 10 ML: at 13:37

## 2019-10-18 RX ADMIN — POLYETHYLENE GLYCOL 3350 17 G: 17 POWDER, FOR SOLUTION ORAL at 13:29

## 2019-10-18 RX ADMIN — BETHANECHOL CHLORIDE 25 MG: 25 TABLET ORAL at 20:58

## 2019-10-18 RX ADMIN — ATORVASTATIN CALCIUM 80 MG: 80 TABLET, FILM COATED ORAL at 19:54

## 2019-10-18 RX ADMIN — POLYETHYLENE GLYCOL 3350 17 G: 17 POWDER, FOR SOLUTION ORAL at 20:58

## 2019-10-18 RX ADMIN — CIPROFLOXACIN HYDROCHLORIDE 500 MG: 500 TABLET, FILM COATED ORAL at 13:29

## 2019-10-18 RX ADMIN — HYDRALAZINE HYDROCHLORIDE 10 MG: 20 INJECTION INTRAMUSCULAR; INTRAVENOUS at 04:02

## 2019-10-18 RX ADMIN — NYSTATIN 500000 UNITS: 100000 SUSPENSION ORAL at 13:29

## 2019-10-18 RX ADMIN — SENNOSIDES, DOCUSATE SODIUM 1 TABLET: 50; 8.6 TABLET, FILM COATED ORAL at 13:30

## 2019-10-18 RX ADMIN — CALCIUM 500 MG: 500 TABLET ORAL at 13:30

## 2019-10-18 RX ADMIN — NYSTATIN 500000 UNITS: 100000 SUSPENSION ORAL at 19:54

## 2019-10-18 RX ADMIN — BETHANECHOL CHLORIDE 25 MG: 25 TABLET ORAL at 13:29

## 2019-10-18 ASSESSMENT — PAIN SCALES - GENERAL
PAINLEVEL_OUTOF10: 2
PAINLEVEL_OUTOF10: 3

## 2019-10-18 ASSESSMENT — PAIN SCALES - WONG BAKER
WONGBAKER_NUMERICALRESPONSE: 2

## 2019-10-19 LAB
ALBUMIN SERPL-MCNC: 3.2 G/DL (ref 3.4–5)
ALP BLD-CCNC: 157 U/L (ref 40–129)
ALT SERPL-CCNC: 50 U/L (ref 10–40)
ANION GAP SERPL CALCULATED.3IONS-SCNC: 8 MMOL/L (ref 3–16)
AST SERPL-CCNC: 25 U/L (ref 15–37)
BASOPHILS ABSOLUTE: 0 K/UL (ref 0–0.2)
BASOPHILS RELATIVE PERCENT: 0.3 %
BILIRUB SERPL-MCNC: 0.6 MG/DL (ref 0–1)
BILIRUBIN DIRECT: <0.2 MG/DL (ref 0–0.3)
BILIRUBIN, INDIRECT: ABNORMAL MG/DL (ref 0–1)
BUN BLDV-MCNC: 23 MG/DL (ref 7–20)
CALCIUM SERPL-MCNC: 8.9 MG/DL (ref 8.3–10.6)
CHLORIDE BLD-SCNC: 91 MMOL/L (ref 99–110)
CO2: 32 MMOL/L (ref 21–32)
CREAT SERPL-MCNC: 0.8 MG/DL (ref 0.8–1.3)
EOSINOPHILS ABSOLUTE: 0.1 K/UL (ref 0–0.6)
EOSINOPHILS RELATIVE PERCENT: 1.5 %
GFR AFRICAN AMERICAN: >60
GFR NON-AFRICAN AMERICAN: >60
GLUCOSE BLD-MCNC: 141 MG/DL (ref 70–99)
GLUCOSE BLD-MCNC: 147 MG/DL (ref 70–99)
GLUCOSE BLD-MCNC: 156 MG/DL (ref 70–99)
GLUCOSE BLD-MCNC: 190 MG/DL (ref 70–99)
GLUCOSE BLD-MCNC: 199 MG/DL (ref 70–99)
HCT VFR BLD CALC: 31.7 % (ref 40.5–52.5)
HEMOGLOBIN: 10.8 G/DL (ref 13.5–17.5)
INR BLD: 3.34 (ref 0.86–1.14)
LYMPHOCYTES ABSOLUTE: 1.5 K/UL (ref 1–5.1)
LYMPHOCYTES RELATIVE PERCENT: 22.3 %
MCH RBC QN AUTO: 30.2 PG (ref 26–34)
MCHC RBC AUTO-ENTMCNC: 34.1 G/DL (ref 31–36)
MCV RBC AUTO: 88.4 FL (ref 80–100)
MONOCYTES ABSOLUTE: 0.9 K/UL (ref 0–1.3)
MONOCYTES RELATIVE PERCENT: 13.7 %
NEUTROPHILS ABSOLUTE: 4.1 K/UL (ref 1.7–7.7)
NEUTROPHILS RELATIVE PERCENT: 62.2 %
PDW BLD-RTO: 14.5 % (ref 12.4–15.4)
PERFORMED ON: ABNORMAL
PLATELET # BLD: 186 K/UL (ref 135–450)
PMV BLD AUTO: 7.6 FL (ref 5–10.5)
POTASSIUM SERPL-SCNC: 5.3 MMOL/L (ref 3.5–5.1)
PROTHROMBIN TIME: 38.1 SEC (ref 9.8–13)
RBC # BLD: 3.58 M/UL (ref 4.2–5.9)
SODIUM BLD-SCNC: 131 MMOL/L (ref 136–145)
TOTAL PROTEIN: 6.2 G/DL (ref 6.4–8.2)
WBC # BLD: 6.6 K/UL (ref 4–11)

## 2019-10-19 PROCEDURE — 80048 BASIC METABOLIC PNL TOTAL CA: CPT

## 2019-10-19 PROCEDURE — 2700000000 HC OXYGEN THERAPY PER DAY

## 2019-10-19 PROCEDURE — 6370000000 HC RX 637 (ALT 250 FOR IP): Performed by: INTERNAL MEDICINE

## 2019-10-19 PROCEDURE — 6370000000 HC RX 637 (ALT 250 FOR IP): Performed by: NURSE PRACTITIONER

## 2019-10-19 PROCEDURE — 36415 COLL VENOUS BLD VENIPUNCTURE: CPT

## 2019-10-19 PROCEDURE — 6370000000 HC RX 637 (ALT 250 FOR IP): Performed by: FAMILY MEDICINE

## 2019-10-19 PROCEDURE — 85610 PROTHROMBIN TIME: CPT

## 2019-10-19 PROCEDURE — 1200000000 HC SEMI PRIVATE

## 2019-10-19 PROCEDURE — 6360000002 HC RX W HCPCS: Performed by: NURSE PRACTITIONER

## 2019-10-19 PROCEDURE — 85025 COMPLETE CBC W/AUTO DIFF WBC: CPT

## 2019-10-19 PROCEDURE — 80076 HEPATIC FUNCTION PANEL: CPT

## 2019-10-19 PROCEDURE — 94761 N-INVAS EAR/PLS OXIMETRY MLT: CPT

## 2019-10-19 PROCEDURE — 2580000003 HC RX 258: Performed by: NURSE PRACTITIONER

## 2019-10-19 RX ADMIN — DOCUSATE SODIUM 100 MG: 100 CAPSULE, LIQUID FILLED ORAL at 10:14

## 2019-10-19 RX ADMIN — NYSTATIN 500000 UNITS: 100000 SUSPENSION ORAL at 16:43

## 2019-10-19 RX ADMIN — NYSTATIN 500000 UNITS: 100000 SUSPENSION ORAL at 10:16

## 2019-10-19 RX ADMIN — Medication 10 ML: at 10:15

## 2019-10-19 RX ADMIN — BETHANECHOL CHLORIDE 25 MG: 25 TABLET ORAL at 14:17

## 2019-10-19 RX ADMIN — NYSTATIN 500000 UNITS: 100000 SUSPENSION ORAL at 14:18

## 2019-10-19 RX ADMIN — CIPROFLOXACIN HYDROCHLORIDE 500 MG: 500 TABLET, FILM COATED ORAL at 21:39

## 2019-10-19 RX ADMIN — ATORVASTATIN CALCIUM 80 MG: 80 TABLET, FILM COATED ORAL at 17:22

## 2019-10-19 RX ADMIN — BETHANECHOL CHLORIDE 25 MG: 25 TABLET ORAL at 21:38

## 2019-10-19 RX ADMIN — POLYETHYLENE GLYCOL 3350 17 G: 17 POWDER, FOR SOLUTION ORAL at 22:04

## 2019-10-19 RX ADMIN — CIPROFLOXACIN HYDROCHLORIDE 500 MG: 500 TABLET, FILM COATED ORAL at 10:14

## 2019-10-19 RX ADMIN — AMLODIPINE BESYLATE 5 MG: 5 TABLET ORAL at 10:18

## 2019-10-19 RX ADMIN — NYSTATIN 500000 UNITS: 100000 SUSPENSION ORAL at 21:38

## 2019-10-19 RX ADMIN — ACETAMINOPHEN 650 MG: 325 TABLET ORAL at 22:02

## 2019-10-19 RX ADMIN — CALCIUM 500 MG: 500 TABLET ORAL at 10:15

## 2019-10-19 RX ADMIN — SENNOSIDES, DOCUSATE SODIUM 1 TABLET: 50; 8.6 TABLET, FILM COATED ORAL at 21:38

## 2019-10-19 RX ADMIN — INSULIN LISPRO 6 UNITS: 100 INJECTION, SOLUTION INTRAVENOUS; SUBCUTANEOUS at 10:16

## 2019-10-19 RX ADMIN — SENNOSIDES, DOCUSATE SODIUM 1 TABLET: 50; 8.6 TABLET, FILM COATED ORAL at 10:15

## 2019-10-19 RX ADMIN — BETHANECHOL CHLORIDE 25 MG: 25 TABLET ORAL at 10:18

## 2019-10-19 RX ADMIN — ASPIRIN 81 MG: 81 TABLET ORAL at 10:14

## 2019-10-19 RX ADMIN — HYDRALAZINE HYDROCHLORIDE 10 MG: 20 INJECTION INTRAMUSCULAR; INTRAVENOUS at 05:06

## 2019-10-19 RX ADMIN — Medication 10 ML: at 05:07

## 2019-10-19 RX ADMIN — POLYETHYLENE GLYCOL 3350 17 G: 17 POWDER, FOR SOLUTION ORAL at 10:15

## 2019-10-19 RX ADMIN — INSULIN GLARGINE 10 UNITS: 100 INJECTION, SOLUTION SUBCUTANEOUS at 10:34

## 2019-10-19 RX ADMIN — DOCUSATE SODIUM 100 MG: 100 CAPSULE, LIQUID FILLED ORAL at 21:38

## 2019-10-19 RX ADMIN — Medication 10 ML: at 21:40

## 2019-10-19 RX ADMIN — INSULIN LISPRO 3 UNITS: 100 INJECTION, SOLUTION INTRAVENOUS; SUBCUTANEOUS at 13:10

## 2019-10-19 RX ADMIN — INSULIN LISPRO 3 UNITS: 100 INJECTION, SOLUTION INTRAVENOUS; SUBCUTANEOUS at 17:18

## 2019-10-19 RX ADMIN — INSULIN LISPRO 2 UNITS: 100 INJECTION, SOLUTION INTRAVENOUS; SUBCUTANEOUS at 21:39

## 2019-10-20 LAB
ANION GAP SERPL CALCULATED.3IONS-SCNC: 7 MMOL/L (ref 3–16)
BASOPHILS ABSOLUTE: 0 K/UL (ref 0–0.2)
BASOPHILS RELATIVE PERCENT: 0.2 %
BUN BLDV-MCNC: 16 MG/DL (ref 7–20)
CALCIUM SERPL-MCNC: 8.7 MG/DL (ref 8.3–10.6)
CHLORIDE BLD-SCNC: 90 MMOL/L (ref 99–110)
CO2: 34 MMOL/L (ref 21–32)
CREAT SERPL-MCNC: 0.7 MG/DL (ref 0.8–1.3)
EOSINOPHILS ABSOLUTE: 0.1 K/UL (ref 0–0.6)
EOSINOPHILS RELATIVE PERCENT: 2.4 %
GFR AFRICAN AMERICAN: >60
GFR NON-AFRICAN AMERICAN: >60
GLUCOSE BLD-MCNC: 126 MG/DL (ref 70–99)
GLUCOSE BLD-MCNC: 132 MG/DL (ref 70–99)
GLUCOSE BLD-MCNC: 149 MG/DL (ref 70–99)
GLUCOSE BLD-MCNC: 212 MG/DL (ref 70–99)
GLUCOSE BLD-MCNC: 213 MG/DL (ref 70–99)
HCT VFR BLD CALC: 29.4 % (ref 40.5–52.5)
HEMOGLOBIN: 10 G/DL (ref 13.5–17.5)
INR BLD: 3.05 (ref 0.86–1.14)
LYMPHOCYTES ABSOLUTE: 1.3 K/UL (ref 1–5.1)
LYMPHOCYTES RELATIVE PERCENT: 21.1 %
MCH RBC QN AUTO: 30.1 PG (ref 26–34)
MCHC RBC AUTO-ENTMCNC: 34.1 G/DL (ref 31–36)
MCV RBC AUTO: 88.1 FL (ref 80–100)
MONOCYTES ABSOLUTE: 0.7 K/UL (ref 0–1.3)
MONOCYTES RELATIVE PERCENT: 12.1 %
NEUTROPHILS ABSOLUTE: 4 K/UL (ref 1.7–7.7)
NEUTROPHILS RELATIVE PERCENT: 64.2 %
PDW BLD-RTO: 14.3 % (ref 12.4–15.4)
PERFORMED ON: ABNORMAL
PLATELET # BLD: 178 K/UL (ref 135–450)
PMV BLD AUTO: 7.5 FL (ref 5–10.5)
POTASSIUM SERPL-SCNC: 4.8 MMOL/L (ref 3.5–5.1)
PROTHROMBIN TIME: 34.8 SEC (ref 9.8–13)
RBC # BLD: 3.34 M/UL (ref 4.2–5.9)
SODIUM BLD-SCNC: 131 MMOL/L (ref 136–145)
WBC # BLD: 6.2 K/UL (ref 4–11)

## 2019-10-20 PROCEDURE — 1200000000 HC SEMI PRIVATE

## 2019-10-20 PROCEDURE — 6370000000 HC RX 637 (ALT 250 FOR IP): Performed by: INTERNAL MEDICINE

## 2019-10-20 PROCEDURE — 6370000000 HC RX 637 (ALT 250 FOR IP): Performed by: NURSE PRACTITIONER

## 2019-10-20 PROCEDURE — 6370000000 HC RX 637 (ALT 250 FOR IP): Performed by: FAMILY MEDICINE

## 2019-10-20 PROCEDURE — 2580000003 HC RX 258: Performed by: NURSE PRACTITIONER

## 2019-10-20 PROCEDURE — 6360000002 HC RX W HCPCS: Performed by: NURSE PRACTITIONER

## 2019-10-20 PROCEDURE — 80048 BASIC METABOLIC PNL TOTAL CA: CPT

## 2019-10-20 PROCEDURE — 36415 COLL VENOUS BLD VENIPUNCTURE: CPT

## 2019-10-20 PROCEDURE — 85025 COMPLETE CBC W/AUTO DIFF WBC: CPT

## 2019-10-20 PROCEDURE — 2700000000 HC OXYGEN THERAPY PER DAY

## 2019-10-20 PROCEDURE — 85610 PROTHROMBIN TIME: CPT

## 2019-10-20 PROCEDURE — 94761 N-INVAS EAR/PLS OXIMETRY MLT: CPT

## 2019-10-20 RX ORDER — AMLODIPINE BESYLATE 5 MG/1
10 TABLET ORAL DAILY
Status: DISCONTINUED | OUTPATIENT
Start: 2019-10-21 | End: 2019-10-21 | Stop reason: HOSPADM

## 2019-10-20 RX ADMIN — HYDRALAZINE HYDROCHLORIDE 10 MG: 20 INJECTION INTRAMUSCULAR; INTRAVENOUS at 08:45

## 2019-10-20 RX ADMIN — NYSTATIN 500000 UNITS: 100000 SUSPENSION ORAL at 08:27

## 2019-10-20 RX ADMIN — AMLODIPINE BESYLATE 5 MG: 5 TABLET ORAL at 08:27

## 2019-10-20 RX ADMIN — INSULIN LISPRO 3 UNITS: 100 INJECTION, SOLUTION INTRAVENOUS; SUBCUTANEOUS at 17:22

## 2019-10-20 RX ADMIN — NYSTATIN 500000 UNITS: 100000 SUSPENSION ORAL at 17:26

## 2019-10-20 RX ADMIN — Medication 10 ML: at 10:43

## 2019-10-20 RX ADMIN — Medication 10 ML: at 20:10

## 2019-10-20 RX ADMIN — POLYETHYLENE GLYCOL 3350 17 G: 17 POWDER, FOR SOLUTION ORAL at 08:27

## 2019-10-20 RX ADMIN — INSULIN GLARGINE 10 UNITS: 100 INJECTION, SOLUTION SUBCUTANEOUS at 10:46

## 2019-10-20 RX ADMIN — NYSTATIN 500000 UNITS: 100000 SUSPENSION ORAL at 20:08

## 2019-10-20 RX ADMIN — SENNOSIDES, DOCUSATE SODIUM 1 TABLET: 50; 8.6 TABLET, FILM COATED ORAL at 08:27

## 2019-10-20 RX ADMIN — DOCUSATE SODIUM 100 MG: 100 CAPSULE, LIQUID FILLED ORAL at 20:08

## 2019-10-20 RX ADMIN — CIPROFLOXACIN HYDROCHLORIDE 500 MG: 500 TABLET, FILM COATED ORAL at 08:27

## 2019-10-20 RX ADMIN — BETHANECHOL CHLORIDE 25 MG: 25 TABLET ORAL at 12:51

## 2019-10-20 RX ADMIN — CALCIUM 500 MG: 500 TABLET ORAL at 08:27

## 2019-10-20 RX ADMIN — ATORVASTATIN CALCIUM 80 MG: 80 TABLET, FILM COATED ORAL at 17:26

## 2019-10-20 RX ADMIN — INSULIN LISPRO 3 UNITS: 100 INJECTION, SOLUTION INTRAVENOUS; SUBCUTANEOUS at 20:09

## 2019-10-20 RX ADMIN — CIPROFLOXACIN HYDROCHLORIDE 500 MG: 500 TABLET, FILM COATED ORAL at 20:08

## 2019-10-20 RX ADMIN — INSULIN LISPRO 6 UNITS: 100 INJECTION, SOLUTION INTRAVENOUS; SUBCUTANEOUS at 12:45

## 2019-10-20 RX ADMIN — DOCUSATE SODIUM 100 MG: 100 CAPSULE, LIQUID FILLED ORAL at 08:27

## 2019-10-20 RX ADMIN — POLYETHYLENE GLYCOL 3350 17 G: 17 POWDER, FOR SOLUTION ORAL at 20:04

## 2019-10-20 RX ADMIN — SENNOSIDES, DOCUSATE SODIUM 1 TABLET: 50; 8.6 TABLET, FILM COATED ORAL at 20:08

## 2019-10-20 RX ADMIN — NYSTATIN 500000 UNITS: 100000 SUSPENSION ORAL at 12:45

## 2019-10-20 RX ADMIN — BETHANECHOL CHLORIDE 25 MG: 25 TABLET ORAL at 20:09

## 2019-10-20 RX ADMIN — BETHANECHOL CHLORIDE 25 MG: 25 TABLET ORAL at 08:27

## 2019-10-20 RX ADMIN — ASPIRIN 81 MG: 81 TABLET ORAL at 08:26

## 2019-10-21 VITALS
HEART RATE: 70 BPM | OXYGEN SATURATION: 96 % | SYSTOLIC BLOOD PRESSURE: 171 MMHG | TEMPERATURE: 98.5 F | RESPIRATION RATE: 18 BRPM | BODY MASS INDEX: 27.19 KG/M2 | HEIGHT: 70 IN | WEIGHT: 189.9 LBS | DIASTOLIC BLOOD PRESSURE: 76 MMHG

## 2019-10-21 LAB
ANION GAP SERPL CALCULATED.3IONS-SCNC: 8 MMOL/L (ref 3–16)
BASOPHILS ABSOLUTE: 0 K/UL (ref 0–0.2)
BASOPHILS RELATIVE PERCENT: 0.2 %
BUN BLDV-MCNC: 15 MG/DL (ref 7–20)
CALCIUM SERPL-MCNC: 8.7 MG/DL (ref 8.3–10.6)
CHLORIDE BLD-SCNC: 90 MMOL/L (ref 99–110)
CO2: 35 MMOL/L (ref 21–32)
CREAT SERPL-MCNC: 0.7 MG/DL (ref 0.8–1.3)
EOSINOPHILS ABSOLUTE: 0.2 K/UL (ref 0–0.6)
EOSINOPHILS RELATIVE PERCENT: 2.1 %
GFR AFRICAN AMERICAN: >60
GFR NON-AFRICAN AMERICAN: >60
GLUCOSE BLD-MCNC: 106 MG/DL (ref 70–99)
GLUCOSE BLD-MCNC: 107 MG/DL (ref 70–99)
GLUCOSE BLD-MCNC: 197 MG/DL (ref 70–99)
GLUCOSE BLD-MCNC: 217 MG/DL (ref 70–99)
GLUCOSE BLD-MCNC: 248 MG/DL (ref 70–99)
HCT VFR BLD CALC: 29.9 % (ref 40.5–52.5)
HEMOGLOBIN: 10.2 G/DL (ref 13.5–17.5)
INR BLD: 2.25 (ref 0.86–1.14)
LV EF: 63 %
LVEF MODALITY: NORMAL
LYMPHOCYTES ABSOLUTE: 1.7 K/UL (ref 1–5.1)
LYMPHOCYTES RELATIVE PERCENT: 20.1 %
MCH RBC QN AUTO: 30 PG (ref 26–34)
MCHC RBC AUTO-ENTMCNC: 34.3 G/DL (ref 31–36)
MCV RBC AUTO: 87.5 FL (ref 80–100)
MONOCYTES ABSOLUTE: 1 K/UL (ref 0–1.3)
MONOCYTES RELATIVE PERCENT: 11.4 %
NEUTROPHILS ABSOLUTE: 5.6 K/UL (ref 1.7–7.7)
NEUTROPHILS RELATIVE PERCENT: 66.2 %
PDW BLD-RTO: 14 % (ref 12.4–15.4)
PERFORMED ON: ABNORMAL
PLATELET # BLD: 204 K/UL (ref 135–450)
PMV BLD AUTO: 7.7 FL (ref 5–10.5)
POTASSIUM SERPL-SCNC: 4.3 MMOL/L (ref 3.5–5.1)
PROTHROMBIN TIME: 25.6 SEC (ref 9.8–13)
RBC # BLD: 3.42 M/UL (ref 4.2–5.9)
SODIUM BLD-SCNC: 133 MMOL/L (ref 136–145)
WBC # BLD: 8.4 K/UL (ref 4–11)

## 2019-10-21 PROCEDURE — 2580000003 HC RX 258: Performed by: NURSE PRACTITIONER

## 2019-10-21 PROCEDURE — 97530 THERAPEUTIC ACTIVITIES: CPT

## 2019-10-21 PROCEDURE — 6370000000 HC RX 637 (ALT 250 FOR IP): Performed by: INTERNAL MEDICINE

## 2019-10-21 PROCEDURE — 6370000000 HC RX 637 (ALT 250 FOR IP): Performed by: FAMILY MEDICINE

## 2019-10-21 PROCEDURE — 2700000000 HC OXYGEN THERAPY PER DAY

## 2019-10-21 PROCEDURE — 80048 BASIC METABOLIC PNL TOTAL CA: CPT

## 2019-10-21 PROCEDURE — 93306 TTE W/DOPPLER COMPLETE: CPT

## 2019-10-21 PROCEDURE — 36415 COLL VENOUS BLD VENIPUNCTURE: CPT

## 2019-10-21 PROCEDURE — 6370000000 HC RX 637 (ALT 250 FOR IP): Performed by: NURSE PRACTITIONER

## 2019-10-21 PROCEDURE — 97110 THERAPEUTIC EXERCISES: CPT

## 2019-10-21 PROCEDURE — 94761 N-INVAS EAR/PLS OXIMETRY MLT: CPT

## 2019-10-21 PROCEDURE — 85610 PROTHROMBIN TIME: CPT

## 2019-10-21 PROCEDURE — 85025 COMPLETE CBC W/AUTO DIFF WBC: CPT

## 2019-10-21 PROCEDURE — 92526 ORAL FUNCTION THERAPY: CPT

## 2019-10-21 RX ORDER — CIPROFLOXACIN 500 MG/1
500 TABLET, FILM COATED ORAL EVERY 12 HOURS SCHEDULED
Qty: 8 TABLET | Refills: 0 | Status: SHIPPED | OUTPATIENT
Start: 2019-10-21 | End: 2019-10-25

## 2019-10-21 RX ORDER — WARFARIN SODIUM 5 MG/1
5 TABLET ORAL
Status: COMPLETED | OUTPATIENT
Start: 2019-10-21 | End: 2019-10-21

## 2019-10-21 RX ORDER — WARFARIN SODIUM 5 MG/1
5 TABLET ORAL
Status: DISCONTINUED | OUTPATIENT
Start: 2019-10-21 | End: 2019-10-21 | Stop reason: SDUPTHER

## 2019-10-21 RX ORDER — AMLODIPINE BESYLATE 10 MG/1
10 TABLET ORAL DAILY
Qty: 30 TABLET | Refills: 3 | Status: SHIPPED | OUTPATIENT
Start: 2019-10-22

## 2019-10-21 RX ORDER — WARFARIN SODIUM 5 MG/1
TABLET ORAL
Qty: 30 TABLET | Refills: 3 | Status: SHIPPED | OUTPATIENT
Start: 2019-10-21 | End: 2019-10-21 | Stop reason: SDUPTHER

## 2019-10-21 RX ADMIN — POLYETHYLENE GLYCOL 3350 17 G: 17 POWDER, FOR SOLUTION ORAL at 21:10

## 2019-10-21 RX ADMIN — AMLODIPINE BESYLATE 10 MG: 5 TABLET ORAL at 09:52

## 2019-10-21 RX ADMIN — SENNOSIDES, DOCUSATE SODIUM 1 TABLET: 50; 8.6 TABLET, FILM COATED ORAL at 09:52

## 2019-10-21 RX ADMIN — INSULIN LISPRO 3 UNITS: 100 INJECTION, SOLUTION INTRAVENOUS; SUBCUTANEOUS at 21:01

## 2019-10-21 RX ADMIN — DOCUSATE SODIUM 100 MG: 100 CAPSULE, LIQUID FILLED ORAL at 21:10

## 2019-10-21 RX ADMIN — CALCIUM 500 MG: 500 TABLET ORAL at 09:52

## 2019-10-21 RX ADMIN — BETHANECHOL CHLORIDE 25 MG: 25 TABLET ORAL at 09:52

## 2019-10-21 RX ADMIN — ATORVASTATIN CALCIUM 80 MG: 80 TABLET, FILM COATED ORAL at 17:28

## 2019-10-21 RX ADMIN — NYSTATIN 500000 UNITS: 100000 SUSPENSION ORAL at 09:53

## 2019-10-21 RX ADMIN — INSULIN GLARGINE 10 UNITS: 100 INJECTION, SOLUTION SUBCUTANEOUS at 09:58

## 2019-10-21 RX ADMIN — NYSTATIN 500000 UNITS: 100000 SUSPENSION ORAL at 17:28

## 2019-10-21 RX ADMIN — CIPROFLOXACIN HYDROCHLORIDE 500 MG: 500 TABLET, FILM COATED ORAL at 21:10

## 2019-10-21 RX ADMIN — Medication 10 ML: at 10:05

## 2019-10-21 RX ADMIN — ASPIRIN 81 MG: 81 TABLET ORAL at 09:52

## 2019-10-21 RX ADMIN — BETHANECHOL CHLORIDE 25 MG: 25 TABLET ORAL at 21:10

## 2019-10-21 RX ADMIN — WARFARIN SODIUM 5 MG: 5 TABLET ORAL at 17:28

## 2019-10-21 RX ADMIN — CIPROFLOXACIN HYDROCHLORIDE 500 MG: 500 TABLET, FILM COATED ORAL at 09:52

## 2019-10-21 RX ADMIN — DOCUSATE SODIUM 100 MG: 100 CAPSULE, LIQUID FILLED ORAL at 09:52

## 2019-10-21 RX ADMIN — SENNOSIDES, DOCUSATE SODIUM 1 TABLET: 50; 8.6 TABLET, FILM COATED ORAL at 21:10

## 2019-10-21 RX ADMIN — INSULIN LISPRO 6 UNITS: 100 INJECTION, SOLUTION INTRAVENOUS; SUBCUTANEOUS at 17:00

## 2019-10-21 RX ADMIN — NYSTATIN 500000 UNITS: 100000 SUSPENSION ORAL at 13:38

## 2019-10-21 RX ADMIN — BETHANECHOL CHLORIDE 25 MG: 25 TABLET ORAL at 13:38

## 2019-10-21 RX ADMIN — INSULIN LISPRO 3 UNITS: 100 INJECTION, SOLUTION INTRAVENOUS; SUBCUTANEOUS at 12:29

## 2019-10-21 RX ADMIN — POLYETHYLENE GLYCOL 3350 17 G: 17 POWDER, FOR SOLUTION ORAL at 09:53

## 2019-10-21 ASSESSMENT — PAIN SCALES - WONG BAKER
WONGBAKER_NUMERICALRESPONSE: 8
WONGBAKER_NUMERICALRESPONSE: 0
WONGBAKER_NUMERICALRESPONSE: 2

## 2019-10-21 ASSESSMENT — PAIN SCALES - GENERAL
PAINLEVEL_OUTOF10: 0

## 2019-10-21 ASSESSMENT — PAIN DESCRIPTION - LOCATION: LOCATION: BACK

## 2019-10-21 ASSESSMENT — PAIN DESCRIPTION - ORIENTATION: ORIENTATION: LOWER

## 2019-10-22 LAB
BLOOD CULTURE, ROUTINE: NORMAL
CULTURE, BLOOD 2: NORMAL

## 2019-11-01 ENCOUNTER — TELEPHONE (OUTPATIENT)
Dept: OTHER | Facility: CLINIC | Age: 84
End: 2019-11-01

## 2019-11-01 ENCOUNTER — APPOINTMENT (OUTPATIENT)
Dept: GENERAL RADIOLOGY | Age: 84
DRG: 291 | End: 2019-11-01
Payer: MEDICARE

## 2019-11-01 ENCOUNTER — HOSPITAL ENCOUNTER (INPATIENT)
Age: 84
LOS: 7 days | Discharge: NURSING, REHAB OR LONG-TERM CARE | DRG: 291 | End: 2019-11-08
Attending: EMERGENCY MEDICINE | Admitting: INTERNAL MEDICINE
Payer: MEDICARE

## 2019-11-01 ENCOUNTER — APPOINTMENT (OUTPATIENT)
Dept: CT IMAGING | Age: 84
DRG: 291 | End: 2019-11-01
Payer: MEDICARE

## 2019-11-01 DIAGNOSIS — R09.02 HYPOXIA: ICD-10-CM

## 2019-11-01 DIAGNOSIS — R41.82 ALTERED MENTAL STATUS, UNSPECIFIED ALTERED MENTAL STATUS TYPE: ICD-10-CM

## 2019-11-01 DIAGNOSIS — I50.9 ACUTE ON CHRONIC CONGESTIVE HEART FAILURE, UNSPECIFIED HEART FAILURE TYPE (HCC): ICD-10-CM

## 2019-11-01 DIAGNOSIS — E11.65 UNCONTROLLED TYPE 2 DIABETES MELLITUS WITH HYPERGLYCEMIA (HCC): ICD-10-CM

## 2019-11-01 DIAGNOSIS — J81.0 ACUTE PULMONARY EDEMA (HCC): Primary | ICD-10-CM

## 2019-11-01 PROBLEM — N32.3 BLADDER DIVERTICULUM: Chronic | Status: ACTIVE | Noted: 2019-11-01

## 2019-11-01 PROBLEM — Z90.79 HISTORY OF RADICAL PROSTATECTOMY: Chronic | Status: ACTIVE | Noted: 2019-11-01

## 2019-11-01 PROBLEM — N32.0 BLADDER OUTLET OBSTRUCTION: Chronic | Status: ACTIVE | Noted: 2019-11-01

## 2019-11-01 PROBLEM — J96.01 ACUTE RESPIRATORY FAILURE WITH HYPOXIA (HCC): Status: ACTIVE | Noted: 2019-11-01

## 2019-11-01 PROBLEM — E87.70 HYPERVOLEMIA: Status: ACTIVE | Noted: 2019-11-01

## 2019-11-01 PROBLEM — I71.20 THORACIC AORTIC ANEURYSM WITHOUT RUPTURE: Chronic | Status: ACTIVE | Noted: 2019-11-01

## 2019-11-01 PROBLEM — Z79.01 CHRONIC ANTICOAGULATION: Chronic | Status: ACTIVE | Noted: 2019-11-01

## 2019-11-01 LAB
A/G RATIO: 1 (ref 1.1–2.2)
ALBUMIN SERPL-MCNC: 3.5 G/DL (ref 3.4–5)
ALP BLD-CCNC: 125 U/L (ref 40–129)
ALT SERPL-CCNC: 12 U/L (ref 10–40)
ANION GAP SERPL CALCULATED.3IONS-SCNC: 8 MMOL/L (ref 3–16)
APTT: 46.4 SEC (ref 26–36)
AST SERPL-CCNC: 15 U/L (ref 15–37)
BASE EXCESS VENOUS: 11.5 MMOL/L (ref -3–3)
BASOPHILS ABSOLUTE: 0 K/UL (ref 0–0.2)
BASOPHILS RELATIVE PERCENT: 0.3 %
BILIRUB SERPL-MCNC: 0.5 MG/DL (ref 0–1)
BILIRUBIN URINE: NEGATIVE
BLOOD, URINE: NEGATIVE
BUN BLDV-MCNC: 23 MG/DL (ref 7–20)
CALCIUM SERPL-MCNC: 8.8 MG/DL (ref 8.3–10.6)
CARBOXYHEMOGLOBIN: 1.7 % (ref 0–1.5)
CHLORIDE BLD-SCNC: 89 MMOL/L (ref 99–110)
CLARITY: CLEAR
CO2: 38 MMOL/L (ref 21–32)
COLOR: YELLOW
CREAT SERPL-MCNC: 0.9 MG/DL (ref 0.8–1.3)
EOSINOPHILS ABSOLUTE: 0 K/UL (ref 0–0.6)
EOSINOPHILS RELATIVE PERCENT: 0.1 %
GFR AFRICAN AMERICAN: >60
GFR NON-AFRICAN AMERICAN: >60
GLOBULIN: 3.5 G/DL
GLUCOSE BLD-MCNC: 127 MG/DL (ref 70–99)
GLUCOSE BLD-MCNC: 135 MG/DL (ref 70–99)
GLUCOSE BLD-MCNC: 139 MG/DL (ref 70–99)
GLUCOSE URINE: NEGATIVE MG/DL
HCO3 VENOUS: 41.3 MMOL/L (ref 23–29)
HCT VFR BLD CALC: 32.2 % (ref 40.5–52.5)
HEMOGLOBIN: 10.7 G/DL (ref 13.5–17.5)
INR BLD: 2.67 (ref 0.86–1.14)
KETONES, URINE: NEGATIVE MG/DL
LACTIC ACID, SEPSIS: 1.2 MMOL/L (ref 0.4–1.9)
LACTIC ACID, SEPSIS: 1.3 MMOL/L (ref 0.4–1.9)
LEUKOCYTE ESTERASE, URINE: NEGATIVE
LYMPHOCYTES ABSOLUTE: 0.8 K/UL (ref 1–5.1)
LYMPHOCYTES RELATIVE PERCENT: 10.9 %
MCH RBC QN AUTO: 29.7 PG (ref 26–34)
MCHC RBC AUTO-ENTMCNC: 33.1 G/DL (ref 31–36)
MCV RBC AUTO: 89.7 FL (ref 80–100)
METHEMOGLOBIN VENOUS: 0.4 %
MICROSCOPIC EXAMINATION: NORMAL
MONOCYTES ABSOLUTE: 0.3 K/UL (ref 0–1.3)
MONOCYTES RELATIVE PERCENT: 4.1 %
NEUTROPHILS ABSOLUTE: 6.6 K/UL (ref 1.7–7.7)
NEUTROPHILS RELATIVE PERCENT: 84.6 %
NITRITE, URINE: NEGATIVE
O2 CONTENT, VEN: 13 VOL %
O2 SAT, VEN: 86 %
O2 THERAPY: ABNORMAL
PCO2, VEN: 89.6 MMHG (ref 40–50)
PDW BLD-RTO: 14.1 % (ref 12.4–15.4)
PERFORMED ON: ABNORMAL
PERFORMED ON: ABNORMAL
PH UA: 5.5 (ref 5–8)
PH VENOUS: 7.28 (ref 7.35–7.45)
PLATELET # BLD: 249 K/UL (ref 135–450)
PMV BLD AUTO: 7.3 FL (ref 5–10.5)
PO2, VEN: 59.5 MMHG (ref 25–40)
POTASSIUM REFLEX MAGNESIUM: 5.3 MMOL/L (ref 3.5–5.1)
PRO-BNP: 1873 PG/ML (ref 0–449)
PROTEIN UA: NEGATIVE MG/DL
PROTHROMBIN TIME: 30.4 SEC (ref 9.8–13)
RBC # BLD: 3.59 M/UL (ref 4.2–5.9)
SODIUM BLD-SCNC: 135 MMOL/L (ref 136–145)
SPECIFIC GRAVITY UA: 1.02 (ref 1–1.03)
TCO2 CALC VENOUS: 44 MMOL/L
TOTAL PROTEIN: 7 G/DL (ref 6.4–8.2)
TROPONIN: 0.13 NG/ML
TROPONIN: 0.14 NG/ML
TROPONIN: 0.15 NG/ML
URINE REFLEX TO CULTURE: NORMAL
URINE TYPE: NORMAL
UROBILINOGEN, URINE: 0.2 E.U./DL
WBC # BLD: 7.7 K/UL (ref 4–11)

## 2019-11-01 PROCEDURE — 96374 THER/PROPH/DIAG INJ IV PUSH: CPT

## 2019-11-01 PROCEDURE — 1200000000 HC SEMI PRIVATE

## 2019-11-01 PROCEDURE — 85730 THROMBOPLASTIN TIME PARTIAL: CPT

## 2019-11-01 PROCEDURE — 6360000002 HC RX W HCPCS: Performed by: EMERGENCY MEDICINE

## 2019-11-01 PROCEDURE — 83605 ASSAY OF LACTIC ACID: CPT

## 2019-11-01 PROCEDURE — 82803 BLOOD GASES ANY COMBINATION: CPT

## 2019-11-01 PROCEDURE — 71046 X-RAY EXAM CHEST 2 VIEWS: CPT

## 2019-11-01 PROCEDURE — 81003 URINALYSIS AUTO W/O SCOPE: CPT

## 2019-11-01 PROCEDURE — 83880 ASSAY OF NATRIURETIC PEPTIDE: CPT

## 2019-11-01 PROCEDURE — 80053 COMPREHEN METABOLIC PANEL: CPT

## 2019-11-01 PROCEDURE — 51701 INSERT BLADDER CATHETER: CPT

## 2019-11-01 PROCEDURE — 70450 CT HEAD/BRAIN W/O DYE: CPT

## 2019-11-01 PROCEDURE — 84484 ASSAY OF TROPONIN QUANT: CPT

## 2019-11-01 PROCEDURE — 99291 CRITICAL CARE FIRST HOUR: CPT

## 2019-11-01 PROCEDURE — 36415 COLL VENOUS BLD VENIPUNCTURE: CPT

## 2019-11-01 PROCEDURE — 85025 COMPLETE CBC W/AUTO DIFF WBC: CPT

## 2019-11-01 PROCEDURE — 85610 PROTHROMBIN TIME: CPT

## 2019-11-01 PROCEDURE — 6370000000 HC RX 637 (ALT 250 FOR IP): Performed by: EMERGENCY MEDICINE

## 2019-11-01 PROCEDURE — 6370000000 HC RX 637 (ALT 250 FOR IP): Performed by: INTERNAL MEDICINE

## 2019-11-01 PROCEDURE — 93005 ELECTROCARDIOGRAM TRACING: CPT | Performed by: EMERGENCY MEDICINE

## 2019-11-01 RX ORDER — ACETAMINOPHEN 160 MG/5ML
650 SOLUTION ORAL EVERY 4 HOURS PRN
Status: DISCONTINUED | OUTPATIENT
Start: 2019-11-01 | End: 2019-11-08 | Stop reason: HOSPADM

## 2019-11-01 RX ORDER — PANTOPRAZOLE SODIUM 40 MG/1
40 TABLET, DELAYED RELEASE ORAL
Status: DISCONTINUED | OUTPATIENT
Start: 2019-11-02 | End: 2019-11-08 | Stop reason: HOSPADM

## 2019-11-01 RX ORDER — HEPARIN SODIUM 1000 [USP'U]/ML
4000 INJECTION, SOLUTION INTRAVENOUS; SUBCUTANEOUS PRN
Status: DISCONTINUED | OUTPATIENT
Start: 2019-11-01 | End: 2019-11-01

## 2019-11-01 RX ORDER — POTASSIUM CHLORIDE 20 MEQ/1
40 TABLET, EXTENDED RELEASE ORAL PRN
Status: DISCONTINUED | OUTPATIENT
Start: 2019-11-01 | End: 2019-11-08 | Stop reason: HOSPADM

## 2019-11-01 RX ORDER — PROMETHAZINE HYDROCHLORIDE 25 MG/ML
12.5 INJECTION, SOLUTION INTRAMUSCULAR; INTRAVENOUS EVERY 4 HOURS PRN
Status: DISCONTINUED | OUTPATIENT
Start: 2019-11-01 | End: 2019-11-08 | Stop reason: HOSPADM

## 2019-11-01 RX ORDER — FUROSEMIDE 10 MG/ML
40 INJECTION INTRAMUSCULAR; INTRAVENOUS ONCE
Status: COMPLETED | OUTPATIENT
Start: 2019-11-01 | End: 2019-11-01

## 2019-11-01 RX ORDER — MAGNESIUM SULFATE 1 G/100ML
1 INJECTION INTRAVENOUS PRN
Status: DISCONTINUED | OUTPATIENT
Start: 2019-11-01 | End: 2019-11-08 | Stop reason: HOSPADM

## 2019-11-01 RX ORDER — ASPIRIN 81 MG/1
81 TABLET ORAL DAILY
Status: DISCONTINUED | OUTPATIENT
Start: 2019-11-02 | End: 2019-11-08 | Stop reason: HOSPADM

## 2019-11-01 RX ORDER — ACETAMINOPHEN 325 MG/1
650 TABLET ORAL EVERY 4 HOURS PRN
Status: DISCONTINUED | OUTPATIENT
Start: 2019-11-01 | End: 2019-11-08 | Stop reason: HOSPADM

## 2019-11-01 RX ORDER — ASPIRIN 81 MG/1
324 TABLET, CHEWABLE ORAL ONCE
Status: COMPLETED | OUTPATIENT
Start: 2019-11-01 | End: 2019-11-01

## 2019-11-01 RX ORDER — SENNA PLUS 8.6 MG/1
1 TABLET ORAL NIGHTLY
Status: DISCONTINUED | OUTPATIENT
Start: 2019-11-01 | End: 2019-11-08 | Stop reason: HOSPADM

## 2019-11-01 RX ORDER — HEPARIN SODIUM 1000 [USP'U]/ML
2000 INJECTION, SOLUTION INTRAVENOUS; SUBCUTANEOUS PRN
Status: DISCONTINUED | OUTPATIENT
Start: 2019-11-01 | End: 2019-11-01

## 2019-11-01 RX ORDER — POTASSIUM CHLORIDE 7.45 MG/ML
10 INJECTION INTRAVENOUS PRN
Status: DISCONTINUED | OUTPATIENT
Start: 2019-11-01 | End: 2019-11-08 | Stop reason: HOSPADM

## 2019-11-01 RX ORDER — HEPARIN SODIUM 1000 [USP'U]/ML
4000 INJECTION, SOLUTION INTRAVENOUS; SUBCUTANEOUS ONCE
Status: DISCONTINUED | OUTPATIENT
Start: 2019-11-01 | End: 2019-11-01

## 2019-11-01 RX ORDER — ONDANSETRON 2 MG/ML
4 INJECTION INTRAMUSCULAR; INTRAVENOUS EVERY 4 HOURS PRN
Status: DISCONTINUED | OUTPATIENT
Start: 2019-11-01 | End: 2019-11-08 | Stop reason: HOSPADM

## 2019-11-01 RX ORDER — DEXTROSE MONOHYDRATE 25 G/50ML
12.5 INJECTION, SOLUTION INTRAVENOUS PRN
Status: DISCONTINUED | OUTPATIENT
Start: 2019-11-01 | End: 2019-11-08 | Stop reason: HOSPADM

## 2019-11-01 RX ORDER — DEXTROSE MONOHYDRATE 50 MG/ML
100 INJECTION, SOLUTION INTRAVENOUS PRN
Status: DISCONTINUED | OUTPATIENT
Start: 2019-11-01 | End: 2019-11-08 | Stop reason: HOSPADM

## 2019-11-01 RX ORDER — PHYTONADIONE 5 MG/1
1.25 TABLET ORAL ONCE
Status: COMPLETED | OUTPATIENT
Start: 2019-11-02 | End: 2019-11-01

## 2019-11-01 RX ORDER — PROMETHAZINE HYDROCHLORIDE 6.25 MG/5ML
12.5 SYRUP ORAL EVERY 4 HOURS PRN
Status: DISCONTINUED | OUTPATIENT
Start: 2019-11-01 | End: 2019-11-08 | Stop reason: HOSPADM

## 2019-11-01 RX ORDER — NICOTINE POLACRILEX 4 MG
15 LOZENGE BUCCAL PRN
Status: DISCONTINUED | OUTPATIENT
Start: 2019-11-01 | End: 2019-11-08 | Stop reason: HOSPADM

## 2019-11-01 RX ORDER — SODIUM CHLORIDE 0.9 % (FLUSH) 0.9 %
10 SYRINGE (ML) INJECTION PRN
Status: DISCONTINUED | OUTPATIENT
Start: 2019-11-01 | End: 2019-11-08 | Stop reason: HOSPADM

## 2019-11-01 RX ORDER — PROMETHAZINE HYDROCHLORIDE 25 MG/1
12.5 TABLET ORAL EVERY 4 HOURS PRN
Status: DISCONTINUED | OUTPATIENT
Start: 2019-11-01 | End: 2019-11-08 | Stop reason: HOSPADM

## 2019-11-01 RX ORDER — POLYETHYLENE GLYCOL 3350 17 G/17G
17 POWDER, FOR SOLUTION ORAL DAILY
Status: DISCONTINUED | OUTPATIENT
Start: 2019-11-02 | End: 2019-11-08 | Stop reason: HOSPADM

## 2019-11-01 RX ORDER — ACETAMINOPHEN 650 MG/1
650 SUPPOSITORY RECTAL EVERY 4 HOURS PRN
Status: DISCONTINUED | OUTPATIENT
Start: 2019-11-01 | End: 2019-11-08 | Stop reason: HOSPADM

## 2019-11-01 RX ORDER — ATORVASTATIN CALCIUM 80 MG/1
80 TABLET, FILM COATED ORAL EVERY EVENING
Status: DISCONTINUED | OUTPATIENT
Start: 2019-11-01 | End: 2019-11-08 | Stop reason: HOSPADM

## 2019-11-01 RX ORDER — INSULIN GLARGINE 100 [IU]/ML
18 INJECTION, SOLUTION SUBCUTANEOUS DAILY
Status: DISCONTINUED | OUTPATIENT
Start: 2019-11-02 | End: 2019-11-06

## 2019-11-01 RX ORDER — ONDANSETRON 4 MG/1
4 TABLET, ORALLY DISINTEGRATING ORAL EVERY 4 HOURS PRN
Status: DISCONTINUED | OUTPATIENT
Start: 2019-11-01 | End: 2019-11-08 | Stop reason: HOSPADM

## 2019-11-01 RX ORDER — HEPARIN SODIUM 10000 [USP'U]/100ML
1000 INJECTION, SOLUTION INTRAVENOUS CONTINUOUS
Status: DISCONTINUED | OUTPATIENT
Start: 2019-11-01 | End: 2019-11-01

## 2019-11-01 RX ORDER — AMLODIPINE BESYLATE 5 MG/1
10 TABLET ORAL DAILY
Status: DISCONTINUED | OUTPATIENT
Start: 2019-11-02 | End: 2019-11-08 | Stop reason: HOSPADM

## 2019-11-01 RX ADMIN — SENNOSIDES 8.6 MG: 8.6 TABLET, FILM COATED ORAL at 23:06

## 2019-11-01 RX ADMIN — ATORVASTATIN CALCIUM 80 MG: 80 TABLET, FILM COATED ORAL at 23:06

## 2019-11-01 RX ADMIN — ASPIRIN 81 MG 324 MG: 81 TABLET ORAL at 18:08

## 2019-11-01 RX ADMIN — ACETAMINOPHEN 650 MG: 325 TABLET ORAL at 23:06

## 2019-11-01 RX ADMIN — NITROGLYCERIN 0.5 INCH: 20 OINTMENT TOPICAL at 18:07

## 2019-11-01 RX ADMIN — Medication 1 MG: at 23:06

## 2019-11-01 RX ADMIN — PHYTONADIONE 1.25 MG: 5 TABLET ORAL at 23:29

## 2019-11-01 RX ADMIN — FUROSEMIDE 40 MG: 10 INJECTION, SOLUTION INTRAMUSCULAR; INTRAVENOUS at 18:12

## 2019-11-01 ASSESSMENT — PAIN SCALES - GENERAL: PAINLEVEL_OUTOF10: 2

## 2019-11-01 ASSESSMENT — PAIN DESCRIPTION - PAIN TYPE: TYPE: ACUTE PAIN

## 2019-11-01 ASSESSMENT — PAIN DESCRIPTION - LOCATION: LOCATION: HEAD

## 2019-11-02 LAB
ANION GAP SERPL CALCULATED.3IONS-SCNC: 6 MMOL/L (ref 3–16)
BASE EXCESS ARTERIAL: 13.9 MMOL/L (ref -3–3)
BASOPHILS ABSOLUTE: 0 K/UL (ref 0–0.2)
BASOPHILS RELATIVE PERCENT: 0.8 %
BUN BLDV-MCNC: 25 MG/DL (ref 7–20)
CALCIUM SERPL-MCNC: 8.2 MG/DL (ref 8.3–10.6)
CARBOXYHEMOGLOBIN ARTERIAL: 0.5 % (ref 0–1.5)
CHLORIDE BLD-SCNC: 89 MMOL/L (ref 99–110)
CO2: 39 MMOL/L (ref 21–32)
CREAT SERPL-MCNC: 1 MG/DL (ref 0.8–1.3)
EKG ATRIAL RATE: 66 BPM
EKG DIAGNOSIS: NORMAL
EKG P AXIS: 19 DEGREES
EKG P-R INTERVAL: 182 MS
EKG Q-T INTERVAL: 452 MS
EKG QRS DURATION: 126 MS
EKG QTC CALCULATION (BAZETT): 473 MS
EKG R AXIS: 68 DEGREES
EKG T AXIS: -23 DEGREES
EKG VENTRICULAR RATE: 66 BPM
EOSINOPHILS ABSOLUTE: 0.1 K/UL (ref 0–0.6)
EOSINOPHILS RELATIVE PERCENT: 2 %
GFR AFRICAN AMERICAN: >60
GFR NON-AFRICAN AMERICAN: >60
GLUCOSE BLD-MCNC: 137 MG/DL (ref 70–99)
GLUCOSE BLD-MCNC: 146 MG/DL (ref 70–99)
GLUCOSE BLD-MCNC: 214 MG/DL (ref 70–99)
GLUCOSE BLD-MCNC: 223 MG/DL (ref 70–99)
GLUCOSE BLD-MCNC: 225 MG/DL (ref 70–99)
GLUCOSE BLD-MCNC: 303 MG/DL (ref 70–99)
HCO3 ARTERIAL: 41.4 MMOL/L (ref 21–29)
HCT VFR BLD CALC: 28.1 % (ref 40.5–52.5)
HEMOGLOBIN, ART, EXTENDED: 11.4 G/DL (ref 13.5–17.5)
HEMOGLOBIN: 9.3 G/DL (ref 13.5–17.5)
INR BLD: 3.14 (ref 0.86–1.14)
LYMPHOCYTES ABSOLUTE: 1.6 K/UL (ref 1–5.1)
LYMPHOCYTES RELATIVE PERCENT: 26.8 %
MAGNESIUM: 2 MG/DL (ref 1.8–2.4)
MCH RBC QN AUTO: 29.6 PG (ref 26–34)
MCHC RBC AUTO-ENTMCNC: 33 G/DL (ref 31–36)
MCV RBC AUTO: 89.6 FL (ref 80–100)
METHEMOGLOBIN ARTERIAL: 0.8 %
MONOCYTES ABSOLUTE: 0.6 K/UL (ref 0–1.3)
MONOCYTES RELATIVE PERCENT: 10.5 %
NEUTROPHILS ABSOLUTE: 3.5 K/UL (ref 1.7–7.7)
NEUTROPHILS RELATIVE PERCENT: 59.9 %
O2 CONTENT ARTERIAL: 15 ML/DL
O2 SAT, ARTERIAL: 92.2 %
O2 THERAPY: ABNORMAL
PCO2 ARTERIAL: 69.2 MMHG (ref 35–45)
PDW BLD-RTO: 14 % (ref 12.4–15.4)
PERFORMED ON: ABNORMAL
PH ARTERIAL: 7.39 (ref 7.35–7.45)
PHOSPHORUS: 4.4 MG/DL (ref 2.5–4.9)
PLATELET # BLD: 203 K/UL (ref 135–450)
PMV BLD AUTO: 7.2 FL (ref 5–10.5)
PO2 ARTERIAL: 64.4 MMHG (ref 75–108)
POTASSIUM SERPL-SCNC: 4.8 MMOL/L (ref 3.5–5.1)
PROTHROMBIN TIME: 35.8 SEC (ref 9.8–13)
RBC # BLD: 3.14 M/UL (ref 4.2–5.9)
SODIUM BLD-SCNC: 134 MMOL/L (ref 136–145)
TCO2 ARTERIAL: 43.5 MMOL/L
TROPONIN: 0.15 NG/ML
WBC # BLD: 5.9 K/UL (ref 4–11)

## 2019-11-02 PROCEDURE — 2580000003 HC RX 258: Performed by: INTERNAL MEDICINE

## 2019-11-02 PROCEDURE — 36600 WITHDRAWAL OF ARTERIAL BLOOD: CPT

## 2019-11-02 PROCEDURE — 99223 1ST HOSP IP/OBS HIGH 75: CPT | Performed by: INTERNAL MEDICINE

## 2019-11-02 PROCEDURE — 6360000002 HC RX W HCPCS: Performed by: INTERNAL MEDICINE

## 2019-11-02 PROCEDURE — 36415 COLL VENOUS BLD VENIPUNCTURE: CPT

## 2019-11-02 PROCEDURE — 84100 ASSAY OF PHOSPHORUS: CPT

## 2019-11-02 PROCEDURE — 85610 PROTHROMBIN TIME: CPT

## 2019-11-02 PROCEDURE — 83735 ASSAY OF MAGNESIUM: CPT

## 2019-11-02 PROCEDURE — 1200000000 HC SEMI PRIVATE

## 2019-11-02 PROCEDURE — 85025 COMPLETE CBC W/AUTO DIFF WBC: CPT

## 2019-11-02 PROCEDURE — 84484 ASSAY OF TROPONIN QUANT: CPT

## 2019-11-02 PROCEDURE — 80048 BASIC METABOLIC PNL TOTAL CA: CPT

## 2019-11-02 PROCEDURE — 6370000000 HC RX 637 (ALT 250 FOR IP): Performed by: INTERNAL MEDICINE

## 2019-11-02 PROCEDURE — 82803 BLOOD GASES ANY COMBINATION: CPT

## 2019-11-02 PROCEDURE — 93010 ELECTROCARDIOGRAM REPORT: CPT | Performed by: INTERNAL MEDICINE

## 2019-11-02 RX ORDER — FUROSEMIDE 10 MG/ML
20 INJECTION INTRAMUSCULAR; INTRAVENOUS 2 TIMES DAILY
Status: DISCONTINUED | OUTPATIENT
Start: 2019-11-02 | End: 2019-11-04

## 2019-11-02 RX ADMIN — INSULIN GLARGINE 18 UNITS: 100 INJECTION, SOLUTION SUBCUTANEOUS at 09:20

## 2019-11-02 RX ADMIN — INSULIN LISPRO 1 UNITS: 100 INJECTION, SOLUTION INTRAVENOUS; SUBCUTANEOUS at 08:56

## 2019-11-02 RX ADMIN — POLYETHYLENE GLYCOL 3350 17 G: 17 POWDER, FOR SOLUTION ORAL at 08:55

## 2019-11-02 RX ADMIN — ATORVASTATIN CALCIUM 80 MG: 80 TABLET, FILM COATED ORAL at 18:16

## 2019-11-02 RX ADMIN — AMLODIPINE BESYLATE 10 MG: 5 TABLET ORAL at 08:55

## 2019-11-02 RX ADMIN — FUROSEMIDE 20 MG: 10 INJECTION, SOLUTION INTRAMUSCULAR; INTRAVENOUS at 18:16

## 2019-11-02 RX ADMIN — INSULIN LISPRO 5 UNITS: 100 INJECTION, SOLUTION INTRAVENOUS; SUBCUTANEOUS at 08:57

## 2019-11-02 RX ADMIN — INSULIN LISPRO 5 UNITS: 100 INJECTION, SOLUTION INTRAVENOUS; SUBCUTANEOUS at 16:34

## 2019-11-02 RX ADMIN — PANTOPRAZOLE SODIUM 40 MG: 40 TABLET, DELAYED RELEASE ORAL at 05:58

## 2019-11-02 RX ADMIN — FUROSEMIDE 20 MG: 10 INJECTION, SOLUTION INTRAMUSCULAR; INTRAVENOUS at 08:55

## 2019-11-02 RX ADMIN — INSULIN LISPRO 2 UNITS: 100 INJECTION, SOLUTION INTRAVENOUS; SUBCUTANEOUS at 21:29

## 2019-11-02 RX ADMIN — Medication 10 ML: at 21:29

## 2019-11-02 RX ADMIN — SENNOSIDES 8.6 MG: 8.6 TABLET, FILM COATED ORAL at 21:28

## 2019-11-02 RX ADMIN — INSULIN LISPRO 5 UNITS: 100 INJECTION, SOLUTION INTRAVENOUS; SUBCUTANEOUS at 11:43

## 2019-11-02 RX ADMIN — INSULIN LISPRO 2 UNITS: 100 INJECTION, SOLUTION INTRAVENOUS; SUBCUTANEOUS at 11:43

## 2019-11-02 RX ADMIN — ASPIRIN 81 MG: 81 TABLET ORAL at 08:55

## 2019-11-02 RX ADMIN — INSULIN LISPRO 2 UNITS: 100 INJECTION, SOLUTION INTRAVENOUS; SUBCUTANEOUS at 16:34

## 2019-11-02 ASSESSMENT — PAIN DESCRIPTION - ONSET: ONSET: ON-GOING

## 2019-11-02 ASSESSMENT — PAIN SCALES - GENERAL
PAINLEVEL_OUTOF10: 5
PAINLEVEL_OUTOF10: 0
PAINLEVEL_OUTOF10: 0

## 2019-11-02 ASSESSMENT — PAIN DESCRIPTION - PROGRESSION: CLINICAL_PROGRESSION: NOT CHANGED

## 2019-11-02 ASSESSMENT — PAIN DESCRIPTION - FREQUENCY: FREQUENCY: INTERMITTENT

## 2019-11-02 ASSESSMENT — ENCOUNTER SYMPTOMS
SHORTNESS OF BREATH: 1
CHEST TIGHTNESS: 0
EYE ITCHING: 0
ABDOMINAL PAIN: 0
CONSTIPATION: 0
DIARRHEA: 0
VOICE CHANGE: 0
SORE THROAT: 0
CHOKING: 0
EYE PAIN: 0
STRIDOR: 0
EYE DISCHARGE: 0

## 2019-11-02 ASSESSMENT — PAIN DESCRIPTION - DESCRIPTORS: DESCRIPTORS: SORE

## 2019-11-02 ASSESSMENT — PAIN DESCRIPTION - PAIN TYPE: TYPE: ACUTE PAIN

## 2019-11-02 ASSESSMENT — PAIN DESCRIPTION - LOCATION: LOCATION: SACRUM

## 2019-11-02 ASSESSMENT — PAIN - FUNCTIONAL ASSESSMENT: PAIN_FUNCTIONAL_ASSESSMENT: PREVENTS OR INTERFERES SOME ACTIVE ACTIVITIES AND ADLS

## 2019-11-03 LAB
ANION GAP SERPL CALCULATED.3IONS-SCNC: 8 MMOL/L (ref 3–16)
BASOPHILS ABSOLUTE: 0 K/UL (ref 0–0.2)
BASOPHILS RELATIVE PERCENT: 0.6 %
BUN BLDV-MCNC: 21 MG/DL (ref 7–20)
CALCIUM SERPL-MCNC: 8.4 MG/DL (ref 8.3–10.6)
CHLORIDE BLD-SCNC: 88 MMOL/L (ref 99–110)
CO2: 39 MMOL/L (ref 21–32)
CREAT SERPL-MCNC: 0.8 MG/DL (ref 0.8–1.3)
EOSINOPHILS ABSOLUTE: 0.1 K/UL (ref 0–0.6)
EOSINOPHILS RELATIVE PERCENT: 1.7 %
GFR AFRICAN AMERICAN: >60
GFR NON-AFRICAN AMERICAN: >60
GLUCOSE BLD-MCNC: 103 MG/DL (ref 70–99)
GLUCOSE BLD-MCNC: 177 MG/DL (ref 70–99)
GLUCOSE BLD-MCNC: 238 MG/DL (ref 70–99)
GLUCOSE BLD-MCNC: 246 MG/DL (ref 70–99)
GLUCOSE BLD-MCNC: 94 MG/DL (ref 70–99)
HCT VFR BLD CALC: 28.1 % (ref 40.5–52.5)
HEMOGLOBIN: 9.3 G/DL (ref 13.5–17.5)
INR BLD: 1.8 (ref 0.86–1.14)
LYMPHOCYTES ABSOLUTE: 1.6 K/UL (ref 1–5.1)
LYMPHOCYTES RELATIVE PERCENT: 27.9 %
MAGNESIUM: 2 MG/DL (ref 1.8–2.4)
MCH RBC QN AUTO: 29.6 PG (ref 26–34)
MCHC RBC AUTO-ENTMCNC: 33.3 G/DL (ref 31–36)
MCV RBC AUTO: 88.9 FL (ref 80–100)
MONOCYTES ABSOLUTE: 0.6 K/UL (ref 0–1.3)
MONOCYTES RELATIVE PERCENT: 10.3 %
NEUTROPHILS ABSOLUTE: 3.4 K/UL (ref 1.7–7.7)
NEUTROPHILS RELATIVE PERCENT: 59.5 %
PDW BLD-RTO: 14.3 % (ref 12.4–15.4)
PERFORMED ON: ABNORMAL
PLATELET # BLD: 185 K/UL (ref 135–450)
PMV BLD AUTO: 7.9 FL (ref 5–10.5)
POTASSIUM SERPL-SCNC: 3.8 MMOL/L (ref 3.5–5.1)
PROTHROMBIN TIME: 20.5 SEC (ref 9.8–13)
RBC # BLD: 3.16 M/UL (ref 4.2–5.9)
SODIUM BLD-SCNC: 135 MMOL/L (ref 136–145)
WBC # BLD: 5.7 K/UL (ref 4–11)

## 2019-11-03 PROCEDURE — 83735 ASSAY OF MAGNESIUM: CPT

## 2019-11-03 PROCEDURE — 85025 COMPLETE CBC W/AUTO DIFF WBC: CPT

## 2019-11-03 PROCEDURE — 97535 SELF CARE MNGMENT TRAINING: CPT

## 2019-11-03 PROCEDURE — 97166 OT EVAL MOD COMPLEX 45 MIN: CPT

## 2019-11-03 PROCEDURE — 80048 BASIC METABOLIC PNL TOTAL CA: CPT

## 2019-11-03 PROCEDURE — 97530 THERAPEUTIC ACTIVITIES: CPT

## 2019-11-03 PROCEDURE — 1200000000 HC SEMI PRIVATE

## 2019-11-03 PROCEDURE — 36415 COLL VENOUS BLD VENIPUNCTURE: CPT

## 2019-11-03 PROCEDURE — 2580000003 HC RX 258: Performed by: INTERNAL MEDICINE

## 2019-11-03 PROCEDURE — 99233 SBSQ HOSP IP/OBS HIGH 50: CPT | Performed by: NURSE PRACTITIONER

## 2019-11-03 PROCEDURE — 2700000000 HC OXYGEN THERAPY PER DAY

## 2019-11-03 PROCEDURE — 6370000000 HC RX 637 (ALT 250 FOR IP): Performed by: INTERNAL MEDICINE

## 2019-11-03 PROCEDURE — 94761 N-INVAS EAR/PLS OXIMETRY MLT: CPT

## 2019-11-03 PROCEDURE — 99233 SBSQ HOSP IP/OBS HIGH 50: CPT | Performed by: INTERNAL MEDICINE

## 2019-11-03 PROCEDURE — 97161 PT EVAL LOW COMPLEX 20 MIN: CPT

## 2019-11-03 PROCEDURE — 85610 PROTHROMBIN TIME: CPT

## 2019-11-03 PROCEDURE — 6370000000 HC RX 637 (ALT 250 FOR IP): Performed by: NURSE PRACTITIONER

## 2019-11-03 PROCEDURE — 6360000002 HC RX W HCPCS: Performed by: INTERNAL MEDICINE

## 2019-11-03 PROCEDURE — 94660 CPAP INITIATION&MGMT: CPT

## 2019-11-03 RX ADMIN — INSULIN LISPRO 5 UNITS: 100 INJECTION, SOLUTION INTRAVENOUS; SUBCUTANEOUS at 16:55

## 2019-11-03 RX ADMIN — ATORVASTATIN CALCIUM 80 MG: 80 TABLET, FILM COATED ORAL at 18:00

## 2019-11-03 RX ADMIN — INSULIN LISPRO 1 UNITS: 100 INJECTION, SOLUTION INTRAVENOUS; SUBCUTANEOUS at 21:40

## 2019-11-03 RX ADMIN — FUROSEMIDE 20 MG: 10 INJECTION, SOLUTION INTRAMUSCULAR; INTRAVENOUS at 08:30

## 2019-11-03 RX ADMIN — SENNOSIDES 8.6 MG: 8.6 TABLET, FILM COATED ORAL at 21:40

## 2019-11-03 RX ADMIN — POLYETHYLENE GLYCOL 3350 17 G: 17 POWDER, FOR SOLUTION ORAL at 08:30

## 2019-11-03 RX ADMIN — INSULIN LISPRO 2 UNITS: 100 INJECTION, SOLUTION INTRAVENOUS; SUBCUTANEOUS at 16:55

## 2019-11-03 RX ADMIN — INSULIN LISPRO 2 UNITS: 100 INJECTION, SOLUTION INTRAVENOUS; SUBCUTANEOUS at 11:26

## 2019-11-03 RX ADMIN — FUROSEMIDE 20 MG: 10 INJECTION, SOLUTION INTRAMUSCULAR; INTRAVENOUS at 17:59

## 2019-11-03 RX ADMIN — INSULIN LISPRO 5 UNITS: 100 INJECTION, SOLUTION INTRAVENOUS; SUBCUTANEOUS at 11:26

## 2019-11-03 RX ADMIN — AMLODIPINE BESYLATE 10 MG: 5 TABLET ORAL at 08:30

## 2019-11-03 RX ADMIN — INSULIN GLARGINE 18 UNITS: 100 INJECTION, SOLUTION SUBCUTANEOUS at 08:30

## 2019-11-03 RX ADMIN — WARFARIN SODIUM 6 MG: 1 TABLET ORAL at 17:59

## 2019-11-03 RX ADMIN — PANTOPRAZOLE SODIUM 40 MG: 40 TABLET, DELAYED RELEASE ORAL at 06:34

## 2019-11-03 RX ADMIN — ACETAMINOPHEN 650 MG: 325 TABLET ORAL at 11:30

## 2019-11-03 RX ADMIN — ASPIRIN 81 MG: 81 TABLET ORAL at 08:30

## 2019-11-03 RX ADMIN — Medication 10 ML: at 21:40

## 2019-11-03 ASSESSMENT — PAIN DESCRIPTION - ONSET
ONSET: ON-GOING
ONSET: ON-GOING

## 2019-11-03 ASSESSMENT — PAIN - FUNCTIONAL ASSESSMENT
PAIN_FUNCTIONAL_ASSESSMENT: PREVENTS OR INTERFERES WITH ALL ACTIVE AND SOME PASSIVE ACTIVITIES
PAIN_FUNCTIONAL_ASSESSMENT: PREVENTS OR INTERFERES SOME ACTIVE ACTIVITIES AND ADLS

## 2019-11-03 ASSESSMENT — PAIN DESCRIPTION - PROGRESSION
CLINICAL_PROGRESSION: NOT CHANGED

## 2019-11-03 ASSESSMENT — PAIN DESCRIPTION - LOCATION
LOCATION: NECK
LOCATION: SACRUM
LOCATION: NECK

## 2019-11-03 ASSESSMENT — PAIN DESCRIPTION - DESCRIPTORS
DESCRIPTORS: SORE;ACHING
DESCRIPTORS: ACHING

## 2019-11-03 ASSESSMENT — PAIN DESCRIPTION - PAIN TYPE
TYPE: CHRONIC PAIN

## 2019-11-03 ASSESSMENT — PAIN DESCRIPTION - FREQUENCY
FREQUENCY: INTERMITTENT
FREQUENCY: INTERMITTENT

## 2019-11-03 ASSESSMENT — PAIN SCALES - GENERAL
PAINLEVEL_OUTOF10: 0
PAINLEVEL_OUTOF10: 3
PAINLEVEL_OUTOF10: 4
PAINLEVEL_OUTOF10: 4

## 2019-11-04 ENCOUNTER — APPOINTMENT (OUTPATIENT)
Dept: GENERAL RADIOLOGY | Age: 84
DRG: 291 | End: 2019-11-04
Payer: MEDICARE

## 2019-11-04 PROBLEM — R09.89 PULMONARY VASCULAR CONGESTION: Status: ACTIVE | Noted: 2019-11-04

## 2019-11-04 PROBLEM — J96.22 ACUTE ON CHRONIC RESPIRATORY FAILURE WITH HYPOXIA AND HYPERCAPNIA (HCC): Status: ACTIVE | Noted: 2019-11-04

## 2019-11-04 PROBLEM — J98.11 ATELECTASIS: Status: ACTIVE | Noted: 2019-11-04

## 2019-11-04 PROBLEM — J96.21 ACUTE ON CHRONIC RESPIRATORY FAILURE WITH HYPOXIA AND HYPERCAPNIA (HCC): Status: ACTIVE | Noted: 2019-11-04

## 2019-11-04 PROBLEM — L89.156 PRESSURE INJURY OF DEEP TISSUE OF SACRAL REGION: Status: ACTIVE | Noted: 2019-11-04

## 2019-11-04 PROBLEM — R91.8 PULMONARY INFILTRATES: Status: ACTIVE | Noted: 2019-11-04

## 2019-11-04 PROBLEM — R06.89 INEFFECTIVE AIRWAY CLEARANCE: Status: ACTIVE | Noted: 2019-11-04

## 2019-11-04 LAB
ANION GAP SERPL CALCULATED.3IONS-SCNC: 8 MMOL/L (ref 3–16)
BASOPHILS ABSOLUTE: 0 K/UL (ref 0–0.2)
BASOPHILS RELATIVE PERCENT: 0.6 %
BUN BLDV-MCNC: 21 MG/DL (ref 7–20)
CALCIUM SERPL-MCNC: 8.3 MG/DL (ref 8.3–10.6)
CHLORIDE BLD-SCNC: 87 MMOL/L (ref 99–110)
CO2: 41 MMOL/L (ref 21–32)
CREAT SERPL-MCNC: 0.8 MG/DL (ref 0.8–1.3)
EOSINOPHILS ABSOLUTE: 0.1 K/UL (ref 0–0.6)
EOSINOPHILS RELATIVE PERCENT: 1.7 %
GFR AFRICAN AMERICAN: >60
GFR NON-AFRICAN AMERICAN: >60
GLUCOSE BLD-MCNC: 101 MG/DL (ref 70–99)
GLUCOSE BLD-MCNC: 101 MG/DL (ref 70–99)
GLUCOSE BLD-MCNC: 105 MG/DL (ref 70–99)
GLUCOSE BLD-MCNC: 109 MG/DL (ref 70–99)
GLUCOSE BLD-MCNC: 292 MG/DL (ref 70–99)
GLUCOSE BLD-MCNC: 97 MG/DL (ref 70–99)
HCT VFR BLD CALC: 29.5 % (ref 40.5–52.5)
HEMOGLOBIN: 9.7 G/DL (ref 13.5–17.5)
INR BLD: 1.68 (ref 0.86–1.14)
LYMPHOCYTES ABSOLUTE: 1.8 K/UL (ref 1–5.1)
LYMPHOCYTES RELATIVE PERCENT: 29.4 %
MAGNESIUM: 2.1 MG/DL (ref 1.8–2.4)
MCH RBC QN AUTO: 29.5 PG (ref 26–34)
MCHC RBC AUTO-ENTMCNC: 33 G/DL (ref 31–36)
MCV RBC AUTO: 89.2 FL (ref 80–100)
MONOCYTES ABSOLUTE: 0.6 K/UL (ref 0–1.3)
MONOCYTES RELATIVE PERCENT: 10.3 %
NEUTROPHILS ABSOLUTE: 3.5 K/UL (ref 1.7–7.7)
NEUTROPHILS RELATIVE PERCENT: 58 %
PDW BLD-RTO: 14.1 % (ref 12.4–15.4)
PERFORMED ON: ABNORMAL
PLATELET # BLD: 176 K/UL (ref 135–450)
PMV BLD AUTO: 7.7 FL (ref 5–10.5)
POTASSIUM SERPL-SCNC: 3.7 MMOL/L (ref 3.5–5.1)
PROTHROMBIN TIME: 19.2 SEC (ref 9.8–13)
RBC # BLD: 3.3 M/UL (ref 4.2–5.9)
SODIUM BLD-SCNC: 136 MMOL/L (ref 136–145)
WBC # BLD: 6.1 K/UL (ref 4–11)

## 2019-11-04 PROCEDURE — 87102 FUNGUS ISOLATION CULTURE: CPT

## 2019-11-04 PROCEDURE — 6360000002 HC RX W HCPCS: Performed by: INTERNAL MEDICINE

## 2019-11-04 PROCEDURE — 87077 CULTURE AEROBIC IDENTIFY: CPT

## 2019-11-04 PROCEDURE — 87106 FUNGI IDENTIFICATION YEAST: CPT

## 2019-11-04 PROCEDURE — 99233 SBSQ HOSP IP/OBS HIGH 50: CPT | Performed by: INTERNAL MEDICINE

## 2019-11-04 PROCEDURE — 2580000003 HC RX 258: Performed by: INTERNAL MEDICINE

## 2019-11-04 PROCEDURE — 94760 N-INVAS EAR/PLS OXIMETRY 1: CPT

## 2019-11-04 PROCEDURE — 94761 N-INVAS EAR/PLS OXIMETRY MLT: CPT

## 2019-11-04 PROCEDURE — 1200000000 HC SEMI PRIVATE

## 2019-11-04 PROCEDURE — 36415 COLL VENOUS BLD VENIPUNCTURE: CPT

## 2019-11-04 PROCEDURE — 88305 TISSUE EXAM BY PATHOLOGIST: CPT

## 2019-11-04 PROCEDURE — 88112 CYTOPATH CELL ENHANCE TECH: CPT

## 2019-11-04 PROCEDURE — 87206 SMEAR FLUORESCENT/ACID STAI: CPT

## 2019-11-04 PROCEDURE — 31624 DX BRONCHOSCOPE/LAVAGE: CPT | Performed by: INTERNAL MEDICINE

## 2019-11-04 PROCEDURE — 83735 ASSAY OF MAGNESIUM: CPT

## 2019-11-04 PROCEDURE — 71045 X-RAY EXAM CHEST 1 VIEW: CPT

## 2019-11-04 PROCEDURE — 2500000003 HC RX 250 WO HCPCS: Performed by: INTERNAL MEDICINE

## 2019-11-04 PROCEDURE — 2700000000 HC OXYGEN THERAPY PER DAY

## 2019-11-04 PROCEDURE — 85025 COMPLETE CBC W/AUTO DIFF WBC: CPT

## 2019-11-04 PROCEDURE — 6370000000 HC RX 637 (ALT 250 FOR IP): Performed by: NURSE PRACTITIONER

## 2019-11-04 PROCEDURE — 80048 BASIC METABOLIC PNL TOTAL CA: CPT

## 2019-11-04 PROCEDURE — 85610 PROTHROMBIN TIME: CPT

## 2019-11-04 PROCEDURE — 7100000010 HC PHASE II RECOVERY - FIRST 15 MIN: Performed by: INTERNAL MEDICINE

## 2019-11-04 PROCEDURE — 7100000011 HC PHASE II RECOVERY - ADDTL 15 MIN: Performed by: INTERNAL MEDICINE

## 2019-11-04 PROCEDURE — 3609010800 HC BRONCHOSCOPY ALVEOLAR LAVAGE: Performed by: INTERNAL MEDICINE

## 2019-11-04 PROCEDURE — 94660 CPAP INITIATION&MGMT: CPT

## 2019-11-04 PROCEDURE — 87205 SMEAR GRAM STAIN: CPT

## 2019-11-04 PROCEDURE — 0B968ZZ DRAINAGE OF RIGHT LOWER LOBE BRONCHUS, VIA NATURAL OR ARTIFICIAL OPENING ENDOSCOPIC: ICD-10-PCS | Performed by: INTERNAL MEDICINE

## 2019-11-04 PROCEDURE — 87631 RESP VIRUS 3-5 TARGETS: CPT

## 2019-11-04 PROCEDURE — 87070 CULTURE OTHR SPECIMN AEROBIC: CPT

## 2019-11-04 PROCEDURE — 99232 SBSQ HOSP IP/OBS MODERATE 35: CPT | Performed by: NURSE PRACTITIONER

## 2019-11-04 PROCEDURE — 2709999900 HC NON-CHARGEABLE SUPPLY: Performed by: INTERNAL MEDICINE

## 2019-11-04 PROCEDURE — 3609010900 HC BRONCHOSCOPY THERAPUTIC ASPIRATION INITIAL: Performed by: INTERNAL MEDICINE

## 2019-11-04 PROCEDURE — 87015 SPECIMEN INFECT AGNT CONCNTJ: CPT

## 2019-11-04 PROCEDURE — 99152 MOD SED SAME PHYS/QHP 5/>YRS: CPT | Performed by: INTERNAL MEDICINE

## 2019-11-04 PROCEDURE — 6370000000 HC RX 637 (ALT 250 FOR IP): Performed by: INTERNAL MEDICINE

## 2019-11-04 PROCEDURE — 87116 MYCOBACTERIA CULTURE: CPT

## 2019-11-04 RX ORDER — FENTANYL CITRATE 50 UG/ML
INJECTION, SOLUTION INTRAMUSCULAR; INTRAVENOUS PRN
Status: DISCONTINUED | OUTPATIENT
Start: 2019-11-04 | End: 2019-11-04 | Stop reason: ALTCHOICE

## 2019-11-04 RX ORDER — FUROSEMIDE 20 MG/1
20 TABLET ORAL DAILY
Status: DISCONTINUED | OUTPATIENT
Start: 2019-11-05 | End: 2019-11-08 | Stop reason: HOSPADM

## 2019-11-04 RX ORDER — MIDAZOLAM HYDROCHLORIDE 5 MG/ML
INJECTION INTRAMUSCULAR; INTRAVENOUS PRN
Status: DISCONTINUED | OUTPATIENT
Start: 2019-11-04 | End: 2019-11-04 | Stop reason: ALTCHOICE

## 2019-11-04 RX ORDER — MAGNESIUM HYDROXIDE 1200 MG/15ML
LIQUID ORAL CONTINUOUS PRN
Status: COMPLETED | OUTPATIENT
Start: 2019-11-04 | End: 2019-11-04

## 2019-11-04 RX ORDER — LIDOCAINE HYDROCHLORIDE 20 MG/ML
INJECTION, SOLUTION INFILTRATION; PERINEURAL PRN
Status: DISCONTINUED | OUTPATIENT
Start: 2019-11-04 | End: 2019-11-04 | Stop reason: ALTCHOICE

## 2019-11-04 RX ADMIN — SENNOSIDES 8.6 MG: 8.6 TABLET, FILM COATED ORAL at 21:47

## 2019-11-04 RX ADMIN — ASPIRIN 81 MG: 81 TABLET ORAL at 18:35

## 2019-11-04 RX ADMIN — ATORVASTATIN CALCIUM 80 MG: 80 TABLET, FILM COATED ORAL at 18:34

## 2019-11-04 RX ADMIN — FUROSEMIDE 20 MG: 10 INJECTION, SOLUTION INTRAMUSCULAR; INTRAVENOUS at 10:02

## 2019-11-04 RX ADMIN — INSULIN LISPRO 2 UNITS: 100 INJECTION, SOLUTION INTRAVENOUS; SUBCUTANEOUS at 21:48

## 2019-11-04 RX ADMIN — Medication 10 ML: at 10:02

## 2019-11-04 RX ADMIN — WARFARIN SODIUM 6 MG: 1 TABLET ORAL at 18:34

## 2019-11-04 ASSESSMENT — PAIN SCALES - GENERAL
PAINLEVEL_OUTOF10: 0

## 2019-11-04 ASSESSMENT — PAIN - FUNCTIONAL ASSESSMENT: PAIN_FUNCTIONAL_ASSESSMENT: 0-10

## 2019-11-05 LAB
ANION GAP SERPL CALCULATED.3IONS-SCNC: 7 MMOL/L (ref 3–16)
BASOPHILS ABSOLUTE: 0 K/UL (ref 0–0.2)
BASOPHILS RELATIVE PERCENT: 0.7 %
BUN BLDV-MCNC: 16 MG/DL (ref 7–20)
CALCIUM SERPL-MCNC: 8.6 MG/DL (ref 8.3–10.6)
CHLORIDE BLD-SCNC: 87 MMOL/L (ref 99–110)
CO2: 42 MMOL/L (ref 21–32)
CREAT SERPL-MCNC: 0.7 MG/DL (ref 0.8–1.3)
EOSINOPHILS ABSOLUTE: 0.1 K/UL (ref 0–0.6)
EOSINOPHILS RELATIVE PERCENT: 1.8 %
GFR AFRICAN AMERICAN: >60
GFR NON-AFRICAN AMERICAN: >60
GLUCOSE BLD-MCNC: 125 MG/DL (ref 70–99)
GLUCOSE BLD-MCNC: 143 MG/DL (ref 70–99)
GLUCOSE BLD-MCNC: 148 MG/DL (ref 70–99)
GLUCOSE BLD-MCNC: 214 MG/DL (ref 70–99)
GLUCOSE BLD-MCNC: 233 MG/DL (ref 70–99)
HCT VFR BLD CALC: 29.8 % (ref 40.5–52.5)
HEMOGLOBIN: 10 G/DL (ref 13.5–17.5)
INR BLD: 2.53 (ref 0.86–1.14)
LYMPHOCYTES ABSOLUTE: 1.1 K/UL (ref 1–5.1)
LYMPHOCYTES RELATIVE PERCENT: 18.1 %
MAGNESIUM: 1.9 MG/DL (ref 1.8–2.4)
MCH RBC QN AUTO: 29.6 PG (ref 26–34)
MCHC RBC AUTO-ENTMCNC: 33.6 G/DL (ref 31–36)
MCV RBC AUTO: 88 FL (ref 80–100)
MONOCYTES ABSOLUTE: 0.6 K/UL (ref 0–1.3)
MONOCYTES RELATIVE PERCENT: 9.8 %
NEUTROPHILS ABSOLUTE: 4.2 K/UL (ref 1.7–7.7)
NEUTROPHILS RELATIVE PERCENT: 69.6 %
PDW BLD-RTO: 13.8 % (ref 12.4–15.4)
PERFORMED ON: ABNORMAL
PLATELET # BLD: 176 K/UL (ref 135–450)
PMV BLD AUTO: 7.6 FL (ref 5–10.5)
POTASSIUM SERPL-SCNC: 4 MMOL/L (ref 3.5–5.1)
PROTHROMBIN TIME: 28.8 SEC (ref 9.8–13)
RBC # BLD: 3.39 M/UL (ref 4.2–5.9)
SODIUM BLD-SCNC: 136 MMOL/L (ref 136–145)
WBC # BLD: 6 K/UL (ref 4–11)

## 2019-11-05 PROCEDURE — 92526 ORAL FUNCTION THERAPY: CPT

## 2019-11-05 PROCEDURE — 36415 COLL VENOUS BLD VENIPUNCTURE: CPT

## 2019-11-05 PROCEDURE — 6370000000 HC RX 637 (ALT 250 FOR IP): Performed by: NURSE PRACTITIONER

## 2019-11-05 PROCEDURE — 99232 SBSQ HOSP IP/OBS MODERATE 35: CPT | Performed by: NURSE PRACTITIONER

## 2019-11-05 PROCEDURE — 94761 N-INVAS EAR/PLS OXIMETRY MLT: CPT

## 2019-11-05 PROCEDURE — 97110 THERAPEUTIC EXERCISES: CPT

## 2019-11-05 PROCEDURE — 85025 COMPLETE CBC W/AUTO DIFF WBC: CPT

## 2019-11-05 PROCEDURE — 80048 BASIC METABOLIC PNL TOTAL CA: CPT

## 2019-11-05 PROCEDURE — 1200000000 HC SEMI PRIVATE

## 2019-11-05 PROCEDURE — 97530 THERAPEUTIC ACTIVITIES: CPT

## 2019-11-05 PROCEDURE — 99232 SBSQ HOSP IP/OBS MODERATE 35: CPT | Performed by: INTERNAL MEDICINE

## 2019-11-05 PROCEDURE — 85610 PROTHROMBIN TIME: CPT

## 2019-11-05 PROCEDURE — 83735 ASSAY OF MAGNESIUM: CPT

## 2019-11-05 PROCEDURE — 2700000000 HC OXYGEN THERAPY PER DAY

## 2019-11-05 PROCEDURE — 92610 EVALUATE SWALLOWING FUNCTION: CPT

## 2019-11-05 PROCEDURE — 6370000000 HC RX 637 (ALT 250 FOR IP): Performed by: INTERNAL MEDICINE

## 2019-11-05 PROCEDURE — 94660 CPAP INITIATION&MGMT: CPT

## 2019-11-05 RX ADMIN — INSULIN GLARGINE 18 UNITS: 100 INJECTION, SOLUTION SUBCUTANEOUS at 08:34

## 2019-11-05 RX ADMIN — INSULIN LISPRO 5 UNITS: 100 INJECTION, SOLUTION INTRAVENOUS; SUBCUTANEOUS at 08:33

## 2019-11-05 RX ADMIN — PANTOPRAZOLE SODIUM 40 MG: 40 TABLET, DELAYED RELEASE ORAL at 08:29

## 2019-11-05 RX ADMIN — POLYETHYLENE GLYCOL 3350 17 G: 17 POWDER, FOR SOLUTION ORAL at 08:28

## 2019-11-05 RX ADMIN — INSULIN LISPRO 5 UNITS: 100 INJECTION, SOLUTION INTRAVENOUS; SUBCUTANEOUS at 17:11

## 2019-11-05 RX ADMIN — INSULIN LISPRO 1 UNITS: 100 INJECTION, SOLUTION INTRAVENOUS; SUBCUTANEOUS at 08:32

## 2019-11-05 RX ADMIN — ASPIRIN 81 MG: 81 TABLET ORAL at 08:29

## 2019-11-05 RX ADMIN — INSULIN LISPRO 1 UNITS: 100 INJECTION, SOLUTION INTRAVENOUS; SUBCUTANEOUS at 17:11

## 2019-11-05 RX ADMIN — INSULIN LISPRO 5 UNITS: 100 INJECTION, SOLUTION INTRAVENOUS; SUBCUTANEOUS at 12:33

## 2019-11-05 RX ADMIN — AMLODIPINE BESYLATE 10 MG: 5 TABLET ORAL at 08:28

## 2019-11-05 RX ADMIN — INSULIN LISPRO 1 UNITS: 100 INJECTION, SOLUTION INTRAVENOUS; SUBCUTANEOUS at 22:09

## 2019-11-05 RX ADMIN — ATORVASTATIN CALCIUM 80 MG: 80 TABLET, FILM COATED ORAL at 17:11

## 2019-11-05 RX ADMIN — SENNOSIDES 8.6 MG: 8.6 TABLET, FILM COATED ORAL at 20:24

## 2019-11-05 RX ADMIN — INSULIN LISPRO 2 UNITS: 100 INJECTION, SOLUTION INTRAVENOUS; SUBCUTANEOUS at 12:33

## 2019-11-05 RX ADMIN — ACETAMINOPHEN 650 MG: 325 TABLET ORAL at 20:27

## 2019-11-05 RX ADMIN — FUROSEMIDE 20 MG: 20 TABLET ORAL at 08:29

## 2019-11-05 ASSESSMENT — ENCOUNTER SYMPTOMS: RESPIRATORY NEGATIVE: 1

## 2019-11-05 ASSESSMENT — PAIN SCALES - GENERAL
PAINLEVEL_OUTOF10: 0
PAINLEVEL_OUTOF10: 3

## 2019-11-06 LAB
ANION GAP SERPL CALCULATED.3IONS-SCNC: 9 MMOL/L (ref 3–16)
BASOPHILS ABSOLUTE: 0 K/UL (ref 0–0.2)
BASOPHILS RELATIVE PERCENT: 0.4 %
BUN BLDV-MCNC: 15 MG/DL (ref 7–20)
CALCIUM SERPL-MCNC: 9 MG/DL (ref 8.3–10.6)
CHLORIDE BLD-SCNC: 83 MMOL/L (ref 99–110)
CO2: 40 MMOL/L (ref 21–32)
CREAT SERPL-MCNC: 0.8 MG/DL (ref 0.8–1.3)
CULTURE, RESPIRATORY: NORMAL
EOSINOPHILS ABSOLUTE: 0.2 K/UL (ref 0–0.6)
EOSINOPHILS RELATIVE PERCENT: 2.6 %
GFR AFRICAN AMERICAN: >60
GFR NON-AFRICAN AMERICAN: >60
GLUCOSE BLD-MCNC: 115 MG/DL (ref 70–99)
GLUCOSE BLD-MCNC: 203 MG/DL (ref 70–99)
GLUCOSE BLD-MCNC: 242 MG/DL (ref 70–99)
GLUCOSE BLD-MCNC: 350 MG/DL (ref 70–99)
GLUCOSE BLD-MCNC: 82 MG/DL (ref 70–99)
GRAM STAIN RESULT: NORMAL
HCT VFR BLD CALC: 32.2 % (ref 40.5–52.5)
HEMOGLOBIN: 10.6 G/DL (ref 13.5–17.5)
INR BLD: 2.51 (ref 0.86–1.14)
LYMPHOCYTES ABSOLUTE: 1.6 K/UL (ref 1–5.1)
LYMPHOCYTES RELATIVE PERCENT: 22.3 %
MAGNESIUM: 2.1 MG/DL (ref 1.8–2.4)
MCH RBC QN AUTO: 29.5 PG (ref 26–34)
MCHC RBC AUTO-ENTMCNC: 32.8 G/DL (ref 31–36)
MCV RBC AUTO: 89.8 FL (ref 80–100)
MISCELLANEOUS LAB TEST ORDER: NORMAL
MONOCYTES ABSOLUTE: 0.6 K/UL (ref 0–1.3)
MONOCYTES RELATIVE PERCENT: 8.8 %
NEUTROPHILS ABSOLUTE: 4.7 K/UL (ref 1.7–7.7)
NEUTROPHILS RELATIVE PERCENT: 65.9 %
PDW BLD-RTO: 14 % (ref 12.4–15.4)
PERFORMED ON: ABNORMAL
PERFORMED ON: NORMAL
PLATELET # BLD: 197 K/UL (ref 135–450)
PMV BLD AUTO: 7.7 FL (ref 5–10.5)
POTASSIUM SERPL-SCNC: 3.9 MMOL/L (ref 3.5–5.1)
PROTHROMBIN TIME: 28.6 SEC (ref 9.8–13)
RBC # BLD: 3.59 M/UL (ref 4.2–5.9)
SODIUM BLD-SCNC: 132 MMOL/L (ref 136–145)
WBC # BLD: 7.2 K/UL (ref 4–11)

## 2019-11-06 PROCEDURE — 51798 US URINE CAPACITY MEASURE: CPT

## 2019-11-06 PROCEDURE — 1200000000 HC SEMI PRIVATE

## 2019-11-06 PROCEDURE — 94660 CPAP INITIATION&MGMT: CPT

## 2019-11-06 PROCEDURE — 83735 ASSAY OF MAGNESIUM: CPT

## 2019-11-06 PROCEDURE — 99232 SBSQ HOSP IP/OBS MODERATE 35: CPT | Performed by: INTERNAL MEDICINE

## 2019-11-06 PROCEDURE — 2700000000 HC OXYGEN THERAPY PER DAY

## 2019-11-06 PROCEDURE — 2580000003 HC RX 258: Performed by: INTERNAL MEDICINE

## 2019-11-06 PROCEDURE — 85610 PROTHROMBIN TIME: CPT

## 2019-11-06 PROCEDURE — 6370000000 HC RX 637 (ALT 250 FOR IP): Performed by: NURSE PRACTITIONER

## 2019-11-06 PROCEDURE — 92526 ORAL FUNCTION THERAPY: CPT

## 2019-11-06 PROCEDURE — 97530 THERAPEUTIC ACTIVITIES: CPT

## 2019-11-06 PROCEDURE — 51701 INSERT BLADDER CATHETER: CPT

## 2019-11-06 PROCEDURE — 85025 COMPLETE CBC W/AUTO DIFF WBC: CPT

## 2019-11-06 PROCEDURE — 36415 COLL VENOUS BLD VENIPUNCTURE: CPT

## 2019-11-06 PROCEDURE — 80048 BASIC METABOLIC PNL TOTAL CA: CPT

## 2019-11-06 PROCEDURE — 6370000000 HC RX 637 (ALT 250 FOR IP): Performed by: INTERNAL MEDICINE

## 2019-11-06 PROCEDURE — 94761 N-INVAS EAR/PLS OXIMETRY MLT: CPT

## 2019-11-06 RX ORDER — INSULIN GLARGINE 100 [IU]/ML
25 INJECTION, SOLUTION SUBCUTANEOUS DAILY
Status: DISCONTINUED | OUTPATIENT
Start: 2019-11-07 | End: 2019-11-08 | Stop reason: HOSPADM

## 2019-11-06 RX ORDER — WARFARIN SODIUM 2.5 MG/1
2.5 TABLET ORAL
Status: COMPLETED | OUTPATIENT
Start: 2019-11-06 | End: 2019-11-06

## 2019-11-06 RX ADMIN — AMLODIPINE BESYLATE 10 MG: 5 TABLET ORAL at 10:41

## 2019-11-06 RX ADMIN — INSULIN LISPRO 5 UNITS: 100 INJECTION, SOLUTION INTRAVENOUS; SUBCUTANEOUS at 13:56

## 2019-11-06 RX ADMIN — SENNOSIDES 8.6 MG: 8.6 TABLET, FILM COATED ORAL at 22:03

## 2019-11-06 RX ADMIN — FUROSEMIDE 20 MG: 20 TABLET ORAL at 10:41

## 2019-11-06 RX ADMIN — PANTOPRAZOLE SODIUM 40 MG: 40 TABLET, DELAYED RELEASE ORAL at 06:19

## 2019-11-06 RX ADMIN — INSULIN LISPRO 5 UNITS: 100 INJECTION, SOLUTION INTRAVENOUS; SUBCUTANEOUS at 10:48

## 2019-11-06 RX ADMIN — INSULIN LISPRO 5 UNITS: 100 INJECTION, SOLUTION INTRAVENOUS; SUBCUTANEOUS at 19:04

## 2019-11-06 RX ADMIN — INSULIN LISPRO 2 UNITS: 100 INJECTION, SOLUTION INTRAVENOUS; SUBCUTANEOUS at 19:04

## 2019-11-06 RX ADMIN — POLYETHYLENE GLYCOL 3350 17 G: 17 POWDER, FOR SOLUTION ORAL at 10:41

## 2019-11-06 RX ADMIN — ASPIRIN 81 MG: 81 TABLET ORAL at 10:41

## 2019-11-06 RX ADMIN — INSULIN GLARGINE 18 UNITS: 100 INJECTION, SOLUTION SUBCUTANEOUS at 10:48

## 2019-11-06 RX ADMIN — Medication 10 ML: at 10:41

## 2019-11-06 RX ADMIN — WARFARIN SODIUM 2.5 MG: 2.5 TABLET ORAL at 19:02

## 2019-11-06 RX ADMIN — ATORVASTATIN CALCIUM 80 MG: 80 TABLET, FILM COATED ORAL at 19:02

## 2019-11-06 ASSESSMENT — PAIN SCALES - GENERAL
PAINLEVEL_OUTOF10: 0
PAINLEVEL_OUTOF10: 0

## 2019-11-07 LAB
ANION GAP SERPL CALCULATED.3IONS-SCNC: 7 MMOL/L (ref 3–16)
BASOPHILS ABSOLUTE: 0 K/UL (ref 0–0.2)
BASOPHILS RELATIVE PERCENT: 0.3 %
BUN BLDV-MCNC: 16 MG/DL (ref 7–20)
CALCIUM SERPL-MCNC: 8.7 MG/DL (ref 8.3–10.6)
CHLORIDE BLD-SCNC: 84 MMOL/L (ref 99–110)
CO2: 40 MMOL/L (ref 21–32)
CREAT SERPL-MCNC: 0.8 MG/DL (ref 0.8–1.3)
EOSINOPHILS ABSOLUTE: 0.1 K/UL (ref 0–0.6)
EOSINOPHILS RELATIVE PERCENT: 1.2 %
GFR AFRICAN AMERICAN: >60
GFR NON-AFRICAN AMERICAN: >60
GLUCOSE BLD-MCNC: 102 MG/DL (ref 70–99)
GLUCOSE BLD-MCNC: 142 MG/DL (ref 70–99)
GLUCOSE BLD-MCNC: 142 MG/DL (ref 70–99)
GLUCOSE BLD-MCNC: 167 MG/DL (ref 70–99)
GLUCOSE BLD-MCNC: 220 MG/DL (ref 70–99)
HCT VFR BLD CALC: 30.3 % (ref 40.5–52.5)
HEMOGLOBIN: 10.2 G/DL (ref 13.5–17.5)
INR BLD: 2.29 (ref 0.86–1.14)
LYMPHOCYTES ABSOLUTE: 1.4 K/UL (ref 1–5.1)
LYMPHOCYTES RELATIVE PERCENT: 17.7 %
MAGNESIUM: 2.1 MG/DL (ref 1.8–2.4)
MCH RBC QN AUTO: 29.7 PG (ref 26–34)
MCHC RBC AUTO-ENTMCNC: 33.7 G/DL (ref 31–36)
MCV RBC AUTO: 88.3 FL (ref 80–100)
MONOCYTES ABSOLUTE: 0.7 K/UL (ref 0–1.3)
MONOCYTES RELATIVE PERCENT: 9.2 %
NEUTROPHILS ABSOLUTE: 5.7 K/UL (ref 1.7–7.7)
NEUTROPHILS RELATIVE PERCENT: 71.6 %
PDW BLD-RTO: 13.9 % (ref 12.4–15.4)
PERFORMED ON: ABNORMAL
PLATELET # BLD: 179 K/UL (ref 135–450)
PMV BLD AUTO: 8 FL (ref 5–10.5)
POTASSIUM SERPL-SCNC: 4 MMOL/L (ref 3.5–5.1)
PROTHROMBIN TIME: 26.1 SEC (ref 9.8–13)
RBC # BLD: 3.42 M/UL (ref 4.2–5.9)
SODIUM BLD-SCNC: 131 MMOL/L (ref 136–145)
WBC # BLD: 7.9 K/UL (ref 4–11)

## 2019-11-07 PROCEDURE — 2580000003 HC RX 258: Performed by: INTERNAL MEDICINE

## 2019-11-07 PROCEDURE — 97535 SELF CARE MNGMENT TRAINING: CPT

## 2019-11-07 PROCEDURE — 97116 GAIT TRAINING THERAPY: CPT

## 2019-11-07 PROCEDURE — 6370000000 HC RX 637 (ALT 250 FOR IP): Performed by: INTERNAL MEDICINE

## 2019-11-07 PROCEDURE — 1200000000 HC SEMI PRIVATE

## 2019-11-07 PROCEDURE — 94660 CPAP INITIATION&MGMT: CPT

## 2019-11-07 PROCEDURE — 85025 COMPLETE CBC W/AUTO DIFF WBC: CPT

## 2019-11-07 PROCEDURE — 99232 SBSQ HOSP IP/OBS MODERATE 35: CPT | Performed by: INTERNAL MEDICINE

## 2019-11-07 PROCEDURE — 83735 ASSAY OF MAGNESIUM: CPT

## 2019-11-07 PROCEDURE — 36415 COLL VENOUS BLD VENIPUNCTURE: CPT

## 2019-11-07 PROCEDURE — 80048 BASIC METABOLIC PNL TOTAL CA: CPT

## 2019-11-07 PROCEDURE — 85610 PROTHROMBIN TIME: CPT

## 2019-11-07 PROCEDURE — 94761 N-INVAS EAR/PLS OXIMETRY MLT: CPT

## 2019-11-07 PROCEDURE — 6370000000 HC RX 637 (ALT 250 FOR IP): Performed by: NURSE PRACTITIONER

## 2019-11-07 PROCEDURE — 51701 INSERT BLADDER CATHETER: CPT

## 2019-11-07 PROCEDURE — 51798 US URINE CAPACITY MEASURE: CPT

## 2019-11-07 PROCEDURE — 97530 THERAPEUTIC ACTIVITIES: CPT

## 2019-11-07 PROCEDURE — 2700000000 HC OXYGEN THERAPY PER DAY

## 2019-11-07 RX ADMIN — WARFARIN SODIUM 3 MG: 2 TABLET ORAL at 17:30

## 2019-11-07 RX ADMIN — INSULIN LISPRO 1 UNITS: 100 INJECTION, SOLUTION INTRAVENOUS; SUBCUTANEOUS at 08:37

## 2019-11-07 RX ADMIN — AMLODIPINE BESYLATE 10 MG: 5 TABLET ORAL at 10:07

## 2019-11-07 RX ADMIN — ASPIRIN 81 MG: 81 TABLET ORAL at 10:07

## 2019-11-07 RX ADMIN — INSULIN LISPRO 1 UNITS: 100 INJECTION, SOLUTION INTRAVENOUS; SUBCUTANEOUS at 22:44

## 2019-11-07 RX ADMIN — INSULIN LISPRO 5 UNITS: 100 INJECTION, SOLUTION INTRAVENOUS; SUBCUTANEOUS at 08:38

## 2019-11-07 RX ADMIN — INSULIN GLARGINE 25 UNITS: 100 INJECTION, SOLUTION SUBCUTANEOUS at 10:07

## 2019-11-07 RX ADMIN — Medication 10 ML: at 10:07

## 2019-11-07 RX ADMIN — SENNOSIDES 8.6 MG: 8.6 TABLET, FILM COATED ORAL at 22:44

## 2019-11-07 RX ADMIN — PANTOPRAZOLE SODIUM 40 MG: 40 TABLET, DELAYED RELEASE ORAL at 06:05

## 2019-11-07 RX ADMIN — ATORVASTATIN CALCIUM 80 MG: 80 TABLET, FILM COATED ORAL at 17:29

## 2019-11-07 RX ADMIN — Medication 10 ML: at 22:44

## 2019-11-07 RX ADMIN — FUROSEMIDE 20 MG: 20 TABLET ORAL at 10:07

## 2019-11-07 RX ADMIN — INSULIN LISPRO 2 UNITS: 100 INJECTION, SOLUTION INTRAVENOUS; SUBCUTANEOUS at 12:19

## 2019-11-07 RX ADMIN — POLYETHYLENE GLYCOL 3350 17 G: 17 POWDER, FOR SOLUTION ORAL at 10:07

## 2019-11-07 RX ADMIN — INSULIN LISPRO 5 UNITS: 100 INJECTION, SOLUTION INTRAVENOUS; SUBCUTANEOUS at 12:19

## 2019-11-07 ASSESSMENT — PAIN DESCRIPTION - PAIN TYPE: TYPE: ACUTE PAIN

## 2019-11-07 ASSESSMENT — PAIN SCALES - GENERAL
PAINLEVEL_OUTOF10: 0
PAINLEVEL_OUTOF10: 4

## 2019-11-07 ASSESSMENT — PAIN - FUNCTIONAL ASSESSMENT: PAIN_FUNCTIONAL_ASSESSMENT: ACTIVITIES ARE NOT PREVENTED

## 2019-11-07 ASSESSMENT — PAIN DESCRIPTION - LOCATION: LOCATION: NECK

## 2019-11-07 ASSESSMENT — PAIN DESCRIPTION - PROGRESSION: CLINICAL_PROGRESSION: NOT CHANGED

## 2019-11-07 ASSESSMENT — PAIN DESCRIPTION - FREQUENCY: FREQUENCY: INTERMITTENT

## 2019-11-07 ASSESSMENT — PAIN DESCRIPTION - ONSET: ONSET: ON-GOING

## 2019-11-07 ASSESSMENT — PAIN DESCRIPTION - DESCRIPTORS: DESCRIPTORS: ACHING;HEADACHE

## 2019-11-08 VITALS
OXYGEN SATURATION: 96 % | HEART RATE: 78 BPM | BODY MASS INDEX: 25.86 KG/M2 | SYSTOLIC BLOOD PRESSURE: 170 MMHG | RESPIRATION RATE: 18 BRPM | WEIGHT: 180.6 LBS | TEMPERATURE: 98.5 F | HEIGHT: 70 IN | DIASTOLIC BLOOD PRESSURE: 68 MMHG

## 2019-11-08 PROBLEM — I50.33 ACUTE ON CHRONIC DIASTOLIC (CONGESTIVE) HEART FAILURE (HCC): Status: ACTIVE | Noted: 2019-11-08

## 2019-11-08 LAB
ANION GAP SERPL CALCULATED.3IONS-SCNC: 12 MMOL/L (ref 3–16)
BASOPHILS ABSOLUTE: 0 K/UL (ref 0–0.2)
BASOPHILS RELATIVE PERCENT: 0.4 %
BUN BLDV-MCNC: 15 MG/DL (ref 7–20)
CALCIUM SERPL-MCNC: 8.5 MG/DL (ref 8.3–10.6)
CHLORIDE BLD-SCNC: 85 MMOL/L (ref 99–110)
CO2: 37 MMOL/L (ref 21–32)
CREAT SERPL-MCNC: 0.8 MG/DL (ref 0.8–1.3)
EOSINOPHILS ABSOLUTE: 0.1 K/UL (ref 0–0.6)
EOSINOPHILS RELATIVE PERCENT: 1.7 %
GFR AFRICAN AMERICAN: >60
GFR NON-AFRICAN AMERICAN: >60
GLUCOSE BLD-MCNC: 103 MG/DL (ref 70–99)
GLUCOSE BLD-MCNC: 79 MG/DL (ref 70–99)
GLUCOSE BLD-MCNC: 88 MG/DL (ref 70–99)
HCT VFR BLD CALC: 28.7 % (ref 40.5–52.5)
HEMOGLOBIN: 9.5 G/DL (ref 13.5–17.5)
INR BLD: 2.25 (ref 0.86–1.14)
LYMPHOCYTES ABSOLUTE: 2.2 K/UL (ref 1–5.1)
LYMPHOCYTES RELATIVE PERCENT: 32.4 %
MAGNESIUM: 2.1 MG/DL (ref 1.8–2.4)
MCH RBC QN AUTO: 29.3 PG (ref 26–34)
MCHC RBC AUTO-ENTMCNC: 33 G/DL (ref 31–36)
MCV RBC AUTO: 88.8 FL (ref 80–100)
MONOCYTES ABSOLUTE: 0.7 K/UL (ref 0–1.3)
MONOCYTES RELATIVE PERCENT: 10.3 %
NEUTROPHILS ABSOLUTE: 3.7 K/UL (ref 1.7–7.7)
NEUTROPHILS RELATIVE PERCENT: 55.2 %
PDW BLD-RTO: 14.2 % (ref 12.4–15.4)
PERFORMED ON: ABNORMAL
PERFORMED ON: NORMAL
PLATELET # BLD: 172 K/UL (ref 135–450)
PMV BLD AUTO: 7.6 FL (ref 5–10.5)
POTASSIUM SERPL-SCNC: 3.9 MMOL/L (ref 3.5–5.1)
PROTHROMBIN TIME: 25.7 SEC (ref 9.8–13)
RBC # BLD: 3.23 M/UL (ref 4.2–5.9)
SODIUM BLD-SCNC: 134 MMOL/L (ref 136–145)
WBC # BLD: 6.8 K/UL (ref 4–11)

## 2019-11-08 PROCEDURE — 99232 SBSQ HOSP IP/OBS MODERATE 35: CPT | Performed by: INTERNAL MEDICINE

## 2019-11-08 PROCEDURE — 6370000000 HC RX 637 (ALT 250 FOR IP): Performed by: INTERNAL MEDICINE

## 2019-11-08 PROCEDURE — 6370000000 HC RX 637 (ALT 250 FOR IP): Performed by: NURSE PRACTITIONER

## 2019-11-08 PROCEDURE — 94761 N-INVAS EAR/PLS OXIMETRY MLT: CPT

## 2019-11-08 PROCEDURE — 83735 ASSAY OF MAGNESIUM: CPT

## 2019-11-08 PROCEDURE — 36415 COLL VENOUS BLD VENIPUNCTURE: CPT

## 2019-11-08 PROCEDURE — 85025 COMPLETE CBC W/AUTO DIFF WBC: CPT

## 2019-11-08 PROCEDURE — 80048 BASIC METABOLIC PNL TOTAL CA: CPT

## 2019-11-08 PROCEDURE — 85610 PROTHROMBIN TIME: CPT

## 2019-11-08 PROCEDURE — 2700000000 HC OXYGEN THERAPY PER DAY

## 2019-11-08 RX ORDER — WARFARIN SODIUM 2.5 MG/1
2.5 TABLET ORAL
Status: DISCONTINUED | OUTPATIENT
Start: 2019-11-08 | End: 2019-11-08 | Stop reason: HOSPADM

## 2019-11-08 RX ORDER — POLYETHYLENE GLYCOL 3350 17 G/17G
17 POWDER, FOR SOLUTION ORAL DAILY
Qty: 527 G | Refills: 1 | DISCHARGE
Start: 2019-11-08

## 2019-11-08 RX ORDER — FUROSEMIDE 20 MG/1
20 TABLET ORAL DAILY
Qty: 60 TABLET | Refills: 3 | Status: ON HOLD | DISCHARGE
Start: 2019-11-08 | End: 2019-11-27 | Stop reason: SDUPTHER

## 2019-11-08 RX ORDER — WARFARIN SODIUM 2.5 MG/1
2.5 TABLET ORAL DAILY
Qty: 30 TABLET | Refills: 3 | DISCHARGE
Start: 2019-11-08 | End: 2019-12-20 | Stop reason: ALTCHOICE

## 2019-11-08 RX ORDER — PANTOPRAZOLE SODIUM 40 MG/1
40 TABLET, DELAYED RELEASE ORAL
Qty: 30 TABLET | Refills: 3 | DISCHARGE
Start: 2019-11-09

## 2019-11-08 RX ADMIN — POLYETHYLENE GLYCOL 3350 17 G: 17 POWDER, FOR SOLUTION ORAL at 11:09

## 2019-11-08 RX ADMIN — INSULIN GLARGINE 25 UNITS: 100 INJECTION, SOLUTION SUBCUTANEOUS at 13:01

## 2019-11-08 RX ADMIN — AMLODIPINE BESYLATE 10 MG: 5 TABLET ORAL at 11:07

## 2019-11-08 RX ADMIN — ASPIRIN 81 MG: 81 TABLET ORAL at 11:07

## 2019-11-08 RX ADMIN — FUROSEMIDE 20 MG: 20 TABLET ORAL at 11:10

## 2019-11-08 RX ADMIN — PANTOPRAZOLE SODIUM 40 MG: 40 TABLET, DELAYED RELEASE ORAL at 06:40

## 2019-11-08 ASSESSMENT — PAIN SCALES - GENERAL
PAINLEVEL_OUTOF10: 0

## 2019-11-20 ENCOUNTER — APPOINTMENT (OUTPATIENT)
Dept: CT IMAGING | Age: 84
DRG: 291 | End: 2019-11-20
Payer: MEDICARE

## 2019-11-20 ENCOUNTER — HOSPITAL ENCOUNTER (INPATIENT)
Age: 84
LOS: 7 days | Discharge: NURSING, REHAB OR LONG-TERM CARE | DRG: 291 | End: 2019-11-27
Attending: EMERGENCY MEDICINE | Admitting: HOSPITALIST
Payer: MEDICARE

## 2019-11-20 ENCOUNTER — APPOINTMENT (OUTPATIENT)
Dept: GENERAL RADIOLOGY | Age: 84
DRG: 291 | End: 2019-11-20
Payer: MEDICARE

## 2019-11-20 DIAGNOSIS — J96.22 ACUTE ON CHRONIC RESPIRATORY FAILURE WITH HYPOXIA AND HYPERCAPNIA (HCC): ICD-10-CM

## 2019-11-20 DIAGNOSIS — I21.4 NSTEMI (NON-ST ELEVATED MYOCARDIAL INFARCTION) (HCC): ICD-10-CM

## 2019-11-20 DIAGNOSIS — J90 PLEURAL EFFUSION ON RIGHT: Primary | ICD-10-CM

## 2019-11-20 DIAGNOSIS — J96.21 ACUTE ON CHRONIC RESPIRATORY FAILURE WITH HYPOXIA AND HYPERCAPNIA (HCC): ICD-10-CM

## 2019-11-20 PROBLEM — I50.33 ACUTE ON CHRONIC DIASTOLIC CHF (CONGESTIVE HEART FAILURE) (HCC): Status: ACTIVE | Noted: 2019-11-20

## 2019-11-20 LAB
A/G RATIO: 1.1 (ref 1.1–2.2)
ALBUMIN SERPL-MCNC: 3.6 G/DL (ref 3.4–5)
ALP BLD-CCNC: 119 U/L (ref 40–129)
ALT SERPL-CCNC: 8 U/L (ref 10–40)
AMORPHOUS: ABNORMAL /HPF
AMPHETAMINE SCREEN, URINE: NORMAL
ANION GAP SERPL CALCULATED.3IONS-SCNC: 10 MMOL/L (ref 3–16)
AST SERPL-CCNC: 12 U/L (ref 15–37)
BACTERIA: ABNORMAL /HPF
BARBITURATE SCREEN URINE: NORMAL
BASE EXCESS VENOUS: 2.8 MMOL/L (ref -3–3)
BASOPHILS ABSOLUTE: 0 K/UL (ref 0–0.2)
BASOPHILS RELATIVE PERCENT: 0.3 %
BENZODIAZEPINE SCREEN, URINE: NORMAL
BILIRUB SERPL-MCNC: 0.6 MG/DL (ref 0–1)
BILIRUBIN URINE: NEGATIVE
BLOOD, URINE: ABNORMAL
BUN BLDV-MCNC: 25 MG/DL (ref 7–20)
CALCIUM SERPL-MCNC: 8.7 MG/DL (ref 8.3–10.6)
CANNABINOID SCREEN URINE: NORMAL
CARBOXYHEMOGLOBIN: 3.6 % (ref 0–1.5)
CASTS: ABNORMAL /LPF
CHLORIDE BLD-SCNC: 86 MMOL/L (ref 99–110)
CLARITY: CLEAR
CO2: 29 MMOL/L (ref 21–32)
COCAINE METABOLITE SCREEN URINE: NORMAL
COLOR: YELLOW
CREAT SERPL-MCNC: 1.4 MG/DL (ref 0.8–1.3)
EKG ATRIAL RATE: 256 BPM
EKG DIAGNOSIS: NORMAL
EKG P AXIS: 49 DEGREES
EKG Q-T INTERVAL: 438 MS
EKG QRS DURATION: 134 MS
EKG QTC CALCULATION (BAZETT): 451 MS
EKG R AXIS: 43 DEGREES
EKG T AXIS: 1 DEGREES
EKG VENTRICULAR RATE: 64 BPM
EOSINOPHILS ABSOLUTE: 0 K/UL (ref 0–0.6)
EOSINOPHILS RELATIVE PERCENT: 0.4 %
GFR AFRICAN AMERICAN: 58
GFR NON-AFRICAN AMERICAN: 48
GLOBULIN: 3.3 G/DL
GLUCOSE BLD-MCNC: 246 MG/DL (ref 70–99)
GLUCOSE BLD-MCNC: 316 MG/DL (ref 70–99)
GLUCOSE BLD-MCNC: 360 MG/DL (ref 70–99)
GLUCOSE URINE: NEGATIVE MG/DL
HCO3 VENOUS: 30.2 MMOL/L (ref 23–29)
HCT VFR BLD CALC: 31 % (ref 40.5–52.5)
HEMOGLOBIN: 10.1 G/DL (ref 13.5–17.5)
INR BLD: 2 (ref 0.86–1.14)
KETONES, URINE: NEGATIVE MG/DL
LEUKOCYTE ESTERASE, URINE: ABNORMAL
LYMPHOCYTES ABSOLUTE: 1.2 K/UL (ref 1–5.1)
LYMPHOCYTES RELATIVE PERCENT: 12.3 %
Lab: NORMAL
MCH RBC QN AUTO: 29.2 PG (ref 26–34)
MCHC RBC AUTO-ENTMCNC: 32.7 G/DL (ref 31–36)
MCV RBC AUTO: 89.2 FL (ref 80–100)
METHADONE SCREEN, URINE: NORMAL
METHEMOGLOBIN VENOUS: 0.2 %
MICROSCOPIC EXAMINATION: YES
MONOCYTES ABSOLUTE: 1 K/UL (ref 0–1.3)
MONOCYTES RELATIVE PERCENT: 10.2 %
MUCUS: ABNORMAL /LPF
NEUTROPHILS ABSOLUTE: 7.6 K/UL (ref 1.7–7.7)
NEUTROPHILS RELATIVE PERCENT: 76.8 %
NITRITE, URINE: NEGATIVE
O2 CONTENT, VEN: 14 VOL %
O2 SAT, VEN: 92 %
O2 THERAPY: ABNORMAL
OPIATE SCREEN URINE: NORMAL
OXYCODONE URINE: NORMAL
PCO2, VEN: 61.2 MMHG (ref 40–50)
PDW BLD-RTO: 14.6 % (ref 12.4–15.4)
PERFORMED ON: ABNORMAL
PERFORMED ON: ABNORMAL
PH UA: 5.5
PH UA: 5.5 (ref 5–8)
PH VENOUS: 7.31 (ref 7.35–7.45)
PHENCYCLIDINE SCREEN URINE: NORMAL
PLATELET # BLD: 244 K/UL (ref 135–450)
PMV BLD AUTO: 7.7 FL (ref 5–10.5)
PO2, VEN: 68.5 MMHG (ref 25–40)
POTASSIUM REFLEX MAGNESIUM: 3.7 MMOL/L (ref 3.5–5.1)
PRO-BNP: 2049 PG/ML (ref 0–449)
PROCALCITONIN: 0.15 NG/ML (ref 0–0.15)
PROPOXYPHENE SCREEN: NORMAL
PROTEIN UA: NEGATIVE MG/DL
PROTHROMBIN TIME: 23.4 SEC (ref 10–13.2)
RBC # BLD: 3.48 M/UL (ref 4.2–5.9)
RBC UA: ABNORMAL /HPF (ref 0–2)
RENAL EPITHELIAL, UA: ABNORMAL /HPF
SODIUM BLD-SCNC: 125 MMOL/L (ref 136–145)
SPECIFIC GRAVITY UA: 1.01 (ref 1–1.03)
TCO2 CALC VENOUS: 32 MMOL/L
TOTAL PROTEIN: 6.9 G/DL (ref 6.4–8.2)
TROPONIN: 0.05 NG/ML
TROPONIN: 0.05 NG/ML
TSH REFLEX: 3.81 UIU/ML (ref 0.27–4.2)
URINE REFLEX TO CULTURE: YES
URINE TYPE: ABNORMAL
UROBILINOGEN, URINE: 0.2 E.U./DL
WBC # BLD: 9.9 K/UL (ref 4–11)
WBC UA: ABNORMAL /HPF (ref 0–5)

## 2019-11-20 PROCEDURE — 87086 URINE CULTURE/COLONY COUNT: CPT

## 2019-11-20 PROCEDURE — 94761 N-INVAS EAR/PLS OXIMETRY MLT: CPT

## 2019-11-20 PROCEDURE — 2000000000 HC ICU R&B

## 2019-11-20 PROCEDURE — 99285 EMERGENCY DEPT VISIT HI MDM: CPT

## 2019-11-20 PROCEDURE — 93005 ELECTROCARDIOGRAM TRACING: CPT | Performed by: EMERGENCY MEDICINE

## 2019-11-20 PROCEDURE — 80307 DRUG TEST PRSMV CHEM ANLYZR: CPT

## 2019-11-20 PROCEDURE — 84484 ASSAY OF TROPONIN QUANT: CPT

## 2019-11-20 PROCEDURE — 80053 COMPREHEN METABOLIC PANEL: CPT

## 2019-11-20 PROCEDURE — 2700000000 HC OXYGEN THERAPY PER DAY

## 2019-11-20 PROCEDURE — 6360000002 HC RX W HCPCS: Performed by: EMERGENCY MEDICINE

## 2019-11-20 PROCEDURE — 84145 PROCALCITONIN (PCT): CPT

## 2019-11-20 PROCEDURE — 87077 CULTURE AEROBIC IDENTIFY: CPT

## 2019-11-20 PROCEDURE — 84443 ASSAY THYROID STIM HORMONE: CPT

## 2019-11-20 PROCEDURE — 51702 INSERT TEMP BLADDER CATH: CPT

## 2019-11-20 PROCEDURE — 83036 HEMOGLOBIN GLYCOSYLATED A1C: CPT

## 2019-11-20 PROCEDURE — 99291 CRITICAL CARE FIRST HOUR: CPT | Performed by: INTERNAL MEDICINE

## 2019-11-20 PROCEDURE — 82803 BLOOD GASES ANY COMBINATION: CPT

## 2019-11-20 PROCEDURE — 2580000003 HC RX 258: Performed by: EMERGENCY MEDICINE

## 2019-11-20 PROCEDURE — 81001 URINALYSIS AUTO W/SCOPE: CPT

## 2019-11-20 PROCEDURE — 94660 CPAP INITIATION&MGMT: CPT

## 2019-11-20 PROCEDURE — 85025 COMPLETE CBC W/AUTO DIFF WBC: CPT

## 2019-11-20 PROCEDURE — 70450 CT HEAD/BRAIN W/O DYE: CPT

## 2019-11-20 PROCEDURE — 93010 ELECTROCARDIOGRAM REPORT: CPT | Performed by: INTERNAL MEDICINE

## 2019-11-20 PROCEDURE — 87186 SC STD MICRODIL/AGAR DIL: CPT

## 2019-11-20 PROCEDURE — 71045 X-RAY EXAM CHEST 1 VIEW: CPT

## 2019-11-20 PROCEDURE — 83540 ASSAY OF IRON: CPT

## 2019-11-20 PROCEDURE — 85610 PROTHROMBIN TIME: CPT

## 2019-11-20 PROCEDURE — 83880 ASSAY OF NATRIURETIC PEPTIDE: CPT

## 2019-11-20 PROCEDURE — 36415 COLL VENOUS BLD VENIPUNCTURE: CPT

## 2019-11-20 PROCEDURE — 83550 IRON BINDING TEST: CPT

## 2019-11-20 RX ORDER — ACETAMINOPHEN 325 MG/1
650 TABLET ORAL EVERY 4 HOURS PRN
Status: DISCONTINUED | OUTPATIENT
Start: 2019-11-20 | End: 2019-11-27 | Stop reason: HOSPADM

## 2019-11-20 RX ORDER — CLONIDINE HYDROCHLORIDE 0.1 MG/1
0.1 TABLET ORAL 2 TIMES DAILY
COMMUNITY

## 2019-11-20 RX ORDER — MAGNESIUM SULFATE 1 G/100ML
1 INJECTION INTRAVENOUS PRN
Status: DISCONTINUED | OUTPATIENT
Start: 2019-11-20 | End: 2019-11-27 | Stop reason: HOSPADM

## 2019-11-20 RX ORDER — NITROGLYCERIN 0.4 MG/1
0.4 TABLET SUBLINGUAL EVERY 5 MIN PRN
Status: DISCONTINUED | OUTPATIENT
Start: 2019-11-20 | End: 2019-11-27 | Stop reason: HOSPADM

## 2019-11-20 RX ORDER — SODIUM CHLORIDE 0.9 % (FLUSH) 0.9 %
10 SYRINGE (ML) INJECTION EVERY 12 HOURS SCHEDULED
Status: DISCONTINUED | OUTPATIENT
Start: 2019-11-20 | End: 2019-11-27 | Stop reason: HOSPADM

## 2019-11-20 RX ORDER — POTASSIUM CHLORIDE 750 MG/1
40 TABLET, EXTENDED RELEASE ORAL PRN
Status: DISCONTINUED | OUTPATIENT
Start: 2019-11-20 | End: 2019-11-27 | Stop reason: HOSPADM

## 2019-11-20 RX ORDER — SODIUM CHLORIDE 0.9 % (FLUSH) 0.9 %
10 SYRINGE (ML) INJECTION PRN
Status: DISCONTINUED | OUTPATIENT
Start: 2019-11-20 | End: 2019-11-27 | Stop reason: HOSPADM

## 2019-11-20 RX ORDER — DEXTROSE MONOHYDRATE 25 G/50ML
12.5 INJECTION, SOLUTION INTRAVENOUS PRN
Status: DISCONTINUED | OUTPATIENT
Start: 2019-11-20 | End: 2019-11-27 | Stop reason: HOSPADM

## 2019-11-20 RX ORDER — ASPIRIN 81 MG/1
81 TABLET ORAL DAILY
Status: DISCONTINUED | OUTPATIENT
Start: 2019-11-21 | End: 2019-11-27 | Stop reason: HOSPADM

## 2019-11-20 RX ORDER — PANTOPRAZOLE SODIUM 40 MG/10ML
40 INJECTION, POWDER, LYOPHILIZED, FOR SOLUTION INTRAVENOUS DAILY
Status: DISCONTINUED | OUTPATIENT
Start: 2019-11-21 | End: 2019-11-21

## 2019-11-20 RX ORDER — ACETAZOLAMIDE 250 MG/1
250 TABLET ORAL DAILY
COMMUNITY

## 2019-11-20 RX ORDER — FINASTERIDE 5 MG/1
5 TABLET, FILM COATED ORAL
COMMUNITY

## 2019-11-20 RX ORDER — ATORVASTATIN CALCIUM 40 MG/1
80 TABLET, FILM COATED ORAL EVERY EVENING
Status: DISCONTINUED | OUTPATIENT
Start: 2019-11-20 | End: 2019-11-27 | Stop reason: HOSPADM

## 2019-11-20 RX ORDER — INSULIN GLARGINE 100 [IU]/ML
10 INJECTION, SOLUTION SUBCUTANEOUS EVERY MORNING
Status: DISCONTINUED | OUTPATIENT
Start: 2019-11-21 | End: 2019-11-27 | Stop reason: HOSPADM

## 2019-11-20 RX ORDER — FUROSEMIDE 10 MG/ML
40 INJECTION INTRAMUSCULAR; INTRAVENOUS 2 TIMES DAILY
Status: DISCONTINUED | OUTPATIENT
Start: 2019-11-20 | End: 2019-11-21

## 2019-11-20 RX ORDER — ONDANSETRON 2 MG/ML
4 INJECTION INTRAMUSCULAR; INTRAVENOUS EVERY 6 HOURS PRN
Status: DISCONTINUED | OUTPATIENT
Start: 2019-11-20 | End: 2019-11-27 | Stop reason: HOSPADM

## 2019-11-20 RX ORDER — OMEPRAZOLE 20 MG/1
20 CAPSULE, DELAYED RELEASE ORAL DAILY
Status: ON HOLD | COMMUNITY
End: 2019-11-27 | Stop reason: HOSPADM

## 2019-11-20 RX ORDER — NICOTINE POLACRILEX 4 MG
15 LOZENGE BUCCAL PRN
Status: DISCONTINUED | OUTPATIENT
Start: 2019-11-20 | End: 2019-11-27 | Stop reason: HOSPADM

## 2019-11-20 RX ORDER — POTASSIUM CHLORIDE 7.45 MG/ML
10 INJECTION INTRAVENOUS PRN
Status: DISCONTINUED | OUTPATIENT
Start: 2019-11-20 | End: 2019-11-27 | Stop reason: HOSPADM

## 2019-11-20 RX ORDER — FUROSEMIDE 10 MG/ML
20 INJECTION INTRAMUSCULAR; INTRAVENOUS ONCE
Status: COMPLETED | OUTPATIENT
Start: 2019-11-20 | End: 2019-11-20

## 2019-11-20 RX ORDER — AMLODIPINE BESYLATE 5 MG/1
10 TABLET ORAL DAILY
Status: DISCONTINUED | OUTPATIENT
Start: 2019-11-21 | End: 2019-11-27 | Stop reason: HOSPADM

## 2019-11-20 RX ORDER — POTASSIUM CITRATE 10 MEQ/1
10 TABLET, EXTENDED RELEASE ORAL DAILY
COMMUNITY
End: 2019-12-04 | Stop reason: ALTCHOICE

## 2019-11-20 RX ORDER — DEXTROSE MONOHYDRATE 50 MG/ML
100 INJECTION, SOLUTION INTRAVENOUS PRN
Status: DISCONTINUED | OUTPATIENT
Start: 2019-11-20 | End: 2019-11-27 | Stop reason: HOSPADM

## 2019-11-20 RX ORDER — CLONIDINE HYDROCHLORIDE 0.1 MG/1
0.1 TABLET ORAL 2 TIMES DAILY
Status: DISCONTINUED | OUTPATIENT
Start: 2019-11-20 | End: 2019-11-27 | Stop reason: HOSPADM

## 2019-11-20 RX ADMIN — FUROSEMIDE 20 MG: 10 INJECTION, SOLUTION INTRAMUSCULAR; INTRAVENOUS at 19:52

## 2019-11-20 RX ADMIN — DEXTROSE MONOHYDRATE 2 G: 5 INJECTION INTRAVENOUS at 18:31

## 2019-11-20 RX ADMIN — AZITHROMYCIN DIHYDRATE 500 MG: 500 INJECTION, POWDER, LYOPHILIZED, FOR SOLUTION INTRAVENOUS at 18:31

## 2019-11-20 ASSESSMENT — PAIN DESCRIPTION - DESCRIPTORS: DESCRIPTORS: ACHING

## 2019-11-20 ASSESSMENT — PAIN DESCRIPTION - FREQUENCY: FREQUENCY: CONTINUOUS

## 2019-11-20 ASSESSMENT — PAIN SCALES - GENERAL
PAINLEVEL_OUTOF10: 5
PAINLEVEL_OUTOF10: 0

## 2019-11-20 ASSESSMENT — PAIN DESCRIPTION - PAIN TYPE: TYPE: ACUTE PAIN

## 2019-11-20 ASSESSMENT — PAIN DESCRIPTION - LOCATION: LOCATION: BUTTOCKS

## 2019-11-21 LAB
ANION GAP SERPL CALCULATED.3IONS-SCNC: 11 MMOL/L (ref 3–16)
BASOPHILS ABSOLUTE: 0 K/UL (ref 0–0.2)
BASOPHILS RELATIVE PERCENT: 0.2 %
BUN BLDV-MCNC: 24 MG/DL (ref 7–20)
CALCIUM SERPL-MCNC: 8.4 MG/DL (ref 8.3–10.6)
CHLORIDE BLD-SCNC: 89 MMOL/L (ref 99–110)
CHOLESTEROL, TOTAL: 77 MG/DL (ref 0–199)
CO2: 31 MMOL/L (ref 21–32)
CREAT SERPL-MCNC: 1.1 MG/DL (ref 0.8–1.3)
EOSINOPHILS ABSOLUTE: 0.1 K/UL (ref 0–0.6)
EOSINOPHILS RELATIVE PERCENT: 1 %
ESTIMATED AVERAGE GLUCOSE: 151.3 MG/DL
GFR AFRICAN AMERICAN: >60
GFR NON-AFRICAN AMERICAN: >60
GLUCOSE BLD-MCNC: 118 MG/DL (ref 70–99)
GLUCOSE BLD-MCNC: 118 MG/DL (ref 70–99)
GLUCOSE BLD-MCNC: 156 MG/DL (ref 70–99)
GLUCOSE BLD-MCNC: 207 MG/DL (ref 70–99)
GLUCOSE BLD-MCNC: 213 MG/DL (ref 70–99)
GLUCOSE BLD-MCNC: 303 MG/DL (ref 70–99)
HBA1C MFR BLD: 6.9 %
HCT VFR BLD CALC: 28.3 % (ref 40.5–52.5)
HDLC SERPL-MCNC: 40 MG/DL (ref 40–60)
HEMOGLOBIN: 9.4 G/DL (ref 13.5–17.5)
INR BLD: 2.16 (ref 0.86–1.14)
IRON SATURATION: 12 % (ref 20–50)
IRON: 41 UG/DL (ref 59–158)
LDL CHOLESTEROL CALCULATED: 27 MG/DL
LYMPHOCYTES ABSOLUTE: 1.3 K/UL (ref 1–5.1)
LYMPHOCYTES RELATIVE PERCENT: 19.9 %
MAGNESIUM: 2 MG/DL (ref 1.8–2.4)
MCH RBC QN AUTO: 29.2 PG (ref 26–34)
MCHC RBC AUTO-ENTMCNC: 33.3 G/DL (ref 31–36)
MCV RBC AUTO: 87.7 FL (ref 80–100)
MONOCYTES ABSOLUTE: 0.8 K/UL (ref 0–1.3)
MONOCYTES RELATIVE PERCENT: 12.5 %
NEUTROPHILS ABSOLUTE: 4.5 K/UL (ref 1.7–7.7)
NEUTROPHILS RELATIVE PERCENT: 66.4 %
PDW BLD-RTO: 14.5 % (ref 12.4–15.4)
PERFORMED ON: ABNORMAL
PLATELET # BLD: 203 K/UL (ref 135–450)
PMV BLD AUTO: 7.6 FL (ref 5–10.5)
POTASSIUM SERPL-SCNC: 3.1 MMOL/L (ref 3.5–5.1)
PROTHROMBIN TIME: 25.2 SEC (ref 10–13.2)
RBC # BLD: 3.22 M/UL (ref 4.2–5.9)
SODIUM BLD-SCNC: 131 MMOL/L (ref 136–145)
TOTAL IRON BINDING CAPACITY: 348 UG/DL (ref 260–445)
TRIGL SERPL-MCNC: 52 MG/DL (ref 0–150)
VLDLC SERPL CALC-MCNC: 10 MG/DL
WBC # BLD: 6.7 K/UL (ref 4–11)

## 2019-11-21 PROCEDURE — 85610 PROTHROMBIN TIME: CPT

## 2019-11-21 PROCEDURE — 36415 COLL VENOUS BLD VENIPUNCTURE: CPT

## 2019-11-21 PROCEDURE — 92610 EVALUATE SWALLOWING FUNCTION: CPT

## 2019-11-21 PROCEDURE — 99232 SBSQ HOSP IP/OBS MODERATE 35: CPT | Performed by: INTERNAL MEDICINE

## 2019-11-21 PROCEDURE — 6370000000 HC RX 637 (ALT 250 FOR IP): Performed by: INTERNAL MEDICINE

## 2019-11-21 PROCEDURE — 6370000000 HC RX 637 (ALT 250 FOR IP): Performed by: PHYSICIAN ASSISTANT

## 2019-11-21 PROCEDURE — 83735 ASSAY OF MAGNESIUM: CPT

## 2019-11-21 PROCEDURE — 2580000003 HC RX 258: Performed by: PHYSICIAN ASSISTANT

## 2019-11-21 PROCEDURE — 6360000002 HC RX W HCPCS: Performed by: INTERNAL MEDICINE

## 2019-11-21 PROCEDURE — 97166 OT EVAL MOD COMPLEX 45 MIN: CPT

## 2019-11-21 PROCEDURE — 97162 PT EVAL MOD COMPLEX 30 MIN: CPT

## 2019-11-21 PROCEDURE — 2580000003 HC RX 258: Performed by: INTERNAL MEDICINE

## 2019-11-21 PROCEDURE — 97530 THERAPEUTIC ACTIVITIES: CPT

## 2019-11-21 PROCEDURE — 80061 LIPID PANEL: CPT

## 2019-11-21 PROCEDURE — 85025 COMPLETE CBC W/AUTO DIFF WBC: CPT

## 2019-11-21 PROCEDURE — 6360000002 HC RX W HCPCS: Performed by: PHYSICIAN ASSISTANT

## 2019-11-21 PROCEDURE — 2060000000 HC ICU INTERMEDIATE R&B

## 2019-11-21 PROCEDURE — 99223 1ST HOSP IP/OBS HIGH 75: CPT | Performed by: INTERNAL MEDICINE

## 2019-11-21 PROCEDURE — 80048 BASIC METABOLIC PNL TOTAL CA: CPT

## 2019-11-21 PROCEDURE — 92526 ORAL FUNCTION THERAPY: CPT

## 2019-11-21 PROCEDURE — 99233 SBSQ HOSP IP/OBS HIGH 50: CPT | Performed by: INTERNAL MEDICINE

## 2019-11-21 RX ORDER — FUROSEMIDE 10 MG/ML
40 INJECTION INTRAMUSCULAR; INTRAVENOUS ONCE
Status: COMPLETED | OUTPATIENT
Start: 2019-11-21 | End: 2019-11-21

## 2019-11-21 RX ORDER — PANTOPRAZOLE SODIUM 40 MG/1
40 TABLET, DELAYED RELEASE ORAL
Status: DISCONTINUED | OUTPATIENT
Start: 2019-11-21 | End: 2019-11-27 | Stop reason: HOSPADM

## 2019-11-21 RX ORDER — WARFARIN SODIUM 2.5 MG/1
2.5 TABLET ORAL
Status: COMPLETED | OUTPATIENT
Start: 2019-11-21 | End: 2019-11-21

## 2019-11-21 RX ORDER — FUROSEMIDE 10 MG/ML
40 INJECTION INTRAMUSCULAR; INTRAVENOUS DAILY
Status: DISCONTINUED | OUTPATIENT
Start: 2019-11-22 | End: 2019-11-22

## 2019-11-21 RX ADMIN — AMLODIPINE BESYLATE 10 MG: 5 TABLET ORAL at 08:35

## 2019-11-21 RX ADMIN — FUROSEMIDE 40 MG: 10 INJECTION, SOLUTION INTRAMUSCULAR; INTRAVENOUS at 00:21

## 2019-11-21 RX ADMIN — CLONIDINE HYDROCHLORIDE 0.1 MG: 0.1 TABLET ORAL at 22:19

## 2019-11-21 RX ADMIN — ATORVASTATIN CALCIUM 80 MG: 40 TABLET, FILM COATED ORAL at 00:22

## 2019-11-21 RX ADMIN — INSULIN GLARGINE 10 UNITS: 100 INJECTION, SOLUTION SUBCUTANEOUS at 08:53

## 2019-11-21 RX ADMIN — INSULIN LISPRO 4 UNITS: 100 INJECTION, SOLUTION INTRAVENOUS; SUBCUTANEOUS at 00:21

## 2019-11-21 RX ADMIN — CLONIDINE HYDROCHLORIDE 0.1 MG: 0.1 TABLET ORAL at 08:35

## 2019-11-21 RX ADMIN — ATORVASTATIN CALCIUM 80 MG: 40 TABLET, FILM COATED ORAL at 16:59

## 2019-11-21 RX ADMIN — WARFARIN SODIUM 2.5 MG: 2.5 TABLET ORAL at 16:59

## 2019-11-21 RX ADMIN — Medication 10 ML: at 00:30

## 2019-11-21 RX ADMIN — CLONIDINE HYDROCHLORIDE 0.1 MG: 0.1 TABLET ORAL at 00:21

## 2019-11-21 RX ADMIN — ASPIRIN 81 MG: 81 TABLET, COATED ORAL at 08:35

## 2019-11-21 RX ADMIN — Medication 10 ML: at 08:35

## 2019-11-21 RX ADMIN — POTASSIUM CHLORIDE 40 MEQ: 10 TABLET, EXTENDED RELEASE ORAL at 06:01

## 2019-11-21 RX ADMIN — FUROSEMIDE 40 MG: 10 INJECTION, SOLUTION INTRAMUSCULAR; INTRAVENOUS at 10:23

## 2019-11-21 RX ADMIN — INSULIN LISPRO 4 UNITS: 100 INJECTION, SOLUTION INTRAVENOUS; SUBCUTANEOUS at 16:20

## 2019-11-21 RX ADMIN — INSULIN LISPRO 8 UNITS: 100 INJECTION, SOLUTION INTRAVENOUS; SUBCUTANEOUS at 12:07

## 2019-11-21 RX ADMIN — Medication 10 ML: at 22:19

## 2019-11-21 RX ADMIN — PANTOPRAZOLE SODIUM 40 MG: 40 TABLET, DELAYED RELEASE ORAL at 08:35

## 2019-11-21 RX ADMIN — INSULIN LISPRO 1 UNITS: 100 INJECTION, SOLUTION INTRAVENOUS; SUBCUTANEOUS at 22:19

## 2019-11-22 LAB
ANION GAP SERPL CALCULATED.3IONS-SCNC: 8 MMOL/L (ref 3–16)
BUN BLDV-MCNC: 24 MG/DL (ref 7–20)
CALCIUM SERPL-MCNC: 8.3 MG/DL (ref 8.3–10.6)
CHLORIDE BLD-SCNC: 88 MMOL/L (ref 99–110)
CO2: 33 MMOL/L (ref 21–32)
CREAT SERPL-MCNC: 1 MG/DL (ref 0.8–1.3)
GFR AFRICAN AMERICAN: >60
GFR NON-AFRICAN AMERICAN: >60
GLUCOSE BLD-MCNC: 177 MG/DL (ref 70–99)
GLUCOSE BLD-MCNC: 211 MG/DL (ref 70–99)
GLUCOSE BLD-MCNC: 245 MG/DL (ref 70–99)
GLUCOSE BLD-MCNC: 82 MG/DL (ref 70–99)
GLUCOSE BLD-MCNC: 88 MG/DL (ref 70–99)
INR BLD: 2.11 (ref 0.86–1.14)
MAGNESIUM: 2 MG/DL (ref 1.8–2.4)
PERFORMED ON: ABNORMAL
PERFORMED ON: NORMAL
PHOSPHORUS: 4.1 MG/DL (ref 2.5–4.9)
POTASSIUM SERPL-SCNC: 3.3 MMOL/L (ref 3.5–5.1)
PROTHROMBIN TIME: 24.7 SEC (ref 10–13.2)
SODIUM BLD-SCNC: 129 MMOL/L (ref 136–145)

## 2019-11-22 PROCEDURE — 6360000002 HC RX W HCPCS: Performed by: INTERNAL MEDICINE

## 2019-11-22 PROCEDURE — 6370000000 HC RX 637 (ALT 250 FOR IP): Performed by: INTERNAL MEDICINE

## 2019-11-22 PROCEDURE — 99232 SBSQ HOSP IP/OBS MODERATE 35: CPT | Performed by: INTERNAL MEDICINE

## 2019-11-22 PROCEDURE — 2580000003 HC RX 258: Performed by: INTERNAL MEDICINE

## 2019-11-22 PROCEDURE — 99233 SBSQ HOSP IP/OBS HIGH 50: CPT | Performed by: INTERNAL MEDICINE

## 2019-11-22 PROCEDURE — 83735 ASSAY OF MAGNESIUM: CPT

## 2019-11-22 PROCEDURE — 36415 COLL VENOUS BLD VENIPUNCTURE: CPT

## 2019-11-22 PROCEDURE — 6370000000 HC RX 637 (ALT 250 FOR IP): Performed by: PHYSICIAN ASSISTANT

## 2019-11-22 PROCEDURE — 2060000000 HC ICU INTERMEDIATE R&B

## 2019-11-22 PROCEDURE — 85610 PROTHROMBIN TIME: CPT

## 2019-11-22 PROCEDURE — 80048 BASIC METABOLIC PNL TOTAL CA: CPT

## 2019-11-22 PROCEDURE — 84100 ASSAY OF PHOSPHORUS: CPT

## 2019-11-22 RX ORDER — NITROGLYCERIN 0.4 MG/1
0.4 TABLET SUBLINGUAL EVERY 5 MIN PRN
Status: DISCONTINUED | OUTPATIENT
Start: 2019-11-22 | End: 2019-11-22

## 2019-11-22 RX ORDER — WARFARIN SODIUM 2.5 MG/1
2.5 TABLET ORAL
Status: COMPLETED | OUTPATIENT
Start: 2019-11-22 | End: 2019-11-22

## 2019-11-22 RX ORDER — FUROSEMIDE 10 MG/ML
40 INJECTION INTRAMUSCULAR; INTRAVENOUS 2 TIMES DAILY
Status: DISCONTINUED | OUTPATIENT
Start: 2019-11-22 | End: 2019-11-23

## 2019-11-22 RX ORDER — POTASSIUM CHLORIDE 20 MEQ/1
40 TABLET, EXTENDED RELEASE ORAL ONCE
Status: COMPLETED | OUTPATIENT
Start: 2019-11-22 | End: 2019-11-22

## 2019-11-22 RX ORDER — POTASSIUM CHLORIDE 750 MG/1
20 TABLET, EXTENDED RELEASE ORAL DAILY
Status: DISCONTINUED | OUTPATIENT
Start: 2019-11-22 | End: 2019-11-27 | Stop reason: HOSPADM

## 2019-11-22 RX ADMIN — INSULIN GLARGINE 10 UNITS: 100 INJECTION, SOLUTION SUBCUTANEOUS at 09:20

## 2019-11-22 RX ADMIN — INSULIN LISPRO 4 UNITS: 100 INJECTION, SOLUTION INTRAVENOUS; SUBCUTANEOUS at 18:07

## 2019-11-22 RX ADMIN — CLONIDINE HYDROCHLORIDE 0.1 MG: 0.1 TABLET ORAL at 09:19

## 2019-11-22 RX ADMIN — ATORVASTATIN CALCIUM 80 MG: 40 TABLET, FILM COATED ORAL at 18:07

## 2019-11-22 RX ADMIN — PANTOPRAZOLE SODIUM 40 MG: 40 TABLET, DELAYED RELEASE ORAL at 05:42

## 2019-11-22 RX ADMIN — ASPIRIN 81 MG: 81 TABLET, COATED ORAL at 09:19

## 2019-11-22 RX ADMIN — FUROSEMIDE 40 MG: 10 INJECTION, SOLUTION INTRAMUSCULAR; INTRAVENOUS at 18:07

## 2019-11-22 RX ADMIN — POTASSIUM CHLORIDE 20 MEQ: 10 TABLET, EXTENDED RELEASE ORAL at 10:48

## 2019-11-22 RX ADMIN — Medication 10 ML: at 22:13

## 2019-11-22 RX ADMIN — CLONIDINE HYDROCHLORIDE 0.1 MG: 0.1 TABLET ORAL at 22:12

## 2019-11-22 RX ADMIN — INSULIN LISPRO 2 UNITS: 100 INJECTION, SOLUTION INTRAVENOUS; SUBCUTANEOUS at 22:13

## 2019-11-22 RX ADMIN — WARFARIN SODIUM 2.5 MG: 2.5 TABLET ORAL at 18:17

## 2019-11-22 RX ADMIN — Medication 10 ML: at 09:19

## 2019-11-22 RX ADMIN — FUROSEMIDE 40 MG: 10 INJECTION, SOLUTION INTRAMUSCULAR; INTRAVENOUS at 09:19

## 2019-11-22 RX ADMIN — INSULIN LISPRO 2 UNITS: 100 INJECTION, SOLUTION INTRAVENOUS; SUBCUTANEOUS at 11:50

## 2019-11-22 RX ADMIN — AMLODIPINE BESYLATE 10 MG: 5 TABLET ORAL at 09:19

## 2019-11-22 RX ADMIN — POTASSIUM CHLORIDE 40 MEQ: 1500 TABLET, EXTENDED RELEASE ORAL at 14:29

## 2019-11-23 ENCOUNTER — APPOINTMENT (OUTPATIENT)
Dept: GENERAL RADIOLOGY | Age: 84
DRG: 291 | End: 2019-11-23
Payer: MEDICARE

## 2019-11-23 LAB
ALBUMIN SERPL-MCNC: 2.7 G/DL (ref 3.4–5)
ANION GAP SERPL CALCULATED.3IONS-SCNC: 10 MMOL/L (ref 3–16)
ANION GAP SERPL CALCULATED.3IONS-SCNC: 11 MMOL/L (ref 3–16)
BASE EXCESS ARTERIAL: 4.4 MMOL/L (ref -3–3)
BASOPHILS ABSOLUTE: 0 K/UL (ref 0–0.2)
BASOPHILS RELATIVE PERCENT: 0.3 %
BUN BLDV-MCNC: 29 MG/DL (ref 7–20)
BUN BLDV-MCNC: 39 MG/DL (ref 7–20)
CALCIUM SERPL-MCNC: 8.5 MG/DL (ref 8.3–10.6)
CALCIUM SERPL-MCNC: 9 MG/DL (ref 8.3–10.6)
CARBOXYHEMOGLOBIN ARTERIAL: 0.9 % (ref 0–1.5)
CHLORIDE BLD-SCNC: 86 MMOL/L (ref 99–110)
CHLORIDE BLD-SCNC: 89 MMOL/L (ref 99–110)
CO2: 29 MMOL/L (ref 21–32)
CO2: 29 MMOL/L (ref 21–32)
CREAT SERPL-MCNC: 1.4 MG/DL (ref 0.8–1.3)
CREAT SERPL-MCNC: 1.7 MG/DL (ref 0.8–1.3)
EOSINOPHILS ABSOLUTE: 0.1 K/UL (ref 0–0.6)
EOSINOPHILS RELATIVE PERCENT: 0.9 %
GFR AFRICAN AMERICAN: 46
GFR AFRICAN AMERICAN: 58
GFR NON-AFRICAN AMERICAN: 38
GFR NON-AFRICAN AMERICAN: 48
GLUCOSE BLD-MCNC: 119 MG/DL (ref 70–99)
GLUCOSE BLD-MCNC: 137 MG/DL (ref 70–99)
GLUCOSE BLD-MCNC: 217 MG/DL (ref 70–99)
GLUCOSE BLD-MCNC: 257 MG/DL (ref 70–99)
GLUCOSE BLD-MCNC: 281 MG/DL (ref 70–99)
GLUCOSE BLD-MCNC: 321 MG/DL (ref 70–99)
HCO3 ARTERIAL: 32 MMOL/L (ref 21–29)
HCT VFR BLD CALC: 27.8 % (ref 40.5–52.5)
HEMOGLOBIN, ART, EXTENDED: 9.3 G/DL (ref 13.5–17.5)
HEMOGLOBIN: 9.2 G/DL (ref 13.5–17.5)
INR BLD: 2.01 (ref 0.86–1.14)
LYMPHOCYTES ABSOLUTE: 0.9 K/UL (ref 1–5.1)
LYMPHOCYTES RELATIVE PERCENT: 12.1 %
MAGNESIUM: 2.1 MG/DL (ref 1.8–2.4)
MCH RBC QN AUTO: 29.3 PG (ref 26–34)
MCHC RBC AUTO-ENTMCNC: 33.1 G/DL (ref 31–36)
MCV RBC AUTO: 88.7 FL (ref 80–100)
METHEMOGLOBIN ARTERIAL: 0.4 %
MONOCYTES ABSOLUTE: 0.8 K/UL (ref 0–1.3)
MONOCYTES RELATIVE PERCENT: 10.9 %
NEUTROPHILS ABSOLUTE: 5.5 K/UL (ref 1.7–7.7)
NEUTROPHILS RELATIVE PERCENT: 75.8 %
O2 CONTENT ARTERIAL: 12 ML/DL
O2 SAT, ARTERIAL: 91.1 %
O2 THERAPY: ABNORMAL
ORGANISM: ABNORMAL
PCO2 ARTERIAL: 66.3 MMHG (ref 35–45)
PDW BLD-RTO: 14.2 % (ref 12.4–15.4)
PERFORMED ON: ABNORMAL
PH ARTERIAL: 7.3 (ref 7.35–7.45)
PHOSPHORUS: 5.4 MG/DL (ref 2.5–4.9)
PLATELET # BLD: 218 K/UL (ref 135–450)
PMV BLD AUTO: 7.9 FL (ref 5–10.5)
PO2 ARTERIAL: 68.1 MMHG (ref 75–108)
POTASSIUM SERPL-SCNC: 4.1 MMOL/L (ref 3.5–5.1)
POTASSIUM SERPL-SCNC: 4.3 MMOL/L (ref 3.5–5.1)
PRO-BNP: 1821 PG/ML (ref 0–449)
PROCALCITONIN: 0.16 NG/ML (ref 0–0.15)
PROTHROMBIN TIME: 23.5 SEC (ref 10–13.2)
RBC # BLD: 3.13 M/UL (ref 4.2–5.9)
SODIUM BLD-SCNC: 126 MMOL/L (ref 136–145)
SODIUM BLD-SCNC: 128 MMOL/L (ref 136–145)
TCO2 ARTERIAL: 34 MMOL/L
URINE CULTURE, ROUTINE: ABNORMAL
WBC # BLD: 7.3 K/UL (ref 4–11)

## 2019-11-23 PROCEDURE — 36415 COLL VENOUS BLD VENIPUNCTURE: CPT

## 2019-11-23 PROCEDURE — 97530 THERAPEUTIC ACTIVITIES: CPT

## 2019-11-23 PROCEDURE — 71045 X-RAY EXAM CHEST 1 VIEW: CPT

## 2019-11-23 PROCEDURE — 97110 THERAPEUTIC EXERCISES: CPT

## 2019-11-23 PROCEDURE — 2580000003 HC RX 258: Performed by: INTERNAL MEDICINE

## 2019-11-23 PROCEDURE — 84145 PROCALCITONIN (PCT): CPT

## 2019-11-23 PROCEDURE — 82803 BLOOD GASES ANY COMBINATION: CPT

## 2019-11-23 PROCEDURE — 85025 COMPLETE CBC W/AUTO DIFF WBC: CPT

## 2019-11-23 PROCEDURE — 85610 PROTHROMBIN TIME: CPT

## 2019-11-23 PROCEDURE — 99233 SBSQ HOSP IP/OBS HIGH 50: CPT | Performed by: INTERNAL MEDICINE

## 2019-11-23 PROCEDURE — 80069 RENAL FUNCTION PANEL: CPT

## 2019-11-23 PROCEDURE — 6370000000 HC RX 637 (ALT 250 FOR IP): Performed by: INTERNAL MEDICINE

## 2019-11-23 PROCEDURE — 6360000002 HC RX W HCPCS: Performed by: HOSPITALIST

## 2019-11-23 PROCEDURE — 83735 ASSAY OF MAGNESIUM: CPT

## 2019-11-23 PROCEDURE — 2060000000 HC ICU INTERMEDIATE R&B

## 2019-11-23 PROCEDURE — 6360000002 HC RX W HCPCS: Performed by: INTERNAL MEDICINE

## 2019-11-23 PROCEDURE — 83880 ASSAY OF NATRIURETIC PEPTIDE: CPT

## 2019-11-23 RX ORDER — FUROSEMIDE 40 MG/1
40 TABLET ORAL 2 TIMES DAILY
Status: DISCONTINUED | OUTPATIENT
Start: 2019-11-24 | End: 2019-11-27 | Stop reason: HOSPADM

## 2019-11-23 RX ORDER — FUROSEMIDE 10 MG/ML
40 INJECTION INTRAMUSCULAR; INTRAVENOUS ONCE
Status: COMPLETED | OUTPATIENT
Start: 2019-11-23 | End: 2019-11-23

## 2019-11-23 RX ORDER — WARFARIN SODIUM 2.5 MG/1
2.5 TABLET ORAL
Status: COMPLETED | OUTPATIENT
Start: 2019-11-23 | End: 2019-11-23

## 2019-11-23 RX ORDER — AMOXICILLIN 500 MG/1
500 CAPSULE ORAL EVERY 8 HOURS SCHEDULED
Status: DISCONTINUED | OUTPATIENT
Start: 2019-11-23 | End: 2019-11-27 | Stop reason: HOSPADM

## 2019-11-23 RX ADMIN — INSULIN LISPRO 6 UNITS: 100 INJECTION, SOLUTION INTRAVENOUS; SUBCUTANEOUS at 13:43

## 2019-11-23 RX ADMIN — FUROSEMIDE 40 MG: 10 INJECTION, SOLUTION INTRAMUSCULAR; INTRAVENOUS at 22:52

## 2019-11-23 RX ADMIN — POTASSIUM CHLORIDE 20 MEQ: 10 TABLET, EXTENDED RELEASE ORAL at 09:34

## 2019-11-23 RX ADMIN — PANTOPRAZOLE SODIUM 40 MG: 40 TABLET, DELAYED RELEASE ORAL at 06:29

## 2019-11-23 RX ADMIN — AMLODIPINE BESYLATE 10 MG: 5 TABLET ORAL at 09:34

## 2019-11-23 RX ADMIN — ASPIRIN 81 MG: 81 TABLET, COATED ORAL at 09:34

## 2019-11-23 RX ADMIN — ATORVASTATIN CALCIUM 80 MG: 40 TABLET, FILM COATED ORAL at 17:25

## 2019-11-23 RX ADMIN — AMOXICILLIN 500 MG: 500 CAPSULE ORAL at 13:43

## 2019-11-23 RX ADMIN — CLONIDINE HYDROCHLORIDE 0.1 MG: 0.1 TABLET ORAL at 09:34

## 2019-11-23 RX ADMIN — INSULIN LISPRO 8 UNITS: 100 INJECTION, SOLUTION INTRAVENOUS; SUBCUTANEOUS at 17:25

## 2019-11-23 RX ADMIN — INSULIN LISPRO 3 UNITS: 100 INJECTION, SOLUTION INTRAVENOUS; SUBCUTANEOUS at 21:20

## 2019-11-23 RX ADMIN — CLONIDINE HYDROCHLORIDE 0.1 MG: 0.1 TABLET ORAL at 21:19

## 2019-11-23 RX ADMIN — AMOXICILLIN 500 MG: 500 CAPSULE ORAL at 21:19

## 2019-11-23 RX ADMIN — FUROSEMIDE 40 MG: 10 INJECTION, SOLUTION INTRAMUSCULAR; INTRAVENOUS at 09:34

## 2019-11-23 RX ADMIN — INSULIN GLARGINE 10 UNITS: 100 INJECTION, SOLUTION SUBCUTANEOUS at 09:47

## 2019-11-23 RX ADMIN — Medication 10 ML: at 21:19

## 2019-11-23 RX ADMIN — WARFARIN SODIUM 2.5 MG: 2.5 TABLET ORAL at 17:25

## 2019-11-23 RX ADMIN — Medication 10 ML: at 09:34

## 2019-11-24 LAB
ALBUMIN SERPL-MCNC: 2.8 G/DL (ref 3.4–5)
ANION GAP SERPL CALCULATED.3IONS-SCNC: 9 MMOL/L (ref 3–16)
BUN BLDV-MCNC: 41 MG/DL (ref 7–20)
CALCIUM SERPL-MCNC: 8.2 MG/DL (ref 8.3–10.6)
CHLORIDE BLD-SCNC: 89 MMOL/L (ref 99–110)
CO2: 32 MMOL/L (ref 21–32)
CREAT SERPL-MCNC: 1.7 MG/DL (ref 0.8–1.3)
GFR AFRICAN AMERICAN: 46
GFR NON-AFRICAN AMERICAN: 38
GLUCOSE BLD-MCNC: 160 MG/DL (ref 70–99)
GLUCOSE BLD-MCNC: 172 MG/DL (ref 70–99)
GLUCOSE BLD-MCNC: 191 MG/DL (ref 70–99)
GLUCOSE BLD-MCNC: 262 MG/DL (ref 70–99)
GLUCOSE BLD-MCNC: 264 MG/DL (ref 70–99)
INR BLD: 1.94 (ref 0.86–1.14)
MAGNESIUM: 2.2 MG/DL (ref 1.8–2.4)
PERFORMED ON: ABNORMAL
PHOSPHORUS: 5.1 MG/DL (ref 2.5–4.9)
POTASSIUM SERPL-SCNC: 4.3 MMOL/L (ref 3.5–5.1)
PROTHROMBIN TIME: 22.6 SEC (ref 10–13.2)
SODIUM BLD-SCNC: 130 MMOL/L (ref 136–145)

## 2019-11-24 PROCEDURE — 6370000000 HC RX 637 (ALT 250 FOR IP): Performed by: INTERNAL MEDICINE

## 2019-11-24 PROCEDURE — 80069 RENAL FUNCTION PANEL: CPT

## 2019-11-24 PROCEDURE — 99233 SBSQ HOSP IP/OBS HIGH 50: CPT | Performed by: INTERNAL MEDICINE

## 2019-11-24 PROCEDURE — 2580000003 HC RX 258: Performed by: INTERNAL MEDICINE

## 2019-11-24 PROCEDURE — 85610 PROTHROMBIN TIME: CPT

## 2019-11-24 PROCEDURE — 94660 CPAP INITIATION&MGMT: CPT

## 2019-11-24 PROCEDURE — 2700000000 HC OXYGEN THERAPY PER DAY

## 2019-11-24 PROCEDURE — 2060000000 HC ICU INTERMEDIATE R&B

## 2019-11-24 PROCEDURE — 94761 N-INVAS EAR/PLS OXIMETRY MLT: CPT

## 2019-11-24 PROCEDURE — 36415 COLL VENOUS BLD VENIPUNCTURE: CPT

## 2019-11-24 PROCEDURE — 83735 ASSAY OF MAGNESIUM: CPT

## 2019-11-24 RX ADMIN — ACETAMINOPHEN 650 MG: 325 TABLET ORAL at 20:13

## 2019-11-24 RX ADMIN — INSULIN LISPRO 2 UNITS: 100 INJECTION, SOLUTION INTRAVENOUS; SUBCUTANEOUS at 09:08

## 2019-11-24 RX ADMIN — PANTOPRAZOLE SODIUM 40 MG: 40 TABLET, DELAYED RELEASE ORAL at 07:03

## 2019-11-24 RX ADMIN — AMLODIPINE BESYLATE 10 MG: 5 TABLET ORAL at 09:07

## 2019-11-24 RX ADMIN — ASPIRIN 81 MG: 81 TABLET, COATED ORAL at 09:07

## 2019-11-24 RX ADMIN — Medication 10 ML: at 20:04

## 2019-11-24 RX ADMIN — AMOXICILLIN 500 MG: 500 CAPSULE ORAL at 07:03

## 2019-11-24 RX ADMIN — INSULIN LISPRO 6 UNITS: 100 INJECTION, SOLUTION INTRAVENOUS; SUBCUTANEOUS at 12:26

## 2019-11-24 RX ADMIN — FUROSEMIDE 40 MG: 40 TABLET ORAL at 09:07

## 2019-11-24 RX ADMIN — ATORVASTATIN CALCIUM 80 MG: 40 TABLET, FILM COATED ORAL at 17:07

## 2019-11-24 RX ADMIN — CLONIDINE HYDROCHLORIDE 0.1 MG: 0.1 TABLET ORAL at 09:07

## 2019-11-24 RX ADMIN — CLONIDINE HYDROCHLORIDE 0.1 MG: 0.1 TABLET ORAL at 20:04

## 2019-11-24 RX ADMIN — INSULIN LISPRO 2 UNITS: 100 INJECTION, SOLUTION INTRAVENOUS; SUBCUTANEOUS at 17:07

## 2019-11-24 RX ADMIN — FUROSEMIDE 40 MG: 40 TABLET ORAL at 17:07

## 2019-11-24 RX ADMIN — WARFARIN SODIUM 3 MG: 1 TABLET ORAL at 17:07

## 2019-11-24 RX ADMIN — INSULIN LISPRO 3 UNITS: 100 INJECTION, SOLUTION INTRAVENOUS; SUBCUTANEOUS at 20:05

## 2019-11-24 RX ADMIN — AMOXICILLIN 500 MG: 500 CAPSULE ORAL at 20:04

## 2019-11-24 RX ADMIN — Medication 10 ML: at 09:08

## 2019-11-24 RX ADMIN — POTASSIUM CHLORIDE 20 MEQ: 10 TABLET, EXTENDED RELEASE ORAL at 09:07

## 2019-11-24 RX ADMIN — INSULIN GLARGINE 10 UNITS: 100 INJECTION, SOLUTION SUBCUTANEOUS at 09:09

## 2019-11-24 RX ADMIN — AMOXICILLIN 500 MG: 500 CAPSULE ORAL at 15:34

## 2019-11-24 ASSESSMENT — PAIN SCALES - GENERAL: PAINLEVEL_OUTOF10: 3

## 2019-11-25 LAB
ANION GAP SERPL CALCULATED.3IONS-SCNC: 8 MMOL/L (ref 3–16)
BUN BLDV-MCNC: 41 MG/DL (ref 7–20)
CALCIUM SERPL-MCNC: 8.2 MG/DL (ref 8.3–10.6)
CHLORIDE BLD-SCNC: 89 MMOL/L (ref 99–110)
CO2: 30 MMOL/L (ref 21–32)
CREAT SERPL-MCNC: 1.5 MG/DL (ref 0.8–1.3)
GFR AFRICAN AMERICAN: 54
GFR NON-AFRICAN AMERICAN: 44
GLUCOSE BLD-MCNC: 115 MG/DL (ref 70–99)
GLUCOSE BLD-MCNC: 131 MG/DL (ref 70–99)
GLUCOSE BLD-MCNC: 245 MG/DL (ref 70–99)
GLUCOSE BLD-MCNC: 306 MG/DL (ref 70–99)
GLUCOSE BLD-MCNC: 419 MG/DL (ref 70–99)
INR BLD: 1.85 (ref 0.86–1.14)
MAGNESIUM: 2.4 MG/DL (ref 1.8–2.4)
PERFORMED ON: ABNORMAL
POTASSIUM SERPL-SCNC: 4.4 MMOL/L (ref 3.5–5.1)
PROTHROMBIN TIME: 21.6 SEC (ref 10–13.2)
SODIUM BLD-SCNC: 127 MMOL/L (ref 136–145)

## 2019-11-25 PROCEDURE — 6370000000 HC RX 637 (ALT 250 FOR IP): Performed by: INTERNAL MEDICINE

## 2019-11-25 PROCEDURE — 83735 ASSAY OF MAGNESIUM: CPT

## 2019-11-25 PROCEDURE — 99232 SBSQ HOSP IP/OBS MODERATE 35: CPT | Performed by: INTERNAL MEDICINE

## 2019-11-25 PROCEDURE — 94660 CPAP INITIATION&MGMT: CPT

## 2019-11-25 PROCEDURE — 97110 THERAPEUTIC EXERCISES: CPT

## 2019-11-25 PROCEDURE — 6370000000 HC RX 637 (ALT 250 FOR IP): Performed by: PHYSICIAN ASSISTANT

## 2019-11-25 PROCEDURE — 97530 THERAPEUTIC ACTIVITIES: CPT

## 2019-11-25 PROCEDURE — 36415 COLL VENOUS BLD VENIPUNCTURE: CPT

## 2019-11-25 PROCEDURE — 99232 SBSQ HOSP IP/OBS MODERATE 35: CPT | Performed by: NURSE PRACTITIONER

## 2019-11-25 PROCEDURE — 94761 N-INVAS EAR/PLS OXIMETRY MLT: CPT

## 2019-11-25 PROCEDURE — 2060000000 HC ICU INTERMEDIATE R&B

## 2019-11-25 PROCEDURE — 2580000003 HC RX 258: Performed by: INTERNAL MEDICINE

## 2019-11-25 PROCEDURE — 97116 GAIT TRAINING THERAPY: CPT

## 2019-11-25 PROCEDURE — 85610 PROTHROMBIN TIME: CPT

## 2019-11-25 PROCEDURE — 2700000000 HC OXYGEN THERAPY PER DAY

## 2019-11-25 PROCEDURE — 80048 BASIC METABOLIC PNL TOTAL CA: CPT

## 2019-11-25 RX ADMIN — AMLODIPINE BESYLATE 10 MG: 5 TABLET ORAL at 09:44

## 2019-11-25 RX ADMIN — CLONIDINE HYDROCHLORIDE 0.1 MG: 0.1 TABLET ORAL at 09:44

## 2019-11-25 RX ADMIN — CLONIDINE HYDROCHLORIDE 0.1 MG: 0.1 TABLET ORAL at 22:27

## 2019-11-25 RX ADMIN — AMOXICILLIN 500 MG: 500 CAPSULE ORAL at 22:27

## 2019-11-25 RX ADMIN — FUROSEMIDE 40 MG: 40 TABLET ORAL at 16:35

## 2019-11-25 RX ADMIN — WARFARIN SODIUM 3 MG: 1 TABLET ORAL at 16:35

## 2019-11-25 RX ADMIN — INSULIN LISPRO 8 UNITS: 100 INJECTION, SOLUTION INTRAVENOUS; SUBCUTANEOUS at 17:10

## 2019-11-25 RX ADMIN — Medication 10 ML: at 22:28

## 2019-11-25 RX ADMIN — ASPIRIN 81 MG: 81 TABLET, COATED ORAL at 09:44

## 2019-11-25 RX ADMIN — Medication 10 ML: at 09:45

## 2019-11-25 RX ADMIN — POTASSIUM CHLORIDE 20 MEQ: 10 TABLET, EXTENDED RELEASE ORAL at 09:45

## 2019-11-25 RX ADMIN — INSULIN LISPRO 6 UNITS: 100 INJECTION, SOLUTION INTRAVENOUS; SUBCUTANEOUS at 22:28

## 2019-11-25 RX ADMIN — PANTOPRAZOLE SODIUM 40 MG: 40 TABLET, DELAYED RELEASE ORAL at 07:05

## 2019-11-25 RX ADMIN — FUROSEMIDE 40 MG: 40 TABLET ORAL at 09:44

## 2019-11-25 RX ADMIN — AMOXICILLIN 500 MG: 500 CAPSULE ORAL at 15:54

## 2019-11-25 RX ADMIN — ATORVASTATIN CALCIUM 80 MG: 40 TABLET, FILM COATED ORAL at 16:35

## 2019-11-25 RX ADMIN — INSULIN GLARGINE 10 UNITS: 100 INJECTION, SOLUTION SUBCUTANEOUS at 09:46

## 2019-11-25 RX ADMIN — INSULIN LISPRO 4 UNITS: 100 INJECTION, SOLUTION INTRAVENOUS; SUBCUTANEOUS at 11:48

## 2019-11-25 RX ADMIN — AMOXICILLIN 500 MG: 500 CAPSULE ORAL at 07:05

## 2019-11-26 LAB
ANION GAP SERPL CALCULATED.3IONS-SCNC: 7 MMOL/L (ref 3–16)
BUN BLDV-MCNC: 38 MG/DL (ref 7–20)
CALCIUM SERPL-MCNC: 8.4 MG/DL (ref 8.3–10.6)
CHLORIDE BLD-SCNC: 89 MMOL/L (ref 99–110)
CO2: 33 MMOL/L (ref 21–32)
CREAT SERPL-MCNC: 1.4 MG/DL (ref 0.8–1.3)
GFR AFRICAN AMERICAN: 58
GFR NON-AFRICAN AMERICAN: 48
GLUCOSE BLD-MCNC: 103 MG/DL (ref 70–99)
GLUCOSE BLD-MCNC: 118 MG/DL (ref 70–99)
GLUCOSE BLD-MCNC: 180 MG/DL (ref 70–99)
GLUCOSE BLD-MCNC: 182 MG/DL (ref 70–99)
GLUCOSE BLD-MCNC: 276 MG/DL (ref 70–99)
INR BLD: 2.19 (ref 0.86–1.14)
PERFORMED ON: ABNORMAL
POTASSIUM SERPL-SCNC: 4.4 MMOL/L (ref 3.5–5.1)
PROTHROMBIN TIME: 25.6 SEC (ref 10–13.2)
SODIUM BLD-SCNC: 129 MMOL/L (ref 136–145)

## 2019-11-26 PROCEDURE — 99232 SBSQ HOSP IP/OBS MODERATE 35: CPT | Performed by: INTERNAL MEDICINE

## 2019-11-26 PROCEDURE — 2580000003 HC RX 258: Performed by: INTERNAL MEDICINE

## 2019-11-26 PROCEDURE — 97110 THERAPEUTIC EXERCISES: CPT

## 2019-11-26 PROCEDURE — 99232 SBSQ HOSP IP/OBS MODERATE 35: CPT | Performed by: NURSE PRACTITIONER

## 2019-11-26 PROCEDURE — 94761 N-INVAS EAR/PLS OXIMETRY MLT: CPT

## 2019-11-26 PROCEDURE — 97530 THERAPEUTIC ACTIVITIES: CPT

## 2019-11-26 PROCEDURE — 92526 ORAL FUNCTION THERAPY: CPT

## 2019-11-26 PROCEDURE — 2060000000 HC ICU INTERMEDIATE R&B

## 2019-11-26 PROCEDURE — 6370000000 HC RX 637 (ALT 250 FOR IP): Performed by: INTERNAL MEDICINE

## 2019-11-26 PROCEDURE — 99233 SBSQ HOSP IP/OBS HIGH 50: CPT | Performed by: INTERNAL MEDICINE

## 2019-11-26 PROCEDURE — 6370000000 HC RX 637 (ALT 250 FOR IP): Performed by: PHYSICIAN ASSISTANT

## 2019-11-26 PROCEDURE — 2700000000 HC OXYGEN THERAPY PER DAY

## 2019-11-26 PROCEDURE — 80048 BASIC METABOLIC PNL TOTAL CA: CPT

## 2019-11-26 PROCEDURE — 85610 PROTHROMBIN TIME: CPT

## 2019-11-26 PROCEDURE — 36415 COLL VENOUS BLD VENIPUNCTURE: CPT

## 2019-11-26 RX ADMIN — AMOXICILLIN 500 MG: 500 CAPSULE ORAL at 06:50

## 2019-11-26 RX ADMIN — AMOXICILLIN 500 MG: 500 CAPSULE ORAL at 21:34

## 2019-11-26 RX ADMIN — INSULIN LISPRO 3 UNITS: 100 INJECTION, SOLUTION INTRAVENOUS; SUBCUTANEOUS at 21:34

## 2019-11-26 RX ADMIN — AMOXICILLIN 500 MG: 500 CAPSULE ORAL at 14:58

## 2019-11-26 RX ADMIN — AMLODIPINE BESYLATE 10 MG: 5 TABLET ORAL at 09:20

## 2019-11-26 RX ADMIN — CLONIDINE HYDROCHLORIDE 0.1 MG: 0.1 TABLET ORAL at 09:21

## 2019-11-26 RX ADMIN — WARFARIN SODIUM 3 MG: 1 TABLET ORAL at 18:49

## 2019-11-26 RX ADMIN — Medication 10 ML: at 09:21

## 2019-11-26 RX ADMIN — Medication 10 ML: at 21:34

## 2019-11-26 RX ADMIN — ASPIRIN 81 MG: 81 TABLET, COATED ORAL at 09:20

## 2019-11-26 RX ADMIN — FUROSEMIDE 40 MG: 40 TABLET ORAL at 18:49

## 2019-11-26 RX ADMIN — PANTOPRAZOLE SODIUM 40 MG: 40 TABLET, DELAYED RELEASE ORAL at 06:51

## 2019-11-26 RX ADMIN — FUROSEMIDE 40 MG: 40 TABLET ORAL at 09:21

## 2019-11-26 RX ADMIN — POTASSIUM CHLORIDE 20 MEQ: 10 TABLET, EXTENDED RELEASE ORAL at 09:21

## 2019-11-26 RX ADMIN — CLONIDINE HYDROCHLORIDE 0.1 MG: 0.1 TABLET ORAL at 21:34

## 2019-11-26 ASSESSMENT — PAIN SCALES - GENERAL: PAINLEVEL_OUTOF10: 0

## 2019-11-27 VITALS
OXYGEN SATURATION: 94 % | HEART RATE: 72 BPM | WEIGHT: 172.8 LBS | HEIGHT: 70 IN | TEMPERATURE: 97.8 F | RESPIRATION RATE: 18 BRPM | BODY MASS INDEX: 24.74 KG/M2 | SYSTOLIC BLOOD PRESSURE: 145 MMHG | DIASTOLIC BLOOD PRESSURE: 68 MMHG

## 2019-11-27 LAB
ANION GAP SERPL CALCULATED.3IONS-SCNC: 10 MMOL/L (ref 3–16)
BUN BLDV-MCNC: 32 MG/DL (ref 7–20)
CALCIUM SERPL-MCNC: 8.7 MG/DL (ref 8.3–10.6)
CHLORIDE BLD-SCNC: 86 MMOL/L (ref 99–110)
CO2: 31 MMOL/L (ref 21–32)
CREAT SERPL-MCNC: 1 MG/DL (ref 0.8–1.3)
GFR AFRICAN AMERICAN: >60
GFR NON-AFRICAN AMERICAN: >60
GLUCOSE BLD-MCNC: 139 MG/DL (ref 70–99)
GLUCOSE BLD-MCNC: 153 MG/DL (ref 70–99)
GLUCOSE BLD-MCNC: 154 MG/DL (ref 70–99)
INR BLD: 2.61 (ref 0.86–1.14)
PERFORMED ON: ABNORMAL
PERFORMED ON: ABNORMAL
POTASSIUM REFLEX MAGNESIUM: 4.4 MMOL/L (ref 3.5–5.1)
PROTHROMBIN TIME: 30.6 SEC (ref 10–13.2)
SODIUM BLD-SCNC: 127 MMOL/L (ref 136–145)

## 2019-11-27 PROCEDURE — 94660 CPAP INITIATION&MGMT: CPT

## 2019-11-27 PROCEDURE — 2580000003 HC RX 258: Performed by: INTERNAL MEDICINE

## 2019-11-27 PROCEDURE — 6370000000 HC RX 637 (ALT 250 FOR IP): Performed by: INTERNAL MEDICINE

## 2019-11-27 PROCEDURE — 2700000000 HC OXYGEN THERAPY PER DAY

## 2019-11-27 PROCEDURE — 36415 COLL VENOUS BLD VENIPUNCTURE: CPT

## 2019-11-27 PROCEDURE — 99232 SBSQ HOSP IP/OBS MODERATE 35: CPT | Performed by: INTERNAL MEDICINE

## 2019-11-27 PROCEDURE — 99239 HOSP IP/OBS DSCHRG MGMT >30: CPT | Performed by: INTERNAL MEDICINE

## 2019-11-27 PROCEDURE — 85610 PROTHROMBIN TIME: CPT

## 2019-11-27 PROCEDURE — 80048 BASIC METABOLIC PNL TOTAL CA: CPT

## 2019-11-27 PROCEDURE — 94761 N-INVAS EAR/PLS OXIMETRY MLT: CPT

## 2019-11-27 RX ORDER — WARFARIN SODIUM 1 MG/1
1.5 TABLET ORAL
Status: DISCONTINUED | OUTPATIENT
Start: 2019-11-27 | End: 2019-11-27 | Stop reason: HOSPADM

## 2019-11-27 RX ORDER — FUROSEMIDE 20 MG/1
40 TABLET ORAL 2 TIMES DAILY
Qty: 60 TABLET | Refills: 0
Start: 2019-11-27

## 2019-11-27 RX ORDER — AMOXICILLIN 500 MG/1
500 CAPSULE ORAL EVERY 8 HOURS SCHEDULED
Qty: 12 CAPSULE | Refills: 0
Start: 2019-11-27 | End: 2019-12-01

## 2019-11-27 RX ADMIN — ASPIRIN 81 MG: 81 TABLET, COATED ORAL at 08:35

## 2019-11-27 RX ADMIN — AMLODIPINE BESYLATE 10 MG: 5 TABLET ORAL at 08:35

## 2019-11-27 RX ADMIN — CLONIDINE HYDROCHLORIDE 0.1 MG: 0.1 TABLET ORAL at 08:37

## 2019-11-27 RX ADMIN — Medication 10 ML: at 08:35

## 2019-11-27 RX ADMIN — INSULIN GLARGINE 10 UNITS: 100 INJECTION, SOLUTION SUBCUTANEOUS at 08:38

## 2019-11-27 RX ADMIN — FUROSEMIDE 40 MG: 40 TABLET ORAL at 08:35

## 2019-11-27 RX ADMIN — PANTOPRAZOLE SODIUM 40 MG: 40 TABLET, DELAYED RELEASE ORAL at 05:25

## 2019-11-27 RX ADMIN — INSULIN LISPRO 2 UNITS: 100 INJECTION, SOLUTION INTRAVENOUS; SUBCUTANEOUS at 08:38

## 2019-11-27 RX ADMIN — POTASSIUM CHLORIDE 20 MEQ: 10 TABLET, EXTENDED RELEASE ORAL at 08:35

## 2019-11-27 RX ADMIN — AMOXICILLIN 500 MG: 500 CAPSULE ORAL at 05:25

## 2019-11-27 RX ADMIN — AMOXICILLIN 500 MG: 500 CAPSULE ORAL at 15:43

## 2019-11-27 ASSESSMENT — PAIN SCALES - GENERAL
PAINLEVEL_OUTOF10: 0
PAINLEVEL_OUTOF10: 0

## 2019-12-04 ENCOUNTER — HOSPITAL ENCOUNTER (OUTPATIENT)
Dept: WOUND CARE | Age: 84
Discharge: HOME | End: 2019-12-04
Payer: MEDICARE

## 2019-12-04 VITALS
HEART RATE: 63 BPM | WEIGHT: 201.2 LBS | RESPIRATION RATE: 18 BRPM | HEIGHT: 70 IN | SYSTOLIC BLOOD PRESSURE: 150 MMHG | TEMPERATURE: 97.1 F | BODY MASS INDEX: 28.8 KG/M2 | OXYGEN SATURATION: 96 % | DIASTOLIC BLOOD PRESSURE: 51 MMHG

## 2019-12-04 DIAGNOSIS — L97.521 DIABETIC ULCER OF TOE OF LEFT FOOT ASSOCIATED WITH TYPE 2 DIABETES MELLITUS, LIMITED TO BREAKDOWN OF SKIN (HCC): ICD-10-CM

## 2019-12-04 DIAGNOSIS — L89.152 PRESSURE ULCER OF SACRAL REGION, STAGE 2 (HCC): ICD-10-CM

## 2019-12-04 DIAGNOSIS — E11.621 DIABETIC ULCER OF TOE OF LEFT FOOT ASSOCIATED WITH TYPE 2 DIABETES MELLITUS, LIMITED TO BREAKDOWN OF SKIN (HCC): ICD-10-CM

## 2019-12-04 PROCEDURE — 99214 OFFICE O/P EST MOD 30 MIN: CPT

## 2019-12-04 PROCEDURE — 99213 OFFICE O/P EST LOW 20 MIN: CPT | Performed by: INTERNAL MEDICINE

## 2019-12-04 RX ORDER — LIDOCAINE 40 MG/G
CREAM TOPICAL ONCE
Status: DISCONTINUED | OUTPATIENT
Start: 2019-12-04 | End: 2019-12-05 | Stop reason: HOSPADM

## 2019-12-04 ASSESSMENT — PAIN SCALES - GENERAL: PAINLEVEL_OUTOF10: 0

## 2019-12-08 PROBLEM — L89.152 PRESSURE ULCER OF SACRAL REGION, STAGE 2 (HCC): Status: ACTIVE | Noted: 2019-12-08

## 2019-12-08 PROBLEM — S32.010A CLOSED COMPRESSION FRACTURE OF FIRST LUMBAR VERTEBRA (HCC): Status: RESOLVED | Noted: 2019-05-24 | Resolved: 2019-12-08

## 2019-12-08 PROBLEM — Z90.79 HISTORY OF RADICAL PROSTATECTOMY: Chronic | Status: RESOLVED | Noted: 2019-11-01 | Resolved: 2019-12-08

## 2019-12-08 PROBLEM — L89.156 PRESSURE INJURY OF DEEP TISSUE OF SACRAL REGION: Status: RESOLVED | Noted: 2019-11-04 | Resolved: 2019-12-08

## 2019-12-08 PROBLEM — E87.70 HYPERVOLEMIA: Status: RESOLVED | Noted: 2019-11-01 | Resolved: 2019-12-08

## 2019-12-08 PROBLEM — J96.22 ACUTE ON CHRONIC RESPIRATORY FAILURE WITH HYPOXIA AND HYPERCAPNIA (HCC): Status: RESOLVED | Noted: 2019-11-04 | Resolved: 2019-12-08

## 2019-12-08 PROBLEM — L97.521 DIABETIC ULCER OF TOE OF LEFT FOOT ASSOCIATED WITH TYPE 2 DIABETES MELLITUS, LIMITED TO BREAKDOWN OF SKIN (HCC): Status: ACTIVE | Noted: 2019-12-08

## 2019-12-08 PROBLEM — Z96.653: Status: RESOLVED | Noted: 2018-11-03 | Resolved: 2019-12-08

## 2019-12-08 PROBLEM — R09.89 PULMONARY VASCULAR CONGESTION: Status: RESOLVED | Noted: 2019-11-04 | Resolved: 2019-12-08

## 2019-12-08 PROBLEM — Z79.01 CHRONIC ANTICOAGULATION: Chronic | Status: RESOLVED | Noted: 2019-11-01 | Resolved: 2019-12-08

## 2019-12-08 PROBLEM — L89.152 PRESSURE ULCER OF COCCYGEAL REGION, STAGE 2 (HCC): Status: RESOLVED | Noted: 2019-03-24 | Resolved: 2019-12-08

## 2019-12-08 PROBLEM — J96.21 ACUTE ON CHRONIC RESPIRATORY FAILURE WITH HYPOXIA AND HYPERCAPNIA (HCC): Status: RESOLVED | Noted: 2019-11-04 | Resolved: 2019-12-08

## 2019-12-08 PROBLEM — I50.33 ACUTE ON CHRONIC DIASTOLIC CHF (CONGESTIVE HEART FAILURE) (HCC): Status: RESOLVED | Noted: 2019-11-20 | Resolved: 2019-12-08

## 2019-12-08 PROBLEM — J18.9 COMMUNITY ACQUIRED PNEUMONIA OF RIGHT LUNG: Status: RESOLVED | Noted: 2019-10-14 | Resolved: 2019-12-08

## 2019-12-08 PROBLEM — R91.8 PULMONARY INFILTRATES: Status: RESOLVED | Noted: 2019-11-04 | Resolved: 2019-12-08

## 2019-12-08 PROBLEM — Z96.641 PRESENCE OF RIGHT HIP IMPLANT: Status: RESOLVED | Noted: 2018-11-03 | Resolved: 2019-12-08

## 2019-12-08 PROBLEM — E11.621 DIABETIC ULCER OF TOE OF LEFT FOOT ASSOCIATED WITH TYPE 2 DIABETES MELLITUS, LIMITED TO BREAKDOWN OF SKIN (HCC): Status: ACTIVE | Noted: 2019-12-08

## 2019-12-08 PROBLEM — I50.33 ACUTE ON CHRONIC DIASTOLIC (CONGESTIVE) HEART FAILURE (HCC): Status: RESOLVED | Noted: 2019-11-08 | Resolved: 2019-12-08

## 2019-12-08 PROBLEM — J96.01 ACUTE RESPIRATORY FAILURE WITH HYPOXIA (HCC): Status: RESOLVED | Noted: 2019-11-01 | Resolved: 2019-12-08

## 2019-12-09 LAB
FUNGUS (MYCOLOGY) CULTURE: ABNORMAL
FUNGUS (MYCOLOGY) CULTURE: ABNORMAL
FUNGUS STAIN: ABNORMAL
ORGANISM: ABNORMAL
ORGANISM: ABNORMAL

## 2019-12-20 ENCOUNTER — HOSPITAL ENCOUNTER (OUTPATIENT)
Dept: WOUND CARE | Age: 84
Discharge: HOME | End: 2019-12-20
Payer: MEDICARE

## 2019-12-20 VITALS
OXYGEN SATURATION: 97 % | BODY MASS INDEX: 27.54 KG/M2 | HEIGHT: 70 IN | DIASTOLIC BLOOD PRESSURE: 64 MMHG | SYSTOLIC BLOOD PRESSURE: 161 MMHG | WEIGHT: 192.4 LBS | RESPIRATION RATE: 16 BRPM | HEART RATE: 73 BPM | TEMPERATURE: 97 F

## 2019-12-20 PROCEDURE — 97597 DBRDMT OPN WND 1ST 20 CM/<: CPT | Performed by: INTERNAL MEDICINE

## 2019-12-20 PROCEDURE — 97597 DBRDMT OPN WND 1ST 20 CM/<: CPT

## 2019-12-20 RX ORDER — LIDOCAINE 40 MG/G
CREAM TOPICAL ONCE
Status: DISCONTINUED | OUTPATIENT
Start: 2019-12-20 | End: 2019-12-21 | Stop reason: HOSPADM

## 2019-12-20 RX ORDER — IPRATROPIUM BROMIDE AND ALBUTEROL SULFATE 2.5; .5 MG/3ML; MG/3ML
1 SOLUTION RESPIRATORY (INHALATION) EVERY 4 HOURS PRN
COMMUNITY

## 2019-12-20 RX ORDER — METOLAZONE 2.5 MG/1
2.5 TABLET ORAL DAILY PRN
COMMUNITY

## 2019-12-20 ASSESSMENT — PAIN SCALES - GENERAL: PAINLEVEL_OUTOF10: 0

## 2019-12-24 LAB
AFB CULTURE (MYCOBACTERIA): NORMAL
AFB SMEAR: NORMAL

## 2020-01-03 ENCOUNTER — HOSPITAL ENCOUNTER (OUTPATIENT)
Dept: WOUND CARE | Age: 85
Discharge: HOME | End: 2020-01-03
Payer: MEDICARE

## 2020-01-03 VITALS
HEART RATE: 66 BPM | TEMPERATURE: 97.2 F | DIASTOLIC BLOOD PRESSURE: 53 MMHG | SYSTOLIC BLOOD PRESSURE: 152 MMHG | RESPIRATION RATE: 18 BRPM

## 2020-01-03 PROCEDURE — 99212 OFFICE O/P EST SF 10 MIN: CPT | Performed by: INTERNAL MEDICINE

## 2020-01-03 PROCEDURE — 99213 OFFICE O/P EST LOW 20 MIN: CPT

## 2020-01-03 RX ORDER — LIDOCAINE 40 MG/G
CREAM TOPICAL PRN
Status: DISCONTINUED | OUTPATIENT
Start: 2020-01-03 | End: 2020-01-04 | Stop reason: HOSPADM

## 2020-01-03 ASSESSMENT — PAIN SCALES - GENERAL: PAINLEVEL_OUTOF10: 0

## 2020-01-06 NOTE — PLAN OF CARE
Patient wound is improving with current regime. No debridement indicated today per MD.  Patient remains a patient at The [address] [place] [address]. Plan to follow up in 2 weeks in wound clinic and it is highly likely will be healed at that time. Discharge instructions reviewed with patient, all questions answered, copy given to patient. Dressings were applied to all wounds per M.D. Instructions at this visit.

## 2020-01-07 PROBLEM — L97.521 DIABETIC ULCER OF TOE OF LEFT FOOT ASSOCIATED WITH TYPE 2 DIABETES MELLITUS, LIMITED TO BREAKDOWN OF SKIN (HCC): Status: RESOLVED | Noted: 2019-12-08 | Resolved: 2020-01-07

## 2020-01-07 PROBLEM — E11.621 DIABETIC ULCER OF TOE OF LEFT FOOT ASSOCIATED WITH TYPE 2 DIABETES MELLITUS, LIMITED TO BREAKDOWN OF SKIN (HCC): Status: RESOLVED | Noted: 2019-12-08 | Resolved: 2020-01-07

## 2020-01-07 NOTE — PROGRESS NOTES
[facility] Progress Note    Mahesh Ibarra     : 3/13/1933    DATE OF VISIT:  1/3/2020    Subjective:     Mahesh Ibarra is a 80 y.o. male who has a pressure ulcer located on the sacrum. Current complaint of pain in this ulcer? yes. Quality of pain: aching  Timing: intermittent and decreasing in frequency  Severity: mild-moderate  Associated Signs/Symptoms: redness and drainage (light, clear)  Other significant symptoms or pertinent ulcer history: definitely made some progress toward healing when he started with the group II mattress. His wife states that he's still eating well, doing PT. Gray catheter still in place, and he follows up with urology soon. Additional ulcer(s) noted? no. Diabetic toe ulcer healed. Mr. Dylan Almeida has a past medical history of Acute encephalopathy, Acute lower GI bleeding, Acute pulmonary edema ([address] Acute renal failure (ARF) ([facility]), Acute respiratory failure ([address] SREE (acute kidney injury) ([address] Atrial flutter ([address] Closed compression fracture of first lumbar vertebra ([address] Closed right hip fracture ([address] Diabetes mellitus ([address] Diastolic heart failure ([address] Diverticulitis, HCAP (healthcare-associated pneumonia), Iron deficiency anemia, Ischemic colitis ([address] Metabolic encephalopathy, Nonhealing nonsurgical wound of scalp, limited to breakdown of skin, Obesity, 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), Thoracic compression fracture ([address] Vasovagal syncope, and VT (ventricular tachycardia) ([address] He has a past surgical history that includes Cholecystectomy; Appendectomy (1939); Prostatectomy (); Revision total knee arthroplasty (Left, 2014); Upper gastrointestinal endoscopy (03/02/10); Colonoscopy (10/07/2014); Uvulopalatopharygoplasty; Coronary artery bypass graft (); Total knee arthroplasty (Bilateral, );  Partial hip arthroplasty Pressure Injury, Stage 2, Onset 11/1/19-Wound Length (cm): 0.4 cm    Wound 12/04/19 #6, Sacrum CLUSTER, Pressure Injury, Stage 2, Onset 11/1/19-Wound Width (cm): 0.3 cm    Wound 12/04/19 #6, Sacrum CLUSTER, Pressure Injury, Stage 2, Onset 11/1/19-Wound Depth (cm): 0.1 cm  ______________________________    Lab Results   Component Value Date    LABALBU 2.8 (L) 11/24/2019     Lab Results   Component Value Date    CREATININE 1.0 11/27/2019     Lab Results   Component Value Date    HGB 9.2 (L) 11/23/2019     Lab Results   Component Value Date    LABA1C 6.9 11/20/2019     Assessment:     Patient Active Problem List   Diagnosis Code    Uncontrolled type 2 diabetes mellitus with hyperglycemia ([facility]) E11.65    Hyperlipidemia E78.5    Venous (peripheral) insufficiency I87.2    CAD (coronary artery disease) I25.10    Normocytic anemia G19.4    Diastolic dysfunction H23.06    Essential hypertension I10    Lumbar spondylolysis M43.06    Bronchiectasis without complication ([facility]) Z18.6    Cardiac pacemaker in situ Z95.0    Carotid stenosis I65.29    CKD (chronic kidney disease) stage 3, GFR 30-59 ml/min ([facility]) N18.3    LPRD (laryngopharyngeal reflux disease) K21.9    RAD (reactive airway disease), mild persistent, uncomplicated M57.53    RBBB (right bundle branch block) I45.10    Restrictive lung disease J98.4    Sensorineural hearing loss H90.5    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

## 2020-01-15 ENCOUNTER — TELEPHONE (OUTPATIENT)
Dept: WOUND CARE | Age: 85
End: 2020-01-15

## 2020-01-15 ENCOUNTER — HOSPITAL ENCOUNTER (OUTPATIENT)
Dept: WOUND CARE | Age: 85
Discharge: HOME | End: 2020-01-15
Payer: MEDICARE

## 2020-01-15 NOTE — TELEPHONE ENCOUNTER
Wife called stating \"unable to get patient there, can not get him around on my own and unable to find someone to help us get there home care coming in and they're going to have to do everything for me neighbors had to come over to help me get him to bed will need to call back to reschedule not sure what's going on or when at this point\".

## 2023-08-01 NOTE — PLAN OF CARE
ARU PATIENT TREATMENT PLAN  [facility], [address]   [phone]    Mahesh Ibarra    : 3/13/1933  Acct #: [de-identified]  MRN: [id number]   PHYSICIAN:  Mitzy Nevarez MD  Primary Problem    Patient Active Problem List   Diagnosis    [facility]    Diabetes mellitus type 2, uncontrolled ([facility]    Hyperlipidemia    Venous (peripheral) insufficiency    CAD (coronary artery disease)    Normocytic anemia    Diastolic dysfunction    Essential hypertension    Lumbar spondylolysis    Bronchiectasis without complication ([facility]    Cardiac pacemaker in situ    Carotid stenosis    CKD (chronic kidney disease) stage 3, GFR 30-59 ml/min ([facility]    LPRD (laryngopharyngeal reflux disease)    RAD (reactive airway disease), mild persistent, uncomplicated    RBBB (right bundle branch block)    Restrictive lung disease    Sensorineural hearing loss    Obstructive and central sleep apnea    Decreased mobility    Presence of total knee joint prosthesis, bilateral    Presence of right hip implant    Allergic rhinitis    Diverticulosis    Vitamin D deficiency    Pressure ulcer of left buttock, stage 2    Constipation    Lumbar compression fracture, sequela       Rehabilitation Diagnosis:     Lumbar compression fracture, sequela [S32.000S]       ADMIT DATE:2019    Patient Goals: return home to [facility]    Admitting Impairments: Decrease strength, Decrease balance, Pain, Munltiple comorbities  Barriers:live with wife , new to assistive device fall risk  Participation: good     CARE PLAN     NURSING:  Mahesh Ibarra while on this unit will:     [] Be continent of bowel and bladder     [x] Have an adequate number of bowel movements  [] Urinate with no urinary retention >300ml in bladder  [] Complete bladder protocol with rodriguez removal  [x] Maintain O2 SATs at _93 __%  [x] Have pain managed while on ARU       [] Be pain free by discharge   [] Have Detail Level: Zone no skin breakdown while on ARU  [] Have improved skin integrity via wound measurements  [] Have no signs/symptoms of infection at the wound site  [x] Be free from injury during hospitalization   [x] Complete education with patient/family with understanding demonstrated for:  [] Adjustment   [] Other:   Nursing interventions may include bowel/bladder training, education for medical assistive devices, medication education, O2 saturation management, energy conservation, stress management techniques, fall prevention, alarms protocol, seating and positioning, skin/wound care, pressure relief instruction,dressing changes,  infection protection, DVT prophylaxis, and/or assistance with in room safety with transfers to bed, toilet, wheelchair, shower as well as bathroom activities and hygiene.      Patient/caregiver education for:   [] Disease/sustained injury/management      [] Medication Use   [] Surgical intervention   [] Safety   [] Body mechanics and or joint protection   [] Health maintenance         PHYSICAL THERAPY:  Goals:                  Short term goals  Time Frame for Short term goals: 1 week (5/25/19)  Short term goal 1: Pt will perform supine<>sit with min A to increase (I) with getting out of bed in home   Short term goal 2: Pt will perform sit<>stand and bed<>chair transfer c LRAD and mod A to increase (I) with functional transfers in home   Short term goal 3: Pt will amb >30 ft c RW and min A to increase (I) with household ambulation   Short term goal 4: Pt will be able to accurately demonstrate log rolling technique with min vc's and </=6/10 pain to improve (I) and decrease stress on low back during bed mobility tasks             Long term goals  Time Frame for Long term goals : 2 weeks (6/1/19)  Long term goal 1: Pt will perform all bed mobility using log rolling technique with CGA for safety to increase (I) getting in to and out of bed in home   Long term goal 2: Pt will perform sit<>stand and bed<>chair Continue Regimen: Opzelura \\nCalcipotriene topical cream QD. transfer c LRAD and CGA for safety to increase (I) with functional transfers in home   Long term goal 3: Pt will amb >50 ft c LRAD and CGA for safety to increase (I) with household ambulation   Long term goal 4: Pt will be (I) with B LE strengthening program to promote B LE strength required for transfers and ambulation   Long term goal 5: Pt's family will be able to demonstrate independence donning and doffing [employer]ace to allow for family to care for pt in home   These goals were reviewed with this patient at the time of assessment and Mahesh Ibarra is in agreement.      Plan of Care: Pt to be seen 5 out of 7 days per week, 90 mins (exact) per day for 14 days (exact)                   Current Treatment Recommendations: Strengthening, IADL Training, Functional Mobility Training, Neuromuscular Re-education, Home Exercise Program, Transfer Training, Gait Training, Safety Education & Training, Endurance Training, Positioning, Patient/Caregiver Education & Training, Stair training, Balance Training      OCCUPATIONAL THERAPY:  Goals:             Short term goals  Time Frame for Short term goals: 1 week  Short term goal 1: Patient will complete toilet transfer/functional ADL transfer mod Ax1  Short term goal 2: Patient will complete UB dressing mod A  Short term goal 3: Patient will complete toileting/hygiene min A  Short term goal 4: Patient will complete grooming task EOB SBA  Short term goal 5: Patient will complete bed mobility mod Ax1 :  Long term goals  Time Frame for Long term goals : 2 weeks  Long term goal 1: Patient will complete functional ADL/toilet transfer CGA  Long term goal 2: Patient will complete UB/LB dressing min A  Long term goal 3: Patient will complete bed mobility CGA  Long term goal 4: Patient will complete 5 min dynamic standing activity CGA :    These goals were reviewed with this patient at the time of assessment and Mahesh Ibarra is in agreement    Plan of Care:  Pt to be seen 5 out of 7 days Modify Regimen: Hold Skyrizi for further TB testing. per week, 90 mins (exact) per day for 14 days (exact)  Current Treatment Recommendations: Strengthening, Endurance Training, Patient/Caregiver Education & Training, Cognitive Reorientation, Balance Training, Gait Training, Pain Management, Self-Care / ADL, Functional Mobility Training, Safety Education & Training, Cognitive/Perceptual Training      SPEECH THERAPY: Goals will be left blank if speech is not following this patient. Goals: These goals were reviewed with this patient at the time of assessment and Mahesh Ibarra is in agreement    Plan of Care: Pt to be seen 5 out of 7 days per week,  mins (exact) per day for [ days (exact)             Dysphagia:             Speech/Language/Cognition:      CASE MANAGEMENT:  Goals:   Assist patient/family with discharge planning, patient/family counseling,   and coordination with insurance during ARU stay. Admission Period/Goal FIM SCORES  FIM Admit/Goal Score   Eating/Swallowing    Grooming    Bathing    Upper Body Dressing    Lower Body Dressing    Toileting    Bladder Renetta, Accidents = FIM    Bowel  Renetta, Accidents = FIM    Bed/Chair Transfer    Toilet Transfer    Tub/Shower Transfer     Locomotion:  Ambulation (Walk) / Wheelchair:  W = walk , w/c = wheelchair  Distance:   1= 0-49 ft , 2=  ft, 3= 150+ ft Distance:    Level of Assist:    Mode:    FIM:       Stairs    Comprehension    Expression    Social Interaction    Problem Solving    Memory         Mahesh Ibarra will be seen a minimum of 3 hours of therapy per day, a minimum of 5 out of 7 days per week. [] In this rare instance due to the nature of this patient's medical involvement, this patient will be seen 15 hours per week (900 minutes within a 7 day period).     Treatments may include therapeutic exercises, gait training, neuromuscular re-ed, transfer training, community reintegration, bed mobility, w/c mobility and training, self Discontinue Regimen: Clobetasol scalp solution (pt never got Rx). care, home mgmt, cognitive training, energy conservation,dysphagia tx, speech/language/communication therapy, group therapy, and patient/family education. In addition, dietician/nutritionist may monitor calorie count as well as intake and collaboratively work with SLP on dietary upgrades. Neuropsychology/Psychology may evaluate and provide necessary support. Medical issues being managed closely and that require 24 hour availability of a physician:   [] Swallowing Precautions  [] Bowel/Bladder Fx  [] Weight bearing precautions   [] Wound Care    [x] Pain Mgmt   [x] Infection Protection   [x] DVT Prophylaxis   [x] Fall Precautions  [x] Fluid/Electrolyte/Nutrition Balance   [] Voice Protection   [] Respiratory  [] Other:    Medical Prognosis: [] Good  [x] Fair    [] Guarded   Total expected IRF days 15  Anticipated discharge destination:    [] Home Independently   [x] Home Modified Independent  [] Home with supervision    []SNF     [] Other                                           Physician anticipated functional outcomes:  Return home with wife and lowest level of assistive device  IPOC brief synthesis: 80 y. o. male with a PMH significant for prostate cancer, ischemic colitis, and diastolic heart failure, who presented to the hospital with constipation and abdominal discomfort for 1 week. He had  CT abdomen and pelvis for workup, this revealed acute L1 compression fracture and progressed L2 compression fracture. Patient has no history of trauma or fall. Patient has no history of bowel or bladder incontinence, or saddle numbness.  Patient was seen by orthopedic spine, and patient elected to proceed conservatively. Patient fitted with a [place] brace, and he should have the brace on when up with activity.  Okay for PT/OT prior to brace arrival. He should Follow up with Dr. Brijesh Walker in 1-2 weeks for repeat xrays. Patient has a past medical history positive for diabetes, hypertension, CAD, hyperlipidemia, chronic kidney Render In Strict Bullet Format?: No Plan: Informed pt that due to contradictory labs, pt needs clearance from their pcp to start Skyrizi.

## 2024-08-20 NOTE — ED PROVIDER NOTES
[address] [facility]  ED  EMERGENCY DEPARTMENT ENCOUNTER        Pt Name: Eboni Melchor  MRN: [id number]  Paulagflisa 3/13/1933  Date of evaluation: 5/14/2019  Provider: AGATHA Garcia - MARY  PCP: Fatimah Cooper    This patient was seen and evaluated by the attending physician Diana Chawla MD.      Geraldine Quiñones       Chief Complaint   Patient presents with    Constipation     Pt reports not having a bowel movement in 10 days. Was recently given pain medications for his DDD. HISTORY OF PRESENT ILLNESS   (Location/Symptom, Timing/Onset, Context/Setting, Quality, Duration, Modifying Factors, Severity)  Note limiting factors. Mahesh Elizabeth is a 80 y.o. male *who presents with 8-10 days of constipation. States he is taking pain medication and it has made him constipated. Denies any fevers, chest pain, shortness of breath, cough, vomiting, pain with urination or diarrhea. States he does have nausea. Rates pain a 9/10. Nursing Notes were all reviewed and agreed with or any disagreements were addressed  in the HPI. REVIEW OF SYSTEMS    (2-9 systems for level 4, 10 or more for level 5)     Review of Systems   Constitutional: Negative for activity change, appetite change, chills, diaphoresis, fatigue and fever. Eyes: Negative. Respiratory: Negative for cough, shortness of breath and wheezing. Cardiovascular: Negative for chest pain, palpitations and leg swelling. Gastrointestinal: Positive for abdominal pain, constipation and nausea. Negative for abdominal distention, diarrhea and vomiting. Endocrine: Negative. Genitourinary: Negative for dysuria, flank pain and hematuria. Musculoskeletal: Negative for back pain, myalgias, neck pain and neck stiffness. Skin: Negative. Allergic/Immunologic: Negative. Neurological: Negative for dizziness, seizures, syncope, weakness, light-headedness, numbness and headaches. Hematological: Negative.     Psychiatric/Behavioral: Negative. Positives and Pertinent negatives as per HPI. Except as noted abovein the ROS, all other systems were reviewed and negative. PAST MEDICAL HISTORY     Past Medical History:   Diagnosis Date    Acute encephalopathy 10/7/2014    Acute lower GI bleeding 10/6/2014    Acute renal failure (ARF) ([address] 10/7/2014    Acute respiratory failure ([address] 11/2/2014    Atrial flutter ([address] 07/11/2015 06/27/2016, AFL DCCV 200 J, successful    Closed right hip fracture ([address] 4/27/4428    Diastolic heart failure ([address] 11/2/2014    Diverticulitis     HCAP (healthcare-associated pneumonia) 11/2/2014    Iron deficiency anemia     Ischemic colitis ([address] 13/38/33    Metabolic encephalopathy 0/66/9104    Nonhealing nonsurgical wound of scalp, limited to breakdown of skin 2/14/2019    Obesity 8/29/2012    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 ([address] 1/7/2014    Pressure ulcer of coccygeal region, stage 2 11/3/2018    Prostate cancer ([address]     Vasovagal syncope     VT (ventricular tachycardia) ([address] 8/13/2012         SURGICAL HISTORY     Past Surgical History:   Procedure Laterality Date    APPENDECTOMY  1939    CHOLECYSTECTOMY      COLONOSCOPY  10/07/2014    Ischemic colitis    CORONARY ARTERY BYPASS GRAFT  1990    PACEMAKER PLACEMENT      PARTIAL HIP ARTHROPLASTY Right 09/24/2016    PROSTATE BIOPSY  08/10/2007    PROSTATECTOMY  2007    radical, with lymph node dissection    REVISION TOTAL KNEE ARTHROPLASTY Left 06/18/2014    TOTAL KNEE ARTHROPLASTY Bilateral 1991    UPPER GASTROINTESTINAL ENDOSCOPY  03/02/10    WNL                 UVULOPALATOPHARYGOPLASTY           CURRENTMEDICATIONS       Previous Medications    AMLODIPINE-BENAZEPRIL (LOTREL) 2.5-10 MG PER CAPSULE    Take 1 capsule by mouth daily     ASPIRIN 81 MG EC TABLET    Take 81 mg by mouth daily. ATORVASTATIN (LIPITOR) 80 MG TABLET    Take 80 mg by mouth every evening.     BETHANECHOL (URECHOLINE) 25 MG TABLET Take 1 tablet by mouth 3 times daily    CALCIUM CARBONATE (OYSTER SHELL CALCIUM 500 MG) 1250 (500 CA) MG TABLET    Take 1 tablet by mouth daily    CONTINUOUS BLOOD GLUC  (FREESTYLE KRISTY 14 DAY READER) TABITHA    1 Units by Does not apply route as needed (as  needed)    CONTINUOUS BLOOD GLUC SENSOR (FREESTYLE KRISTY 14 DAY SENSOR) MISC    2 Units by Does not apply route as needed (use one sensor for 14 days)    DUTASTERIDE (AVODART) 0.5 MG CAPSULE    Take 0.5 mg by mouth twice a week.  Take 0.5mg Monday and Friday     EZETIMIBE (ZETIA) 10 MG TABLET    Take 10 mg by mouth every evening     GLYBURIDE (DIABETA) 5 MG TABLET    Take 5 mg by mouth 2 times daily    INSULIN DETEMIR (LEVEMIR FLEXTOUCH) 100 UNIT/ML INJECTION PEN    Inject 30 Units into the skin every morning    METFORMIN HCL PO    Take 500 mg by mouth 2 times daily    SPIRONOLACTONE (ALDACTONE) 25 MG TABLET        WARFARIN (COUMADIN) 5 MG TABLET    5 mg for the next 7 days         ALLERGIES     Metoprolol    FAMILYHISTORY       Family History   Problem Relation Age of Onset    Stroke Mother     Cancer Sister         stomach    Mental Retardation Brother     Cancer Brother         prostate    Other Father         suicide    Alcohol Abuse Father     Cancer Sister         breast    Other Brother         MVA          SOCIAL HISTORY       Social History     Socioeconomic History    Marital status:      Spouse name: None    Number of children: None    Years of education: None    Highest education level: None   Occupational History    None   Social Needs    Financial resource strain: None    Food insecurity:     Worry: None     Inability: None    Transportation needs:     Medical: None     Non-medical: None   Tobacco Use    Smoking status: Never Smoker    Smokeless tobacco: Never Used   Substance and Sexual Activity    Alcohol use: No    Drug use: No    Sexual activity: Yes     Partners: Female   Lifestyle    Physical activity: Days per week: None     Minutes per session: None    Stress: None   Relationships    Social connections:     Talks on phone: None     Gets together: None     Attends Yazdanism service: None     Active member of club or organization: None     Attends meetings of clubs or organizations: None     Relationship status: None    Intimate partner violence:     Fear of current or ex partner: None     Emotionally abused: None     Physically abused: None     Forced sexual activity: None   Other Topics Concern    None   Social History Narrative    None       SCREENINGS             PHYSICAL EXAM    (up to 7 for level 4, 8 or more for level 5)     ED Triage Vitals [05/14/19 1323]   BP Temp Temp Source Pulse Resp SpO2 Height Weight   (!) 150/63 98.3 °F (36.8 °C) Oral 78 18 92 % 5' 10\" (1.778 m) 190 lb (86.2 kg)       Physical Exam   Constitutional: He is oriented to person, place, and time. He appears well-developed and well-nourished. No distress. HENT:   Head: Normocephalic and atraumatic. Mouth/Throat: Oropharynx is clear and moist. No oropharyngeal exudate. Eyes: Pupils are equal, round, and reactive to light. Conjunctivae and EOM are normal. Right eye exhibits no discharge. Left eye exhibits no discharge. No scleral icterus. Neck: Normal range of motion. Cardiovascular: Normal rate, regular rhythm, normal heart sounds and intact distal pulses. Exam reveals no gallop and no friction rub. No murmur heard. Pulmonary/Chest: Effort normal and breath sounds normal. No stridor. No respiratory distress. He has no wheezes. He has no rales. He exhibits no tenderness. Abdominal: Soft. There is tenderness in the suprapubic area. Genitourinary: Rectum normal.   Genitourinary Comments: No hemorrhoids noted. Stool ball palpated but unable to remove manually. Smog given   Musculoskeletal: Normal range of motion. Lymphadenopathy:     He has no cervical adenopathy.    Neurological: He is alert and oriented to person, Benzoyl Peroxide Counseling: Patient counseled that medicine may cause skin irritation and bleach clothing.  In the event of skin irritation, the patient was advised to reduce the amount of the drug applied or use it less frequently.   The patient verbalized understanding of the proper use and possible adverse effects of benzoyl peroxide.  All of the patient's questions and concerns were addressed. Dapsone Pregnancy And Lactation Text: This medication is Pregnancy Category C and is not considered safe during pregnancy or breast feeding. program.  Efforts were made to edit the dictations but occasionally words are mis-transcribed.)    AGATHA Cosme - MARY (electronically signed)           AGATHA Cosme - CNP  05/14/19 [address] [facility], [address], AGATHA - CNP  05/14/19 [address] [facility], [address], APRN - CNP  05/14/19 1605 Minocycline Pregnancy And Lactation Text: This medication is Pregnancy Category D and not consider safe during pregnancy. It is also excreted in breast milk. Birth Control Pills Pregnancy And Lactation Text: This medication should be avoided if pregnant and for the first 30 days post-partum. High Dose Vitamin A Pregnancy And Lactation Text: High dose vitamin A therapy is contraindicated during pregnancy and breast feeding. Tazorac Pregnancy And Lactation Text: This medication is not safe during pregnancy. It is unknown if this medication is excreted in breast milk. Spironolactone Counseling: Patient advised regarding risks of diarrhea, abdominal pain, hyperkalemia, birth defects (for female patients), liver toxicity and renal toxicity. The patient may need blood work to monitor liver and kidney function and potassium levels while on therapy. The patient verbalized understanding of the proper use and possible adverse effects of spironolactone.  All of the patient's questions and concerns were addressed. Include Pregnancy/Lactation Warning?: No Azelaic Acid Pregnancy And Lactation Text: This medication is considered safe during pregnancy and breast feeding. Doxycycline Counseling:  Patient counseled regarding possible photosensitivity and increased risk for sunburn.  Patient instructed to avoid sunlight, if possible.  When exposed to sunlight, patients should wear protective clothing, sunglasses, and sunscreen.  The patient was instructed to call the office immediately if the following severe adverse effects occur:  hearing changes, easy bruising/bleeding, severe headache, or vision changes.  The patient verbalized understanding of the proper use and possible adverse effects of doxycycline.  All of the patient's questions and concerns were addressed. Erythromycin Pregnancy And Lactation Text: This medication is Pregnancy Category B and is considered safe during pregnancy. It is also excreted in breast milk. Topical Clindamycin Counseling: Patient counseled that this medication may cause skin irritation or allergic reactions.  In the event of skin irritation, the patient was advised to reduce the amount of the drug applied or use it less frequently.   The patient verbalized understanding of the proper use and possible adverse effects of clindamycin.  All of the patient's questions and concerns were addressed. Winlevi Pregnancy And Lactation Text: This medication is considered safe during pregnancy and breastfeeding. Spironolactone Pregnancy And Lactation Text: This medication can cause feminization of the male fetus and should be avoided during pregnancy. The active metabolite is also found in breast milk. Winlevi Counseling:  I discussed with the patient the risks of topical clascoterone including but not limited to erythema, scaling, itching, and stinging. Patient voiced their understanding. Azithromycin Pregnancy And Lactation Text: This medication is considered safe during pregnancy and is also secreted in breast milk. Benzoyl Peroxide Pregnancy And Lactation Text: This medication is Pregnancy Category C. It is unknown if benzoyl peroxide is excreted in breast milk. Aklief Pregnancy And Lactation Text: It is unknown if this medication is safe to use during pregnancy.  It is unknown if this medication is excreted in breast milk.  Breastfeeding women should use the topical cream on the smallest area of the skin for the shortest time needed while breastfeeding.  Do not apply to nipple and areola. Doxycycline Pregnancy And Lactation Text: This medication is Pregnancy Category D and not consider safe during pregnancy. It is also excreted in breast milk but is considered safe for shorter treatment courses. Isotretinoin Pregnancy And Lactation Text: This medication is Pregnancy Category X and is considered extremely dangerous during pregnancy. It is unknown if it is excreted in breast milk. Topical Sulfur Applications Pregnancy And Lactation Text: This medication is Pregnancy Category C and has an unknown safety profile during pregnancy. It is unknown if this topical medication is excreted in breast milk. Sarecycline Counseling: Patient advised regarding possible photosensitivity and discoloration of the teeth, skin, lips, tongue and gums.  Patient instructed to avoid sunlight, if possible.  When exposed to sunlight, patients should wear protective clothing, sunglasses, and sunscreen.  The patient was instructed to call the office immediately if the following severe adverse effects occur:  hearing changes, easy bruising/bleeding, severe headache, or vision changes.  The patient verbalized understanding of the proper use and possible adverse effects of sarecycline.  All of the patient's questions and concerns were addressed. Minocycline Counseling: Patient advised regarding possible photosensitivity and discoloration of the teeth, skin, lips, tongue and gums.  Patient instructed to avoid sunlight, if possible.  When exposed to sunlight, patients should wear protective clothing, sunglasses, and sunscreen.  The patient was instructed to call the office immediately if the following severe adverse effects occur:  hearing changes, easy bruising/bleeding, severe headache, or vision changes.  The patient verbalized understanding of the proper use and possible adverse effects of minocycline.  All of the patient's questions and concerns were addressed. High Dose Vitamin A Counseling: Side effects reviewed, pt to contact office should one occur. Tazorac Counseling:  Patient advised that medication is irritating and drying.  Patient may need to apply sparingly and wash off after an hour before eventually leaving it on overnight.  The patient verbalized understanding of the proper use and possible adverse effects of tazorac.  All of the patient's questions and concerns were addressed. Azelaic Acid Counseling: Patient counseled that medicine may cause skin irritation and to avoid applying near the eyes.  In the event of skin irritation, the patient was advised to reduce the amount of the drug applied or use it less frequently.   The patient verbalized understanding of the proper use and possible adverse effects of azelaic acid.  All of the patient's questions and concerns were addressed. Tetracycline Counseling: Patient counseled regarding possible photosensitivity and increased risk for sunburn.  Patient instructed to avoid sunlight, if possible.  When exposed to sunlight, patients should wear protective clothing, sunglasses, and sunscreen.  The patient was instructed to call the office immediately if the following severe adverse effects occur:  hearing changes, easy bruising/bleeding, severe headache, or vision changes.  The patient verbalized understanding of the proper use and possible adverse effects of tetracycline.  All of the patient's questions and concerns were addressed. Patient understands to avoid pregnancy while on therapy due to potential birth defects. Azithromycin Counseling:  I discussed with the patient the risks of azithromycin including but not limited to GI upset, allergic reaction, drug rash, diarrhea, and yeast infections. Topical Clindamycin Pregnancy And Lactation Text: This medication is Pregnancy Category B and is considered safe during pregnancy. It is unknown if it is excreted in breast milk. Topical Retinoid counseling:  Patient advised to apply a pea-sized amount only at bedtime and wait 30 minutes after washing their face before applying.  If too drying, patient may add a non-comedogenic moisturizer. The patient verbalized understanding of the proper use and possible adverse effects of retinoids.  All of the patient's questions and concerns were addressed. Isotretinoin Counseling: Patient should get monthly blood tests, not donate blood, not drive at night if vision affected, not share medication, and not undergo elective surgery for 6 months after tx completed. Side effects reviewed, pt to contact office should one occur. Detail Level: Detailed Birth Control Pills Counseling: Birth Control Pill Counseling: I discussed with the patient the potential side effects of OCPs including but not limited to increased risk of stroke, heart attack, thrombophlebitis, deep venous thrombosis, hepatic adenomas, breast changes, GI upset, headaches, and depression.  The patient verbalized understanding of the proper use and possible adverse effects of OCPs. All of the patient's questions and concerns were addressed. Dapsone Counseling: I discussed with the patient the risks of dapsone including but not limited to hemolytic anemia, agranulocytosis, rashes, methemoglobinemia, kidney failure, peripheral neuropathy, headaches, GI upset, and liver toxicity.  Patients who start dapsone require monitoring including baseline LFTs and weekly CBCs for the first month, then every month thereafter.  The patient verbalized understanding of the proper use and possible adverse effects of dapsone.  All of the patient's questions and concerns were addressed. Aklief counseling:  Patient advised to apply a pea-sized amount only at bedtime and wait 30 minutes after washing their face before applying.  If too drying, patient may add a non-comedogenic moisturizer.  The most commonly reported side effects including irritation, redness, scaling, dryness, stinging, burning, itching, and increased risk of sunburn.  The patient verbalized understanding of the proper use and possible adverse effects of retinoids.  All of the patient's questions and concerns were addressed. Erythromycin Counseling:  I discussed with the patient the risks of erythromycin including but not limited to GI upset, allergic reaction, drug rash, diarrhea, increase in liver enzymes, and yeast infections. Topical Retinoid Pregnancy And Lactation Text: This medication is Pregnancy Category C. It is unknown if this medication is excreted in breast milk. Topical Sulfur Applications Counseling: Topical Sulfur Counseling: Patient counseled that this medication may cause skin irritation or allergic reactions.  In the event of skin irritation, the patient was advised to reduce the amount of the drug applied or use it less frequently.   The patient verbalized understanding of the proper use and possible adverse effects of topical sulfur application.  All of the patient's questions and concerns were addressed. Bactrim Counseling:  I discussed with the patient the risks of sulfa antibiotics including but not limited to GI upset, allergic reaction, drug rash, diarrhea, dizziness, photosensitivity, and yeast infections.  Rarely, more serious reactions can occur including but not limited to aplastic anemia, agranulocytosis, methemoglobinemia, blood dyscrasias, liver or kidney failure, lung infiltrates or desquamative/blistering drug rashes. Bactrim Pregnancy And Lactation Text: This medication is Pregnancy Category D and is known to cause fetal risk.  It is also excreted in breast milk.

## (undated) DEVICE — TUBING, SUCTION, 3/16" X 12', STRAIGHT: Brand: MEDLINE

## (undated) DEVICE — TRAP,MUCUS SPECIMEN, 80CC: Brand: MEDLINE

## (undated) DEVICE — BOWL MED M 16OZ PLAS CAP GRAD

## (undated) DEVICE — ELECTRODE,RADIOTRANSLUCENT,FOAM,3PK: Brand: MEDLINE

## (undated) DEVICE — SYRINGE MED 50ML LUERSLIP TIP

## (undated) DEVICE — CONMED SCOPE SAVER BITE BLOCK, 20X27 MM: Brand: SCOPE SAVER

## (undated) DEVICE — ENDO CARRY-ON PROCEDURE KIT: Brand: ENDO CARRY-ON PROCEDURE KIT

## (undated) DEVICE — SINGLE USE SUCTION VALVE MAJ-209: Brand: SINGLE USE SUCTION VALVE (STERILE)

## (undated) DEVICE — SYRINGE MED 10ML SLIP TIP BLNT FILL AND LUERLOCK DISP

## (undated) DEVICE — LINER,SOFT,SUCTION CANISTER,1500CC: Brand: MEDLINE

## (undated) DEVICE — YANKAUER,BULB TIP,W/O VENT,RIGID,STERILE: Brand: MEDLINE

## (undated) DEVICE — TUBING, SUCTION, 3/16" X 6', STRAIGHT: Brand: MEDLINE

## (undated) DEVICE — CANNULA,OXY,ADULT,SUPERSOFT,W/7'TUB,SC: Brand: MEDLINE INDUSTRIES, INC.

## (undated) DEVICE — SINGLE USE BIOPSY VALVE MAJ-210: Brand: SINGLE USE BIOPSY VALVE (STERILE)